# Patient Record
Sex: MALE | Race: WHITE | Employment: OTHER | URBAN - NONMETROPOLITAN AREA
[De-identification: names, ages, dates, MRNs, and addresses within clinical notes are randomized per-mention and may not be internally consistent; named-entity substitution may affect disease eponyms.]

---

## 2017-01-05 ENCOUNTER — OFFICE VISIT (OUTPATIENT)
Dept: FAMILY MEDICINE | Facility: OTHER | Age: 82
End: 2017-01-05
Attending: PHYSICIAN ASSISTANT
Payer: COMMERCIAL

## 2017-01-05 VITALS
DIASTOLIC BLOOD PRESSURE: 74 MMHG | SYSTOLIC BLOOD PRESSURE: 122 MMHG | OXYGEN SATURATION: 94 % | HEART RATE: 72 BPM | HEIGHT: 68 IN | BODY MASS INDEX: 26.98 KG/M2 | TEMPERATURE: 96.5 F | WEIGHT: 178 LBS

## 2017-01-05 DIAGNOSIS — E11.9 TYPE 2 DIABETES MELLITUS WITHOUT COMPLICATION, WITHOUT LONG-TERM CURRENT USE OF INSULIN (H): ICD-10-CM

## 2017-01-05 DIAGNOSIS — M17.12 PRIMARY OSTEOARTHRITIS OF LEFT KNEE: ICD-10-CM

## 2017-01-05 DIAGNOSIS — I25.810 CORONARY ARTERY DISEASE INVOLVING CORONARY BYPASS GRAFT OF NATIVE HEART WITHOUT ANGINA PECTORIS: ICD-10-CM

## 2017-01-05 DIAGNOSIS — E11.9 CONTROLLED TYPE 2 DIABETES MELLITUS WITHOUT COMPLICATION, WITHOUT LONG-TERM CURRENT USE OF INSULIN (H): ICD-10-CM

## 2017-01-05 DIAGNOSIS — E03.8 OTHER SPECIFIED HYPOTHYROIDISM: Primary | ICD-10-CM

## 2017-01-05 DIAGNOSIS — E11.9 ENCOUNTER FOR DIABETIC FOOT EXAM (H): ICD-10-CM

## 2017-01-05 DIAGNOSIS — E11.69 TYPE 2 DIABETES MELLITUS WITH OTHER SPECIFIED COMPLICATION (H): ICD-10-CM

## 2017-01-05 LAB
ALBUMIN SERPL-MCNC: 3.6 G/DL (ref 3.4–5)
ALBUMIN UR-MCNC: 10 MG/DL
ALP SERPL-CCNC: 102 U/L (ref 40–150)
ALT SERPL W P-5'-P-CCNC: 24 U/L (ref 0–70)
ANION GAP SERPL CALCULATED.3IONS-SCNC: 9 MMOL/L (ref 3–14)
APPEARANCE UR: CLEAR
AST SERPL W P-5'-P-CCNC: 14 U/L (ref 0–45)
BASOPHILS # BLD AUTO: 0.1 10E9/L (ref 0–0.2)
BASOPHILS NFR BLD AUTO: 1.1 %
BILIRUB SERPL-MCNC: 0.9 MG/DL (ref 0.2–1.3)
BILIRUB UR QL STRIP: NEGATIVE
BUN SERPL-MCNC: 12 MG/DL (ref 7–30)
CALCIUM SERPL-MCNC: 8.6 MG/DL (ref 8.5–10.1)
CHLORIDE SERPL-SCNC: 109 MMOL/L (ref 94–109)
CHOLEST SERPL-MCNC: 132 MG/DL
CO2 SERPL-SCNC: 24 MMOL/L (ref 20–32)
COLOR UR AUTO: YELLOW
CREAT SERPL-MCNC: 1.08 MG/DL (ref 0.66–1.25)
CREAT UR-MCNC: 226 MG/DL
DIFFERENTIAL METHOD BLD: NORMAL
EOSINOPHIL # BLD AUTO: 0.2 10E9/L (ref 0–0.7)
EOSINOPHIL NFR BLD AUTO: 2.6 %
ERYTHROCYTE [DISTWIDTH] IN BLOOD BY AUTOMATED COUNT: 13.4 % (ref 10–15)
EST. AVERAGE GLUCOSE BLD GHB EST-MCNC: 157 MG/DL
GFR SERPL CREATININE-BSD FRML MDRD: 65 ML/MIN/1.7M2
GLUCOSE SERPL-MCNC: 148 MG/DL (ref 70–99)
GLUCOSE UR STRIP-MCNC: NEGATIVE MG/DL
HBA1C MFR BLD: 7.1 % (ref 4.3–6)
HCT VFR BLD AUTO: 46.1 % (ref 40–53)
HDLC SERPL-MCNC: 38 MG/DL
HGB BLD-MCNC: 15.7 G/DL (ref 13.3–17.7)
HGB UR QL STRIP: NEGATIVE
HYALINE CASTS #/AREA URNS LPF: 2 /LPF (ref 0–2)
IMM GRANULOCYTES # BLD: 0 10E9/L (ref 0–0.4)
IMM GRANULOCYTES NFR BLD: 0.5 %
KETONES UR STRIP-MCNC: NEGATIVE MG/DL
LDLC SERPL CALC-MCNC: 64 MG/DL
LEUKOCYTE ESTERASE UR QL STRIP: ABNORMAL
LYMPHOCYTES # BLD AUTO: 1.3 10E9/L (ref 0.8–5.3)
LYMPHOCYTES NFR BLD AUTO: 20.8 %
MCH RBC QN AUTO: 29.1 PG (ref 26.5–33)
MCHC RBC AUTO-ENTMCNC: 34.1 G/DL (ref 31.5–36.5)
MCV RBC AUTO: 86 FL (ref 78–100)
MICROALBUMIN UR-MCNC: 10 MG/L
MICROALBUMIN/CREAT UR: 4.42 MG/G CR (ref 0–17)
MONOCYTES # BLD AUTO: 0.6 10E9/L (ref 0–1.3)
MONOCYTES NFR BLD AUTO: 8.8 %
MUCOUS THREADS #/AREA URNS LPF: PRESENT /LPF
NEUTROPHILS # BLD AUTO: 4.1 10E9/L (ref 1.6–8.3)
NEUTROPHILS NFR BLD AUTO: 66.2 %
NITRATE UR QL: NEGATIVE
NONHDLC SERPL-MCNC: 94 MG/DL
NRBC # BLD AUTO: 0 10*3/UL
NRBC BLD AUTO-RTO: 0 /100
PH UR STRIP: 5.5 PH (ref 4.7–8)
PLATELET # BLD AUTO: 173 10E9/L (ref 150–450)
POTASSIUM SERPL-SCNC: 4 MMOL/L (ref 3.4–5.3)
PROT SERPL-MCNC: 6.9 G/DL (ref 6.8–8.8)
RBC # BLD AUTO: 5.39 10E12/L (ref 4.4–5.9)
RBC #/AREA URNS AUTO: 0 /HPF (ref 0–2)
SODIUM SERPL-SCNC: 142 MMOL/L (ref 133–144)
SP GR UR STRIP: 1.02 (ref 1–1.03)
T4 FREE SERPL-MCNC: 0.86 NG/DL (ref 0.76–1.46)
TRIGL SERPL-MCNC: 152 MG/DL
TSH SERPL DL<=0.005 MIU/L-ACNC: 15.86 MU/L (ref 0.4–4)
URN SPEC COLLECT METH UR: ABNORMAL
UROBILINOGEN UR STRIP-MCNC: 2 MG/DL (ref 0–2)
WBC # BLD AUTO: 6.3 10E9/L (ref 4–11)
WBC #/AREA URNS AUTO: 2 /HPF (ref 0–2)

## 2017-01-05 PROCEDURE — 84443 ASSAY THYROID STIM HORMONE: CPT | Performed by: FAMILY MEDICINE

## 2017-01-05 PROCEDURE — 84439 ASSAY OF FREE THYROXINE: CPT | Performed by: FAMILY MEDICINE

## 2017-01-05 PROCEDURE — 82043 UR ALBUMIN QUANTITATIVE: CPT | Performed by: FAMILY MEDICINE

## 2017-01-05 PROCEDURE — 99212 OFFICE O/P EST SF 10 MIN: CPT

## 2017-01-05 PROCEDURE — 36415 COLL VENOUS BLD VENIPUNCTURE: CPT | Performed by: FAMILY MEDICINE

## 2017-01-05 PROCEDURE — 83036 HEMOGLOBIN GLYCOSYLATED A1C: CPT | Performed by: FAMILY MEDICINE

## 2017-01-05 PROCEDURE — 99214 OFFICE O/P EST MOD 30 MIN: CPT | Performed by: PHYSICIAN ASSISTANT

## 2017-01-05 PROCEDURE — 80061 LIPID PANEL: CPT | Performed by: FAMILY MEDICINE

## 2017-01-05 PROCEDURE — 81001 URINALYSIS AUTO W/SCOPE: CPT | Performed by: FAMILY MEDICINE

## 2017-01-05 PROCEDURE — 40000788 ZZHCL STATISTIC ESTIMATED AVERAGE GLUCOSE: Performed by: FAMILY MEDICINE

## 2017-01-05 PROCEDURE — 85025 COMPLETE CBC W/AUTO DIFF WBC: CPT | Performed by: FAMILY MEDICINE

## 2017-01-05 PROCEDURE — 80053 COMPREHEN METABOLIC PANEL: CPT | Performed by: FAMILY MEDICINE

## 2017-01-05 RX ORDER — ATORVASTATIN CALCIUM 40 MG/1
40 TABLET, FILM COATED ORAL DAILY
Qty: 90 TABLET | Refills: 1 | Status: SHIPPED | OUTPATIENT
Start: 2017-01-05 | End: 2017-07-28 | Stop reason: DRUGHIGH

## 2017-01-05 RX ORDER — LEVOTHYROXINE SODIUM 100 UG/1
100 TABLET ORAL DAILY
Qty: 60 TABLET | Refills: 0 | Status: SHIPPED | OUTPATIENT
Start: 2017-01-05 | End: 2017-01-27 | Stop reason: DRUGHIGH

## 2017-01-05 RX ORDER — METOPROLOL SUCCINATE 50 MG/1
50 TABLET, EXTENDED RELEASE ORAL DAILY
Qty: 90 TABLET | Refills: 1 | Status: SHIPPED | OUTPATIENT
Start: 2017-01-05 | End: 2018-02-16

## 2017-01-05 RX ORDER — METFORMIN HCL 500 MG
TABLET, EXTENDED RELEASE 24 HR ORAL
Qty: 180 TABLET | Refills: 0 | Status: SHIPPED | OUTPATIENT
Start: 2017-01-05 | End: 2017-03-28

## 2017-01-05 RX ORDER — GLIPIZIDE 5 MG/1
TABLET ORAL
Qty: 90 TABLET | Refills: 1 | Status: SHIPPED | OUTPATIENT
Start: 2017-01-05 | End: 2017-04-28 | Stop reason: DRUGHIGH

## 2017-01-05 ASSESSMENT — PATIENT HEALTH QUESTIONNAIRE - PHQ9: 5. POOR APPETITE OR OVEREATING: NOT AT ALL

## 2017-01-05 ASSESSMENT — ANXIETY QUESTIONNAIRES
6. BECOMING EASILY ANNOYED OR IRRITABLE: NOT AT ALL
2. NOT BEING ABLE TO STOP OR CONTROL WORRYING: NOT AT ALL
GAD7 TOTAL SCORE: 0
5. BEING SO RESTLESS THAT IT IS HARD TO SIT STILL: NOT AT ALL
7. FEELING AFRAID AS IF SOMETHING AWFUL MIGHT HAPPEN: NOT AT ALL
1. FEELING NERVOUS, ANXIOUS, OR ON EDGE: NOT AT ALL
3. WORRYING TOO MUCH ABOUT DIFFERENT THINGS: NOT AT ALL

## 2017-01-05 NOTE — NURSING NOTE
"Chief Complaint   Patient presents with     Recheck Medication       Initial /74 mmHg  Pulse 72  Temp(Src) 96.5  F (35.8  C) (Tympanic)  Ht 5' 7.5\" (1.715 m)  Wt 178 lb (80.74 kg)  BMI 27.45 kg/m2  SpO2 94% Estimated body mass index is 27.45 kg/(m^2) as calculated from the following:    Height as of this encounter: 5' 7.5\" (1.715 m).    Weight as of this encounter: 178 lb (80.74 kg).  BP completed using cuff size: charissa Bird LPN      "

## 2017-01-05 NOTE — PROGRESS NOTES
SUBJECTIVE:                                                    Nori Looney is a 82 year old male who presents to clinic today for the following health issues:      Diabetes Follow-up      Patient is checking blood sugars: once a week- averaging highest 165 lowest 120    Diabetic concerns: None     Symptoms of hypoglycemia (low blood sugar): dizzy     Paresthesias (numbness or burning in feet) or sores: No     Date of last diabetic eye exam: 6 months ago     Hypothyroidism Follow-up      Since last visit, patient describes the following symptoms: Weight stable, no hair loss, no skin changes, no constipation, no loose stools       Amount of exercise or physical activity: 6-7 days/week for an average of 45-60 minutes    Problems taking medications regularly: No    Medication side effects: dark spots when he bumps something, pains in hands, fingers and knee    Diet: regular (no restrictions)    Musculoskeletal problem/pain      Duration: left knee     Description  Location: pain in medial knee     Intensity:  moderate    Accompanying signs and symptoms: none    History  Previous similar problem: no   Previous evaluation:  none    Precipitating or alleviating factors:  Trauma or overuse: YES  Aggravating factors include: none    Therapies tried and outcome: nothing       Problem list and histories reviewed & adjusted, as indicated.  Additional history: as documented    Patient Active Problem List   Diagnosis     ACP (advance care planning)     Other specified hypothyroidism     Chronic systolic congestive heart failure (H)     Type 2 diabetes mellitus with other specified complication (H)     Benign essential hypertension     Hyperlipidemia LDL goal <70     Bilateral carotid artery stenosis     Coronary artery disease involving coronary bypass graft of native heart without angina pectoris     Past Surgical History   Procedure Laterality Date     As cabg, artery-vein, five  11/02/2011     off pump       Social History    Substance Use Topics     Smoking status: Never Smoker      Smokeless tobacco: Never Used      Comment: second hand smoke in the house 40 yrs     Alcohol Use: No     Family History   Problem Relation Age of Onset     Coronary Artery Disease Sister      Lung Cancer Sister      CEREBROVASCULAR DISEASE Father      Dementia Mother      Coronary Artery Disease Father      Thyroid Disease Daughter          Current Outpatient Prescriptions   Medication Sig Dispense Refill     levothyroxine (SYNTHROID/LEVOTHROID) 100 MCG tablet Take 1 tablet (100 mcg) by mouth daily 60 tablet 0     glipiZIDE (GLUCOTROL) 5 MG tablet TAKE 1 TABLET BY MOUTH EVERY DAY BEFORE A MEAL 30 tablet 0     metFORMIN (GLUCOPHAGE-XR) 500 MG 24 hr tablet TAKE 1 TABLET BY MOUTH TWICE DAILY WITH MEALS. SWALLOW WHOLE. DO NOT CRUSH OR CHEW 180 tablet 0     ASPIRIN PO Take 325 mg by mouth daily       Atorvastatin Calcium (LIPITOR PO) Take 40 mg by mouth daily       blood glucose monitoring (ACCU-CHEK KEYANNA) test strip by In Vitro route 2 times daily .       METFORMIN HCL PO Take 500 mg by mouth 2 times daily (with meals)       METOPROLOL SUCCINATE ER PO Take 50 mg by mouth daily        NITROGLYCERIN SL Place 0.4 mg under the tongue every 5 minutes as needed for chest pain       meclizine 25 MG CHEW Take 25 mg by mouth 3 times daily as needed for dizziness       [DISCONTINUED] levothyroxine (SYNTHROID/LEVOTHROID) 88 MCG tablet TAKE 1 TABLET(88 MCG) BY MOUTH DAILY 90 tablet 1     No Known Allergies  BP Readings from Last 3 Encounters:   01/05/17 122/74   06/29/16 121/68   06/09/16 161/91    Wt Readings from Last 3 Encounters:   01/05/17 178 lb (80.74 kg)   06/29/16 175 lb (79.379 kg)                  Problem list, Medication list, Allergies, and Medical/Social/Surgical histories reviewed in [facility] and updated as appropriate.    ROS:  Constitutional, neuro, ENT, endocrine, pulmonary, cardiac, gastrointestinal, genitourinary, musculoskeletal, integument and  "psychiatric systems are negative, except as otherwise noted.    OBJECTIVE:                                                    /74 mmHg  Pulse 72  Temp(Src) 96.5  F (35.8  C) (Tympanic)  Ht 5' 7.5\" (1.715 m)  Wt 178 lb (80.74 kg)  BMI 27.45 kg/m2  SpO2 94%  Body mass index is 27.45 kg/(m^2).  GENERAL APPEARANCE: healthy, alert and no distress  EYES: Eyes grossly normal to inspection, PERRL and conjunctivae and sclerae normal  HENT: ear canals and TM's normal and nose and mouth without ulcers or lesions  NECK: no adenopathy, no asymmetry, masses, or scars and thyroid normal to palpation  RESP: lungs clear to auscultation - no rales, rhonchi or wheezes  CV: regular rates and rhythm, normal S1 S2, no S3 or S4 and no murmur, click or rub  LYMPHATICS: normal ant/post cervical and supraclavicular nodes  ABDOMEN: soft, nontender, without hepatosplenomegaly or masses and bowel sounds normal  MS: extremities normal- no gross deformities noted  SKIN: no suspicious lesions or rashes  NEURO: Normal strength and tone, mentation intact and speech normal  DIABETIC FOOT EXAM: normal DP and PT pulses, no trophic changes or ulcerative lesions, normal sensory exam, bunions and loss of great toe nail on left.   PSYCH: mentation appears normal and affect normal/bright  MENTAL STATUS EXAM:  Appearance/Behavior: No apparent distress, Casually groomed and Appears stated age  Speech: Normal  Mood/Affect: normal affect  Insight: Adequate    Diagnostic test results:  Diagnostic Test Results:  Results for orders placed or performed in visit on 01/05/17   TSH with free T4 reflex   Result Value Ref Range    TSH 15.86 (H) 0.40 - 4.00 mU/L   Hemoglobin A1c   Result Value Ref Range    Hemoglobin A1C 7.1 (H) 4.3 - 6.0 %   Lipid Profile (Chol, Trig, HDL, LDL calc)   Result Value Ref Range    Cholesterol 132 <200 mg/dL    Triglycerides 152 (H) <150 mg/dL    HDL Cholesterol 38 (L) >39 mg/dL    LDL Cholesterol Calculated 64 <100 mg/dL    Non " HDL Cholesterol 94 <130 mg/dL   Comprehensive metabolic panel   Result Value Ref Range    Sodium 142 133 - 144 mmol/L    Potassium 4.0 3.4 - 5.3 mmol/L    Chloride 109 94 - 109 mmol/L    Carbon Dioxide 24 20 - 32 mmol/L    Anion Gap 9 3 - 14 mmol/L    Glucose 148 (H) 70 - 99 mg/dL    Urea Nitrogen 12 7 - 30 mg/dL    Creatinine 1.08 0.66 - 1.25 mg/dL    GFR Estimate 65 >60 mL/min/1.7m2    GFR Estimate If Black 79 >60 mL/min/1.7m2    Calcium 8.6 8.5 - 10.1 mg/dL    Bilirubin Total 0.9 0.2 - 1.3 mg/dL    Albumin 3.6 3.4 - 5.0 g/dL    Protein Total 6.9 6.8 - 8.8 g/dL    Alkaline Phosphatase 102 40 - 150 U/L    ALT 24 0 - 70 U/L    AST 14 0 - 45 U/L   CBC with platelets differential   Result Value Ref Range    WBC 6.3 4.0 - 11.0 10e9/L    RBC Count 5.39 4.4 - 5.9 10e12/L    Hemoglobin 15.7 13.3 - 17.7 g/dL    Hematocrit 46.1 40.0 - 53.0 %    MCV 86 78 - 100 fl    MCH 29.1 26.5 - 33.0 pg    MCHC 34.1 31.5 - 36.5 g/dL    RDW 13.4 10.0 - 15.0 %    Platelet Count 173 150 - 450 10e9/L    Diff Method Automated Method     % Neutrophils 66.2 %    % Lymphocytes 20.8 %    % Monocytes 8.8 %    % Eosinophils 2.6 %    % Basophils 1.1 %    % Immature Granulocytes 0.5 %    Nucleated RBCs 0 0 /100    Absolute Neutrophil 4.1 1.6 - 8.3 10e9/L    Absolute Lymphocytes 1.3 0.8 - 5.3 10e9/L    Absolute Monocytes 0.6 0.0 - 1.3 10e9/L    Absolute Eosinophils 0.2 0.0 - 0.7 10e9/L    Absolute Basophils 0.1 0.0 - 0.2 10e9/L    Abs Immature Granulocytes 0.0 0 - 0.4 10e9/L    Absolute Nucleated RBC 0.0    T4 free   Result Value Ref Range    T4 Free 0.86 0.76 - 1.46 ng/dL        ASSESSMENT/PLAN:                                                    1. Other specified hypothyroidism  He comes in today as he needed his thyroid medications refilled.  He was to see his primary Dr. Stuart but was placed on my service.  He has been taking this medications.  Has been active in heart failure clinic with Dr Elizondo and saw her yesterday and no labs were  done.  His TSH has improved from the 60's range to now 15 range.  We will bump up his medication and to be again rechecked in 2 months.  We did make an appointment for him prior to his leaving.   He does admit to trouble with memory and he is going to be giving all information to his grand daughter who now lives with him and she is a nurse.   We did write all his changes out for him on his AVS and when to return.     - TSH with free T4 reflex  - T4 free  - Estimated Average Glucose  - Lipid Profile; Future    2. Type 2 diabetes mellitus with other specified complication (H)  No low sugars and runs in the 180's at highest and 80 at lowest.  He doesn't like how he feels at the 80's as it feels shaky.  We will refill his meds and make no changes with an A1C of 7.1.   He is checking his sugars about 3 times a week.   His foot exam today was good.  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     - TSH with free T4 reflex  - Hemoglobin A1c  - Microalbumin quantitative random urine  - Lipid Profile (Chol, Trig, HDL, LDL calc)  - Comprehensive metabolic panel  - CBC with platelets differential  - UA with Microscopic reflex to Culture  - Albumin Random Urine Quantitative  - Lipid Profile; Future    3. Coronary artery disease involving coronary bypass graft of native heart without angina pectoris  Refill his medications.  Wasn't fasting but denies any sx of muscle aching at all.  At the heart clinic in [place] with Dr. Elizondo.  Not sure if she is filling this or us.  He is needing this done.  We will do this today for him.     - Lipid Profile; Future  - atorvastatin (LIPITOR) 40 MG tablet; Take 1 tablet (40 mg) by mouth daily  Dispense: 90 tablet; Refill: 1  - metoprolol (TOPROL-XL) 50 MG 24 hr tablet; Take 1 tablet (50 mg) by mouth daily  Dispense: 90 tablet; Refill: 1        4. Primary osteoarthritis of left knee  Tylenol and Fransico Boyce for his knee.  Not better we can image.         5.  Type 2 diabetes mellitus without complication, without long-term current use of insulin (H)  Doing well.  As above.    - metFORMIN (GLUCOPHAGE-XR) 500 MG 24 hr tablet; TAKE 1 TABLET BY MOUTH TWICE DAILY WITH MEALS. SWALLOW WHOLE. DO NOT CRUSH OR CHEW  Dispense: 180 tablet; Refill: 0    7. Controlled type 2 diabetes mellitus without complication, without long-term current use of insulin (H)  Doing well as above.   Foot exam documented and good.     - glipiZIDE (GLUCOTROL) 5 MG tablet; TAKE 1 TABLET BY MOUTH EVERY DAY BEFORE A MEAL  Dispense: 90 tablet; Refill: 1      Follow up with Provider - 2 months. For hypothyroidism.      SONAM Eric  [facility]

## 2017-01-05 NOTE — MR AVS SNAPSHOT
After Visit Summary   1/5/2017    Nori Looney    MRN: [id number]           Patient Information     Date Of Birth          2/4/1934        Visit Information        Provider Department      1/5/2017 9:30 AM Thao Bashir PA [facility] [place]        Today's Diagnoses     Coronary artery disease involving coronary bypass graft of native heart without angina pectoris    -  1     Other specified hypothyroidism         Type 2 diabetes mellitus with other specified complication (H)         Controlled type 2 diabetes mellitus without complication, with long-term current use of insulin (H)         Primary osteoarthritis of left knee         Type 2 diabetes mellitus without complication, without long-term current use of insulin (H)         Controlled type 2 diabetes mellitus without complication, without long-term current use of insulin (H)           Care Instructions    Results for orders placed or performed in visit on 01/05/17 (from the past 24 hour(s))   TSH with free T4 reflex   Result Value Ref Range    TSH 15.86 (H) 0.40 - 4.00 mU/L   Hemoglobin A1c   Result Value Ref Range    Hemoglobin A1C 7.1 (H) 4.3 - 6.0 %   Lipid Profile (Chol, Trig, HDL, LDL calc)   Result Value Ref Range    Cholesterol 132 <200 mg/dL    Triglycerides 152 (H) <150 mg/dL    HDL Cholesterol 38 (L) >39 mg/dL    LDL Cholesterol Calculated 64 <100 mg/dL    Non HDL Cholesterol 94 <130 mg/dL   Comprehensive metabolic panel   Result Value Ref Range    Sodium 142 133 - 144 mmol/L    Potassium 4.0 3.4 - 5.3 mmol/L    Chloride 109 94 - 109 mmol/L    Carbon Dioxide 24 20 - 32 mmol/L    Anion Gap 9 3 - 14 mmol/L    Glucose 148 (H) 70 - 99 mg/dL    Urea Nitrogen 12 7 - 30 mg/dL    Creatinine 1.08 0.66 - 1.25 mg/dL    GFR Estimate 65 >60 mL/min/1.7m2    GFR Estimate If Black 79 >60 mL/min/1.7m2    Calcium 8.6 8.5 - 10.1 mg/dL    Bilirubin Total 0.9 0.2 - 1.3 mg/dL    Albumin 3.6 3.4 - 5.0 g/dL    Protein Total 6.9 6.8 - 8.8 g/dL     Alkaline Phosphatase 102 40 - 150 U/L    ALT 24 0 - 70 U/L    AST 14 0 - 45 U/L   CBC with platelets differential   Result Value Ref Range    WBC 6.3 4.0 - 11.0 10e9/L    RBC Count 5.39 4.4 - 5.9 10e12/L    Hemoglobin 15.7 13.3 - 17.7 g/dL    Hematocrit 46.1 40.0 - 53.0 %    MCV 86 78 - 100 fl    MCH 29.1 26.5 - 33.0 pg    MCHC 34.1 31.5 - 36.5 g/dL    RDW 13.4 10.0 - 15.0 %    Platelet Count 173 150 - 450 10e9/L    Diff Method Automated Method     % Neutrophils 66.2 %    % Lymphocytes 20.8 %    % Monocytes 8.8 %    % Eosinophils 2.6 %    % Basophils 1.1 %    % Immature Granulocytes 0.5 %    Nucleated RBCs 0 0 /100    Absolute Neutrophil 4.1 1.6 - 8.3 10e9/L    Absolute Lymphocytes 1.3 0.8 - 5.3 10e9/L    Absolute Monocytes 0.6 0.0 - 1.3 10e9/L    Absolute Eosinophils 0.2 0.0 - 0.7 10e9/L    Absolute Basophils 0.1 0.0 - 0.2 10e9/L    Abs Immature Granulocytes 0.0 0 - 0.4 10e9/L    Absolute Nucleated RBC 0.0    T4 free   Result Value Ref Range    T4 Free 0.86 0.76 - 1.46 ng/dL     We sent in new dose of his medication for his thyroid.  He needs to see Dr. Stuart in 6 to 8 weeks on his new dose of medications.   He should continue with Dr. Elizondo as he has been instructed from Before and watch his weight and salt intake.   We did clean his ears today.  He has wax in them.  May benefit his hearing.           Follow-ups after your visit        Future tests that were ordered for you today     Open Future Orders        Priority Expected Expires Ordered    Lipid Profile Routine  1/5/2018 1/5/2017            Who to contact     If you have questions or need follow up information about today's clinic visit or your schedule please contact [facility] directly at [phone].  Normal or non-critical lab and imaging results will be communicated to you by MyChart, letter or phone within 4 business days after the clinic has received the results. If you do not hear from us within 7 days, please contact the clinic  "through [employer] or phone. If you have a critical or abnormal lab result, we will notify you by phone as soon as possible.  Submit refill requests through [employer] or call your pharmacy and they will forward the refill request to us. Please allow 3 business days for your refill to be completed.          Additional Information About Your Visit        Care EveryWhere ID     This is your Care EveryWhere ID. This could be used by other organizations to access your [facility] medical records  [id number]        Your Vitals Were     Pulse Temperature Height BMI (Body Mass Index) Pulse Oximetry       72 96.5  F (35.8  C) (Tympanic) 5' 7.5\" (1.715 m) 27.45 kg/m2 94%        Blood Pressure from Last 3 Encounters:   01/05/17 122/74   06/29/16 121/68   06/09/16 161/91    Weight from Last 3 Encounters:   01/05/17 178 lb (80.74 kg)   06/29/16 175 lb (79.379 kg)              We Performed the Following     Albumin Random Urine Quantitative     CBC with platelets differential     Comprehensive metabolic panel     Estimated Average Glucose     Hemoglobin A1c     Lipid Profile (Chol, Trig, HDL, LDL calc)     Microalbumin quantitative random urine     T4 free     TSH with free T4 reflex     UA with Microscopic reflex to Culture          Today's Medication Changes          These changes are accurate as of: 1/5/17 10:43 AM.  If you have any questions, ask your nurse or doctor.               These medicines have changed or have updated prescriptions.        Dose/Directions    atorvastatin 40 MG tablet   Commonly known as:  LIPITOR   This may have changed:  medication strength   Used for:  Coronary artery disease involving coronary bypass graft of native heart without angina pectoris   Changed by:  Thao Bashir PA        Dose:  40 mg   Take 1 tablet (40 mg) by mouth daily   Quantity:  90 tablet   Refills:  1       glipiZIDE 5 MG tablet   Commonly known as:  GLUCOTROL   This may have changed:  See the new instructions.   Used for:  " Controlled type 2 diabetes mellitus without complication, without long-term current use of insulin (H)   Changed by:  Thao Bashir PA        TAKE 1 TABLET BY MOUTH EVERY DAY BEFORE A MEAL   Quantity:  90 tablet   Refills:  1       levothyroxine 100 MCG tablet   Commonly known as:  SYNTHROID/LEVOTHROID   This may have changed:  See the new instructions.   Used for:  Controlled type 2 diabetes mellitus without complication, with long-term current use of insulin (H)   Changed by:  Thao Bashir PA        Dose:  100 mcg   Take 1 tablet (100 mcg) by mouth daily   Quantity:  60 tablet   Refills:  0       metFORMIN 500 MG 24 hr tablet   Commonly known as:  GLUCOPHAGE-XR   This may have changed:  See the new instructions.   Used for:  Type 2 diabetes mellitus without complication, without long-term current use of insulin (H)   Changed by:  Thao Bashir PA        TAKE 1 TABLET BY MOUTH TWICE DAILY WITH MEALS. SWALLOW WHOLE. DO NOT CRUSH OR CHEW   Quantity:  180 tablet   Refills:  0       metoprolol 50 MG 24 hr tablet   Commonly known as:  TOPROL-XL   This may have changed:  medication strength   Used for:  Coronary artery disease involving coronary bypass graft of native heart without angina pectoris   Changed by:  Thao Bashir PA        Dose:  50 mg   Take 1 tablet (50 mg) by mouth daily   Quantity:  90 tablet   Refills:  1            Where to get your medicines      These medications [id number] [address] [facility] [address] [facility] [address]  [address] [place] [address] [address]     Phone:  [phone]    - atorvastatin 40 MG tablet  - glipiZIDE 5 MG tablet  - levothyroxine 100 MCG tablet  - metFORMIN 500 MG 24 hr tablet  - metoprolol 50 MG 24 hr tablet             Primary Care Provider Office Phone # Fax #    Bisi Stuart [phone]       [facility] [place] [address]  [place] [address]        Thank you!     Thank  you for choosing [facility]  for your care. Our goal is always to provide you with excellent care. Hearing back from our patients is one way we can continue to improve our services. Please take a few minutes to complete the written survey that you may receive in the mail after your visit with us. Thank you!             Your Updated Medication List - Protect others around you: Learn how to safely use, store and throw away your medicines at www.disposemymeds.org.          This list is accurate as of: 1/5/17 10:43 AM.  Always use your most recent med list.                   Brand Name Dispense Instructions for use    ACCU-CHEK KEYANNA test strip   Generic drug:  blood glucose monitoring      by In Vitro route 2 times daily .       ASPIRIN PO      Take 325 mg by mouth daily       atorvastatin 40 MG tablet    LIPITOR    90 tablet    Take 1 tablet (40 mg) by mouth daily       glipiZIDE 5 MG tablet    GLUCOTROL    90 tablet    TAKE 1 TABLET BY MOUTH EVERY DAY BEFORE A MEAL       levothyroxine 100 MCG tablet    SYNTHROID/LEVOTHROID    60 tablet    Take 1 tablet (100 mcg) by mouth daily       meclizine 25 MG Chew      Take 25 mg by mouth 3 times daily as needed for dizziness       metFORMIN 500 MG 24 hr tablet    GLUCOPHAGE-XR    180 tablet    TAKE 1 TABLET BY MOUTH TWICE DAILY WITH MEALS. SWALLOW WHOLE. DO NOT CRUSH OR CHEW       metoprolol 50 MG 24 hr tablet    TOPROL-XL    90 tablet    Take 1 tablet (50 mg) by mouth daily       NITROGLYCERIN SL      Place 0.4 mg under the tongue every 5 minutes as needed for chest pain

## 2017-01-05 NOTE — PATIENT INSTRUCTIONS
Results for orders placed or performed in visit on 01/05/17 (from the past 24 hour(s))   TSH with free T4 reflex   Result Value Ref Range    TSH 15.86 (H) 0.40 - 4.00 mU/L   Hemoglobin A1c   Result Value Ref Range    Hemoglobin A1C 7.1 (H) 4.3 - 6.0 %   Lipid Profile (Chol, Trig, HDL, LDL calc)   Result Value Ref Range    Cholesterol 132 <200 mg/dL    Triglycerides 152 (H) <150 mg/dL    HDL Cholesterol 38 (L) >39 mg/dL    LDL Cholesterol Calculated 64 <100 mg/dL    Non HDL Cholesterol 94 <130 mg/dL   Comprehensive metabolic panel   Result Value Ref Range    Sodium 142 133 - 144 mmol/L    Potassium 4.0 3.4 - 5.3 mmol/L    Chloride 109 94 - 109 mmol/L    Carbon Dioxide 24 20 - 32 mmol/L    Anion Gap 9 3 - 14 mmol/L    Glucose 148 (H) 70 - 99 mg/dL    Urea Nitrogen 12 7 - 30 mg/dL    Creatinine 1.08 0.66 - 1.25 mg/dL    GFR Estimate 65 >60 mL/min/1.7m2    GFR Estimate If Black 79 >60 mL/min/1.7m2    Calcium 8.6 8.5 - 10.1 mg/dL    Bilirubin Total 0.9 0.2 - 1.3 mg/dL    Albumin 3.6 3.4 - 5.0 g/dL    Protein Total 6.9 6.8 - 8.8 g/dL    Alkaline Phosphatase 102 40 - 150 U/L    ALT 24 0 - 70 U/L    AST 14 0 - 45 U/L   CBC with platelets differential   Result Value Ref Range    WBC 6.3 4.0 - 11.0 10e9/L    RBC Count 5.39 4.4 - 5.9 10e12/L    Hemoglobin 15.7 13.3 - 17.7 g/dL    Hematocrit 46.1 40.0 - 53.0 %    MCV 86 78 - 100 fl    MCH 29.1 26.5 - 33.0 pg    MCHC 34.1 31.5 - 36.5 g/dL    RDW 13.4 10.0 - 15.0 %    Platelet Count 173 150 - 450 10e9/L    Diff Method Automated Method     % Neutrophils 66.2 %    % Lymphocytes 20.8 %    % Monocytes 8.8 %    % Eosinophils 2.6 %    % Basophils 1.1 %    % Immature Granulocytes 0.5 %    Nucleated RBCs 0 0 /100    Absolute Neutrophil 4.1 1.6 - 8.3 10e9/L    Absolute Lymphocytes 1.3 0.8 - 5.3 10e9/L    Absolute Monocytes 0.6 0.0 - 1.3 10e9/L    Absolute Eosinophils 0.2 0.0 - 0.7 10e9/L    Absolute Basophils 0.1 0.0 - 0.2 10e9/L    Abs Immature Granulocytes 0.0 0 - 0.4 10e9/L    Absolute  Nucleated RBC 0.0    T4 free   Result Value Ref Range    T4 Free 0.86 0.76 - 1.46 ng/dL     We sent in new dose of his medication for his thyroid.  He needs to see Dr. Stuart in 6 to 8 weeks on his new dose of medications.   He should continue with Dr. Elizondo as he has been instructed from Before and watch his weight and salt intake.   We did clean his ears today.  He has wax in them.  May benefit his hearing.

## 2017-01-06 ASSESSMENT — PATIENT HEALTH QUESTIONNAIRE - PHQ9: SUM OF ALL RESPONSES TO PHQ QUESTIONS 1-9: 1

## 2017-01-06 ASSESSMENT — ANXIETY QUESTIONNAIRES: GAD7 TOTAL SCORE: 0

## 2017-01-06 NOTE — PROGRESS NOTES
both ear wash done per Thao Bashir. Ear was irrigated with warm water, hydrogen peroxide and rubbing alcohol.  Ear was irrigated until cerumen was removed. Ear was clear, with no redness noted. Patient tolerated procedure well.  Nicole Bird LPN

## 2017-01-22 ENCOUNTER — HOSPITAL ENCOUNTER (EMERGENCY)
Facility: HOSPITAL | Age: 82
Discharge: HOME | End: 2017-01-22
Attending: NURSE PRACTITIONER | Admitting: NURSE PRACTITIONER
Payer: COMMERCIAL

## 2017-01-22 VITALS
DIASTOLIC BLOOD PRESSURE: 79 MMHG | OXYGEN SATURATION: 96 % | SYSTOLIC BLOOD PRESSURE: 151 MMHG | RESPIRATION RATE: 16 BRPM | TEMPERATURE: 96.5 F

## 2017-01-22 DIAGNOSIS — Z79.4 CONTROLLED TYPE 2 DIABETES MELLITUS WITHOUT COMPLICATION, WITH LONG-TERM CURRENT USE OF INSULIN (H): Primary | ICD-10-CM

## 2017-01-22 DIAGNOSIS — M16.11 ARTHRITIS OF RIGHT HIP: ICD-10-CM

## 2017-01-22 DIAGNOSIS — E11.9 CONTROLLED TYPE 2 DIABETES MELLITUS WITHOUT COMPLICATION, WITH LONG-TERM CURRENT USE OF INSULIN (H): Primary | ICD-10-CM

## 2017-01-22 PROCEDURE — 99213 OFFICE O/P EST LOW 20 MIN: CPT | Performed by: NURSE PRACTITIONER

## 2017-01-22 PROCEDURE — 99213 OFFICE O/P EST LOW 20 MIN: CPT

## 2017-01-22 PROCEDURE — 73502 X-RAY EXAM HIP UNI 2-3 VIEWS: CPT | Mod: TC,RT

## 2017-01-22 NOTE — DISCHARGE INSTRUCTIONS
Capsaicin cream or patches  Icy hot patches  Osteoarthritis  Osteoarthritis (also called degenerative joint disease) happens when the cartilage in a joint becomes damaged and worn. This may be due to age, wear and tear, overuse of the joint, or other problems. Osteoarthritis can affect any joint. But it is most common in hands, knees, spine, hips, and feet. Symptoms include joint stiffness, pain, and swelling.  Home care    When a joint is more sore than usual, rest it for a day or two.    Heat can help relieve stiffness. Take a hot bath or apply a heating pad for up to 30 minutes at a time. If symptoms are worse in the morning, using heat just after awakening can help relax the muscle and soothe the joints.     Ice helps relieve pain and swelling. It is often used after activity. Use a cold pack wrapped in a thin cloth on the joint for 10-15 minutes at a time.     Alternating hot and cold can also help relieve pain. Try this for 20 minutes at a time, several times per day.    Exercise helps prevent the muscles and ligaments around the joint from becoming weak. It also helps maintain function in the joint.  Be as active as you can. Talk to your doctor about what activity program is best for you.    Excess weight puts a lot of extra strain on weight-bearing joints of the lower back, hips, knees, feet and ankles. If you are overweight, talk to your doctor about a safe and effective weight loss program.    Use anti-inflammatory medications as prescribed for pain. This incudes acetaminophen or NSAIDs such as ibuprofen or naproxen. If needed, topical or injected medications may be recommended. Talk to your doctor if these options are not enough to manage your pain.    Talk with your doctor about devices that might help improve your function and reduce pain.  Follow-up care  Follow up with your doctor as advised by our staff.  When to seek medical attention  Call your doctor right away if any of the following  occur:    Redness or swelling of a painful joint    Discharge or pus from a painful joint    Fever of 100.4 F (38 C) or higher, or as directed by your healthcare provider    Worsening joint pain    Decreased ability to move the joint or bear weight on the joint    2538-4607 [employer]. [address] [place] [address]24. All rights reserved. This information is not intended as a substitute for professional medical care. Always follow your healthcare professional's instructions.

## 2017-01-22 NOTE — ED AVS SNAPSHOT
[facility]    [address] [address]    Phone:  [phone]                                       Nori Looney   MRN: [id number]    Department:  [facility]   Date of Visit:  1/22/2017           Patient Information     Date Of Birth          2/4/1934        Your diagnoses for this visit were:     Arthritis of right hip        You were seen by Nancy Miranda NP.      Follow-up Information     Follow up with [facility].    Specialty:  EMERGENCY MEDICINE    Why:  As needed, If symptoms worsen, or concerns develop    Contact information:    [address] [place] [address] [phone]    Additional information:    From [place]: Take [address]. Turn left at [address]. Turn left at the first stoplight on [address]. At the first stop sign, take a right onto [address]. Take a left into the parking lot and continue through until you reach the North enterance of the building.       From [place]: Take [address]. Take the [address] ramp towards [place]. Turn left onto [address]. Take a slight right onto [address]. Turn left at the first stoplight on [address]. At the first stop sign, take a right onto [address]. Take a left into the parking lot and continue through until you reach the North enterance of the building.       From [place]: Take [address]. Take a right at [address]. At the first stop sign, take a right onto [address]. Take a left into the parking lot and continue through until you reach the North enterance of the building.         Follow up with Bisi Stuart MD. Call on 1/23/2017.    Specialty:  Family Practice    Why:  to schedule an appointment for follow up    Contact information:    [facility]  [address]  [place] [address]  [phone]          Discharge Instructions         Capsaicin cream or  patches  Icy hot patches  Osteoarthritis  Osteoarthritis (also called degenerative joint disease) happens when the cartilage in a joint becomes damaged and worn. This may be due to age, wear and tear, overuse of the joint, or other problems. Osteoarthritis can affect any joint. But it is most common in hands, knees, spine, hips, and feet. Symptoms include joint stiffness, pain, and swelling.  Home care    When a joint is more sore than usual, rest it for a day or two.    Heat can help relieve stiffness. Take a hot bath or apply a heating pad for up to 30 minutes at a time. If symptoms are worse in the morning, using heat just after awakening can help relax the muscle and soothe the joints.     Ice helps relieve pain and swelling. It is often used after activity. Use a cold pack wrapped in a thin cloth on the joint for 10-15 minutes at a time.     Alternating hot and cold can also help relieve pain. Try this for 20 minutes at a time, several times per day.    Exercise helps prevent the muscles and ligaments around the joint from becoming weak. It also helps maintain function in the joint.  Be as active as you can. Talk to your doctor about what activity program is best for you.    Excess weight puts a lot of extra strain on weight-bearing joints of the lower back, hips, knees, feet and ankles. If you are overweight, talk to your doctor about a safe and effective weight loss program.    Use anti-inflammatory medications as prescribed for pain. This incudes acetaminophen or NSAIDs such as ibuprofen or naproxen. If needed, topical or injected medications may be recommended. Talk to your doctor if these options are not enough to manage your pain.    Talk with your doctor about devices that might help improve your function and reduce pain.  Follow-up care  Follow up with your doctor as advised by our staff.  When to seek medical attention  Call your doctor right away if any of the following occur:    Redness or swelling  of a painful joint    Discharge or pus from a painful joint    Fever of 100.4 F (38 C) or higher, or as directed by your healthcare provider    Worsening joint pain    Decreased ability to move the joint or bear weight on the joint    5016-7072 [employer]. [address] [place] [address]. All rights reserved. This information is not intended as a substitute for professional medical care. Always follow your healthcare professional's instructions.             Review of your medicines      Our records show that you are taking the medicines listed below. If these are incorrect, please call your family doctor or clinic.        Dose / Directions Last dose taken    ACCU-CHEK KEYANNA test strip   Generic drug:  blood glucose monitoring        by In Vitro route 2 times daily .   Refills:  0        ASPIRIN PO   Dose:  325 mg        Take 325 mg by mouth daily   Refills:  0        atorvastatin 40 MG tablet   Commonly known as:  LIPITOR   Dose:  40 mg   Quantity:  90 tablet        Take 1 tablet (40 mg) by mouth daily   Refills:  1        glipiZIDE 5 MG tablet   Commonly known as:  GLUCOTROL   Quantity:  90 tablet        TAKE 1 TABLET BY MOUTH EVERY DAY BEFORE A MEAL   Refills:  1        levothyroxine 100 MCG tablet   Commonly known as:  SYNTHROID/LEVOTHROID   Dose:  100 mcg   Quantity:  60 tablet        Take 1 tablet (100 mcg) by mouth daily   Refills:  0        meclizine 25 MG Chew   Dose:  25 mg        Take 25 mg by mouth 3 times daily as needed for dizziness   Refills:  0        metFORMIN 500 MG 24 hr tablet   Commonly known as:  GLUCOPHAGE-XR   Quantity:  180 tablet        TAKE 1 TABLET BY MOUTH TWICE DAILY WITH MEALS. SWALLOW WHOLE. DO NOT CRUSH OR CHEW   Refills:  0        metoprolol 50 MG 24 hr tablet   Commonly known as:  TOPROL-XL   Dose:  50 mg   Quantity:  90 tablet        Take 1 tablet (50 mg) by mouth daily   Refills:  1        NITROGLYCERIN SL   Dose:  0.4 mg        Place 0.4 mg under the  tongue every 5 minutes as needed for chest pain   Refills:  0                Procedures and tests performed during your visit     XR Pelvis including Hip Right 2-3 Views      Orders Needing Specimen Collection     None      Pending Results     Date and Time Order Name Status Description    1/22/2017 1447 XR Pelvis including Hip Right 2-3 Views In process             Pending Culture Results     No orders found from 1/21/2017 to 1/23/2017.            Thank you for choosing [facility]       Thank you for choosing [facility] for your care. Our goal is always to provide you with excellent care. Hearing back from our patients is one way we can continue to improve our services. Please take a few minutes to complete the written survey that you may receive in the mail after you visit with us. Thank you!        Care EveryWhere ID     This is your Care EveryWhere ID. This could be used by other organizations to access your [facility] medical records  [id number]        After Visit Summary       This is your record. Keep this with you and show to your community pharmacist(s) and doctor(s) at your next visit.

## 2017-01-22 NOTE — ED PROVIDER NOTES
History     Chief Complaint   Patient presents with     Hip Pain     c/o rt hip pain for a few weeks, denies injury     The history is provided by the patient. No  was used.     Nori Looney is a 82 year old male who presents today with a CC of right hip pain x several weeks.  No injury.  He is a farmer, very active, has a history of osteoarthritis.  Pain and stiffness is worse in the morning, improves as gets up and moves during the day.  He takes a daily ASA, no other pain medications.  Today he noted sharp stabbing pain in his right hip and buttocks.  No inciting injury or movements.  The pain was intense for a short time and has since been resolving.  His family was visiting and encouraged him to come in for evaluation.  He notes his left knee has been causing him pain off and on with certain twisting movements - no injury.  He denies numbness or tingling, no saddle paresthesias, no loss of continence.      I have reviewed the Medications, Allergies, Past Medical and Surgical History, and Social History in the Epic system.    Review of Systems   Constitutional: Negative for fever, chills, appetite change and fatigue.   Musculoskeletal: Positive for arthralgias (right hip, left knee).       Physical Exam   BP: 151/79 mmHg  Heart Rate: 75  Temp: 96.5  F (35.8  C)  Resp: 16  SpO2: 96 %    Physical Exam   Constitutional: He is oriented to person, place, and time. He appears well-developed and well-nourished.   HENT:   Head: Normocephalic and atraumatic.   Eyes: Conjunctivae are normal.   Cardiovascular: Normal rate.    Pulmonary/Chest: Effort normal.   Musculoskeletal:        Right hip: He exhibits tenderness and bony tenderness (femoral head). He exhibits normal range of motion, normal strength, no swelling, no deformity and no laceration.        Left knee: Tenderness found. Medial joint line tenderness noted.        Lumbar back: He exhibits no tenderness, no bony tenderness, no swelling, no  laceration and no spasm.   Right hip: No obvious ecchymosis, edema or erythema.  Mildly TTP right SI joint  Bilateral pedal pulses intact  Sensation intact, capillary refill < 3 seconds bilateral LE's  Left knee: No obvious ecchymosis, edema or erythema.  Able to reproduce mild pain with flexion and internal rotation.  No varus or valgus laxity or pain noted   Neurological: He is alert and oriented to person, place, and time.   Psychiatric: He has a normal mood and affect. His behavior is normal.   Nursing note and vitals reviewed.      ED Course   Procedures    Results for orders placed or performed during the hospital encounter of 01/22/17   XR Pelvis including Hip Right 2-3 Views    Narrative    PELVIS AND RIGHT HIP    HISTORY:  An 82-year-old with right hip pain.    FINDINGS:  A total of three views were obtained.  There is moderate to  severe joint space narrowing of the right hip.  There is marginal  osteophytic change at the head/neck junction, with some  chondrocalcinosis.  There is osteoarthritic change of similar to  slightly less severity of the left hip.    IMPRESSION:  MODERATE TO ADVANCED LEFT HIP OSTEARTHRITIS, WITHOUT  EVIDENCE OF FRACTURE OR DISLOCATION.  Exam Date: Jan 22, 2017 02:57:00 PM  Author: RICHARD SAMPSON  This report is final and signed         Assessments & Plan (with Medical Decision Making)     I have reviewed the nursing notes.    I have reviewed the findings, diagnosis, plan and need for follow up with the patient.  ASSESSMENT / PLAN:  (M19.90) Arthritis of right hip  Comment: no acute changes  Plan:  Offered patient ortho consultation as he is quite active and spry for his age, he declines at this time, would like to follow up with PCP first.  States he has a f/u appt for thyroid medication.  Will discuss at that time   Discussed OTC medications - NSAIDS, and topicals such as capsaicin cream.     Keep active, but do not push activity past point of discomfort   Patient and family  verbally educated and given appropriate education sheets for their diagnoses and has no questions.   Take medications as directed.    Return to ED/UC if symptoms increase or concerns develop: red flag symptoms as discussed and per discharge instructions   Call Monday to schedule a follow up appointment with PCP      Discharge Medication List as of 1/22/2017  3:16 PM          Final diagnoses:   Arthritis of right hip       1/22/2017   [facility]      Nancy Miranda NP  01/25/17 1052

## 2017-01-22 NOTE — ED AVS SNAPSHOT
[facility]    [address]    [place] [address]    Phone:  [phone]                                       Nori Looney   MRN: [id number]    Department:  [facility]   Date of Visit:  1/22/2017           After Visit Summary Signature Page     I have received my discharge instructions, and my questions have been answered. I have discussed any challenges I see with this plan with the nurse or doctor.    ..........................................................................................................................................  Patient/Patient Representative Signature      ..........................................................................................................................................  Patient Representative Print Name and Relationship to Patient    ..................................................               ................................................  Date                                            Time    ..........................................................................................................................................  Reviewed by Signature/Title    ...................................................              ..............................................  Date                                                            Time

## 2017-01-23 NOTE — PROGRESS NOTES
Quick Note:    Pelvis and Right Hip XR report routed to PCP, Dr. YONI Neville. Incidental finding - moderate to advanced left hip osteoarthritis.  ______

## 2017-01-24 RX ORDER — GLIPIZIDE 5 MG/1
TABLET ORAL
Qty: 30 TABLET | Refills: 5 | Status: SHIPPED | OUTPATIENT
Start: 2017-01-24 | End: 2017-07-28

## 2017-01-24 NOTE — TELEPHONE ENCOUNTER
Glipizide 5mg tab         Last Written Prescription Date: 12-  Last Fill Quantity: 30, # refills: 0  Last Office Visit with G, P or [facility] prescribing provider:  1-5-2017   Next 5 appointments (look out 90 days)     Jan 27, 2017  9:45 AM   (Arrive by 9:30 AM)   SHORT with Bisi Stuart MD   [facility]    [address]  [facility] [address]   [phone]                   BP Readings from Last 3 Encounters:   01/22/17 151/79   01/05/17 122/74   06/29/16 121/68     MICROL       10   1/5/2017  No results found for this basename: microalbumin  CREATININE   Date Value Ref Range Status   01/05/2017 1.08 0.66 - 1.25 mg/dL Final   ]  GFR ESTIMATE   Date Value Ref Range Status   01/05/2017 65 >60 mL/min/1.7m2 Final     Comment:     Non  GFR Calc   06/09/2016 76 >60 mL/min/1.7m2 Final     Comment:     Non  GFR Calc     GFR ESTIMATE IF BLACK   Date Value Ref Range Status   01/05/2017 79 >60 mL/min/1.7m2 Final     Comment:      GFR Calc   06/09/2016 >90   GFR Calc   >60 mL/min/1.7m2 Final     CHOL      132   1/5/2017  HDL       38   1/5/2017  LDL       64   1/5/2017  TRIG      152   1/5/2017  No results found for this basename: cholhdlratio  AST       14   1/5/2017  ALT       24   1/5/2017  A1C      7.1   1/5/2017  A1C      7.3   6/15/2016  POTASSIUM   Date Value Ref Range Status   01/05/2017 4.0 3.4 - 5.3 mmol/L Final

## 2017-01-25 ASSESSMENT — ENCOUNTER SYMPTOMS
FATIGUE: 0
FEVER: 0
APPETITE CHANGE: 0
ARTHRALGIAS: 1
CHILLS: 0

## 2017-01-27 ENCOUNTER — OFFICE VISIT (OUTPATIENT)
Dept: FAMILY MEDICINE | Facility: OTHER | Age: 82
End: 2017-01-27
Attending: FAMILY MEDICINE
Payer: COMMERCIAL

## 2017-01-27 VITALS
HEART RATE: 74 BPM | HEIGHT: 68 IN | DIASTOLIC BLOOD PRESSURE: 60 MMHG | SYSTOLIC BLOOD PRESSURE: 120 MMHG | BODY MASS INDEX: 25.31 KG/M2 | TEMPERATURE: 97 F | OXYGEN SATURATION: 95 % | WEIGHT: 167 LBS

## 2017-01-27 DIAGNOSIS — Z23 NEED FOR VACCINATION: ICD-10-CM

## 2017-01-27 DIAGNOSIS — E11.9 TYPE 2 DIABETES MELLITUS WITHOUT COMPLICATION, WITHOUT LONG-TERM CURRENT USE OF INSULIN (H): Primary | ICD-10-CM

## 2017-01-27 DIAGNOSIS — E78.5 HYPERLIPIDEMIA LDL GOAL <70: ICD-10-CM

## 2017-01-27 DIAGNOSIS — M16.7 OTHER SECONDARY OSTEOARTHRITIS OF RIGHT HIP: ICD-10-CM

## 2017-01-27 DIAGNOSIS — I10 BENIGN ESSENTIAL HYPERTENSION: ICD-10-CM

## 2017-01-27 DIAGNOSIS — E03.8 OTHER SPECIFIED HYPOTHYROIDISM: ICD-10-CM

## 2017-01-27 DIAGNOSIS — I50.22 CHRONIC SYSTOLIC CONGESTIVE HEART FAILURE (H): ICD-10-CM

## 2017-01-27 PROCEDURE — 99212 OFFICE O/P EST SF 10 MIN: CPT | Mod: 25

## 2017-01-27 PROCEDURE — G0009 ADMIN PNEUMOCOCCAL VACCINE: HCPCS | Performed by: FAMILY MEDICINE

## 2017-01-27 PROCEDURE — 99214 OFFICE O/P EST MOD 30 MIN: CPT | Performed by: FAMILY MEDICINE

## 2017-01-27 PROCEDURE — 90732 PPSV23 VACC 2 YRS+ SUBQ/IM: CPT | Performed by: FAMILY MEDICINE

## 2017-01-27 RX ORDER — LEVOTHYROXINE SODIUM 112 UG/1
112 TABLET ORAL DAILY
Qty: 90 TABLET | Refills: 1 | Status: SHIPPED | OUTPATIENT
Start: 2017-01-27 | End: 2017-06-26

## 2017-01-27 ASSESSMENT — PAIN SCALES - GENERAL: PAINLEVEL: MODERATE PAIN (4)

## 2017-01-27 NOTE — MR AVS SNAPSHOT
After Visit Summary   1/27/2017    Nori Looney    MRN: [id number]           Patient Information     Date Of Birth          2/4/1934        Visit Information        Provider Department      1/27/2017 9:45 AM Bisi Stuart MD [facility]        Today's Diagnoses     Type 2 diabetes mellitus without complication, without long-term current use of insulin (H)    -  1     Other specified hypothyroidism         Chronic systolic congestive heart failure (H)         Benign essential hypertension         Hyperlipidemia LDL goal <70         Other secondary osteoarthritis of right hip           Care Instructions    1.  Follow up in 3 mos  2.  Increase synthroid to 112 mcg and take on an empty stomach first thing in the morning  3.  Orthopedic consult          Follow-ups after your visit        Additional Services     ORTHOPEDICS ADULT REFERRAL       Your provider has referred you to: [facility]  Would like to consider injection    Please be aware that coverage of these services is subject to the terms and limitations of your health insurance plan.  Call member services at your health plan with any benefit or coverage questions.      Please bring the following to your appointment:    >>   Any x-rays, CTs or MRIs which have been performed.  Contact the facility where they were done to arrange for  prior to your scheduled appointment.  Any new CT, MRI or other procedures ordered by your specialist must be performed at a [facility] facility or coordinated by your clinic's referral office.    >>   List of current medications   >>   This referral request   >>   Any documents/labs given to you for this referral                  Follow-up notes from your care team     Return in about 3 months (around 4/27/2017) for DM2 follow up/thyroid.      Who to contact     If you have questions or need follow up information about today's clinic visit or your schedule please contact [facility]  "directly at [phone].  Normal or non-critical lab and imaging results will be communicated to you by MyChart, letter or phone within 4 business days after the clinic has received the results. If you do not hear from us within 7 days, please contact the clinic through MyChart or phone. If you have a critical or abnormal lab result, we will notify you by phone as soon as possible.  Submit refill requests through [employer] or call your pharmacy and they will forward the refill request to us. Please allow 3 business days for your refill to be completed.          Additional Information About Your Visit        Care EveryWhere ID     This is your Care EveryWhere ID. This could be used by other organizations to access your [facility] medical records  [id number]        Your Vitals Were     Pulse Temperature Height BMI (Body Mass Index) Pulse Oximetry       74 97  F (36.1  C) 5' 7.75\" (1.721 m) 25.58 kg/m2 95%        Blood Pressure from Last 3 Encounters:   01/27/17 120/60   01/22/17 151/79   01/05/17 122/74    Weight from Last 3 Encounters:   01/27/17 167 lb (75.751 kg)   01/05/17 178 lb (80.74 kg)   06/29/16 175 lb (79.379 kg)              We Performed the Following     ORTHOPEDICS ADULT REFERRAL          Today's Medication Changes          These changes are accurate as of: 1/27/17 10:12 AM.  If you have any questions, ask your nurse or doctor.               These medicines have changed or have updated prescriptions.        Dose/Directions    levothyroxine 112 MCG tablet   Commonly known as:  SYNTHROID/LEVOTHROID   This may have changed:    - medication strength  - how much to take   Used for:  Other specified hypothyroidism   Changed by:  Bisi Stuart MD        Dose:  112 mcg   Take 1 tablet (112 mcg) by mouth daily   Quantity:  90 tablet   Refills:  1            Where to get your medicines      These medications were sent to [facility] [address] [place] [address] [facility] [address] [place] [address]     Phone:  [phone]    - levothyroxine 112 MCG tablet             Primary Care Provider Office Phone # Fax #    Bisi Stuart [phone]       [facility] [place] [address]  [place] [address]        Thank you!     [facility] you [facility]  [facility] your [facility]. Our goal is always to provide you with excellent care. Hearing back from our patients is one way we can continue to improve our services. Please take a few minutes to complete the written survey that you may receive in the mail after your visit with us. Thank you!             Your Updated Medication List - Protect others around you: Learn how to safely use, store and throw away your medicines at www.disposemymeds.org.          This list is accurate as of: 1/27/17 10:12 AM.  Always use your most recent med list.                   Brand Name Dispense Instructions for use    ACCU-CHEK KEYANNA test strip   Generic drug:  blood glucose monitoring      by In Vitro route 2 times daily .       ASPIRIN PO      Take 325 mg by mouth daily       atorvastatin 40 MG tablet    LIPITOR    90 tablet    Take 1 tablet (40 mg) by mouth daily       * glipiZIDE 5 MG tablet    GLUCOTROL    90 tablet    TAKE 1 TABLET BY MOUTH EVERY DAY BEFORE A MEAL       * glipiZIDE 5 MG tablet    GLUCOTROL    30 tablet    TAKE 1 TABLET BY MOUTH EVERY DAY BEFORE A MEAL       levothyroxine 112 MCG tablet    SYNTHROID/LEVOTHROID    90 tablet    Take 1 tablet (112 mcg) by mouth daily       meclizine 25 MG Chew      Take 25 mg by mouth 3 times daily as needed for dizziness       metFORMIN 500 MG 24 hr tablet    GLUCOPHAGE-XR    180 tablet    TAKE 1 TABLET BY MOUTH TWICE DAILY WITH MEALS. SWALLOW WHOLE. DO NOT CRUSH OR CHEW       metoprolol 50 MG 24 hr tablet    TOPROL-XL    90 tablet    Take 1 tablet (50 mg) by mouth daily       NITROGLYCERIN SL      Place 0.4 mg under the tongue every 5 minutes as  needed for chest pain       * Notice:  This list has 2 medication(s) that are the same as other medications prescribed for you. Read the directions carefully, and ask your doctor or other care provider to review them with you.

## 2017-01-27 NOTE — PROGRESS NOTES
SUBJECTIVE:                                                    Nori Looney is a 82 year old male who presents to clinic today for the following health issues:      Musculoskeletal problem/pain      Duration: 1 week    Description  Location: right hip    Intensity:  moderate    Accompanying signs and symptoms: numbness of right foot    History  Previous similar problem: no   Previous evaluation:  x-ray    Precipitating or alleviating factors:  Trauma or overuse: YES- had twisted leg and pain started after that  Aggravating factors include: sitting    Therapies tried and outcome: acetaminophen and icy hot patches seem to help         Diabetes Follow-up      Patient is checking blood sugars: 1 times a week    Diabetic concerns: None     Symptoms of hypoglycemia (low blood sugar): none     Paresthesias (numbness or burning in feet) or sores: Yes numbness     Date of last diabetic eye exam: 8/2016     Hyperlipidemia Follow-Up      Rate your low fat/cholesterol diet?: fair    Taking statin?  Yes, no muscle aches from statin    Other lipid medications/supplements?:  none     Hypertension Follow-up      Outpatient blood pressures are not being checked.    Low Salt Diet: low salt     Hypothyroidism Follow-up      Since last visit, patient describes the following symptoms: Weight stable, no hair loss, no skin changes, no constipation, no loose stools       Amount of exercise or physical activity: 2-3 days/week for an average of 15-30 minutes    Problems taking medications regularly: No    Medication side effects: none  Diet: regular (no restrictions)     Problem list and histories reviewed & adjusted, as indicated.  Additional history: as documented    Current Outpatient Prescriptions   Medication Sig Dispense Refill     levothyroxine (SYNTHROID/LEVOTHROID) 112 MCG tablet Take 1 tablet (112 mcg) by mouth daily 90 tablet 1     glipiZIDE (GLUCOTROL) 5 MG tablet TAKE 1 TABLET BY MOUTH EVERY DAY BEFORE A MEAL 30 tablet 5      "metFORMIN (GLUCOPHAGE-XR) 500 MG 24 hr tablet TAKE 1 TABLET BY MOUTH TWICE DAILY WITH MEALS. SWALLOW WHOLE. DO NOT CRUSH OR CHEW 180 tablet 0     glipiZIDE (GLUCOTROL) 5 MG tablet TAKE 1 TABLET BY MOUTH EVERY DAY BEFORE A MEAL 90 tablet 1     atorvastatin (LIPITOR) 40 MG tablet Take 1 tablet (40 mg) by mouth daily 90 tablet 1     metoprolol (TOPROL-XL) 50 MG 24 hr tablet Take 1 tablet (50 mg) by mouth daily 90 tablet 1     ASPIRIN PO Take 325 mg by mouth daily       blood glucose monitoring (ACCU-CHEK KEYANNA) test strip by In Vitro route 2 times daily .       NITROGLYCERIN SL Place 0.4 mg under the tongue every 5 minutes as needed for chest pain       meclizine 25 MG CHEW Take 25 mg by mouth 3 times daily as needed for dizziness       [DISCONTINUED] levothyroxine (SYNTHROID/LEVOTHROID) 100 MCG tablet Take 1 tablet (100 mcg) by mouth daily 60 tablet 0     Problem list, Medication list, Allergies, and Medical/Social/Surgical histories reviewed in [facility] and updated as appropriate.    ROS:  Constitutional, HEENT, cardiovascular, pulmonary, gi and gu systems are negative, except as otherwise noted.    OBJECTIVE:                                                    /60 mmHg  Pulse 74  Temp(Src) 97  F (36.1  C)  Ht 5' 7.75\" (1.721 m)  Wt 167 lb (75.751 kg)  BMI 25.58 kg/m2  SpO2 95%  Body mass index is 25.58 kg/(m^2).  GENERAL APPEARANCE: healthy, alert and no distress  RESP: lungs clear to auscultation - no rales, rhonchi or wheezes  CV: regular rates and rhythm, normal S1 S2, no S3 or S4 and no murmur, click or rub  ABDOMEN: soft, nontender, without hepatosplenomegaly or masses and bowel sounds normal  SKIN: no suspicious lesions or rashes  NEURO: Normal strength and tone, mentation intact and speech normal  DIABETIC FOOT EXAM: normal DP and PT pulses, no trophic changes or ulcerative lesions, normal sensory exam and normal monofilament exam  PSYCH: mentation appears normal and affect normal/bright   "     ASSESSMENT/PLAN:                                                    1. Type 2 diabetes mellitus without complication, without long-term current use of insulin (H)  F/u 3 mos  - FOOT EXAM    2. Other specified hypothyroidism  F/u 3 mos, increased dose from   - levothyroxine (SYNTHROID/LEVOTHROID) 112 MCG tablet; Take 1 tablet (112 mcg) by mouth daily  Dispense: 90 tablet; Refill: 1    3. Chronic systolic congestive heart failure (H)  stable    4. Benign essential hypertension  stable    5. Hyperlipidemia LDL goal <70  stable    6. Other secondary osteoarthritis of right hip  Discussed options, he would like to try injection first  - ORTHOPEDICS ADULT REFERRAL    7. Need for vaccination    - Pneumococcal vaccine 23 valent (Pneumovax) [01026]  - 1st  Administration  [11110]    Patient was agreeable to this plan and had no further questions.  See Patient Instructions    Bisi Stuart MD  [facility]

## 2017-01-27 NOTE — PATIENT INSTRUCTIONS
1.  Follow up in 3 mos  2.  Increase synthroid to 112 mcg and take on an empty stomach first thing in the morning  3.  Orthopedic consult

## 2017-01-31 ENCOUNTER — OFFICE VISIT (OUTPATIENT)
Dept: ORTHOPEDICS | Facility: OTHER | Age: 82
End: 2017-01-31
Attending: FAMILY MEDICINE
Payer: COMMERCIAL

## 2017-01-31 VITALS
DIASTOLIC BLOOD PRESSURE: 78 MMHG | HEART RATE: 67 BPM | OXYGEN SATURATION: 97 % | HEIGHT: 67 IN | SYSTOLIC BLOOD PRESSURE: 136 MMHG | WEIGHT: 173 LBS | TEMPERATURE: 97 F | BODY MASS INDEX: 27.15 KG/M2

## 2017-01-31 DIAGNOSIS — M16.11 PRIMARY OSTEOARTHRITIS OF RIGHT HIP: ICD-10-CM

## 2017-01-31 PROCEDURE — 99203 OFFICE O/P NEW LOW 30 MIN: CPT | Performed by: ORTHOPAEDIC SURGERY

## 2017-01-31 PROCEDURE — 99214 OFFICE O/P EST MOD 30 MIN: CPT

## 2017-01-31 RX ORDER — SULINDAC 200 MG/1
200 TABLET ORAL 2 TIMES DAILY WITH MEALS
Qty: 60 TABLET | Refills: 2 | Status: SHIPPED | OUTPATIENT
Start: 2017-01-31 | End: 2017-07-28

## 2017-01-31 ASSESSMENT — PAIN SCALES - GENERAL: PAINLEVEL: SEVERE PAIN (6)

## 2017-01-31 NOTE — PATIENT INSTRUCTIONS
Take Sulindac (Clinoril) as prescribed, with meals.  Discontinue any other NSAIDs that you may be taking such as Advil, Aleve, Ibuprofen, Naproxen, etc..  It is ok to take over the counter Tylenol in an alternating pattern with this medication if needed.    Potential side effects to taking Sulindac include upset stomach, stomach pain, and heartburn (reflux).  Discontinue this medication should you experience these side effects and contact Dr. Stevenson.  Also, if you should notice any blood in your stool or black, tarry stools or decreased urination, please discontinue medication and contact our office.

## 2017-01-31 NOTE — NURSING NOTE
"Chief Complaint   Patient presents with     Musculoskeletal Problem     Complaints of osteoarthritis of right hip. Bisi sahu reffering.       Initial /78 mmHg  Pulse 67  Temp(Src) 97  F (36.1  C) (Tympanic)  Ht 5' 7\" (1.702 m)  Wt 173 lb (78.472 kg)  BMI 27.09 kg/m2  SpO2 97% Estimated body mass index is 27.09 kg/(m^2) as calculated from the following:    Height as of this encounter: 5' 7\" (1.702 m).    Weight as of this encounter: 173 lb (78.472 kg).  BP completed using cuff size: regular  Romina Urrutia LPN      "

## 2017-01-31 NOTE — MR AVS SNAPSHOT
After Visit Summary   1/31/2017    Nori Looney    MRN: [id number]           Patient Information     Date Of Birth          2/4/1934        Visit Information        Provider Department      1/31/2017 2:20 PM David Stevenson MD  ORTHOPEDICS        Today's Diagnoses     Primary osteoarthritis of right hip           Care Instructions    Take Sulindac (Clinoril) as prescribed, with meals.  Discontinue any other NSAIDs that you may be taking such as Advil, Aleve, Ibuprofen, Naproxen, etc..  It is ok to take over the counter Tylenol in an alternating pattern with this medication if needed.    Potential side effects to taking Sulindac include upset stomach, stomach pain, and heartburn (reflux).  Discontinue this medication should you experience these side effects and contact Dr. Stevenson.  Also, if you should notice any blood in your stool or black, tarry stools or decreased urination, please discontinue medication and contact our office.              Follow-ups after your visit        Follow-up notes from your care team     Return in about 4 weeks (around 2/28/2017).      Your next 10 appointments already scheduled     Apr 28, 2017  9:45 AM   (Arrive by 9:30 AM)   SHORT with Bisi Stuart MD   [facility] ([facility])    [address] [place] [address]   [phone]              Who to contact     If you have questions or need follow up information about today's clinic visit or your schedule please contact  ORTHOPEDICS directly at [phone].  Normal or non-critical lab and imaging results will be communicated to you by MyChart, letter or phone within 4 business days after the clinic has received the results. If you do not hear from us within 7 days, please contact the clinic through MyChart or phone. If you have a critical or abnormal lab result, we will notify you by phone as soon as possible.  Submit refill requests through [employer] or call your pharmacy and  "they will forward the refill request to us. Please allow 3 business days for your refill to be completed.          Additional Information About Your Visit        Care EveryWhere ID     This is your Care EveryWhere ID. This could be used by other organizations to access your [facility] medical records  [id number]        Your Vitals Were     Pulse Temperature Height BMI (Body Mass Index) Pulse Oximetry       67 97  F (36.1  C) (Tympanic) 5' 7\" (1.702 m) 27.09 kg/m2 97%        Blood Pressure from Last 3 Encounters:   01/31/17 136/78   01/27/17 120/60   01/22/17 151/79    Weight from Last 3 Encounters:   01/31/17 173 lb (78.472 kg)   01/27/17 167 lb (75.751 kg)   01/05/17 178 lb (80.74 kg)              Today, you had the following     No orders found for display         Today's Medication Changes          These changes are accurate as of: 1/31/17  2:22 PM.  If you have any questions, ask your nurse or doctor.               Start taking these medicines.        Dose/Directions    sulindac 200 MG tablet   Commonly known as:  CLINORIL   Used for:  Primary osteoarthritis of right hip   Started by:  David Stevenson MD        Dose:  200 mg   Take 1 tablet (200 mg) by mouth 2 times daily (with meals)   Quantity:  60 tablet   Refills:  2            Where to get your medicines      These medications were sent to [id number] - [place] [address] [facility] [address]  [address] [facility] [address]     Phone:  [phone]    - sulindac 200 MG tablet             Primary Care Provider Office Phone # Fax #    Bisi Stuart [phone]       [facility] [place] [address]  [place] [address]        Thank you!     Thank you for choosing  ORTHOPEDICS  for your care. Our goal is always to provide you with excellent care. Hearing back from our patients is one way we can continue to improve our services. Please take a few minutes to complete the written " survey that you may receive in the mail after your visit with us. Thank you!             Your Updated Medication List - Protect others around you: Learn how to safely use, store and throw away your medicines at www.disposemymeds.org.          This list is accurate as of: 1/31/17  2:22 PM.  Always use your most recent med list.                   Brand Name Dispense Instructions for use    ACCU-CHEK KEYANNA test strip   Generic drug:  blood glucose monitoring      by In Vitro route 2 times daily .       ASPIRIN PO      Take 325 mg by mouth daily       atorvastatin 40 MG tablet    LIPITOR    90 tablet    Take 1 tablet (40 mg) by mouth daily       * glipiZIDE 5 MG tablet    GLUCOTROL    90 tablet    TAKE 1 TABLET BY MOUTH EVERY DAY BEFORE A MEAL       * glipiZIDE 5 MG tablet    GLUCOTROL    30 tablet    TAKE 1 TABLET BY MOUTH EVERY DAY BEFORE A MEAL       levothyroxine 112 MCG tablet    SYNTHROID/LEVOTHROID    90 tablet    Take 1 tablet (112 mcg) by mouth daily       meclizine 25 MG Chew      Take 25 mg by mouth 3 times daily as needed for dizziness       metFORMIN 500 MG 24 hr tablet    GLUCOPHAGE-XR    180 tablet    TAKE 1 TABLET BY MOUTH TWICE DAILY WITH MEALS. SWALLOW WHOLE. DO NOT CRUSH OR CHEW       metoprolol 50 MG 24 hr tablet    TOPROL-XL    90 tablet    Take 1 tablet (50 mg) by mouth daily       NITROGLYCERIN SL      Place 0.4 mg under the tongue every 5 minutes as needed for chest pain       sulindac 200 MG tablet    CLINORIL    60 tablet    Take 1 tablet (200 mg) by mouth 2 times daily (with meals)       * Notice:  This list has 2 medication(s) that are the same as other medications prescribed for you. Read the directions carefully, and ask your doctor or other care provider to review them with you.

## 2017-01-31 NOTE — PROGRESS NOTES
New Patient Visit  Referral Source: Dr. Stuart  Chief Complaint: Right hip pain    History of Present Illness/Injury: This 82-year-old right-handed diabetic  had no complaints with his right hip until 2 weeks ago when he got down off a tractor and felt the sudden onset of pain in his lateral aspect of the right hip.  The pain has persisted intermittently, grade 6 in severity, dull and throbbing in quality.  It is worse with getting up from a seated position and is less by walking.  Since the injury he has been using a cane for ambulation.  The hip is not helped by taking Tylenol or applying heat.  Since this episode he has also noted intermittent numbness and tingling in the right foot.  He denies a history of neuropathy.    A review of the patient's complete medical/family/social  history, medications, allergies and a two (neurological and musculoskeletal) system review of systems are noncontributory for the presenting problem.  He is status post quintuple bypass in 2011. He is on no anticoagulants.  He has no allergies.    Examination: Elderly-appearing taciturn man in no obvious distress, with appropriate mood and affect.  He brought a cane with him.  Vital signs stable and afebrile.  He was easily able to get up from a seated position and get onto the examination table.The right hip is mildly tender to palpation anteriorly but not laterally or posteriorly.  He can do a straight leg raise against gravity.  The Stinchfield test is mildly positive.  Leg lengths are equal.  Passive range of motion of the right hip reveals 100  of flexion, no internal rotation in 90  of flexion, 25  of external rotation in flexion, and full abduction.  His right ankle pulses are palpable.  Light touch sensation in the right foot is present but he says it is diminished compared to his other foot.    X-ray; plain films of the right hip taken on 1/22/17 show severe DJD manifested as moderate joint space narrowing, femoral  head and acetabular osteophytes, a surceal sign and flattening of the superior femoral head.  The acetabular osteophyte might have a fracture through it.    Impression:   #1.  Primary osteoarthritis right hip  #2.  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  I showed them his x-ray.  We discussed treatment options in the form of NSAIDs, steroid injections, and DANILO.  I recommended we try conservative treatment 1st, in the form of an oral NSAID.  They agreed.  I prescribed sulindac 200 mg twice a day with food after giving him the full NSAID talk.  He knows to stop the medication and notify us if he has  problems with it.  He'll return in 4 weeks for recheck.

## 2017-03-01 ENCOUNTER — OFFICE VISIT (OUTPATIENT)
Dept: ORTHOPEDICS | Facility: OTHER | Age: 82
End: 2017-03-01
Attending: ORTHOPAEDIC SURGERY
Payer: COMMERCIAL

## 2017-03-01 VITALS
TEMPERATURE: 97.7 F | OXYGEN SATURATION: 95 % | WEIGHT: 168 LBS | SYSTOLIC BLOOD PRESSURE: 142 MMHG | HEART RATE: 64 BPM | DIASTOLIC BLOOD PRESSURE: 72 MMHG | HEIGHT: 68 IN | BODY MASS INDEX: 25.46 KG/M2

## 2017-03-01 DIAGNOSIS — M16.11 PRIMARY OSTEOARTHRITIS OF RIGHT HIP: Primary | ICD-10-CM

## 2017-03-01 PROCEDURE — 99212 OFFICE O/P EST SF 10 MIN: CPT | Performed by: ORTHOPAEDIC SURGERY

## 2017-03-01 PROCEDURE — 99212 OFFICE O/P EST SF 10 MIN: CPT

## 2017-03-01 ASSESSMENT — PAIN SCALES - GENERAL: PAINLEVEL: MODERATE PAIN (4)

## 2017-03-01 NOTE — NURSING NOTE
Rechecked B/P patient advised that any B/P over 140 we like to recheck and daughters were present , that patients are always welcome in our clinic without a appointment. Second B/P recorded.   Jia Urbina LPN

## 2017-03-01 NOTE — MR AVS SNAPSHOT
After Visit Summary   3/1/2017    Nori Looney    MRN: [id number]           Patient Information     Date Of Birth          2/4/1934        Visit Information        Provider Department      3/1/2017 1:40 PM David Stevenson MD  ORTHOPEDICS        Today's Diagnoses     Primary osteoarthritis of right hip    -  1      Care Instructions    You must get blood work done before your next prescription renewal in April.  Dr. Stuart can check the blood work results when she sees you in April for your next medical checkup.  Either she or we can give you refills on your sulindac, as long as the blood work is okay.        Follow-ups after your visit        Follow-up notes from your care team     Return if symptoms worsen or fail to improve.      Your next 10 appointments already scheduled     Apr 28, 2017  9:45 AM CDT   (Arrive by 9:30 AM)   SHORT with Bisi Stuart MD   [facility] ([facility])    [address] [place] [address]   [phone]              Who to contact     If you have questions or need follow up information about today's clinic visit or your schedule please contact  ORTHOPEDICS directly at [phone].  Normal or non-critical lab and imaging results will be communicated to you by MyChart, letter or phone within 4 business days after the clinic has received the results. If you do not hear from us within 7 days, please contact the clinic through MyChart or phone. If you have a critical or abnormal lab result, we will notify you by phone as soon as possible.  Submit refill requests through [employer] or call your pharmacy and they will forward the refill request to us. Please allow 3 business days for your refill to be completed.          Additional Information About Your Visit        Care EveryWhere ID     This is your Care EveryWhere ID. This could be used by other organizations to access your [facility] medical records  [id number]        Your Vitals Were   "   Pulse Temperature Height Pulse Oximetry BMI (Body Mass Index)       64 97.7  F (36.5  C) (Tympanic) 5' 8\" (1.727 m) 95% 25.54 kg/m2        Blood Pressure from Last 3 Encounters:   03/01/17 152/82   01/31/17 136/78   01/27/17 120/60    Weight from Last 3 Encounters:   03/01/17 168 lb (76.2 kg)   01/31/17 173 lb (78.5 kg)   01/27/17 167 lb (75.8 kg)              Today, you had the following     No orders found for display       Primary Care Provider Office Phone # Fax #    Bisi Stuart [phone]       [facility] [place] [address]  [place] [address]        Thank you!     Thank you for choosing  ORTHOPEDICS  for your care. Our goal is always to provide you with excellent care. Hearing back from our patients is one way we can continue to improve our services. Please take a few minutes to complete the written survey that you may receive in the mail after your visit with us. Thank you!             Your Updated Medication List - Protect others around you: Learn how to safely use, store and throw away your medicines at www.disposemymeds.org.          This list is accurate as of: 3/1/17  1:57 PM.  Always use your most recent med list.                   Brand Name Dispense Instructions for use    ACCU-CHEK KEYANNA test strip   Generic drug:  blood glucose monitoring      by In Vitro route 2 times daily .       ASPIRIN PO      Take 325 mg by mouth daily       atorvastatin 40 MG tablet    LIPITOR    90 tablet    Take 1 tablet (40 mg) by mouth daily       * glipiZIDE 5 MG tablet    GLUCOTROL    90 tablet    TAKE 1 TABLET BY MOUTH EVERY DAY BEFORE A MEAL       * glipiZIDE 5 MG tablet    GLUCOTROL    30 tablet    TAKE 1 TABLET BY MOUTH EVERY DAY BEFORE A MEAL       levothyroxine 112 MCG tablet    SYNTHROID/LEVOTHROID    90 tablet    Take 1 tablet (112 mcg) by mouth daily       meclizine 25 MG Chew      Take 25 mg by mouth 3 times daily as needed for dizziness       metFORMIN 500 MG 24 hr tablet "    GLUCOPHAGE-XR    180 tablet    TAKE 1 TABLET BY MOUTH TWICE DAILY WITH MEALS. SWALLOW WHOLE. DO NOT CRUSH OR CHEW       metoprolol 50 MG 24 hr tablet    TOPROL-XL    90 tablet    Take 1 tablet (50 mg) by mouth daily       NITROGLYCERIN SL      Place 0.4 mg under the tongue every 5 minutes as needed for chest pain       sulindac 200 MG tablet    CLINORIL    60 tablet    Take 1 tablet (200 mg) by mouth 2 times daily (with meals)       * Notice:  This list has 2 medication(s) that are the same as other medications prescribed for you. Read the directions carefully, and ask your doctor or other care provider to review them with you.

## 2017-03-01 NOTE — PROGRESS NOTES
Chief complaint: Primary osteoarthritis right hip    Subjective: This 83-year-old diabetic  was seen for the 1st time on 1/31/17 upon referral from Dr. Stuart for right hip pain that came on rather suddenly 2 weeks before.  X-rays reveal severe DJD in that joint.  He was not interested in surgical intervention at that time.  He agreed to try oral sulindac.    Today he reports that his stomach has tolerated the sulindac well, and at the sulindac has significantly lessened his right hip pain so now he can do his activities of daily living comfortably.  He is pleased with the results and wishes to stay on the medication.    Examination: Healthy-appearing elderly male in no obvious distress.  He was using no ambulatory appliance.    Impression: Primary osteoarthritis right hip    Plan: He'll continue on sulindac for now.  He has refills to last through April.  Before the prescription can be renewed he must have his BUN, creatinine, AST, and ALT checked.  He sees his PCP in April.  He'll get these labs drawn just before that visit.  Either Dr. Stuart or I can renew his sulindac prescription if the labs are okay.

## 2017-03-01 NOTE — NURSING NOTE
"Chief Complaint   Patient presents with     RECHECK     Follow up right hip.       Initial /82 (BP Location: Right arm, Patient Position: Chair, Cuff Size: Adult Regular)  Pulse 64  Temp 97.7  F (36.5  C) (Tympanic)  Ht 5' 8\" (1.727 m)  Wt 168 lb (76.2 kg)  SpO2 95%  BMI 25.54 kg/m2 Estimated body mass index is 25.54 kg/(m^2) as calculated from the following:    Height as of this encounter: 5' 8\" (1.727 m).    Weight as of this encounter: 168 lb (76.2 kg).  Medication Reconciliation: complete   Romina Urrutia LPN      "

## 2017-03-01 NOTE — PATIENT INSTRUCTIONS
You must get blood work done before your next prescription renewal in April.  Dr. Stuart can check the blood work results when she sees you in April for your next medical checkup.  Either she or we can give you refills on your sulindac, as long as the blood work is okay.

## 2017-03-28 DIAGNOSIS — E11.9 TYPE 2 DIABETES MELLITUS WITHOUT COMPLICATION, WITHOUT LONG-TERM CURRENT USE OF INSULIN (H): ICD-10-CM

## 2017-03-29 RX ORDER — METFORMIN HCL 500 MG
TABLET, EXTENDED RELEASE 24 HR ORAL
Qty: 180 TABLET | Refills: 1 | Status: SHIPPED | OUTPATIENT
Start: 2017-03-29 | End: 2017-10-25

## 2017-03-29 NOTE — TELEPHONE ENCOUNTER
Metformin         Last Written Prescription Date: 12/03/2016  Last Fill Quantity: 180, # refills: 0  Last Office Visit with [facility], [facility] or [facility] prescribing provider:  01/27/2017   Next 5 appointments (look out 90 days)     Apr 28, 2017  9:45 AM CDT   (Arrive by 9:30 AM)   SHORT with Bisi Stuart MD   [facility] ([facility])    [address]  [facility] [address]   [phone]                   BP Readings from Last 3 Encounters:   03/01/17 142/72   01/31/17 136/78   01/27/17 120/60     Lab Results   Component Value Date    MICROL 10 01/05/2017     No results found for: MICROALBUMIN  Creatinine   Date Value Ref Range Status   01/05/2017 1.08 0.66 - 1.25 mg/dL Final   ]  GFR Estimate   Date Value Ref Range Status   01/05/2017 65 >60 mL/min/1.7m2 Final     Comment:     Non  GFR Calc   06/09/2016 76 >60 mL/min/1.7m2 Final     Comment:     Non  GFR Calc     GFR Estimate If Black   Date Value Ref Range Status   01/05/2017 79 >60 mL/min/1.7m2 Final     Comment:      GFR Calc   06/09/2016 >90   GFR Calc   >60 mL/min/1.7m2 Final     Lab Results   Component Value Date    CHOL 132 01/05/2017     Lab Results   Component Value Date    HDL 38 01/05/2017     Lab Results   Component Value Date    LDL 64 01/05/2017     Lab Results   Component Value Date    TRIG 152 01/05/2017     No results found for: CHOLHDLRATIO  Lab Results   Component Value Date    AST 14 01/05/2017     Lab Results   Component Value Date    ALT 24 01/05/2017     Lab Results   Component Value Date    A1C 7.1 01/05/2017    A1C 7.3 06/15/2016     Potassium   Date Value Ref Range Status   01/05/2017 4.0 3.4 - 5.3 mmol/L Final

## 2017-04-28 ENCOUNTER — OFFICE VISIT (OUTPATIENT)
Dept: FAMILY MEDICINE | Facility: OTHER | Age: 82
End: 2017-04-28
Attending: FAMILY MEDICINE
Payer: COMMERCIAL

## 2017-04-28 VITALS
DIASTOLIC BLOOD PRESSURE: 74 MMHG | RESPIRATION RATE: 20 BRPM | SYSTOLIC BLOOD PRESSURE: 126 MMHG | OXYGEN SATURATION: 96 % | WEIGHT: 172 LBS | BODY MASS INDEX: 26.15 KG/M2 | HEART RATE: 72 BPM

## 2017-04-28 DIAGNOSIS — E03.8 OTHER SPECIFIED HYPOTHYROIDISM: ICD-10-CM

## 2017-04-28 DIAGNOSIS — E78.5 HYPERLIPIDEMIA LDL GOAL <70: ICD-10-CM

## 2017-04-28 DIAGNOSIS — E11.9 TYPE 2 DIABETES MELLITUS WITHOUT COMPLICATION, WITHOUT LONG-TERM CURRENT USE OF INSULIN (H): Primary | ICD-10-CM

## 2017-04-28 DIAGNOSIS — I10 BENIGN ESSENTIAL HYPERTENSION: ICD-10-CM

## 2017-04-28 DIAGNOSIS — M17.10 ARTHRITIS OF KNEE: ICD-10-CM

## 2017-04-28 LAB
EST. AVERAGE GLUCOSE BLD GHB EST-MCNC: 171 MG/DL
HBA1C MFR BLD: 7.6 % (ref 4.3–6)
T4 FREE SERPL-MCNC: 1.17 NG/DL (ref 0.76–1.46)
TSH SERPL DL<=0.005 MIU/L-ACNC: 4.88 MU/L (ref 0.4–4)

## 2017-04-28 PROCEDURE — 83036 HEMOGLOBIN GLYCOSYLATED A1C: CPT | Performed by: FAMILY MEDICINE

## 2017-04-28 PROCEDURE — 36415 COLL VENOUS BLD VENIPUNCTURE: CPT | Performed by: FAMILY MEDICINE

## 2017-04-28 PROCEDURE — 84443 ASSAY THYROID STIM HORMONE: CPT | Performed by: FAMILY MEDICINE

## 2017-04-28 PROCEDURE — 99212 OFFICE O/P EST SF 10 MIN: CPT

## 2017-04-28 PROCEDURE — 99214 OFFICE O/P EST MOD 30 MIN: CPT | Performed by: FAMILY MEDICINE

## 2017-04-28 PROCEDURE — 40000788 ZZHCL STATISTIC ESTIMATED AVERAGE GLUCOSE: Performed by: FAMILY MEDICINE

## 2017-04-28 PROCEDURE — 84439 ASSAY OF FREE THYROXINE: CPT | Performed by: FAMILY MEDICINE

## 2017-04-28 ASSESSMENT — PAIN SCALES - GENERAL: PAINLEVEL: MILD PAIN (2)

## 2017-04-28 NOTE — MR AVS SNAPSHOT
After Visit Summary   4/28/2017    Nori Looney    MRN: [id number]           Patient Information     Date Of Birth          2/4/1934        Visit Information        Provider Department      4/28/2017 9:45 AM Bisi Stuart MD [facility] [place]        Today's Diagnoses     Type 2 diabetes mellitus without complication, without long-term current use of insulin (H)    -  1    Other specified hypothyroidism        Hyperlipidemia LDL goal <70        Benign essential hypertension          Care Instructions    1.  Fish oil 2000mg daily  2.  Use tylenol 625-1000 3x/day as needed  3.  Follow up if not improving with knee pain        Follow-ups after your visit        Your next 10 appointments already scheduled     Apr 28, 2017  9:45 AM CDT   (Arrive by 9:30 AM)   SHORT with Bisi Stuart MD   [facility] ([facility])    [address]  [facility] [address]   [phone]            Jul 28, 2017  9:45 AM CDT   (Arrive by 9:30 AM)   SHORT with Bisi Stuart MD   [facility] ([facility])    [address]  [facility] [address]   [phone]              Who to contact     If you have questions or need follow up information about today's clinic visit or your schedule please contact [facility] directly at [phone].  Normal or non-critical lab and imaging results will be communicated to you by MyChart, letter or phone within 4 business days after the clinic has received the results. If you do not hear from us within 7 days, please contact the clinic through MyChart or phone. If you have a critical or abnormal lab result, we will notify you by phone as soon as possible.  Submit refill requests through [employer] or call your pharmacy and they will forward the refill request to us. Please allow 3 business days for your refill to be completed.          Additional Information About Your Visit        MyChart Information     [facility] lets  "you send messages to your doctor, view your test results, renew your prescriptions, schedule appointments and more. To sign up, go to [email or web address] . Click on \"Log in\" on the left side of the screen, which will take you to the Welcome page. Then click on \"Sign up Now\" on the right side of the page.     You will be asked to enter the access code listed below, as well as some personal information. Please follow the directions to create your username and password.     Your access code is: [email or web address]  Expires: 2017  9:44 AM     Your access code will  in 90 days. If you need help or a new code, please call your [facility] clinic or [phone].        Care EveryWhere ID     This is your Care EveryWhere ID. This could be used by other organizations to access your [facility] medical records  [id number]        Your Vitals Were     Pulse Respirations Pulse Oximetry BMI (Body Mass Index)          72 20 96% 26.15 kg/m2         Blood Pressure from Last 3 Encounters:   17 126/74   17 142/72   17 136/78    Weight from Last 3 Encounters:   17 172 lb (78 kg)   17 168 lb (76.2 kg)   17 173 lb (78.5 kg)              We Performed the Following     Hemoglobin A1c     TSH with free T4 reflex          Today's Medication Changes          These changes are accurate as of: 17  9:44 AM.  If you have any questions, ask your nurse or doctor.               These medicines have changed or have updated prescriptions.        Dose/Directions    glipiZIDE 5 MG tablet   Commonly known as:  GLUCOTROL   This may have changed:  Another medication with the same name was removed. Continue taking this medication, and follow the directions you see here.   Used for:  Controlled type 2 diabetes mellitus without complication, with long-term current use of insulin (H)   Changed by:  Bisi Stuart MD        TAKE 1 TABLET BY MOUTH EVERY DAY BEFORE A MEAL   Quantity:  30 tablet   Refills:  5    "             Primary Care Provider Office Phone # Fax #    Bisi Stuart [phone]       [facility] [place] [address]  [place] [address]        Thank you!     Thank you for choosing [facility] [place]  for your care. Our goal is always to provide you with excellent care. Hearing back from our patients is one way we can continue to improve our services. Please take a few minutes to complete the written survey that you may receive in the mail after your visit with us. Thank you!             Your Updated Medication List - Protect others around you: Learn how to safely use, store and throw away your medicines at www.disposemymeds.org.          This list is accurate as of: 4/28/17  9:44 AM.  Always use your most recent med list.                   Brand Name Dispense Instructions for use    ACCU-CHEK KEYANNA test strip   Generic drug:  blood glucose monitoring      by In Vitro route 2 times daily .       ASPIRIN PO      Take 325 mg by mouth daily       atorvastatin 40 MG tablet    LIPITOR    90 tablet    Take 1 tablet (40 mg) by mouth daily       glipiZIDE 5 MG tablet    GLUCOTROL    30 tablet    TAKE 1 TABLET BY MOUTH EVERY DAY BEFORE A MEAL       levothyroxine 112 MCG tablet    SYNTHROID/LEVOTHROID    90 tablet    Take 1 tablet (112 mcg) by mouth daily       metFORMIN 500 MG 24 hr tablet    GLUCOPHAGE-XR    180 tablet    TAKE 1 TABLET BY MOUTH TWICE DAILY WITH MEALS. SWALLOW WHOLE. DO NOT CRUSH OR CHEW       metoprolol 50 MG 24 hr tablet    TOPROL-XL    90 tablet    Take 1 tablet (50 mg) by mouth daily       NITROGLYCERIN SL      Place 0.4 mg under the tongue every 5 minutes as needed for chest pain       sulindac 200 MG tablet    CLINORIL    60 tablet    Take 1 tablet (200 mg) by mouth 2 times daily (with meals)

## 2017-04-28 NOTE — PATIENT INSTRUCTIONS
1.  Fish oil 2000mg daily  2.  Use tylenol 625-1000 3x/day as needed  3.  Follow up if not improving with knee pain

## 2017-04-28 NOTE — PROGRESS NOTES
SUBJECTIVE:                                                    Nori Looney is a 83 year old male who presents to clinic today for the following health issues:      Diabetes Follow-up      Patient is checking blood sugars: 1-2 times a week    Diabetic concerns: None     Symptoms of hypoglycemia (low blood sugar): none     Paresthesias (numbness or burning in feet) or sores: No     Date of last diabetic eye exam: 2016     Hypothyroidism Follow-up      Since last visit, patient describes the following symptoms: Weight stable, no hair loss, no skin changes, no constipation, no loose stools       Amount of exercise or physical activity: walking    Problems taking medications regularly: No    Medication side effects: none    Diet: regular (no restrictions)      Joint Pain     Onset: the last few months    Description:   Location: left knee  Character: Gnawing    Intensity: mild, 4/10    Progression of Symptoms: same    Accompanying Signs & Symptoms:  Other symptoms: none   History:   Previous similar pain: no       Precipitating factors:   Trauma or overuse: no     Alleviating factors:  Improved by: rest/inactivity       Therapies Tried and outcome:       Problem list and histories reviewed & adjusted, as indicated.  Additional history: as documented    Current Outpatient Prescriptions   Medication Sig Dispense Refill     metFORMIN (GLUCOPHAGE-XR) 500 MG 24 hr tablet TAKE 1 TABLET BY MOUTH TWICE DAILY WITH MEALS. SWALLOW WHOLE. DO NOT CRUSH OR CHEW 180 tablet 1     sulindac (CLINORIL) 200 MG tablet Take 1 tablet (200 mg) by mouth 2 times daily (with meals) 60 tablet 2     levothyroxine (SYNTHROID/LEVOTHROID) 112 MCG tablet Take 1 tablet (112 mcg) by mouth daily 90 tablet 1     glipiZIDE (GLUCOTROL) 5 MG tablet TAKE 1 TABLET BY MOUTH EVERY DAY BEFORE A MEAL 30 tablet 5     atorvastatin (LIPITOR) 40 MG tablet Take 1 tablet (40 mg) by mouth daily 90 tablet 1     metoprolol (TOPROL-XL) 50 MG 24 hr tablet Take 1 tablet (50  mg) by mouth daily 90 tablet 1     ASPIRIN PO Take 325 mg by mouth daily       blood glucose monitoring (ACCU-CHEK KEYANNA) test strip by In Vitro route 2 times daily .       NITROGLYCERIN SL Place 0.4 mg under the tongue every 5 minutes as needed for chest pain       [DISCONTINUED] glipiZIDE (GLUCOTROL) 5 MG tablet TAKE 1 TABLET BY MOUTH EVERY DAY BEFORE A MEAL 90 tablet 1     Labs reviewed in EPIC    Reviewed and updated as needed this visit by clinical staff  Tobacco  Allergies  Meds  Med Hx  Surg Hx  Fam Hx  Soc Hx      Reviewed and updated as needed this visit by Provider         ROS:  Constitutional, HEENT, cardiovascular, pulmonary, gi and gu systems are negative, except as otherwise noted.    OBJECTIVE:                                                    /74  Pulse 72  Resp 20  Wt 172 lb (78 kg)  SpO2 96%  BMI 26.15 kg/m2  Body mass index is 26.15 kg/(m^2).  GENERAL APPEARANCE: healthy, alert and no distress  NECK: no adenopathy, no asymmetry, masses, or scars and thyroid normal to palpation  RESP: lungs clear to auscultation - no rales, rhonchi or wheezes  CV: regular rates and rhythm, normal S1 S2, no S3 or S4 and no murmur, click or rub  ABDOMEN: soft, nontender, without hepatosplenomegaly or masses and bowel sounds normal  MS: arthritic changes of the knees bilaterally  PSYCH: mentation appears normal and affect normal/bright       ASSESSMENT/PLAN:                                                    1. Type 2 diabetes mellitus without complication, without long-term current use of insulin (H)  F/u 3 mos  - Hemoglobin A1c    2. Other specified hypothyroidism  Will likely need increased dose and f/u in 3 mos again  - TSH with free T4 reflex    3. Hyperlipidemia LDL goal <70  Add fish oil    4. Benign essential hypertension  stable    5. Arthritis of knee  Correct thyroid, add fish oil, and tylenol prn    Patient was agreeable to this plan and had no further questions.  See Patient  Instructions    Bisi Stuart MD  [facility]

## 2017-04-28 NOTE — LETTER
[facility] [place]  [address]  [place] [address]  [phone]      May 1, 2017      Nori Looney  [address]  [place] [address]        Dear Nori,    Here are the lab results from your recent visit.  Your thyroid level is getting better, continue medication and recheck in 3 months.  Your sugar is higher, watch your sugars and carbohydrates otherwise we may have to increase your medication.    Results for orders placed or performed in visit on 04/28/17   Hemoglobin A1c   Result Value Ref Range    Hemoglobin A1C 7.6 (H) 4.3 - 6.0 %   TSH with free T4 reflex   Result Value Ref Range    TSH 4.88 (H) 0.40 - 4.00 mU/L   Estimated Average Glucose   Result Value Ref Range    Estimated Average Glucose 171 mg/dL   T4 free   Result Value Ref Range    T4 Free 1.17 0.76 - 1.46 ng/dL     If you have any questions please call my office [phone].      Sincerely,    Dr. Bisi Stuart/MATILDE

## 2017-05-08 DIAGNOSIS — E11.9 TYPE 2 DIABETES MELLITUS WITHOUT COMPLICATION, WITHOUT LONG-TERM CURRENT USE OF INSULIN (H): ICD-10-CM

## 2017-05-10 NOTE — TELEPHONE ENCOUNTER
metformin         Last Written Prescription Date: 5/8/17  Last Fill Quantity: 180, # refills: 0  Last Office Visit with [facility], [facility] or [facility] prescribing provider:  4/28/17   Next 5 appointments (look out 90 days)     Jul 28, 2017  9:45 AM CDT   (Arrive by 9:30 AM)   SHORT with Bisi Stuart MD   [facility] ([facility])    [address]  [facility] [address]   [phone]                   BP Readings from Last 3 Encounters:   04/28/17 126/74   03/01/17 142/72   01/31/17 136/78     Lab Results   Component Value Date    MICROL 10 01/05/2017     Lab Results   Component Value Date    UMALCR 4.42 01/05/2017     Creatinine   Date Value Ref Range Status   01/05/2017 1.08 0.66 - 1.25 mg/dL Final   ]  GFR Estimate   Date Value Ref Range Status   01/05/2017 65 >60 mL/min/1.7m2 Final     Comment:     Non  GFR Calc   06/09/2016 76 >60 mL/min/1.7m2 Final     Comment:     Non  GFR Calc     GFR Estimate If Black   Date Value Ref Range Status   01/05/2017 79 >60 mL/min/1.7m2 Final     Comment:      GFR Calc   06/09/2016 >90   GFR Calc   >60 mL/min/1.7m2 Final     Lab Results   Component Value Date    CHOL 132 01/05/2017     Lab Results   Component Value Date    HDL 38 01/05/2017     Lab Results   Component Value Date    LDL 64 01/05/2017     Lab Results   Component Value Date    TRIG 152 01/05/2017     No results found for: CHOLHDLRATIO  Lab Results   Component Value Date    AST 14 01/05/2017     Lab Results   Component Value Date    ALT 24 01/05/2017     Lab Results   Component Value Date    A1C 7.6 04/28/2017    A1C 7.1 01/05/2017    A1C 7.3 06/15/2016     Potassium   Date Value Ref Range Status   01/05/2017 4.0 3.4 - 5.3 mmol/L Final

## 2017-05-11 RX ORDER — METFORMIN HCL 500 MG
TABLET, EXTENDED RELEASE 24 HR ORAL
Qty: 180 TABLET | Refills: 0 | Status: SHIPPED | OUTPATIENT
Start: 2017-05-11 | End: 2017-06-15

## 2017-06-06 DIAGNOSIS — I25.10 CORONARY ARTERY DISEASE INVOLVING NATIVE CORONARY ARTERY OF NATIVE HEART WITHOUT ANGINA PECTORIS: Primary | ICD-10-CM

## 2017-06-06 RX ORDER — NITROGLYCERIN 0.4 MG/1
TABLET SUBLINGUAL
Qty: 25 TABLET | Refills: 0 | Status: SHIPPED | OUTPATIENT
Start: 2017-06-06 | End: 2017-06-26

## 2017-06-06 NOTE — TELEPHONE ENCOUNTER
PCP Dr. Stuart. Last office visit 04/28/17.  Nitro historically entered on patient's current medication list. Please associate dx and sign if appropriate.

## 2017-06-15 ENCOUNTER — OFFICE VISIT (OUTPATIENT)
Dept: FAMILY MEDICINE | Facility: OTHER | Age: 82
End: 2017-06-15
Attending: FAMILY MEDICINE
Payer: COMMERCIAL

## 2017-06-15 VITALS
DIASTOLIC BLOOD PRESSURE: 68 MMHG | WEIGHT: 175 LBS | BODY MASS INDEX: 26.52 KG/M2 | HEIGHT: 68 IN | SYSTOLIC BLOOD PRESSURE: 124 MMHG | TEMPERATURE: 97.5 F | HEART RATE: 80 BPM

## 2017-06-15 DIAGNOSIS — G89.29 CHRONIC PAIN OF BOTH KNEES: Primary | ICD-10-CM

## 2017-06-15 DIAGNOSIS — M25.562 CHRONIC PAIN OF BOTH KNEES: Primary | ICD-10-CM

## 2017-06-15 DIAGNOSIS — M17.0 PRIMARY OSTEOARTHRITIS OF BOTH KNEES: ICD-10-CM

## 2017-06-15 DIAGNOSIS — M25.561 CHRONIC PAIN OF BOTH KNEES: Primary | ICD-10-CM

## 2017-06-15 DIAGNOSIS — M17.10 PRIMARY LOCALIZED OSTEOARTHROSIS, LOWER LEG, UNSPECIFIED LATERALITY: ICD-10-CM

## 2017-06-15 PROCEDURE — 99214 OFFICE O/P EST MOD 30 MIN: CPT

## 2017-06-15 PROCEDURE — 73565 X-RAY EXAM OF KNEES: CPT | Mod: TC

## 2017-06-15 PROCEDURE — 99213 OFFICE O/P EST LOW 20 MIN: CPT | Mod: 25 | Performed by: FAMILY MEDICINE

## 2017-06-15 PROCEDURE — 20610 DRAIN/INJ JOINT/BURSA W/O US: CPT | Performed by: FAMILY MEDICINE

## 2017-06-15 RX ORDER — TRIAMCINOLONE ACETONIDE 40 MG/ML
80 INJECTION, SUSPENSION INTRA-ARTICULAR; INTRAMUSCULAR ONCE
Qty: 2 ML | Refills: 0 | OUTPATIENT
Start: 2017-06-15 | End: 2017-06-15

## 2017-06-15 RX ORDER — LIDOCAINE HYDROCHLORIDE 10 MG/ML
12 INJECTION, SOLUTION INFILTRATION; PERINEURAL ONCE
Qty: 12 ML | Refills: 0 | OUTPATIENT
Start: 2017-06-15 | End: 2017-06-15

## 2017-06-15 ASSESSMENT — PAIN SCALES - GENERAL: PAINLEVEL: MODERATE PAIN (5)

## 2017-06-15 NOTE — PROGRESS NOTES
SUBJECTIVE:                                                    Nori Looney is a 83 year old male who presents to clinic today for the following health issues:      Musculoskeletal problem/pain      Duration: couple    Description  Location: bilateral knees    Intensity:  moderate    Accompanying signs and symptoms: swelling    History  Previous similar problem: YES  Previous evaluation:  none    Precipitating or alleviating factors:  Trauma or overuse: no   Aggravating factors include: walking and overuse    Therapies tried and outcome: nothing     He has been kneeling on them lately and has gotten some swelling    Problem list and histories reviewed & adjusted, as indicated.  Additional history: as documented    Current Outpatient Prescriptions   Medication Sig Dispense Refill     lidocaine 1 % injection 12 mLs by INTRA-ARTICULAR route once for 1 dose 12 mL 0     triamcinolone acetonide (KENALOG) 40 MG/ML injection 2 mLs (80 mg) by INTRA-ARTICULAR route once for 1 dose 2 mL 0     nitroglycerin (NITROSTAT) 0.4 MG sublingual tablet For chest pain place 1 tablet under the tongue every 5 minutes for 3 doses. If symptoms persist 5 minutes after 1st dose call 911. 25 tablet 0     metFORMIN (GLUCOPHAGE-XR) 500 MG 24 hr tablet TAKE 1 TABLET BY MOUTH TWICE DAILY WITH MEALS. SWALLOW WHOLE. DO NOT CRUSH OR CHEW 180 tablet 1     sulindac (CLINORIL) 200 MG tablet Take 1 tablet (200 mg) by mouth 2 times daily (with meals) 60 tablet 2     levothyroxine (SYNTHROID/LEVOTHROID) 112 MCG tablet Take 1 tablet (112 mcg) by mouth daily 90 tablet 1     glipiZIDE (GLUCOTROL) 5 MG tablet TAKE 1 TABLET BY MOUTH EVERY DAY BEFORE A MEAL 30 tablet 5     atorvastatin (LIPITOR) 40 MG tablet Take 1 tablet (40 mg) by mouth daily 90 tablet 1     metoprolol (TOPROL-XL) 50 MG 24 hr tablet Take 1 tablet (50 mg) by mouth daily 90 tablet 1     ASPIRIN PO Take 325 mg by mouth daily       blood glucose monitoring (ACCU-CHEK KEYANNA) test strip by In  "Vitro route 2 times daily .       [DISCONTINUED] metFORMIN (GLUCOPHAGE-XR) 500 MG 24 hr tablet TAKE 1 TABLET BY MOUTH 2 TIMES DAILY WITH MEALS. SWALLOW WHOLE, DO NOT CRUSH OR CHEW 180 tablet 0     Labs reviewed in EPIC    Reviewed and updated as needed this visit by clinical staff  Tobacco  Allergies  Meds  Med Hx  Surg Hx  Fam Hx  Soc Hx      Reviewed and updated as needed this visit by Provider         ROS:  Constitutional, HEENT, cardiovascular, pulmonary, gi and gu systems are negative, except as otherwise noted.    OBJECTIVE:                                                    /68  Pulse 80  Temp 97.5  F (36.4  C)  Ht 5' 8\" (1.727 m)  Wt 175 lb (79.4 kg)  BMI 26.61 kg/m2  Body mass index is 26.61 kg/(m^2).  GENERAL APPEARANCE: healthy, alert and no distress  MS: arthritic changes of the knees bilaterally, with mild effusion  PSYCH: mentation appears normal and affect normal/bright       ASSESSMENT/PLAN:                                                    1. Chronic pain of both knees  Moderate-severe arthritis noted on xray today  - XR Knee AP Standing Bilateral (Clinic Performed)    2. Primary osteoarthritis of both knees    - Large Joint/Bursa injection and/or drainage (Shoulder, Knee)  - Kenalog  80 MG         []  - Lidocaine 1% or 2%     []  - lidocaine 1 % injection; 12 mLs by INTRA-ARTICULAR route once for 1 dose  Dispense: 12 mL; Refill: 0  - triamcinolone acetonide (KENALOG) 40 MG/ML injection; 2 mLs (80 mg) by INTRA-ARTICULAR route once for 1 dose  Dispense: 2 mL; Refill: 0    3. Primary localized osteoarthrosis, lower leg, unspecified laterality  F/u 3-12 mos  - Large Joint/Bursa injection and/or drainage (Shoulder, Knee)  - Kenalog  80 MG         []  - Lidocaine 1% or 2%     []  - lidocaine 1 % injection; 12 mLs by INTRA-ARTICULAR route once for 1 dose  Dispense: 12 mL; Refill: 0  - triamcinolone acetonide (KENALOG) 40 MG/ML injection; 2 mLs (80 mg) by " INTRA-ARTICULAR route once for 1 dose  Dispense: 2 mL; Refill: 0    Patient was agreeable to this plan and had no further questions.  See Patient Instructions    Bisi Stuart MD  [facility]

## 2017-06-15 NOTE — MR AVS SNAPSHOT
"              After Visit Summary   6/15/2017    Nori Looney    MRN: [id number]           Patient Information     Date Of Birth          2/4/1934        Visit Information        Provider Department      6/15/2017 10:30 AM Bisi Stuart MD [facility] [place]        Today's Diagnoses     Chronic pain of both knees    -  1    Primary osteoarthritis of both knees        Primary localized osteoarthrosis, lower leg, unspecified laterality           Follow-ups after your visit        Your next 10 appointments already scheduled     Jul 28, 2017  9:45 AM CDT   (Arrive by 9:30 AM)   SHORT with Bisi Stuart MD   [facility] ([facility] )    [address]  [facility] [address]   [phone]              Who to contact     If you have questions or need follow up information about today's clinic visit or your schedule please contact [facility] directly at [phone].  Normal or non-critical lab and imaging results will be communicated to you by MyChart, letter or phone within 4 business days after the clinic has received the results. If you do not hear from us within 7 days, please contact the clinic through [employer] or phone. If you have a critical or abnormal lab result, we will notify you by phone as soon as possible.  Submit refill requests through [employer] or call your pharmacy and they will forward the refill request to us. Please allow 3 business days for your refill to be completed.          Additional Information About Your Visit        [employer] Information     [employer] lets you send messages to your doctor, view your test results, renew your prescriptions, schedule appointments and more. To sign up, go to [email or web address] . Click on \"Log in\" on the left side of the screen, which will take you to the Welcome page. Then click on \"Sign up Now\" on the right side of the page.     You will be asked to enter the access code listed below, as well as " "some personal information. Please follow the directions to create your username and password.     Your access code is: [email or web address]  Expires: 2017  9:44 AM     Your access code will  in 90 days. If you need help or a new code, please call your [facility] clinic or [phone].        Care EveryWhere ID     This is your Care EveryWhere ID. This could be used by other organizations to access your [facility] medical records  [id number]        Your Vitals Were     Pulse Temperature Height BMI (Body Mass Index)          80 97.5  F (36.4  C) 5' 8\" (1.727 m) 26.61 kg/m2         Blood Pressure from Last 3 Encounters:   06/15/17 124/68   17 126/74   17 142/72    Weight from Last 3 Encounters:   06/15/17 175 lb (79.4 kg)   17 172 lb (78 kg)   17 168 lb (76.2 kg)              We Performed the Following     Kenalog  80 MG         []     Large Joint/Bursa injection and/or drainage (Shoulder, Knee)     Lidocaine 1% or 2%     []     XR Knee AP Standing Bilateral (Clinic Performed)          Today's Medication Changes          These changes are accurate as of: 6/15/17 12:38 PM.  If you have any questions, ask your nurse or doctor.               Start taking these medicines.        Dose/Directions    lidocaine 1 % injection   Used for:  Primary osteoarthritis of both knees, Primary localized osteoarthrosis, lower leg, unspecified laterality   Started by:  Bisi Stuart MD        Dose:  12 mL   12 mLs by INTRA-ARTICULAR route once for 1 dose   Quantity:  12 mL   Refills:  0       triamcinolone acetonide 40 MG/ML injection   Commonly known as:  KENALOG   Used for:  Primary osteoarthritis of both knees, Primary localized osteoarthrosis, lower leg, unspecified laterality   Started by:  Bisi Stuart MD        Dose:  80 mg   2 mLs (80 mg) by INTRA-ARTICULAR route once for 1 dose   Quantity:  2 mL   Refills:  0         These medicines have changed or have updated prescriptions.     "    Dose/Directions    metFORMIN 500 MG 24 hr tablet   Commonly known as:  GLUCOPHAGE-XR   This may have changed:  Another medication with the same name was removed. Continue taking this medication, and follow the directions you see here.   Used for:  Type 2 diabetes mellitus without complication, without long-term current use of insulin (H)   Changed by:  Bisi Stuart MD        TAKE 1 TABLET BY MOUTH TWICE DAILY WITH MEALS. SWALLOW WHOLE. DO NOT CRUSH OR CHEW   Quantity:  180 tablet   Refills:  1            Where to get your medicines      Some of these will need a paper prescription and others can be bought over the counter.  Ask your nurse if you have questions.     You don't need a prescription for these medications     lidocaine 1 % injection    triamcinolone acetonide 40 MG/ML injection                Primary Care Provider Office Phone # Fax #    Bisi Stuart [phone]       [facility] [place] [address] [address]        Thank you!     Thank you for choosing [facility]  for your care. Our goal is always to provide you with excellent care. Hearing back from our patients is one way we can continue to improve our services. Please take a few minutes to complete the written survey that you may receive in the mail after your visit with us. Thank you!             Your Updated Medication List - Protect others around you: Learn how to safely use, store and throw away your medicines at www.disposemymeds.org.          This list is accurate as of: 6/15/17 12:38 PM.  Always use your most recent med list.                   Brand Name Dispense Instructions for use    ACCU-CHEK KEYANNA test strip   Generic drug:  blood glucose monitoring      by In Vitro route 2 times daily .       ASPIRIN PO      Take 325 mg by mouth daily       atorvastatin 40 MG tablet    LIPITOR    90 tablet    Take 1 tablet (40 mg) by mouth daily       glipiZIDE 5 MG tablet    GLUCOTROL    30  tablet    TAKE 1 TABLET BY MOUTH EVERY DAY BEFORE A MEAL       levothyroxine 112 MCG tablet    SYNTHROID/LEVOTHROID    90 tablet    Take 1 tablet (112 mcg) by mouth daily       lidocaine 1 % injection     12 mL    12 mLs by INTRA-ARTICULAR route once for 1 dose       metFORMIN 500 MG 24 hr tablet    GLUCOPHAGE-XR    180 tablet    TAKE 1 TABLET BY MOUTH TWICE DAILY WITH MEALS. SWALLOW WHOLE. DO NOT CRUSH OR CHEW       metoprolol 50 MG 24 hr tablet    TOPROL-XL    90 tablet    Take 1 tablet (50 mg) by mouth daily       nitroglycerin 0.4 MG sublingual tablet    NITROSTAT    25 tablet    For chest pain place 1 tablet under the tongue every 5 minutes for 3 doses. If symptoms persist 5 minutes after 1st dose call 911.       sulindac 200 MG tablet    CLINORIL    60 tablet    Take 1 tablet (200 mg) by mouth 2 times daily (with meals)       triamcinolone acetonide 40 MG/ML injection    KENALOG    2 mL    2 mLs (80 mg) by INTRA-ARTICULAR route once for 1 dose

## 2017-06-15 NOTE — NURSING NOTE
"Chief Complaint   Patient presents with     Knee Pain       Initial /68  Pulse 80  Temp 97.5  F (36.4  C)  Ht 5' 8\" (1.727 m)  Wt 175 lb (79.4 kg)  BMI 26.61 kg/m2 Estimated body mass index is 26.61 kg/(m^2) as calculated from the following:    Height as of this encounter: 5' 8\" (1.727 m).    Weight as of this encounter: 175 lb (79.4 kg).  Medication Reconciliation: complete     Fernando Brown      "

## 2017-06-26 DIAGNOSIS — I25.10 CORONARY ARTERY DISEASE INVOLVING NATIVE CORONARY ARTERY OF NATIVE HEART WITHOUT ANGINA PECTORIS: ICD-10-CM

## 2017-06-26 DIAGNOSIS — E03.8 OTHER SPECIFIED HYPOTHYROIDISM: ICD-10-CM

## 2017-06-26 RX ORDER — LEVOTHYROXINE SODIUM 112 UG/1
112 TABLET ORAL DAILY
Qty: 90 TABLET | Refills: 1 | Status: SHIPPED | OUTPATIENT
Start: 2017-06-26 | End: 2017-09-21

## 2017-06-26 RX ORDER — NITROGLYCERIN 0.4 MG/1
TABLET SUBLINGUAL
Qty: 25 TABLET | Refills: 0 | Status: SHIPPED | OUTPATIENT
Start: 2017-06-26 | End: 2022-01-13

## 2017-07-18 DIAGNOSIS — E11.9 CONTROLLED TYPE 2 DIABETES MELLITUS WITHOUT COMPLICATION, WITHOUT LONG-TERM CURRENT USE OF INSULIN (H): ICD-10-CM

## 2017-07-19 RX ORDER — GLIPIZIDE 5 MG/1
TABLET ORAL
Qty: 90 TABLET | Refills: 1 | Status: SHIPPED | OUTPATIENT
Start: 2017-07-19 | End: 2018-01-29

## 2017-07-19 NOTE — TELEPHONE ENCOUNTER
Glipizide          Last Written Prescription Date: 1/24/2017  Last Fill Quantity: 90, # refills: 0  Last Office Visit with FMG, UMP or  Health prescribing provider:  6/15/2017   Next 5 appointments (look out 90 days)     Jul 28, 2017  9:45 AM CDT   (Arrive by 9:30 AM)   SHORT with Bisi Stuart MD   [facility] [place] ([facility] [place] )    [address]  [place] [address]   [phone]                   BP Readings from Last 3 Encounters:   06/15/17 124/68   04/28/17 126/74   03/01/17 142/72     Lab Results   Component Value Date    MICROL 10 01/05/2017     Lab Results   Component Value Date    UMALCR 4.42 01/05/2017     Creatinine   Date Value Ref Range Status   01/05/2017 1.08 0.66 - 1.25 mg/dL Final   ]  GFR Estimate   Date Value Ref Range Status   01/05/2017 65 >60 mL/min/1.7m2 Final     Comment:     Non  GFR Calc   06/09/2016 76 >60 mL/min/1.7m2 Final     Comment:     Non  GFR Calc     GFR Estimate If Black   Date Value Ref Range Status   01/05/2017 79 >60 mL/min/1.7m2 Final     Comment:      GFR Calc   06/09/2016 >90   GFR Calc   >60 mL/min/1.7m2 Final     Lab Results   Component Value Date    CHOL 132 01/05/2017     Lab Results   Component Value Date    HDL 38 01/05/2017     Lab Results   Component Value Date    LDL 64 01/05/2017     Lab Results   Component Value Date    TRIG 152 01/05/2017     No results found for: CHOLHDLRATIO  Lab Results   Component Value Date    AST 14 01/05/2017     Lab Results   Component Value Date    ALT 24 01/05/2017     Lab Results   Component Value Date    A1C 7.6 04/28/2017    A1C 7.1 01/05/2017    A1C 7.3 06/15/2016     Potassium   Date Value Ref Range Status   01/05/2017 4.0 3.4 - 5.3 mmol/L Final

## 2017-07-28 ENCOUNTER — OFFICE VISIT (OUTPATIENT)
Dept: FAMILY MEDICINE | Facility: OTHER | Age: 82
End: 2017-07-28
Attending: FAMILY MEDICINE
Payer: COMMERCIAL

## 2017-07-28 VITALS
OXYGEN SATURATION: 94 % | SYSTOLIC BLOOD PRESSURE: 136 MMHG | DIASTOLIC BLOOD PRESSURE: 64 MMHG | BODY MASS INDEX: 25.01 KG/M2 | WEIGHT: 165 LBS | TEMPERATURE: 97.9 F | HEART RATE: 62 BPM | HEIGHT: 68 IN

## 2017-07-28 DIAGNOSIS — E03.8 OTHER SPECIFIED HYPOTHYROIDISM: ICD-10-CM

## 2017-07-28 DIAGNOSIS — M17.10 ARTHRITIS OF KNEE: ICD-10-CM

## 2017-07-28 DIAGNOSIS — M16.11 PRIMARY OSTEOARTHRITIS OF RIGHT HIP: ICD-10-CM

## 2017-07-28 DIAGNOSIS — E78.5 HYPERLIPIDEMIA LDL GOAL <70: ICD-10-CM

## 2017-07-28 DIAGNOSIS — I10 BENIGN ESSENTIAL HYPERTENSION: ICD-10-CM

## 2017-07-28 DIAGNOSIS — E11.9 CONTROLLED TYPE 2 DIABETES MELLITUS WITHOUT COMPLICATION, WITHOUT LONG-TERM CURRENT USE OF INSULIN (H): Primary | ICD-10-CM

## 2017-07-28 LAB
EST. AVERAGE GLUCOSE BLD GHB EST-MCNC: 177 MG/DL
HBA1C MFR BLD: 7.8 % (ref 4.3–6)

## 2017-07-28 PROCEDURE — 40000788 ZZHCL STATISTIC ESTIMATED AVERAGE GLUCOSE: Mod: ZL | Performed by: FAMILY MEDICINE

## 2017-07-28 PROCEDURE — 99214 OFFICE O/P EST MOD 30 MIN: CPT | Performed by: FAMILY MEDICINE

## 2017-07-28 PROCEDURE — 83036 HEMOGLOBIN GLYCOSYLATED A1C: CPT | Mod: ZL | Performed by: FAMILY MEDICINE

## 2017-07-28 PROCEDURE — 99212 OFFICE O/P EST SF 10 MIN: CPT

## 2017-07-28 PROCEDURE — 36415 COLL VENOUS BLD VENIPUNCTURE: CPT | Mod: ZL | Performed by: FAMILY MEDICINE

## 2017-07-28 RX ORDER — SULINDAC 200 MG/1
200 TABLET ORAL 2 TIMES DAILY WITH MEALS
Qty: 60 TABLET | Refills: 2 | Status: CANCELLED | OUTPATIENT
Start: 2017-07-28

## 2017-07-28 RX ORDER — ERGOCALCIFEROL 1.25 MG/1
CAPSULE, LIQUID FILLED ORAL
COMMUNITY
Start: 2017-06-22 | End: 2021-02-01

## 2017-07-28 RX ORDER — ACETAMINOPHEN 500 MG
1000 TABLET ORAL 2 TIMES DAILY PRN
Qty: 120 TABLET | Refills: 1 | Status: SHIPPED | OUTPATIENT
Start: 2017-07-28 | End: 2017-12-09

## 2017-07-28 RX ORDER — ATORVASTATIN CALCIUM 20 MG/1
20 TABLET, FILM COATED ORAL DAILY
Qty: 30 TABLET | Refills: 1 | Status: SHIPPED | OUTPATIENT
Start: 2017-07-28 | End: 2017-10-08

## 2017-07-28 ASSESSMENT — ANXIETY QUESTIONNAIRES
6. BECOMING EASILY ANNOYED OR IRRITABLE: SEVERAL DAYS
7. FEELING AFRAID AS IF SOMETHING AWFUL MIGHT HAPPEN: NOT AT ALL
2. NOT BEING ABLE TO STOP OR CONTROL WORRYING: NOT AT ALL
5. BEING SO RESTLESS THAT IT IS HARD TO SIT STILL: NOT AT ALL
4. TROUBLE RELAXING: NOT AT ALL
3. WORRYING TOO MUCH ABOUT DIFFERENT THINGS: NOT AT ALL
1. FEELING NERVOUS, ANXIOUS, OR ON EDGE: NOT AT ALL
GAD7 TOTAL SCORE: 1
IF YOU CHECKED OFF ANY PROBLEMS ON THIS QUESTIONNAIRE, HOW DIFFICULT HAVE THESE PROBLEMS MADE IT FOR YOU TO DO YOUR WORK, TAKE CARE OF THINGS AT HOME, OR GET ALONG WITH OTHER PEOPLE: NOT DIFFICULT AT ALL

## 2017-07-28 ASSESSMENT — PAIN SCALES - GENERAL: PAINLEVEL: NO PAIN (0)

## 2017-07-28 NOTE — PROGRESS NOTES
SUBJECTIVE:                                                    Nori Looney is a 83 year old male who presents to clinic today for the following health issues:      Diabetes Follow-up      Patient is checking blood sugars: not at all    Diabetic concerns: None     Symptoms of hypoglycemia (low blood sugar): none     Paresthesias (numbness or burning in feet) or sores: No     Date of last diabetic eye exam: Due for one. Been a few years    Hyperlipidemia Follow-Up      Rate your low fat/cholesterol diet?: not monitoring fat    Taking statin?  Yes, no muscle aches from statin    Other lipid medications/supplements?:  none    Hypertension Follow-up      Outpatient blood pressures are not being checked.    Low Salt Diet: not monitoring salt        Amount of exercise or physical activity: 6-7 days/week for an average of greater than 60 minutes    Problems taking medications regularly: No    Medication side effects: none  Diet: regular (no restrictions)      Musculoskeletal problem/pain      Duration: Since winter    Description  Location: hip, keee pain, shoulder    Intensity:  mild, moderate    Accompanying signs and symptoms: none    History  Previous similar problem: YES  Previous evaluation:  x-ray, MRI and CT    Precipitating or alleviating factors:  Trauma or overuse: no   Aggravating factors include: sitting and walking    Therapies tried and outcome: injections - helped. L knee has more pain in it then the right one.     Hypothyroidism Follow-up      Since last visit, patient describes the following symptoms: Weight stable, no hair loss, no skin changes, no constipation, no loose stools and weight loss of 10 lbs        Problem list and histories reviewed & adjusted, as indicated.  Additional history: as documented    Current Outpatient Prescriptions   Medication Sig Dispense Refill     vitamin D (ERGOCALCIFEROL) 10590 UNIT capsule        atorvastatin (LIPITOR) 20 MG tablet Take 1 tablet (20 mg) by mouth daily  "30 tablet 1     acetaminophen (TYLENOL) 500 MG tablet Take 2 tablets (1,000 mg) by mouth 2 times daily as needed for mild pain 120 tablet 1     glipiZIDE (GLUCOTROL) 5 MG tablet TAKE 1 TABLET BY MOUTH EVERY DAY BEFORE A MEAL 90 tablet 1     nitroglycerin (NITROSTAT) 0.4 MG sublingual tablet For chest pain place 1 tablet under the tongue every 5 minutes for 3 doses. If symptoms persist 5 minutes after 1st dose call 911. 25 tablet 0     levothyroxine (SYNTHROID/LEVOTHROID) 112 MCG tablet Take 1 tablet (112 mcg) by mouth daily 90 tablet 1     metFORMIN (GLUCOPHAGE-XR) 500 MG 24 hr tablet TAKE 1 TABLET BY MOUTH TWICE DAILY WITH MEALS. SWALLOW WHOLE. DO NOT CRUSH OR CHEW 180 tablet 1     [DISCONTINUED] glipiZIDE (GLUCOTROL) 5 MG tablet TAKE 1 TABLET BY MOUTH EVERY DAY BEFORE A MEAL 30 tablet 5     metoprolol (TOPROL-XL) 50 MG 24 hr tablet Take 1 tablet (50 mg) by mouth daily 90 tablet 1     [DISCONTINUED] atorvastatin (LIPITOR) 40 MG tablet Take 1 tablet (40 mg) by mouth daily 90 tablet 1     ASPIRIN PO Take 325 mg by mouth daily       blood glucose monitoring (ACCU-CHEK KEYANNA) test strip by In Vitro route 2 times daily .       Labs reviewed in EPIC    Reviewed and updated as needed this visit by clinical staffTobacco  Allergies  Meds  Med Hx  Surg Hx  Fam Hx  Soc Hx      Reviewed and updated as needed this visit by Provider         ROS:  Constitutional, HEENT, cardiovascular, pulmonary, gi and gu systems are negative, except as otherwise noted.      OBJECTIVE:                                                    /64 (BP Location: Left arm, Patient Position: Chair, Cuff Size: Adult Regular)  Pulse 62  Temp 97.9  F (36.6  C) (Tympanic)  Ht 5' 8\" (1.727 m)  Wt 165 lb (74.8 kg)  SpO2 94%  BMI 25.09 kg/m2  Body mass index is 25.09 kg/(m^2).  GENERAL APPEARANCE: healthy, alert and no distress  RESP: lungs clear to auscultation - no rales, rhonchi or wheezes  CV: regular rates and rhythm, normal S1 S2, no S3 or " S4 and grade 2/6 systolic murmur heard best over the RUSB  ABDOMEN: soft, nontender, without hepatosplenomegaly or masses and bowel sounds normal  MS: arthritic changes of the knees bilaterally  PSYCH: mentation appears normal and affect normal/bright       ASSESSMENT/PLAN:                                                    1. Controlled type 2 diabetes mellitus without complication, without long-term current use of insulin (H)  F/u 3 mos come fasting  - Hemoglobin A1c; Standing  - Hemoglobin A1c    2. Hyperlipidemia LDL goal <70  Family would like to decrease dose  - atorvastatin (LIPITOR) 20 MG tablet; Take 1 tablet (20 mg) by mouth daily  Dispense: 30 tablet; Refill: 1    3. Benign essential hypertension  stable    4. Other specified hypothyroidism  UTD    5. Primary osteoarthritis of right hip  Try regular tyelnol and PT  - PHYSICAL THERAPY REFERRAL  - acetaminophen (TYLENOL) 500 MG tablet; Take 2 tablets (1,000 mg) by mouth 2 times daily as needed for mild pain  Dispense: 120 tablet; Refill: 1    6. Arthritis of knee  Regular tylenol and PT and tumeric OTC if needed  - PHYSICAL THERAPY REFERRAL  - acetaminophen (TYLENOL) 500 MG tablet; Take 2 tablets (1,000 mg) by mouth 2 times daily as needed for mild pain  Dispense: 120 tablet; Refill: 1    Patient was agreeable to this plan and had no further questions.  See Patient Instructions    Bisi Stuart MD  [facility]

## 2017-07-28 NOTE — MR AVS SNAPSHOT
"              After Visit Summary   7/28/2017    Nori Looney    MRN: [id number]           Patient Information     Date Of Birth          2/4/1934        Visit Information        Provider Department      7/28/2017 9:45 AM Bisi Stuart MD [facility] [place]        Today's Diagnoses     Controlled type 2 diabetes mellitus without complication, without long-term current use of insulin (H)    -  1    Hyperlipidemia LDL goal <70        Benign essential hypertension        Other specified hypothyroidism        Primary osteoarthritis of right hip        Arthritis of knee           Follow-ups after your visit        Additional Services     PHYSICAL THERAPY REFERRAL       *This therapy referral will be filtered to a centralized scheduling office at [facility] and the patient will receive a call to schedule an appointment at a [facility] location most convenient for them. *     [facility] provides Physical Therapy evaluation and treatment and many specialty services across the [facility] system.  If requesting a specialty program, please choose from the list below.    If you have not heard from the scheduling office within 2 business days, please call [phone] for all locations, with the exception of [place], please call [phone].  Treatment: Evaluation & Treatment  Special Instructions/Modalities:   Special Programs:     Please be aware that coverage of these services is subject to the terms and limitations of your health insurance plan.  Call member services at your health plan with any benefit or coverage questions.      **Note to Provider:  If you are referring outside of [facility] for the therapy appointment, please list the name of the location in the \"special instructions\" above, print the referral and give to the patient to schedule the appointment.                  Your next 10 appointments already scheduled     Oct 30, 2017  9:15 AM CDT   (Arrive by 9:00 " "AM)   SHORT with Bisi Stuart MD   [facility] [place] ([facility] [place] )    [place] [address]   [phone]              Future tests that were ordered for you today     Open Standing Orders        Priority Remaining Interval Expires Ordered    Hemoglobin A1c Routine 3/4  2018 2017            Who to contact     If you have questions or need follow up information about today's clinic visit or your schedule please contact [facility] directly at [phone].  Normal or non-critical lab and imaging results will be communicated to you by [employer], letter or phone within 4 business days after the clinic has received the results. If you do not hear from us within 7 days, please contact the clinic through [employer] or phone. If you have a critical or abnormal lab result, we will notify you by phone as soon as possible.  Submit refill requests through [employer] or call your pharmacy and they will forward the refill request to us. Please allow 3 business days for your refill to be completed.          Additional Information About Your Visit        [employer] Information     [employer] lets you send messages to your doctor, view your test results, renew your prescriptions, schedule appointments and more. To sign up, go to [email or web address] . Click on \"Log in\" on the left side of the screen, which will take you to the Welcome page. Then click on \"Sign up Now\" on the right side of the page.     You will be asked to enter the access code listed below, as well as some personal information. Please follow the directions to create your username and password.     Your access code is: [email or web address]  Expires: 10/26/2017 10:19 AM     Your access code will  in 90 days. If you need help or a new code, please call your [facility] or [phone].        Care EveryWhere ID     This is your Care EveryWhere ID. This could be used by other organizations to access your " "[facility] medical records  [id number]        Your Vitals Were     Pulse Temperature Height Pulse Oximetry BMI (Body Mass Index)       62 97.9  F (36.6  C) (Tympanic) 5' 8\" (1.727 m) 94% 25.09 kg/m2        Blood Pressure from Last 3 Encounters:   07/28/17 136/64   06/15/17 124/68   04/28/17 126/74    Weight from Last 3 Encounters:   07/28/17 165 lb (74.8 kg)   06/15/17 175 lb (79.4 kg)   04/28/17 172 lb (78 kg)              We Performed the Following     Hemoglobin A1c     PHYSICAL THERAPY REFERRAL          Today's Medication Changes          These changes are accurate as of: 7/28/17 10:19 AM.  If you have any questions, ask your nurse or doctor.               Start taking these medicines.        Dose/Directions    acetaminophen 500 MG tablet   Commonly known as:  TYLENOL   Used for:  Primary osteoarthritis of right hip, Arthritis of knee   Started by:  Bisi Stuart MD        Dose:  1000 mg   Take 2 tablets (1,000 mg) by mouth 2 times daily as needed for mild pain   Quantity:  120 tablet   Refills:  1         These medicines have changed or have updated prescriptions.        Dose/Directions    atorvastatin 20 MG tablet   Commonly known as:  LIPITOR   This may have changed:    - medication strength  - how much to take   Used for:  Hyperlipidemia LDL goal <70   Changed by:  Bisi Stuart MD        Dose:  20 mg   Take 1 tablet (20 mg) by mouth daily   Quantity:  30 tablet   Refills:  1            Where to get your medicines      These medications were sent to [facility] - [address] AT [address] [address]     Phone:  [phone]     acetaminophen 500 MG tablet    atorvastatin 20 MG tablet                Primary Care Provider Office Phone # Fax #    Bisi Stuart [phone]       [facility] [place] [address]  [place] [address]        Equal Access to Services     ARIE RESENDIZ AH: " Hadii aad santos hadcedrico Somarcyali, waaxda luqadaha, qaybta kaalmada boris, esteban grant. So [facility] [phone].    ATENCIÓN: Si robert cheney, tiene a crabtree disposición servicios gratuitos de asistencia lingüística. Llame al [phone].    We comply with applicable federal civil rights laws and [place] laws. We do not discriminate on the basis of race, color, national origin, age, disability sex, sexual orientation or gender identity.            Thank you!     Thank you for choosing [facility]  for your care. Our goal is always to provide you with excellent care. Hearing back from our patients is one way we can continue to improve our services. Please take a few minutes to complete the written survey that you may receive in the mail after your visit with us. Thank you!             Your Updated Medication List - Protect others around you: Learn how to safely use, store and throw away your medicines at www.disposemymeds.org.          This list is accurate as of: 7/28/17 10:19 AM.  Always use your most recent med list.                   Brand Name Dispense Instructions for use Diagnosis    ACCU-CHEK KEYANNA test strip   Generic drug:  blood glucose monitoring      by In Vitro route 2 times daily .        acetaminophen 500 MG tablet    TYLENOL    120 tablet    Take 2 tablets (1,000 mg) by mouth 2 times daily as needed for mild pain    Primary osteoarthritis of right hip, Arthritis of knee       ASPIRIN PO      Take 325 mg by mouth daily        atorvastatin 20 MG tablet    LIPITOR    30 tablet    Take 1 tablet (20 mg) by mouth daily    Hyperlipidemia LDL goal <70       glipiZIDE 5 MG tablet    GLUCOTROL    90 tablet    TAKE 1 TABLET BY MOUTH EVERY DAY BEFORE A MEAL    Controlled type 2 diabetes mellitus without complication, without long-term current use of insulin (H)       levothyroxine 112 MCG tablet    SYNTHROID/LEVOTHROID    90 tablet    Take 1 tablet (112 mcg) by mouth daily     Other specified hypothyroidism       metFORMIN 500 MG 24 hr tablet    GLUCOPHAGE-XR    180 tablet    TAKE 1 TABLET BY MOUTH TWICE DAILY WITH MEALS. SWALLOW WHOLE. DO NOT CRUSH OR CHEW    Type 2 diabetes mellitus without complication, without long-term current use of insulin (H)       metoprolol 50 MG 24 hr tablet    TOPROL-XL    90 tablet    Take 1 tablet (50 mg) by mouth daily    Coronary artery disease involving coronary bypass graft of native heart without angina pectoris       nitroGLYcerin 0.4 MG sublingual tablet    NITROSTAT    25 tablet    For chest pain place 1 tablet under the tongue every 5 minutes for 3 doses. If symptoms persist 5 minutes after 1st dose call 911.    Coronary artery disease involving native coronary artery of native heart without angina pectoris       vitamin D 43567 UNIT capsule    ERGOCALCIFEROL

## 2017-07-29 ASSESSMENT — ANXIETY QUESTIONNAIRES: GAD7 TOTAL SCORE: 1

## 2017-07-29 ASSESSMENT — PATIENT HEALTH QUESTIONNAIRE - PHQ9: SUM OF ALL RESPONSES TO PHQ QUESTIONS 1-9: 1

## 2017-08-02 ENCOUNTER — HOSPITAL ENCOUNTER (OUTPATIENT)
Dept: PHYSICAL THERAPY | Facility: HOSPITAL | Age: 82
Setting detail: THERAPIES SERIES
End: 2017-08-02
Attending: FAMILY MEDICINE
Payer: COMMERCIAL

## 2017-08-02 PROCEDURE — G8980 MOBILITY D/C STATUS: HCPCS | Mod: GP,CI

## 2017-08-02 PROCEDURE — 97162 PT EVAL MOD COMPLEX 30 MIN: CPT | Mod: GP

## 2017-08-02 PROCEDURE — 40000718 ZZHC STATISTIC PT DEPARTMENT ORTHO VISIT

## 2017-08-02 PROCEDURE — G8979 MOBILITY GOAL STATUS: HCPCS | Mod: GP,CI

## 2017-08-02 PROCEDURE — G8978 MOBILITY CURRENT STATUS: HCPCS | Mod: GP,CI

## 2017-08-03 ENCOUNTER — TELEPHONE (OUTPATIENT)
Dept: FAMILY MEDICINE | Facility: OTHER | Age: 82
End: 2017-08-03

## 2017-08-04 ENCOUNTER — TELEPHONE (OUTPATIENT)
Dept: FAMILY MEDICINE | Facility: OTHER | Age: 82
End: 2017-08-04

## 2017-08-04 DIAGNOSIS — M16.10 ARTHRITIS, HIP: Primary | ICD-10-CM

## 2017-08-04 ASSESSMENT — ACTIVITIES OF DAILY LIVING (ADL)
HOW_WOULD_YOU_RATE_THE_OVERALL_FUNCTION_OF_YOUR_KNEE_DURING_YOUR_USUAL_DAILY_ACTIVITIES?: ABNORMAL
WALK: ACTIVITY IS FAIRLY DIFFICULT
WEAKNESS: THE SYMPTOM AFFECTS MY ACTIVITY MODERATELY
GO DOWN STAIRS: ACTIVITY IS FAIRLY DIFFICULT
RISE FROM A CHAIR: ACTIVITY IS FAIRLY DIFFICULT
RAW_SCORE: 36
SQUAT: ACTIVITY IS SOMEWHAT DIFFICULT
STIFFNESS: THE SYMPTOM AFFECTS MY ACTIVITY MODERATELY
SIT WITH YOUR KNEE BENT: ACTIVITY IS SOMEWHAT DIFFICULT
KNEE_ACTIVITY_OF_DAILY_LIVING_SCORE: 51.43
PAIN: THE SYMPTOM AFFECTS MY ACTIVITY MODERATELY
GO UP STAIRS: ACTIVITY IS FAIRLY DIFFICULT
LIMPING: THE SYMPTOM AFFECTS MY ACTIVITY MODERATELY
SWELLING: I DO NOT HAVE THE SYMPTOM
HOW_WOULD_YOU_RATE_THE_CURRENT_FUNCTION_OF_YOUR_KNEE_DURING_YOUR_USUAL_DAILY_ACTIVITIES_ON_A_SCALE_FROM_0_TO_100_WITH_100_BEING_YOUR_LEVEL_OF_KNEE_FUNCTION_PRIOR_TO_YOUR_INJURY_AND_0_BEING_THE_INABILITY_TO_PERFORM_ANY_OF_YOUR_USUAL_DAILY_ACTIVITIES?: 50
AS_A_RESULT_OF_YOUR_KNEE_INJURY,_HOW_WOULD_YOU_RATE_YOUR_CURRENT_LEVEL_OF_DAILY_ACTIVITY?: ABNORMAL
KNEEL ON THE FRONT OF YOUR KNEE: ACTIVITY IS FAIRLY DIFFICULT
GIVING WAY, BUCKLING OR SHIFTING OF KNEE: I DO NOT HAVE THE SYMPTOM
STAND: ACTIVITY IS FAIRLY DIFFICULT
KNEE_ACTIVITY_OF_DAILY_LIVING_SUM: 36

## 2017-08-04 NOTE — PROGRESS NOTES
08/02/17 1000   General Information   Type of Visit Initial OP Ortho PT Evaluation   Start of Care Date 08/02/17   Referring Physician Sade Rojas Evaluate and Treat   Date of Order 07/28/17   Insurance Type Medicare   Medical Diagnosis R hip strain/pain, Bilat knee OA   Surgical/Medical history reviewed Yes   Precautions/Limitations no known precautions/limitations   Body Part(s)   Body Part(s) Knee;Hip   Presentation and Etiology   Pertinent history of current problem (include personal factors and/or comorbidities that impact the POC) This 84 y/o male has had 6 month history of R hip pain when twisting hip getting onto tractor.  Bilat knee pain started a few months after d/t walking differently. Cortizone shots one month ago with signif pain reduction. 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 Avoiding too far distance walkingStopped using cane ( Was using in R hand) R hip is worse than bilat knees.    Impairments A. Pain;B. Decreased WB tolerance;E. Decreased flexibility;F. Decreased strength and endurance;G. Impaired balance;H. Impaired gait   Functional Limitations perform activities of daily living;perform desired leisure / sports activities   Symptom Location R hip, bilat knees   How/Where did it occur Other   Onset date of current episode/exacerbation (Feb 2017)   Pain rating (0-10 point scale) Best (/10);Worst (/10)   Best (/10) 0   Worst (/10) 4   Pain quality B. Dull;C. Aching   Frequency of pain/symptoms B. Intermittent   Pain/symptoms exacerbated by B. Walking  (sit to stand, climbing)   Pain/symptoms eased by C. Rest;I. OTC medication(s)  (icy hot patches)   Progression of symptoms since onset: Worsened   Prior Level of Function   Prior Level of Function-Mobility indep with no asst device   Prior Level of Function-ADLs indep, very active   Functional Level Prior Comment still farms, takes care of cattle   Current Level of Function   Current  Community Support Family/friend caregiver   Patient role/employment history A. Employed  ( 8 hours per day)   Living environment [facility]   Home/community accessibility 5 steps to enter house. one levelhouse   Current equipment-Gait/Locomotion None   Fall Risk Screen   Per patient - Fall 2 or more times in past year? No   Per patient - Fall with injury in past year? No   Is patient a fall risk? No   Knee Objective Findings   Side (if bilateral, select both right and left) Right;Left   Integumentary  intact, no edema or incisions   Gait/Locomotion ambul with antalgic gait pattern when not using cane d/t R hip pain. No knee pain presently with ambulation  (always wears cowboy boots )   Balance/Proprioception (Single Leg Stance) equal, wfl for age   Knee ROM Comment pain free   Anterior Drawer Test neg   Posterior Drawer Test neg   Varus Stress Test neg   Valgus Stress Test neg   Palpation no significant    Right Knee Extension AROM full   Right Knee Flexion AROM full   Right Knee Flexion Strength 5   Right Knee Extension Strength 5   Right Quad Set Strength 5   Right Hamstring Flexibility wfl   Right Hip Flexor Flexibility wfl   Right Quadricep Flexibility wfl   Left Knee Extension AROM full   Left Knee Flexion AROM full   Left Knee Flexion Strength 5   Left Knee Extension Strength 5   Left Quad Set Strength 5   Left Gastrocnemius Flexibility wfl for age   Left Hamstring Flexibility wfl   Left Hip Flexor Flexibility wfl   Left ITB Flexibility wfl   Hip Objective Findings   Side (if bilateral, select both right and left) Right   Integumentary  intact   Gait/Locomotion pain with R heeltouch/foot flat d/t pain when not using cane. When using cane in L hand appropriately, pt ambul with min antalgia and has no pain   Balance/Proprioception (Single Leg Stance) wnl bilat for age   Lumbar ROM wfl for age   Femoral Nerve Stretch Test neg   Straight Leg Raise Test neg   Scour Test neg   Palpation possible  tenderness over greater trochanter- inconsistent. No ITB or other palpable tightness   Right Hip Flexion PROM wnl   Right Hip Abduction PROM wnl   Right Hip Adduction PROM wnl   Right Hip ER PROM wnl   Right Hip IR PROM wnl   Right Hip Flexion Strength 4+   Right Hip Abduction Strength 4+   Right Hip Extension Strength 5   Right Hip IR Strength 4+   Right Hip ER Strength 4+   Right Knee Flexion Strength 5   Right Knee Extension Strength 5   Haile Flexibility Test modified test- slight tightness to be expected   Right Hamstring Flexibility wfl   Right Piriformis Flexibility wfl   Clinical Impression   Clinical Impression Comments No significant dysfunction or impairment to treat at this time. Pt is functional, has normal strength, ROM and unable to treat arthritic pain which was flared by injury in February. Pt was taught proper cane usage and will use cane consistently more at this time until hip pain subsides. Will refer back to MD for now to consider possible further injections or treatments.   1x Eval Only-Outpatient/Observation Medicare G-Code   G-code Mobility: Walking & Moving Around   Mobility: Walking & Moving Around   Mobility: Current Status , Goal , Discharge -Bgch Only- Modifier the same for all G-codes CI: 1-19% impairment   Mobility: Current & Discharge Modifier Rationale-Eval Only ambul with cane   Therapy Certification   Certification date from 08/02/17   Certification date to 08/02/17   Medical Diagnosis L hip strain aggravated by signif OA in hip and bilat knees.

## 2017-08-04 NOTE — TELEPHONE ENCOUNTER
PT eval completed on 8/02/17 for bilat knee and R hip pain. Pt's knee pain is not significant at this time since injections.  All hip and knee strength and  ROM tests are wnl and PT orthop testing negative. No treatment necessary at this time. Pt was taught how to properly use cane in L UE.  It is possible that R hip OA pathology was aggravated by tractor incident.  Would you consider possible injection or further imaging?

## 2017-09-12 ENCOUNTER — TRANSFERRED RECORDS (OUTPATIENT)
Dept: HEALTH INFORMATION MANAGEMENT | Facility: HOSPITAL | Age: 82
End: 2017-09-12

## 2017-09-21 DIAGNOSIS — E03.8 OTHER SPECIFIED HYPOTHYROIDISM: ICD-10-CM

## 2017-09-21 RX ORDER — LEVOTHYROXINE SODIUM 112 UG/1
112 TABLET ORAL DAILY
Qty: 90 TABLET | Refills: 1 | Status: SHIPPED | OUTPATIENT
Start: 2017-09-21 | End: 2018-01-31 | Stop reason: DRUGHIGH

## 2017-09-21 NOTE — TELEPHONE ENCOUNTER
"Chief Complaint   Patient presents with     Refill Request     Levothyroxine        Initial There were no vitals taken for this visit. Estimated body mass index is 25.09 kg/(m^2) as calculated from the following:    Height as of 7/28/17: 5' 8\" (1.727 m).    Weight as of 7/28/17: 165 lb (74.8 kg).  Medication Reconciliation: artuor Gardiner      "

## 2017-10-08 DIAGNOSIS — E78.5 HYPERLIPIDEMIA LDL GOAL <70: ICD-10-CM

## 2017-10-09 NOTE — TELEPHONE ENCOUNTER
atorvastatin (LIPITOR) 20 MG tablet    Last Written Prescription Date: 7/28/17  Last Fill Quantity: 30, # refills: 1  Last Office Visit with FMG, UMP or [facility] prescribing provider: 7/28/17  Next 5 appointments (look out 90 days)     Oct 30, 2017  9:15 AM CDT   (Arrive by 9:00 AM)   SHORT with Bisi Stuart MD   [facility] [place] ([facility] [place] )    [address] [place] [address] [place]  [place] [address]   [phone]                   Lab Results   Component Value Date    CHOL 132 01/05/2017     Lab Results   Component Value Date    HDL 38 01/05/2017     Lab Results   Component Value Date    LDL 64 01/05/2017     Lab Results   Component Value Date    TRIG 152 01/05/2017     No results found for: CHOLSHAYYATIO

## 2017-10-10 RX ORDER — ATORVASTATIN CALCIUM 20 MG/1
TABLET, FILM COATED ORAL
Qty: 30 TABLET | Refills: 2 | Status: SHIPPED | OUTPATIENT
Start: 2017-10-10 | End: 2018-01-14

## 2017-10-25 DIAGNOSIS — E11.9 TYPE 2 DIABETES MELLITUS WITHOUT COMPLICATION, WITHOUT LONG-TERM CURRENT USE OF INSULIN (H): ICD-10-CM

## 2017-10-25 NOTE — TELEPHONE ENCOUNTER
Metformin       Last Written Prescription Date: 3/29/17  Last Fill Quantity: 180,  # refills: 1   Last Office Visit with FMG, UMP or [facility] prescribing provider: 7/28/17                                         Next 5 appointments (look out 90 days)     Oct 30, 2017  9:15 AM CDT   (Arrive by 9:00 AM)   SHORT with Bisi Stuart MD   [facility] [place] ([facility] [place] )    [address] [place] [address]   [phone]

## 2017-10-30 ENCOUNTER — OFFICE VISIT (OUTPATIENT)
Dept: FAMILY MEDICINE | Facility: OTHER | Age: 82
End: 2017-10-30
Attending: FAMILY MEDICINE
Payer: COMMERCIAL

## 2017-10-30 VITALS
HEIGHT: 68 IN | OXYGEN SATURATION: 97 % | WEIGHT: 170.8 LBS | DIASTOLIC BLOOD PRESSURE: 58 MMHG | BODY MASS INDEX: 25.88 KG/M2 | SYSTOLIC BLOOD PRESSURE: 132 MMHG | TEMPERATURE: 95.4 F | HEART RATE: 60 BPM

## 2017-10-30 DIAGNOSIS — E11.9 TYPE 2 DIABETES MELLITUS WITHOUT COMPLICATION, WITHOUT LONG-TERM CURRENT USE OF INSULIN (H): Primary | ICD-10-CM

## 2017-10-30 DIAGNOSIS — I10 BENIGN ESSENTIAL HYPERTENSION: ICD-10-CM

## 2017-10-30 DIAGNOSIS — M54.2 CERVICALGIA: ICD-10-CM

## 2017-10-30 DIAGNOSIS — E03.9 HYPOTHYROIDISM, UNSPECIFIED TYPE: ICD-10-CM

## 2017-10-30 DIAGNOSIS — E03.8 OTHER SPECIFIED HYPOTHYROIDISM: ICD-10-CM

## 2017-10-30 DIAGNOSIS — E78.5 HYPERLIPIDEMIA LDL GOAL <70: ICD-10-CM

## 2017-10-30 DIAGNOSIS — I50.22 CHRONIC SYSTOLIC CONGESTIVE HEART FAILURE (H): ICD-10-CM

## 2017-10-30 DIAGNOSIS — M16.11 PRIMARY OSTEOARTHRITIS OF RIGHT HIP: ICD-10-CM

## 2017-10-30 DIAGNOSIS — M17.12 PRIMARY LOCALIZED OSTEOARTHROSIS OF LEFT LOWER LEG: ICD-10-CM

## 2017-10-30 DIAGNOSIS — E11.9 CONTROLLED TYPE 2 DIABETES MELLITUS WITHOUT COMPLICATION, WITHOUT LONG-TERM CURRENT USE OF INSULIN (H): ICD-10-CM

## 2017-10-30 DIAGNOSIS — Z23 NEED FOR PROPHYLACTIC VACCINATION AND INOCULATION AGAINST INFLUENZA: ICD-10-CM

## 2017-10-30 LAB
ALBUMIN SERPL-MCNC: 3.8 G/DL (ref 3.4–5)
ALP SERPL-CCNC: 107 U/L (ref 40–150)
ALT SERPL W P-5'-P-CCNC: 36 U/L (ref 0–70)
ANION GAP SERPL CALCULATED.3IONS-SCNC: 10 MMOL/L (ref 3–14)
AST SERPL W P-5'-P-CCNC: 14 U/L (ref 0–45)
BILIRUB SERPL-MCNC: 0.7 MG/DL (ref 0.2–1.3)
BUN SERPL-MCNC: 14 MG/DL (ref 7–30)
CALCIUM SERPL-MCNC: 8.9 MG/DL (ref 8.5–10.1)
CHLORIDE SERPL-SCNC: 108 MMOL/L (ref 94–109)
CHOLEST SERPL-MCNC: 151 MG/DL
CK SERPL-CCNC: 11 U/L (ref 30–300)
CO2 SERPL-SCNC: 25 MMOL/L (ref 20–32)
CREAT SERPL-MCNC: 0.81 MG/DL (ref 0.66–1.25)
CREAT UR-MCNC: 142 MG/DL
EST. AVERAGE GLUCOSE BLD GHB EST-MCNC: 166 MG/DL
GFR SERPL CREATININE-BSD FRML MDRD: >90 ML/MIN/1.7M2
GLUCOSE SERPL-MCNC: 179 MG/DL (ref 70–99)
HBA1C MFR BLD: 7.4 % (ref 4.3–6)
HDLC SERPL-MCNC: 39 MG/DL
LDLC SERPL CALC-MCNC: 94 MG/DL
MICROALBUMIN UR-MCNC: 16 MG/L
MICROALBUMIN/CREAT UR: 11.34 MG/G CR (ref 0–17)
NONHDLC SERPL-MCNC: 112 MG/DL
POTASSIUM SERPL-SCNC: 4.7 MMOL/L (ref 3.4–5.3)
PROT SERPL-MCNC: 7.1 G/DL (ref 6.8–8.8)
SODIUM SERPL-SCNC: 143 MMOL/L (ref 133–144)
T4 FREE SERPL-MCNC: 1.16 NG/DL (ref 0.76–1.46)
TRIGL SERPL-MCNC: 89 MG/DL
TSH SERPL DL<=0.005 MIU/L-ACNC: 6.03 MU/L (ref 0.4–4)

## 2017-10-30 PROCEDURE — 36415 COLL VENOUS BLD VENIPUNCTURE: CPT | Mod: ZL | Performed by: FAMILY MEDICINE

## 2017-10-30 PROCEDURE — 82043 UR ALBUMIN QUANTITATIVE: CPT | Mod: ZL | Performed by: FAMILY MEDICINE

## 2017-10-30 PROCEDURE — 82550 ASSAY OF CK (CPK): CPT | Mod: ZL | Performed by: FAMILY MEDICINE

## 2017-10-30 PROCEDURE — 99214 OFFICE O/P EST MOD 30 MIN: CPT | Mod: 25 | Performed by: FAMILY MEDICINE

## 2017-10-30 PROCEDURE — 40000788 ZZHCL STATISTIC ESTIMATED AVERAGE GLUCOSE: Mod: ZL | Performed by: FAMILY MEDICINE

## 2017-10-30 PROCEDURE — 84439 ASSAY OF FREE THYROXINE: CPT | Mod: ZL | Performed by: FAMILY MEDICINE

## 2017-10-30 PROCEDURE — 90471 IMMUNIZATION ADMIN: CPT | Performed by: FAMILY MEDICINE

## 2017-10-30 PROCEDURE — 80061 LIPID PANEL: CPT | Mod: ZL | Performed by: FAMILY MEDICINE

## 2017-10-30 PROCEDURE — 80053 COMPREHEN METABOLIC PANEL: CPT | Mod: ZL | Performed by: FAMILY MEDICINE

## 2017-10-30 PROCEDURE — 20610 DRAIN/INJ JOINT/BURSA W/O US: CPT | Mod: LT | Performed by: FAMILY MEDICINE

## 2017-10-30 PROCEDURE — 84443 ASSAY THYROID STIM HORMONE: CPT | Mod: ZL | Performed by: FAMILY MEDICINE

## 2017-10-30 PROCEDURE — 83036 HEMOGLOBIN GLYCOSYLATED A1C: CPT | Mod: ZL | Performed by: FAMILY MEDICINE

## 2017-10-30 PROCEDURE — 90662 IIV NO PRSV INCREASED AG IM: CPT | Performed by: FAMILY MEDICINE

## 2017-10-30 PROCEDURE — 99212 OFFICE O/P EST SF 10 MIN: CPT

## 2017-10-30 RX ORDER — LIDOCAINE HYDROCHLORIDE 10 MG/ML
7 INJECTION, SOLUTION INFILTRATION; PERINEURAL ONCE
Qty: 7 ML | Refills: 0 | OUTPATIENT
Start: 2017-10-30 | End: 2017-10-30

## 2017-10-30 RX ORDER — LEVOTHYROXINE SODIUM 125 UG/1
125 TABLET ORAL DAILY
Qty: 90 TABLET | Refills: 0 | Status: SHIPPED | OUTPATIENT
Start: 2017-10-30 | End: 2018-02-03

## 2017-10-30 RX ORDER — METFORMIN HCL 500 MG
TABLET, EXTENDED RELEASE 24 HR ORAL
Qty: 180 TABLET | Refills: 0 | Status: SHIPPED | OUTPATIENT
Start: 2017-10-30 | End: 2018-02-03

## 2017-10-30 RX ORDER — TRIAMCINOLONE ACETONIDE 40 MG/ML
40 INJECTION, SUSPENSION INTRA-ARTICULAR; INTRAMUSCULAR ONCE
Qty: 1 ML | Refills: 0 | OUTPATIENT
Start: 2017-10-30 | End: 2017-10-30

## 2017-10-30 ASSESSMENT — ANXIETY QUESTIONNAIRES
7. FEELING AFRAID AS IF SOMETHING AWFUL MIGHT HAPPEN: NOT AT ALL
4. TROUBLE RELAXING: NOT AT ALL
2. NOT BEING ABLE TO STOP OR CONTROL WORRYING: NOT AT ALL
5. BEING SO RESTLESS THAT IT IS HARD TO SIT STILL: NOT AT ALL
1. FEELING NERVOUS, ANXIOUS, OR ON EDGE: NOT AT ALL
6. BECOMING EASILY ANNOYED OR IRRITABLE: NOT AT ALL
GAD7 TOTAL SCORE: 0
3. WORRYING TOO MUCH ABOUT DIFFERENT THINGS: NOT AT ALL

## 2017-10-30 ASSESSMENT — PAIN SCALES - GENERAL: PAINLEVEL: MODERATE PAIN (4)

## 2017-10-30 ASSESSMENT — PATIENT HEALTH QUESTIONNAIRE - PHQ9: SUM OF ALL RESPONSES TO PHQ QUESTIONS 1-9: 2

## 2017-10-30 NOTE — PROGRESS NOTES
SUBJECTIVE:                                                    Nori Looney is a 83 year old male who presents to clinic today for the following health issues:      Diabetes Follow-up      Patient is checking blood sugars: rarely.  Results range from 160-170     Diabetic concerns: None     Symptoms of hypoglycemia (low blood sugar): dizzy, sometimes but gets better as soon as he eats     Paresthesias (numbness or burning in feet) or sores: No     Date of last diabetic eye exam: Sept 2017    Hyperlipidemia Follow-Up      Rate your low fat/cholesterol diet?: not monitoring fat    Taking statin?  Yes, no muscle aches from statin    Other lipid medications/supplements?:  Fish oil/Omega 3, dose unknown without side effects    Hypertension Follow-up      Outpatient blood pressures are not being checked.    Low Salt Diet: not monitoring salt        Amount of exercise or physical activity: 6-7 days/week for an average of 45-60 minutes    Problems taking medications regularly: No    Medication side effects: none    Diet: regular (no restrictions)    Also having knee pain and neck pain    Problem list and histories reviewed & adjusted, as indicated.  Additional history: as documented    Current Outpatient Prescriptions   Medication Sig Dispense Refill     triamcinolone acetonide (KENALOG) 40 MG/ML injection 1 mL (40 mg) by INTRA-ARTICULAR route once for 1 dose 1 mL 0     lidocaine 1 % injection 7 mLs by INTRA-ARTICULAR route once for 1 dose 7 mL 0     atorvastatin (LIPITOR) 20 MG tablet Take 1 Tab by mouth one time a day. 30 tablet 2     levothyroxine (SYNTHROID/LEVOTHROID) 112 MCG tablet Take 1 tablet (112 mcg) by mouth daily 90 tablet 1     vitamin D (ERGOCALCIFEROL) 22203 UNIT capsule        acetaminophen (TYLENOL) 500 MG tablet Take 2 tablets (1,000 mg) by mouth 2 times daily as needed for mild pain 120 tablet 1     glipiZIDE (GLUCOTROL) 5 MG tablet TAKE 1 TABLET BY MOUTH EVERY DAY BEFORE A MEAL 90 tablet 1      "nitroglycerin (NITROSTAT) 0.4 MG sublingual tablet For chest pain place 1 tablet under the tongue every 5 minutes for 3 doses. If symptoms persist 5 minutes after 1st dose call 911. 25 tablet 0     metFORMIN (GLUCOPHAGE-XR) 500 MG 24 hr tablet TAKE 1 TABLET BY MOUTH TWICE DAILY WITH MEALS. SWALLOW WHOLE. DO NOT CRUSH OR CHEW 180 tablet 1     metoprolol (TOPROL-XL) 50 MG 24 hr tablet Take 1 tablet (50 mg) by mouth daily 90 tablet 1     ASPIRIN PO Take 325 mg by mouth daily       blood glucose monitoring (ACCU-CHEK KEYANNA) test strip by In Vitro route 2 times daily .       Labs reviewed in EPIC    ROS:  Constitutional, HEENT, cardiovascular, pulmonary, gi and gu systems are negative, except as otherwise noted.      OBJECTIVE:                                                    /58 (BP Location: Left arm, Patient Position: Sitting, Cuff Size: Adult Regular)  Pulse 60  Temp 95.4  F (35.2  C)  Ht 5' 8\" (1.727 m)  Wt 170 lb 12.8 oz (77.5 kg)  SpO2 97%  BMI 25.97 kg/m2  Body mass index is 25.97 kg/(m^2).  GENERAL APPEARANCE: healthy, alert and no distress  RESP: lungs clear to auscultation - no rales, rhonchi or wheezes  CV: regular rates and rhythm, normal S1 S2, no S3 or S4 and no murmur, click or rub  ABDOMEN: soft, nontender, without hepatosplenomegaly or masses and bowel sounds normal  MS: arthritic changes of the knees  ORTHO: Cervical Spine Exam: Inspection: normal cervical lordosis  Tender:  left paracervical muscles, right paracervical muscles, left trapezius muscles, right trapezius muscles  Non-tender:  medial border of scapula, superior angle of scapula  Range of Motion:  flexion:  decreased, painful, extension: decreased, painful, left lateral bending: decreased, painful, right lateral bending: decreased, painful, left lateral rotation:  decreased, painful, right lateral rotation:  decreased, painful  Strength:  Not done  Special tests:  Not done  PSYCH: mentation appears normal and affect " normal/bright         ASSESSMENT/PLAN:                                                    1. Type 2 diabetes mellitus without complication, without long-term current use of insulin (H)  F/u 3 mos  - Albumin Random Urine Quantitative with Creat Ratio; Future  - Estimated Average Glucose  - Albumin Random Urine Quantitative with Creat Ratio  - Comprehensive metabolic panel  - TSH with free T4 reflex  - Lipid Profile (Chol, Trig, HDL, LDL calc)  - T4 free    2. Hyperlipidemia LDL goal <70    - Lipid Profile (Chol, Trig, HDL, LDL calc)    3. Benign essential hypertension    - Comprehensive metabolic panel    4. Other specified hypothyroidism      5. Chronic systolic congestive heart failure (H)      6. Controlled type 2 diabetes mellitus without complication, without long-term current use of insulin (H)    - Hemoglobin A1c    7. Need for prophylactic vaccination and inoculation against influenza    - HC FLU VACCINE, INCREASED ANTIGEN, PRESV FREE  - Vaccine Administration, Initial [30336]    8. Primary osteoarthritis of right hip    - CK total    9. Hypothyroidism, unspecified type    - TSH with free T4 reflex    10. Cervicalgia    - CHIROPRACTIC REFERRAL    11. Primary localized osteoarthrosis of left lower leg    - Large Joint/Bursa injection and/or drainage (Shoulder, Knee)  - Kenalog 40 MG  []  - Lidocaine 1% or 2%     []  - triamcinolone acetonide (KENALOG) 40 MG/ML injection; 1 mL (40 mg) by INTRA-ARTICULAR route once for 1 dose  Dispense: 1 mL; Refill: 0  - lidocaine 1 % injection; 7 mLs by INTRA-ARTICULAR route once for 1 dose  Dispense: 7 mL; Refill: 0    Please notify patient of the following results:  See Patient Instructions    Bisi Stuart MD  [facility] [place]    Injectable Influenza Immunization Documentation    1.  Is the person to be vaccinated sick today?   No    2. Does the person to be vaccinated have an allergy to a component   of the vaccine?   No  Egg Allergy Algorithm  Link    3. Has the person to be vaccinated ever had a serious reaction   to influenza vaccine in the past?   No    4. Has the person to be vaccinated ever had Guillain-Barré syndrome?   No    Form completed by [place]

## 2017-10-30 NOTE — MR AVS SNAPSHOT
After Visit Summary   10/30/2017    Nori Looney    MRN: [id number]           Patient Information     Date Of Birth          2/4/1934        Visit Information        Provider Department      10/30/2017 9:15 AM Bisi Stuart MD [place] [facility] [place]        Today's Diagnoses     Type 2 diabetes mellitus without complication, without long-term current use of insulin (H)    -  1    Hyperlipidemia LDL goal <70        Benign essential hypertension        Other specified hypothyroidism        Chronic systolic congestive heart failure (H)        Controlled type 2 diabetes mellitus without complication, without long-term current use of insulin (H)        Need for prophylactic vaccination and inoculation against influenza        Primary osteoarthritis of right hip        Hypothyroidism, unspecified type        Cervicalgia           Follow-ups after your visit        Additional Services     CHIROPRACTIC REFERRAL       Your provider has referred you to: [facility] [place] [phone]   [email or web address]    Please be aware that coverage of these services is subject to the terms and limitations of your health insurance plan.  Call member services at your health plan with any benefit or coverage questions.      Please bring the following to your appointment:    >>   Any x-rays, CTs or MRIs which have been performed.  Contact the facility where they were done to arrange for  prior to your scheduled appointment.    >>   List of current medications   >>   This referral request   >>   Any documents/labs given to you for this referral                  Your next 10 appointments already scheduled     Jan 31, 2018 10:30 AM CST   (Arrive by 10:15 AM)   [facility]    [address] [place] [address]   [phone]              Who to  "contact     If you have questions or need follow up information about today's clinic visit or your schedule please contact [facility] directly at [phone].  Normal or non-critical lab and imaging results will be communicated to you by MyChart, letter or phone within 4 business days after the clinic has received the results. If you do not hear from us within 7 days, please contact the clinic through MyChart or phone. If you have a critical or abnormal lab result, we will notify you by phone as soon as possible.  Submit refill requests through [employer] or call your pharmacy and they will forward the refill request to us. Please allow 3 business days for your refill to be completed.          Additional Information About Your Visit        VAYAVYA [employer]SharFlynn Information     IORevolution lets you send messages to your doctor, view your test results, renew your prescriptions, schedule appointments and more. To sign up, go to [email or web address] . Click on \"Log in\" on the left side of the screen, which will take you to the Welcome page. Then click on \"Sign up Now\" on the right side of the page.     You will be asked to enter the access code listed below, as well as some personal information. Please follow the directions to create your username and password.     Your access code is: [email or web address]  Expires: 2018 10:26 AM     Your access code will  in 90 days. If you need help or a new code, please call your [place] clinic or [phone].        Care EveryWhere ID     This is your Care EveryWhere ID. This could be used by other organizations to access your [place] medical records  [id number]        Your Vitals Were     Pulse Temperature Height Pulse Oximetry BMI (Body Mass Index)       60 95.4  F (35.2  C) 5' 8\" (1.727 m) 97% 25.97 kg/m2        Blood Pressure from Last 3 Encounters:   10/30/17 132/58   17 136/64   06/15/17 124/68    Weight from Last 3 Encounters:   10/30/17 170 lb 12.8 oz (77.5 kg) "   07/28/17 165 lb (74.8 kg)   06/15/17 175 lb (79.4 kg)              We Performed the Following     Albumin Random Urine Quantitative with Creat Ratio     CHIROPRACTIC REFERRAL     CK total     Comprehensive metabolic panel     Estimated Average Glucose     HC FLU VACCINE, INCREASED ANTIGEN, PRESV FREE     Hemoglobin A1c     Lipid Profile (Chol, Trig, HDL, LDL calc)     TSH with free T4 reflex     Vaccine Administration, Initial [[id number]]        Primary Care Provider Office Phone # Fax #    Bisi Stuart [phone]       [facility] [place] [address]  [place] [address]        Equal Access to Services     [facility]: Hadii aad ku hadasho Soomaali, waaxda luqadaha, qaybta kaalmada adeegyada, esteban man haylayne cai . So [facility] [phone].    ATENCIÓN: Si habla español, tiene a crabtree disposición servicios gratuitos de asistencia lingüística. [facility] [phone].    We comply with applicable federal civil rights laws and [place] laws. We do not discriminate on the basis of race, color, national origin, age, disability, sex, sexual orientation, or gender identity.            Thank you!     Thank you for choosing [facility]  for your care. Our goal is always to provide you with excellent care. Hearing back from our patients is one way we can continue to improve our services. Please take a few minutes to complete the written survey that you may receive in the mail after your visit with us. Thank you!             Your Updated Medication List - Protect others around you: Learn how to safely use, store and throw away your medicines at www.disposemymeds.org.          This list is accurate as of: 10/30/17 10:26 AM.  Always use your most recent med list.                   Brand Name Dispense Instructions for use Diagnosis    ACCU-CHEK KEYANNA test strip   Generic drug:  blood glucose monitoring      by In Vitro route 2 times daily .        acetaminophen 500 MG tablet     TYLENOL    120 tablet    Take 2 tablets (1,000 mg) by mouth 2 times daily as needed for mild pain    Primary osteoarthritis of right hip, Arthritis of knee       ASPIRIN PO      Take 325 mg by mouth daily        atorvastatin 20 MG tablet    LIPITOR    30 tablet    Take 1 Tab by mouth one time a day.    Hyperlipidemia LDL goal <70       glipiZIDE 5 MG tablet    GLUCOTROL    90 tablet    TAKE 1 TABLET BY MOUTH EVERY DAY BEFORE A MEAL    Controlled type 2 diabetes mellitus without complication, without long-term current use of insulin (H)       levothyroxine 112 MCG tablet    SYNTHROID/LEVOTHROID    90 tablet    Take 1 tablet (112 mcg) by mouth daily    Other specified hypothyroidism       metFORMIN 500 MG 24 hr tablet    GLUCOPHAGE-XR    180 tablet    TAKE 1 TABLET BY MOUTH TWICE DAILY WITH MEALS. SWALLOW WHOLE. DO NOT CRUSH OR CHEW    Type 2 diabetes mellitus without complication, without long-term current use of insulin (H)       metoprolol 50 MG 24 hr tablet    TOPROL-XL    90 tablet    Take 1 tablet (50 mg) by mouth daily    Coronary artery disease involving coronary bypass graft of native heart without angina pectoris       nitroGLYcerin 0.4 MG sublingual tablet    NITROSTAT    25 tablet    For chest pain place 1 tablet under the tongue every 5 minutes for 3 doses. If symptoms persist 5 minutes after 1st dose call 911.    Coronary artery disease involving native coronary artery of native heart without angina pectoris       vitamin D 95690 UNIT capsule    ERGOCALCIFEROL

## 2017-10-30 NOTE — PROGRESS NOTES
PROCEDURE:  JOINT ASPIRATION/INJECTION.         After a discussion of risks, benefits and side effects of procedure, informed patient consent was obtained.       The left knee was prepped and draped in the usual clean fashion (sterile not required for this procedure).      INJECTION:  Using 7 cc of 1% lidocaine mixed                           with 60 mg of kenalog, the left knee was successfully injected                           without complication.  Patient did experience some pain                          relief following injection.

## 2017-10-30 NOTE — NURSING NOTE
"Chief Complaint   Patient presents with     Diabetes       Initial /58 (BP Location: Left arm, Patient Position: Sitting, Cuff Size: Adult Regular)  Pulse 60  Temp 95.4  F (35.2  C)  Ht 5' 8\" (1.727 m)  Wt 170 lb 12.8 oz (77.5 kg)  SpO2 97%  BMI 25.97 kg/m2 Estimated body mass index is 25.97 kg/(m^2) as calculated from the following:    Height as of this encounter: 5' 8\" (1.727 m).    Weight as of this encounter: 170 lb 12.8 oz (77.5 kg).  Medication Reconciliation: complete     [place]    "

## 2017-10-31 ASSESSMENT — ANXIETY QUESTIONNAIRES: GAD7 TOTAL SCORE: 0

## 2017-11-01 ENCOUNTER — OFFICE VISIT (OUTPATIENT)
Dept: CHIROPRACTIC MEDICINE | Facility: OTHER | Age: 82
End: 2017-11-01
Attending: CHIROPRACTOR
Payer: COMMERCIAL

## 2017-11-01 DIAGNOSIS — M54.2 CERVICALGIA: ICD-10-CM

## 2017-11-01 DIAGNOSIS — M99.02 SEGMENTAL AND SOMATIC DYSFUNCTION OF THORACIC REGION: ICD-10-CM

## 2017-11-01 DIAGNOSIS — M99.01 SEGMENTAL AND SOMATIC DYSFUNCTION OF CERVICAL REGION: Primary | ICD-10-CM

## 2017-11-01 PROCEDURE — 98940 CHIROPRACT MANJ 1-2 REGIONS: CPT | Mod: AT | Performed by: CHIROPRACTOR

## 2017-11-01 NOTE — MR AVS SNAPSHOT
"              After Visit Summary   11/1/2017    Nori Looney    MRN: [id number]           Patient Information     Date Of Birth          2/4/1934        Visit Information        Provider Department      11/1/2017 2:40 PM Vinh Ceballos DC [facility] [place]        Today's Diagnoses     Segmental and somatic dysfunction of cervical region    -  1    Cervicalgia        Segmental and somatic dysfunction of thoracic region           Follow-ups after your visit        Your next 10 appointments already scheduled     Nov 06, 2017 10:00 AM CST   Return Visit with Vinh Ceballos DC   [facility])    [address]  [facility] [address]   [phone]            Jan 31, 2018 10:30 AM CST   (Arrive by 10:15 AM)   SHORT with Bisi Stuart MD   [facility] ([facility] )    [address] [facility] [address]   [phone]              Who to contact     If you have questions or need follow up information about today's clinic visit or your schedule please contact  [facility] directly at [phone].  Normal or non-critical lab and imaging results will be communicated to you by Pro-[employer] Therapeuticshart, letter or phone within 4 business days after the clinic has received the results. If you do not hear from us within 7 days, please contact the clinic through [employer] or phone. If you have a critical or abnormal lab result, we will notify you by phone as soon as possible.  Submit refill requests through Taykey or call your pharmacy and they will forward the refill request to us. Please allow 3 business days for your refill to be completed.          Additional Information About Your Visit        MyChart Information     Taykey lets you send messages to your doctor, view your test results, renew your prescriptions, schedule appointments and more. To sign up, go to [email or web address] . Click on \"Log in\" on the left side of the screen, which will take " "you to the Welcome page. Then click on \"Sign up Now\" on the right side of the page.     You will be asked to enter the access code listed below, as well as some personal information. Please follow the directions to create your username and password.     Your access code is: [email or web address]  Expires: 2018 10:26 AM     Your access code will  in 90 days. If you need help or a new code, please call your [place] clinic or [phone].        Care EveryWhere ID     This is your Care EveryWhere ID. This could be used by other organizations to access your [place] medical records  [id number]         Blood Pressure from Last 3 Encounters:   10/30/17 132/58   17 136/64   06/15/17 124/68    Weight from Last 3 Encounters:   10/30/17 170 lb 12.8 oz (77.5 kg)   17 165 lb (74.8 kg)   06/15/17 175 lb (79.4 kg)              We Performed the Following     CHIROPRAC MANIP,SPINAL,1-2 REGIONS        Primary Care Provider Office Phone # Fax #    Bisi Stuart [phone]       [facility] [place] [address] [facility] [address]        Equal Access to Services     SHANNON RESENDIZ : Hadii aad ku hadasho Soomaali, waaxda luqadaha, qaybta kaalmada adeegyada, waxay idiin hayavan rosanna cai . So [facility] [phone].    ATENCIÓN: Si habla español, tiene a crabtree disposición servicios gratuitos de asistencia lingüística. Llame al [phone].    We comply with applicable federal civil rights laws and [place] laws. We do not discriminate on the basis of race, color, national origin, age, disability, sex, sexual orientation, or gender identity.            Thank you!     Thank you for choosing  [facility] [place] [facility]  for your care. Our goal is always to provide you with excellent care. Hearing back from our patients is one way we can continue to improve our services. Please take a few minutes to complete the written survey that you may receive in the mail after your visit with us. Thank you!   "           Your Updated Medication List - Protect others around you: Learn how to safely use, store and throw away your medicines at www.disposemymeds.org.          This list is accurate as of: 11/1/17 11:59 PM.  Always use your most recent med list.                   Brand Name Dispense Instructions for use Diagnosis    ACCU-CHEK KEYANNA test strip   Generic drug:  blood glucose monitoring      by In Vitro route 2 times daily .        acetaminophen 500 MG tablet    TYLENOL    120 tablet    Take 2 tablets (1,000 mg) by mouth 2 times daily as needed for mild pain    Primary osteoarthritis of right hip, Arthritis of knee       ASPIRIN PO      Take 325 mg by mouth daily        atorvastatin 20 MG tablet    LIPITOR    30 tablet    Take 1 Tab by mouth one time a day.    Hyperlipidemia LDL goal <70       glipiZIDE 5 MG tablet    GLUCOTROL    90 tablet    TAKE 1 TABLET BY MOUTH EVERY DAY BEFORE A MEAL    Controlled type 2 diabetes mellitus without complication, without long-term current use of insulin (H)       * levothyroxine 112 MCG tablet    SYNTHROID/LEVOTHROID    90 tablet    Take 1 tablet (112 mcg) by mouth daily    Other specified hypothyroidism       * levothyroxine 125 MCG tablet    SYNTHROID/LEVOTHROID    90 tablet    Take 1 tablet (125 mcg) by mouth daily    Other specified hypothyroidism       metFORMIN 500 MG 24 hr tablet    GLUCOPHAGE-XR    180 tablet    TAKE 1 TABLET BY MOUTH TWICE DAILY WITH MEALS. SWALLOW WHOLE. DO NOT CRUSH OR CHEW    Type 2 diabetes mellitus without complication, without long-term current use of insulin (H)       metoprolol 50 MG 24 hr tablet    TOPROL-XL    90 tablet    Take 1 tablet (50 mg) by mouth daily    Coronary artery disease involving coronary bypass graft of native heart without angina pectoris       nitroGLYcerin 0.4 MG sublingual tablet    NITROSTAT    25 tablet    For chest pain place 1 tablet under the tongue every 5 minutes for 3 doses. If symptoms persist 5 minutes after 1st  dose call 911.    Coronary artery disease involving native coronary artery of native heart without angina pectoris       vitamin D 03382 UNIT capsule    ERGOCALCIFEROL          * Notice:  This list has 2 medication(s) that are the same as other medications prescribed for you. Read the directions carefully, and ask your doctor or other care provider to review them with you.

## 2017-11-02 NOTE — PROGRESS NOTES
Subjective Finding:    Chief compalint: Patient presents with:  Neck Pain: right scapulae pain  , Pain Scale: 7/10, Intensity: sharp, Duration: 2 months, Radiating: down right leg.    Date of injury:     Activities that the pain restricts:   Home/household/hobbies/social activities: yes.  Work duties: no.  Sleep: no.  Makes symptoms better: rest.  Makes symptoms worse: activity.  Have you seen anyone else for the symptoms? MD.  Work related: no.  Automobile related injury: no.    Objective and Assessment:    Posture Analysis:   High shoulder: right.  Head tilt: right.  High iliac crest: .  Head carriage: forward.  Thoracic Kyphosis: neutral.  Lumbar Lordosis: neutral.    Lumbar Range of Motion: .  Cervical Range of Motion: extension decreased and right lateral flexion decreased.  Thoracic Range of Motion: .  Extremity Range of Motion: .    Palpation:   Lev scapulae: sharp pain, referred pain: no    Segmental dysfunction pre-treatment and treatment area: C5, C6, T2 and T10.    Assessment post-treatment:  Cervical: ROM increased.  Thoracic: ROM increased.  Lumbar: .    Comments: .      Complicating Factors: .    Procedure(s):  [facility]:  [address] Chiropractic manipulative treatment 1-2 regions performed   Cervical: Diversified, See above for level, Supine and Thoracic: Diversified, See above for level, Prone    Modalities:  None performed this visit    Therapeutic procedures:  None    Plan:  Treatment plan: 2 times per week for 2 weeks.  Instructed patient: stretch as instructed at visit.  Short term goals: reduce pain.  Long term goals: restore normal function.  Prognosis: excellent.

## 2017-11-06 ENCOUNTER — OFFICE VISIT (OUTPATIENT)
Dept: CHIROPRACTIC MEDICINE | Facility: OTHER | Age: 82
End: 2017-11-06
Attending: CHIROPRACTOR
Payer: COMMERCIAL

## 2017-11-06 DIAGNOSIS — M99.02 SEGMENTAL AND SOMATIC DYSFUNCTION OF THORACIC REGION: ICD-10-CM

## 2017-11-06 DIAGNOSIS — M99.01 SEGMENTAL AND SOMATIC DYSFUNCTION OF CERVICAL REGION: Primary | ICD-10-CM

## 2017-11-06 DIAGNOSIS — M54.2 CERVICALGIA: ICD-10-CM

## 2017-11-06 PROCEDURE — 98940 CHIROPRACT MANJ 1-2 REGIONS: CPT | Mod: AT | Performed by: CHIROPRACTOR

## 2017-11-06 NOTE — MR AVS SNAPSHOT
"              After Visit Summary   11/6/2017    Nori Looney    MRN: [id number]           Patient Information     Date Of Birth          2/4/1934        Visit Information        Provider Department      11/6/2017 10:00 AM [facility] C[facility]all[facility]        Today's Diagnoses     Segmental and somatic dysfunction of cervical region    -  1    Cervicalgia        Segmental and somatic dysfunction of thoracic region           Follow-ups after your visit        Your next 10 appointments already scheduled     Jan 31, 2018 10:30 AM CST   (Arrive by 10:15 AM)   SHORT with Bisi Stuart MD   [facility] ([facility] )    [address]  [place] [address]   [phone]              Who to contact     If you have questions or need follow up information about today's clinic visit or your schedule please contact  [facility] [place] [facility] directly at [phone].  Normal or non-critical lab and imaging results will be communicated to you by MyChart, letter or phone within 4 business days after the clinic has received the results. If you do not hear from us within 7 days, please contact the clinic through MyChart or phone. If you have a critical or abnormal lab result, we will notify you by phone as soon as possible.  Submit refill requests through [employer] or call your pharmacy and they will forward the refill request to us. Please allow 3 business days for your refill to be completed.          Additional Information About Your Visit        MyChart Information     [employer] lets you send messages to your doctor, view your test results, renew your prescriptions, schedule appointments and more. To sign up, go to [email or web address] . Click on \"Log in\" on the left side of the screen, which will take you to the Welcome page. Then click on \"Sign up Now\" on the right side of the page.     You will be asked to enter the access code listed below, as well as some personal " information. Please follow the directions to create your username and password.     Your access code is: [email or web address]  Expires: 2018  9:26 AM     Your access code will  in 90 days. If you need help or a new code, please call your [place] [facility] clinic or [phone].        Care EveryWhere ID     This is your Care EveryWhere ID. This could be used by other organizations to access your [facility] medical records  [id number]         Blood Pressure from Last 3 Encounters:   10/30/17 132/58   17 136/64   06/15/17 124/68    Weight from Last 3 Encounters:   10/30/17 170 lb 12.8 oz (77.5 kg)   17 165 lb (74.8 kg)   06/15/17 175 lb (79.4 kg)              We Performed the Following     CHIROPRAC MANIP,SPINAL,1-2 REGIONS        Primary Care Provider Office Phone # Fax #    Bisi Stuart [phone]       [facility] [place] [address] [address]        Equal Access to Services     [facility]: Hadii aad ku hadasho Soomaali, waaxda luqadaha, qaybta kaalmada adeegyada, esteban cai . So [facility] [phone].    ATENCIÓN: Si habla español, tiene a crabtree disposición servicios gratuitos de asistencia lingüística. Ale al [phone].    We comply with applicable federal civil rights laws and [place] laws. We do not discriminate on the basis of race, color, national origin, age, disability, sex, sexual orientation, or gender identity.            Thank you!     Thank you for choosing  [facility] [place] [facility]  for your care. Our goal is always to provide you with excellent care. Hearing back from our patients is one way we can continue to improve our services. Please take a few minutes to complete the written survey that you may receive in the mail after your visit with us. Thank you!             Your Updated Medication List - Protect others around you: Learn how to safely use, store and throw away your medicines at www.disposemymeds.org.          This  list is accurate as of: 11/6/17 11:59 PM.  Always use your most recent med list.                   Brand Name Dispense Instructions for use Diagnosis    ACCU-CHEK KEYANNA test strip   Generic drug:  blood glucose monitoring      by In Vitro route 2 times daily .        acetaminophen 500 MG tablet    TYLENOL    120 tablet    Take 2 tablets (1,000 mg) by mouth 2 times daily as needed for mild pain    Primary osteoarthritis of right hip, Arthritis of knee       ASPIRIN PO      Take 325 mg by mouth daily        atorvastatin 20 MG tablet    LIPITOR    30 tablet    Take 1 Tab by mouth one time a day.    Hyperlipidemia LDL goal <70       glipiZIDE 5 MG tablet    GLUCOTROL    90 tablet    TAKE 1 TABLET BY MOUTH EVERY DAY BEFORE A MEAL    Controlled type 2 diabetes mellitus without complication, without long-term current use of insulin (H)       * levothyroxine 112 MCG tablet    SYNTHROID/LEVOTHROID    90 tablet    Take 1 tablet (112 mcg) by mouth daily    Other specified hypothyroidism       * levothyroxine 125 MCG tablet    SYNTHROID/LEVOTHROID    90 tablet    Take 1 tablet (125 mcg) by mouth daily    Other specified hypothyroidism       metFORMIN 500 MG 24 hr tablet    GLUCOPHAGE-XR    180 tablet    TAKE 1 TABLET BY MOUTH TWICE DAILY WITH MEALS. SWALLOW WHOLE. DO NOT CRUSH OR CHEW    Type 2 diabetes mellitus without complication, without long-term current use of insulin (H)       metoprolol 50 MG 24 hr tablet    TOPROL-XL    90 tablet    Take 1 tablet (50 mg) by mouth daily    Coronary artery disease involving coronary bypass graft of native heart without angina pectoris       nitroGLYcerin 0.4 MG sublingual tablet    NITROSTAT    25 tablet    For chest pain place 1 tablet under the tongue every 5 minutes for 3 doses. If symptoms persist 5 minutes after 1st dose call 911.    Coronary artery disease involving native coronary artery of native heart without angina pectoris       vitamin D 46156 UNIT capsule    ERGOCALCIFEROL           * Notice:  This list has 2 medication(s) that are the same as other medications prescribed for you. Read the directions carefully, and ask your doctor or other care provider to review them with you.

## 2017-11-07 NOTE — PROGRESS NOTES
Subjective Finding:    Chief compalint: Patient presents with:  Neck Pain  Back Pain: right upper back  , Pain Scale: 2/10, Intensity: sharp, Duration: 2 months, Radiating: right arm.    Date of injury:     Activities that the pain restricts:   Home/household/hobbies/social activities: yes.  Work duties: no.  Sleep: no.  Makes symptoms better: rest.  Makes symptoms worse: activity.  Have you seen anyone else for the symptoms? MD.  Work related: no.  Automobile related injury: no.    Objective and Assessment:    Posture Analysis:   High shoulder: right.  Head tilt: right.  High iliac crest: .  Head carriage: forward.  Thoracic Kyphosis: neutral.  Lumbar Lordosis: neutral.    Lumbar Range of Motion: .  Cervical Range of Motion: extension decreased and right lateral flexion decreased.  Thoracic Range of Motion: .  Extremity Range of Motion: .    Palpation:   Lev scapulae: sharp pain, referred pain: no    Segmental dysfunction pre-treatment and treatment area: C5, C6, T2 and T10.    Assessment post-treatment:  Cervical: ROM increased.  Thoracic: ROM increased.  Lumbar: .    Comments: .      Complicating Factors: .    Procedure(s):  [facility]:  [address] Chiropractic manipulative treatment 1-2 regions performed   Cervical: Diversified, See above for level, Supine and Thoracic: Diversified, See above for level, Prone    Modalities:  None performed this visit    Therapeutic procedures:  None    Plan:  Treatment plan: 2 times per week for 2 weeks.  Instructed patient: stretch as instructed at visit.  Short term goals: reduce pain.  Long term goals: restore normal function.  Prognosis: excellent.

## 2017-12-09 DIAGNOSIS — M17.10 ARTHRITIS OF KNEE: ICD-10-CM

## 2017-12-09 DIAGNOSIS — M16.11 PRIMARY OSTEOARTHRITIS OF RIGHT HIP: ICD-10-CM

## 2017-12-12 RX ORDER — ACETAMINOPHEN 500 MG/1
TABLET, FILM COATED ORAL
Qty: 120 TABLET | Refills: 0 | Status: SHIPPED | OUTPATIENT
Start: 2017-12-12 | End: 2018-03-12

## 2017-12-12 NOTE — TELEPHONE ENCOUNTER
acetaminophen 500 MG OR tablet  Last Written Prescription Date: 07/28/17  Last Fill Quantity: 120,  # refills: 1   Last Office Visit with [facility], [facility] or [facility] prescribing provider: 10/30/17                                         Next 5 appointments (look out 90 days)     Jan 31, 2018 10:30 AM CST   (Arrive by 10:15 AM)   SHORT with Bisi Stuart MD   [facility] [place] ([facility] )    [address] [place] [address]   [phone]

## 2018-01-16 ENCOUNTER — TRANSFERRED RECORDS (OUTPATIENT)
Dept: HEALTH INFORMATION MANAGEMENT | Facility: HOSPITAL | Age: 83
End: 2018-01-16

## 2018-01-23 ENCOUNTER — TRANSFERRED RECORDS (OUTPATIENT)
Dept: HEALTH INFORMATION MANAGEMENT | Facility: HOSPITAL | Age: 83
End: 2018-01-23

## 2018-01-29 DIAGNOSIS — E11.9 CONTROLLED TYPE 2 DIABETES MELLITUS WITHOUT COMPLICATION, WITHOUT LONG-TERM CURRENT USE OF INSULIN (H): ICD-10-CM

## 2018-01-29 RX ORDER — GLIPIZIDE 5 MG/1
TABLET ORAL
Qty: 90 TABLET | Refills: 0 | Status: SHIPPED | OUTPATIENT
Start: 2018-01-29 | End: 2018-04-22

## 2018-01-29 NOTE — TELEPHONE ENCOUNTER
glipiZIDE (GLUCOTROL) 5 MG tablet   Last Written Prescription Date:  7/19/17  Last Fill Quantity: 90,   # refills: 1  Last Office Visit: 10/30/17  Future Office visit:    Next 5 appointments (look out 90 days)     Jan 31, 2018 10:30 AM CST   (Arrive by 10:15 AM)   SHORT with Bisi Stuart MD   [facility] [place] ([facility] )    [address] [place] [address]   [phone]

## 2018-01-31 ENCOUNTER — OFFICE VISIT (OUTPATIENT)
Dept: FAMILY MEDICINE | Facility: OTHER | Age: 83
End: 2018-01-31
Attending: FAMILY MEDICINE
Payer: COMMERCIAL

## 2018-01-31 VITALS
WEIGHT: 170 LBS | HEART RATE: 72 BPM | HEIGHT: 68 IN | DIASTOLIC BLOOD PRESSURE: 74 MMHG | SYSTOLIC BLOOD PRESSURE: 122 MMHG | BODY MASS INDEX: 25.76 KG/M2 | TEMPERATURE: 96.7 F | OXYGEN SATURATION: 93 %

## 2018-01-31 DIAGNOSIS — G62.9 NEUROPATHY: ICD-10-CM

## 2018-01-31 DIAGNOSIS — E03.8 OTHER SPECIFIED HYPOTHYROIDISM: ICD-10-CM

## 2018-01-31 DIAGNOSIS — E78.5 HYPERLIPIDEMIA LDL GOAL <70: ICD-10-CM

## 2018-01-31 DIAGNOSIS — I10 BENIGN ESSENTIAL HYPERTENSION: ICD-10-CM

## 2018-01-31 DIAGNOSIS — I50.22 CHRONIC SYSTOLIC CONGESTIVE HEART FAILURE (H): ICD-10-CM

## 2018-01-31 DIAGNOSIS — M70.61 TROCHANTERIC BURSITIS OF RIGHT HIP: ICD-10-CM

## 2018-01-31 DIAGNOSIS — E11.9 TYPE 2 DIABETES MELLITUS WITHOUT COMPLICATION, WITHOUT LONG-TERM CURRENT USE OF INSULIN (H): Primary | ICD-10-CM

## 2018-01-31 DIAGNOSIS — E11.9 CONTROLLED TYPE 2 DIABETES MELLITUS WITHOUT COMPLICATION, WITHOUT LONG-TERM CURRENT USE OF INSULIN (H): ICD-10-CM

## 2018-01-31 LAB
EST. AVERAGE GLUCOSE BLD GHB EST-MCNC: 171 MG/DL
HBA1C MFR BLD: 7.6 % (ref 4.3–6)
TSH SERPL DL<=0.005 MIU/L-ACNC: 2.58 MU/L (ref 0.4–4)

## 2018-01-31 PROCEDURE — 83036 HEMOGLOBIN GLYCOSYLATED A1C: CPT | Mod: ZL | Performed by: FAMILY MEDICINE

## 2018-01-31 PROCEDURE — 99214 OFFICE O/P EST MOD 30 MIN: CPT | Performed by: FAMILY MEDICINE

## 2018-01-31 PROCEDURE — 36415 COLL VENOUS BLD VENIPUNCTURE: CPT | Mod: ZL | Performed by: FAMILY MEDICINE

## 2018-01-31 PROCEDURE — G0463 HOSPITAL OUTPT CLINIC VISIT: HCPCS

## 2018-01-31 PROCEDURE — 40000788 ZZHCL STATISTIC ESTIMATED AVERAGE GLUCOSE: Mod: ZL | Performed by: FAMILY MEDICINE

## 2018-01-31 PROCEDURE — 84443 ASSAY THYROID STIM HORMONE: CPT | Mod: ZL | Performed by: FAMILY MEDICINE

## 2018-01-31 ASSESSMENT — ANXIETY QUESTIONNAIRES
GAD7 TOTAL SCORE: 1
1. FEELING NERVOUS, ANXIOUS, OR ON EDGE: NOT AT ALL
IF YOU CHECKED OFF ANY PROBLEMS ON THIS QUESTIONNAIRE, HOW DIFFICULT HAVE THESE PROBLEMS MADE IT FOR YOU TO DO YOUR WORK, TAKE CARE OF THINGS AT HOME, OR GET ALONG WITH OTHER PEOPLE: NOT DIFFICULT AT ALL
2. NOT BEING ABLE TO STOP OR CONTROL WORRYING: NOT AT ALL
3. WORRYING TOO MUCH ABOUT DIFFERENT THINGS: NOT AT ALL
5. BEING SO RESTLESS THAT IT IS HARD TO SIT STILL: NOT AT ALL
6. BECOMING EASILY ANNOYED OR IRRITABLE: SEVERAL DAYS
7. FEELING AFRAID AS IF SOMETHING AWFUL MIGHT HAPPEN: NOT AT ALL

## 2018-01-31 ASSESSMENT — PATIENT HEALTH QUESTIONNAIRE - PHQ9: 5. POOR APPETITE OR OVEREATING: NOT AT ALL

## 2018-01-31 ASSESSMENT — PAIN SCALES - GENERAL: PAINLEVEL: MILD PAIN (3)

## 2018-01-31 NOTE — PROGRESS NOTES
SUBJECTIVE:   Nori Looney is a 83 year old male who presents to clinic today for the following health issues:    Diabetes Follow-up      Patient is checking blood sugars: rarely.  Results range from 160 to 175    Diabetic concerns: None     Symptoms of hypoglycemia (low blood sugar): none     Paresthesias (numbness or burning in feet) or sores: No     Date of last diabetic eye exam: 3 months ago    Hyperlipidemia Follow-Up      Rate your low fat/cholesterol diet?: not monitoring fat    Taking statin?  Yes, muscle aches, possibly from other cause    Other lipid medications/supplements?:  none    Hypertension Follow-up      Outpatient blood pressures are not being checked.    Low Salt Diet: not monitoring salt    BP Readings from Last 2 Encounters:   01/31/18 122/74   10/30/17 132/58     Hemoglobin A1C (%)   Date Value   10/30/2017 7.4 (H)   07/28/2017 7.8 (H)     LDL Cholesterol Calculated (mg/dL)   Date Value   10/30/2017 94   01/05/2017 64       Amount of exercise or physical activity: 6-7 days/week for an average of greater than 60 minutes    Problems taking medications regularly: No    Medication side effects: none    Diet: regular (no restrictions)        Problem list and histories reviewed & adjusted, as indicated.  Additional history: as documented    Current Outpatient Prescriptions   Medication Sig Dispense Refill     glipiZIDE (GLUCOTROL) 5 MG tablet TAKE 1 TABLET BY MOUTH EVERY DAY BEFORE A MEAL 90 tablet 0     atorvastatin (LIPITOR) 20 MG tablet Take 1 Tab by mouth one time a day. 30 tablet 5     PAIN RELIEF EXTRA STRENGTH 500 MG tablet Take 2 Tabs by mouth two times a day as needed for mild pain 120 tablet 0     metFORMIN (GLUCOPHAGE-XR) 500 MG 24 hr tablet TAKE 1 TABLET BY MOUTH TWICE DAILY WITH MEALS. SWALLOW WHOLE. DO NOT CRUSH OR CHEW 180 tablet 0     levothyroxine (SYNTHROID/LEVOTHROID) 125 MCG tablet Take 1 tablet (125 mcg) by mouth daily 90 tablet 0     vitamin D (ERGOCALCIFEROL) 00847 UNIT  "capsule        nitroglycerin (NITROSTAT) 0.4 MG sublingual tablet For chest pain place 1 tablet under the tongue every 5 minutes for 3 doses. If symptoms persist 5 minutes after 1st dose call 911. 25 tablet 0     metoprolol (TOPROL-XL) 50 MG 24 hr tablet Take 1 tablet (50 mg) by mouth daily 90 tablet 1     ASPIRIN PO Take 325 mg by mouth daily       blood glucose monitoring (ACCU-CHEK KEYANNA) test strip by In Vitro route 2 times daily .       [DISCONTINUED] levothyroxine (SYNTHROID/LEVOTHROID) 112 MCG tablet Take 1 tablet (112 mcg) by mouth daily 90 tablet 1     No Known Allergies  Recent Labs   Lab Test  01/31/18   1033  10/30/17   0856  07/28/17   0953   01/05/17   0921   06/09/16   1035   A1C  7.6*  7.4*  7.8*   < >  7.1*   < >   --    LDL   --   94   --    --   64   --    --    HDL   --   39*   --    --   38*   --    --    TRIG   --   89   --    --   152*   --    --    ALT   --   36   --    --   24   --   42   CR   --   0.81   --    --   1.08   < >  0.95   GFRESTIMATED   --   >90   --    --   65   --   76   GFRESTBLACK   --   >90   --    --   79   --   >90   GFR Calc     POTASSIUM   --   4.7   --    --   4.0   < >  4.1   TSH  2.58  6.03*   --    < >  15.86*   --   68.28*    < > = values in this interval not displayed.      Labs reviewed in EPIC    Reviewed and updated as needed this visit by clinical staff  Tobacco  Allergies  Meds       Reviewed and updated as needed this visit by Provider         ROS:  Constitutional, HEENT, cardiovascular, pulmonary, gi and gu systems are negative, except as otherwise noted.    OBJECTIVE:                                                    /74  Pulse 72  Temp 96.7  F (35.9  C) (Tympanic)  Ht 5' 8\" (1.727 m)  Wt 170 lb (77.1 kg)  SpO2 93%  BMI 25.85 kg/m2  Body mass index is 25.85 kg/(m^2).  GENERAL APPEARANCE: healthy, alert and no distress  RESP: lungs clear to auscultation - no rales, rhonchi or wheezes  CV: regular rates and rhythm, normal S1 " S2, no S3 or S4 and no murmur, click or rub  ABDOMEN: soft, nontender, without hepatosplenomegaly or masses and bowel sounds normal  MS: decreased range of motion  and arthritic changes of the right trocanteric bursa  SKIN: no suspicious lesions or rashes  DIABETIC FOOT EXAM: normal DP and PT pulses, no trophic changes or ulcerative lesions, normal sensory exam, normal monofilament exam except bilateral toes and onychomycosis  PSYCH: mentation appears normal and affect normal/bright       ASSESSMENT/PLAN:                                                    1. Type 2 diabetes mellitus without complication, without long-term current use of insulin (H)  F/u 3 mos  - Estimated Average Glucose  - Hemoglobin A1c; Standing    2. Hyperlipidemia LDL goal <70      3. Other specified hypothyroidism    - TSH with free T4 reflex; Future  - TSH with free T4 reflex    4. Benign essential hypertension    - CBC with platelets; Future    5. Chronic systolic congestive heart failure (H)  Note from Dr epperson reviewed today    6. Controlled type 2 diabetes mellitus without complication, without long-term current use of insulin (H)  Discussed better control of sugars, eating less bread and sweets  - Hemoglobin A1c    7. Neuropathy  New onset, wear good shoes    8.  (M70.61) Trochanteric bursitis of right hip  Comment:   Plan: discussed ice and injection      Patient was agreeable to this plan and had no further questions.  See Patient Instructions    Bisi Stuart MD  [facility]

## 2018-01-31 NOTE — MR AVS SNAPSHOT
After Visit Summary   1/31/2018    Nori Looney    MRN: [id number]           Patient Information     Date Of Birth          2/4/1934        Visit Information        Provider Department      1/31/2018 10:30 AM [facility] [place]        Today's Diagnoses     Type 2 diabetes mellitus without complication, without long-term current use of insulin (H)    -  1    Hyperlipidemia LDL goal <70        Other specified hypothyroidism        Benign essential hypertension        Chronic systolic congestive heart failure (H)        Controlled type 2 diabetes mellitus without complication, without long-term current use of insulin (H)        Neuropathy        Trochanteric bursitis of right hip           Follow-ups after your visit        Your next 10 appointments already scheduled     May 04, 2018 11:00 AM CDT   (Arrive by 10:45 AM)   SHORT with Bisi Stuart MD   [facility] [place] ([facility] )    [address]  [place] [address]   [phone]              Future tests that were ordered for you today     Open Standing Orders        Priority Remaining Interval Expires Ordered    Hemoglobin A1c Routine 4/4 1/31/2019 1/31/2018          Open Future Orders        Priority Expected Expires Ordered    CBC with platelets Routine 4/16/2018 1/2/2019 1/31/2018            Who to contact     If you have questions or need follow up information about today's clinic visit or your schedule please contact [facility] [place] directly at [phone].  Normal or non-critical lab and imaging results will be communicated to you by MyChart, letter or phone within 4 business days after the clinic has received the results. If you do not hear from us within 7 days, please contact the clinic through MyChart or phone. If you have a critical or abnormal lab result, we will notify you by phone as soon as possible.  Submit refill requests through [employer] or call your  "pharmacy and they will forward the refill request to us. Please allow 3 business days for your refill to be completed.          Additional Information About Your Visit        MySupportAssistanthart Information     [employer] lets you send messages to your doctor, view your test results, renew your prescriptions, schedule appointments and more. To sign up, go to [email or web address] . Click on \"Log in\" on the left side of the screen, which will take you to the Welcome page. Then click on \"Sign up Now\" on the right side of the page.     You will be asked to enter the access code listed below, as well as some personal information. Please follow the directions to create your username and password.     Your access code is: [email or web address]  Expires: 2018 12:02 PM     Your access code will  in 90 days. If you need help or a new code, please call your [place] clinic or [phone].        Care EveryWhere ID     This is your Care EveryWhere ID. This could be used by other organizations to access your [place] medical records  [id number]        Your Vitals Were     Pulse Temperature Height Pulse Oximetry BMI (Body Mass Index)       72 96.7  F (35.9  C) (Tympanic) 5' 8\" (1.727 m) 93% 25.85 kg/m2        Blood Pressure from Last 3 Encounters:   18 122/74   10/30/17 132/58   17 136/64    Weight from Last 3 Encounters:   18 170 lb (77.1 kg)   10/30/17 170 lb 12.8 oz (77.5 kg)   17 165 lb (74.8 kg)              We Performed the Following     Estimated Average Glucose     Hemoglobin A1c     TSH with free T4 reflex          Today's Medication Changes          These changes are accurate as of 18 12:02 PM.  If you have any questions, ask your nurse or doctor.               These medicines have changed or have updated prescriptions.        Dose/Directions    levothyroxine 125 MCG tablet   Commonly known as:  SYNTHROID/LEVOTHROID   This may have changed:  Another medication with the same name was removed. " Continue taking this medication, and follow the directions you see here.   Used for:  Other specified hypothyroidism   Changed by:  Bisi Stuart MD        Dose:  125 mcg   Take 1 tablet (125 mcg) by mouth daily   Quantity:  90 tablet   Refills:  0                Primary Care Provider Office Phone # Fax #    Bisi Stuart [phone]       [facility] [place] [address]  [place] [address]        Equal Access to Services     [facility]: Hadii aad ku hadasho Soomaali, waaxda luqadaha, qaybta kaalmada adeegyada, waxay idiin hayaan adeeg kharash la'aan . So [facility] [phone].    ATENCIÓN: Si habla español, tiene a crabtree disposición servicios gratuitos de asistencia lingüística. [facility] [phone].    We comply with applicable federal civil rights laws and [place] laws. We do not discriminate on the basis of race, color, national origin, age, disability, sex, sexual orientation, or gender identity.            Thank you!     [facility] you [facility]  [facility] your [facility]. Our goal is always to provide you with excellent care. Hearing back from our patients is one way we can continue to improve our services. Please take a few minutes to complete the written survey that you may receive in the mail after your visit with us. Thank you!             Your Updated Medication List - Protect others around you: Learn how to safely use, store and throw away your medicines at www.disposemymeds.org.          This list is accurate as of 1/31/18 12:02 PM.  Always use your most recent med list.                   Brand Name Dispense Instructions for use Diagnosis    ACCU-CHEK KEYANNA test strip   Generic drug:  blood glucose monitoring      by In Vitro route 2 times daily .        ASPIRIN PO      Take 325 mg by mouth daily        atorvastatin 20 MG tablet    LIPITOR    30 tablet    Take 1 Tab by mouth one time a day.    Hyperlipidemia LDL goal <70       glipiZIDE 5 MG tablet     GLUCOTROL    90 tablet    TAKE 1 TABLET BY MOUTH EVERY DAY BEFORE A MEAL    Controlled type 2 diabetes mellitus without complication, without long-term current use of insulin (H)       levothyroxine 125 MCG tablet    SYNTHROID/LEVOTHROID    90 tablet    Take 1 tablet (125 mcg) by mouth daily    Other specified hypothyroidism       metFORMIN 500 MG 24 hr tablet    GLUCOPHAGE-XR    180 tablet    TAKE 1 TABLET BY MOUTH TWICE DAILY WITH MEALS. SWALLOW WHOLE. DO NOT CRUSH OR CHEW    Type 2 diabetes mellitus without complication, without long-term current use of insulin (H)       metoprolol succinate 50 MG 24 hr tablet    TOPROL-XL    90 tablet    Take 1 tablet (50 mg) by mouth daily    Coronary artery disease involving coronary bypass graft of native heart without angina pectoris       nitroGLYcerin 0.4 MG sublingual tablet    NITROSTAT    25 tablet    For chest pain place 1 tablet under the tongue every 5 minutes for 3 doses. If symptoms persist 5 minutes after 1st dose call 911.    Coronary artery disease involving native coronary artery of native heart without angina pectoris       PAIN RELIEF EXTRA STRENGTH 500 MG tablet   Generic drug:  acetaminophen     120 tablet    Take 2 Tabs by mouth two times a day as needed for mild pain    Primary osteoarthritis of right hip, Arthritis of knee       vitamin D 63614 UNIT capsule    ERGOCALCIFEROL

## 2018-01-31 NOTE — NURSING NOTE
"No chief complaint on file.      Initial /74  Pulse 72  Temp 96.7  F (35.9  C) (Tympanic)  Ht 5' 8\" (1.727 m)  Wt 170 lb (77.1 kg)  SpO2 93%  BMI 25.85 kg/m2 Estimated body mass index is 25.85 kg/(m^2) as calculated from the following:    Height as of this encounter: 5' 8\" (1.727 m).    Weight as of this encounter: 170 lb (77.1 kg).  Medication Reconciliation: complete     Aretha Barger      "

## 2018-02-01 ASSESSMENT — PATIENT HEALTH QUESTIONNAIRE - PHQ9: SUM OF ALL RESPONSES TO PHQ QUESTIONS 1-9: 1

## 2018-02-01 ASSESSMENT — ANXIETY QUESTIONNAIRES: GAD7 TOTAL SCORE: 1

## 2018-02-03 DIAGNOSIS — E03.8 OTHER SPECIFIED HYPOTHYROIDISM: ICD-10-CM

## 2018-02-03 DIAGNOSIS — E11.9 TYPE 2 DIABETES MELLITUS WITHOUT COMPLICATION, WITHOUT LONG-TERM CURRENT USE OF INSULIN (H): ICD-10-CM

## 2018-02-05 RX ORDER — LEVOTHYROXINE SODIUM 125 UG/1
TABLET ORAL
Qty: 90 TABLET | Refills: 2 | Status: SHIPPED | OUTPATIENT
Start: 2018-02-05 | End: 2018-11-12

## 2018-02-05 RX ORDER — METFORMIN HCL 500 MG
TABLET, EXTENDED RELEASE 24 HR ORAL
Qty: 180 TABLET | Refills: 0 | Status: SHIPPED | OUTPATIENT
Start: 2018-02-05 | End: 2018-05-10

## 2018-02-16 DIAGNOSIS — E78.5 HYPERLIPIDEMIA LDL GOAL <70: ICD-10-CM

## 2018-02-16 DIAGNOSIS — I25.810 CORONARY ARTERY DISEASE INVOLVING CORONARY BYPASS GRAFT OF NATIVE HEART WITHOUT ANGINA PECTORIS: ICD-10-CM

## 2018-02-16 NOTE — TELEPHONE ENCOUNTER
metoprolol (TOPROL-XL) 50 MG 24 hr tablet     Last Written Prescription Date:  1/5/17  Last Fill Quantity: 90,   # refills: 1  Last Office Visit: 1/31/18  Future Office visit:    Next 5 appointments (look out 90 days)     May 04, 2018 11:00 AM CDT   (Arrive by 10:45 AM)   SHORT with Bisi Stuart MD   [facility]    [address] [facility] [address]   [phone]                 lipitor      Last Written Prescription Date:  1/15/18  Last Fill Quantity: 30,   # refills: 5  Last Office Visit: 1/31/18  Future Office visit:    Next 5 appointments (look out 90 days)     May 04, 2018 11:00 AM CDT   (Arrive by 10:45 AM)   SHORT [facility] Bisi Stuart MD   [facility]    [address] [place] [address]   [phone]

## 2018-02-19 RX ORDER — METOPROLOL SUCCINATE 50 MG/1
50 TABLET, EXTENDED RELEASE ORAL DAILY
Qty: 90 TABLET | Refills: 1 | Status: SHIPPED | OUTPATIENT
Start: 2018-02-19 | End: 2018-09-15

## 2018-02-19 RX ORDER — ATORVASTATIN CALCIUM 20 MG/1
TABLET, FILM COATED ORAL
Qty: 30 TABLET | Refills: 5 | Status: SHIPPED | OUTPATIENT
Start: 2018-02-19 | End: 2018-09-23

## 2018-03-12 DIAGNOSIS — M16.11 PRIMARY OSTEOARTHRITIS OF RIGHT HIP: ICD-10-CM

## 2018-03-12 DIAGNOSIS — M17.10 ARTHRITIS OF KNEE: ICD-10-CM

## 2018-04-22 DIAGNOSIS — E11.9 CONTROLLED TYPE 2 DIABETES MELLITUS WITHOUT COMPLICATION, WITHOUT LONG-TERM CURRENT USE OF INSULIN (H): ICD-10-CM

## 2018-04-23 NOTE — TELEPHONE ENCOUNTER
glipizide      Last Written Prescription Date:  1/29/18  Last Fill Quantity: 90,   # refills: 0  Last Office Visit: 1/31/18  Future Office visit:    Next 5 appointments (look out 90 days)     May 10, 2018 11:15 AM CDT   (Arrive by 11:00 AM)   SHORT with Bisi Stuart MD   [facility] ([facility] )    [address] [place] [address]   [phone]

## 2018-04-24 RX ORDER — GLIPIZIDE 5 MG/1
TABLET ORAL
Qty: 90 TABLET | Refills: 0 | Status: SHIPPED | OUTPATIENT
Start: 2018-04-24 | End: 2018-08-19

## 2018-05-10 ENCOUNTER — OFFICE VISIT (OUTPATIENT)
Dept: FAMILY MEDICINE | Facility: OTHER | Age: 83
End: 2018-05-10
Attending: FAMILY MEDICINE
Payer: COMMERCIAL

## 2018-05-10 VITALS
BODY MASS INDEX: 26.95 KG/M2 | HEART RATE: 62 BPM | OXYGEN SATURATION: 95 % | WEIGHT: 177.8 LBS | SYSTOLIC BLOOD PRESSURE: 132 MMHG | DIASTOLIC BLOOD PRESSURE: 72 MMHG | TEMPERATURE: 96.5 F | RESPIRATION RATE: 16 BRPM | HEIGHT: 68 IN

## 2018-05-10 DIAGNOSIS — E11.9 TYPE 2 DIABETES MELLITUS WITHOUT COMPLICATION, WITHOUT LONG-TERM CURRENT USE OF INSULIN (H): Primary | ICD-10-CM

## 2018-05-10 DIAGNOSIS — M17.10 PRIMARY LOCALIZED OSTEOARTHROSIS OF LOWER LEG, UNSPECIFIED LATERALITY: ICD-10-CM

## 2018-05-10 DIAGNOSIS — I10 BENIGN ESSENTIAL HYPERTENSION: ICD-10-CM

## 2018-05-10 DIAGNOSIS — E78.5 HYPERLIPIDEMIA LDL GOAL <70: ICD-10-CM

## 2018-05-10 DIAGNOSIS — E03.8 OTHER SPECIFIED HYPOTHYROIDISM: ICD-10-CM

## 2018-05-10 LAB
ERYTHROCYTE [DISTWIDTH] IN BLOOD BY AUTOMATED COUNT: 13.6 % (ref 10–15)
EST. AVERAGE GLUCOSE BLD GHB EST-MCNC: 189 MG/DL
HBA1C MFR BLD: 8.2 % (ref 0–5.6)
HCT VFR BLD AUTO: 46.8 % (ref 40–53)
HGB BLD-MCNC: 15.9 G/DL (ref 13.3–17.7)
MCH RBC QN AUTO: 29.3 PG (ref 26.5–33)
MCHC RBC AUTO-ENTMCNC: 34 G/DL (ref 31.5–36.5)
MCV RBC AUTO: 86 FL (ref 78–100)
PLATELET # BLD AUTO: 170 10E9/L (ref 150–450)
RBC # BLD AUTO: 5.42 10E12/L (ref 4.4–5.9)
WBC # BLD AUTO: 7.6 10E9/L (ref 4–11)

## 2018-05-10 PROCEDURE — 36415 COLL VENOUS BLD VENIPUNCTURE: CPT | Mod: ZL | Performed by: FAMILY MEDICINE

## 2018-05-10 PROCEDURE — 83036 HEMOGLOBIN GLYCOSYLATED A1C: CPT | Mod: ZL | Performed by: FAMILY MEDICINE

## 2018-05-10 PROCEDURE — 85027 COMPLETE CBC AUTOMATED: CPT | Mod: ZL | Performed by: FAMILY MEDICINE

## 2018-05-10 PROCEDURE — G0463 HOSPITAL OUTPT CLINIC VISIT: HCPCS

## 2018-05-10 PROCEDURE — 99214 OFFICE O/P EST MOD 30 MIN: CPT | Mod: 25 | Performed by: FAMILY MEDICINE

## 2018-05-10 PROCEDURE — 40000788 ZZHCL STATISTIC ESTIMATED AVERAGE GLUCOSE: Mod: ZL | Performed by: FAMILY MEDICINE

## 2018-05-10 PROCEDURE — 20610 DRAIN/INJ JOINT/BURSA W/O US: CPT | Mod: 50 | Performed by: FAMILY MEDICINE

## 2018-05-10 PROCEDURE — G0463 HOSPITAL OUTPT CLINIC VISIT: HCPCS | Mod: 25

## 2018-05-10 RX ORDER — TRIAMCINOLONE ACETONIDE 40 MG/ML
80 INJECTION, SUSPENSION INTRA-ARTICULAR; INTRAMUSCULAR ONCE
Qty: 2 ML | Refills: 0 | OUTPATIENT
Start: 2018-05-10 | End: 2019-02-22

## 2018-05-10 RX ORDER — METFORMIN HCL 500 MG
1000 TABLET, EXTENDED RELEASE 24 HR ORAL 2 TIMES DAILY WITH MEALS
Qty: 180 TABLET | Refills: 1 | Status: SHIPPED | OUTPATIENT
Start: 2018-05-10 | End: 2019-03-02

## 2018-05-10 RX ORDER — LIDOCAINE HYDROCHLORIDE 10 MG/ML
12 INJECTION, SOLUTION INFILTRATION; PERINEURAL ONCE
Qty: 12 ML | Refills: 0 | OUTPATIENT
Start: 2018-05-10 | End: 2019-02-22

## 2018-05-10 ASSESSMENT — ANXIETY QUESTIONNAIRES
7. FEELING AFRAID AS IF SOMETHING AWFUL MIGHT HAPPEN: NOT AT ALL
3. WORRYING TOO MUCH ABOUT DIFFERENT THINGS: NOT AT ALL
6. BECOMING EASILY ANNOYED OR IRRITABLE: NOT AT ALL
IF YOU CHECKED OFF ANY PROBLEMS ON THIS QUESTIONNAIRE, HOW DIFFICULT HAVE THESE PROBLEMS MADE IT FOR YOU TO DO YOUR WORK, TAKE CARE OF THINGS AT HOME, OR GET ALONG WITH OTHER PEOPLE: NOT DIFFICULT AT ALL
5. BEING SO RESTLESS THAT IT IS HARD TO SIT STILL: NOT AT ALL
1. FEELING NERVOUS, ANXIOUS, OR ON EDGE: NOT AT ALL
2. NOT BEING ABLE TO STOP OR CONTROL WORRYING: NOT AT ALL
GAD7 TOTAL SCORE: 0

## 2018-05-10 ASSESSMENT — PATIENT HEALTH QUESTIONNAIRE - PHQ9: 5. POOR APPETITE OR OVEREATING: NOT AT ALL

## 2018-05-10 NOTE — PROGRESS NOTES
SUBJECTIVE:                                                    Nori Looney is a 84 year old male who presents to clinic today for the following health issues:      Diabetes Follow-up      Patient is checking blood sugars: rarely.  Results range from 160 to 175    Diabetic concerns: None     Symptoms of hypoglycemia (low blood sugar): none     Paresthesias (numbness or burning in feet) or sores: No     Date of last diabetic eye exam: 12/17    BP Readings from Last 2 Encounters:   05/10/18 132/72   01/31/18 122/74     Hemoglobin A1C (%)   Date Value   01/31/2018 7.6 (H)   10/30/2017 7.4 (H)     LDL Cholesterol Calculated (mg/dL)   Date Value   10/30/2017 94   01/05/2017 64       Amount of exercise or physical activity: 4-5 days/week for an average of 30-45 minutes    Problems taking medications regularly: No    Medication side effects: none    Diet: regular (no restrictions)        Hyperlipidemia Follow-Up      Rate your low fat/cholesterol diet?: not monitoring fat    Taking statin?  Yes, no muscle aches from statin    Other lipid medications/supplements?:  none    Hypertension Follow-up      Outpatient blood pressures are not being checked.    Low Salt Diet: no added salt      Problem list and histories reviewed & adjusted, as indicated.  Additional history: as documented    Patient Active Problem List   Diagnosis     ACP (advance care planning)     Other specified hypothyroidism     Chronic systolic congestive heart failure (H)     Benign essential hypertension     Hyperlipidemia LDL goal <70     Bilateral carotid artery stenosis     Coronary artery disease involving coronary bypass graft of native heart without angina pectoris     Controlled type 2 diabetes mellitus without complication, without long-term current use of insulin (H)     Type 2 diabetes mellitus without complication, without long-term current use of insulin (H)     Primary osteoarthritis of left knee     Encounter for diabetic foot exam (H)      Neuropathy     Trochanteric bursitis of right hip     Past Surgical History:   Procedure Laterality Date     AS CABG, ARTERY-VEIN, FIVE  11/02/2011    off pump       Social History   Substance Use Topics     Smoking status: Never Smoker     Smokeless tobacco: Never Used      Comment: second hand smoke in the house 40 yrs     Alcohol use No     Family History   Problem Relation Age of Onset     CEREBROVASCULAR DISEASE Father      Coronary Artery Disease Father      Dementia Mother      Coronary Artery Disease Sister      Lung Cancer Sister      Thyroid Disease Daughter          Current Outpatient Prescriptions   Medication Sig Dispense Refill     ASPIRIN PO Take 325 mg by mouth daily       atorvastatin (LIPITOR) 20 MG tablet Take 1 Tab by mouth one time a day. 30 tablet 5     blood glucose monitoring (ACCU-CHEK KEYANNA) test strip by In Vitro route 2 times daily .       glipiZIDE (GLUCOTROL) 5 MG tablet Take 1 Tab by mouth one time a day before a meal. 90 tablet 0     levothyroxine (SYNTHROID/LEVOTHROID) 125 MCG tablet Take 1 Tab by mouth one time a day. 90 tablet 2     lidocaine 1 % injection 12 mLs by INTRA-ARTICULAR route once for 1 dose 12 mL 0     MAPAP 500 MG tablet Take 2 Tabs by mouth two times a day as needed for mild pain. 120 tablet 3     metFORMIN (GLUCOPHAGE-XR) 500 MG 24 hr tablet Take 2 tablets (1,000 mg) by mouth 2 times daily (with meals) 180 tablet 1     metoprolol succinate (TOPROL-XL) 50 MG 24 hr tablet Take 1 tablet (50 mg) by mouth daily 90 tablet 1     nitroglycerin (NITROSTAT) 0.4 MG sublingual tablet For chest pain place 1 tablet under the tongue every 5 minutes for 3 doses. If symptoms persist 5 minutes after 1st dose call 911. 25 tablet 0     triamcinolone acetonide (KENALOG) 10 MG/ML injection 2 mLs (20 mg) by INTRA-ARTICULAR route once for 1 dose 2 mL 0     triamcinolone acetonide (KENALOG) 40 MG/ML injection 2 mLs (80 mg) by INTRA-ARTICULAR route once for 1 dose 2 mL 0     vitamin D  "(ERGOCALCIFEROL) 36740 UNIT capsule        [DISCONTINUED] metFORMIN (GLUCOPHAGE-XR) 500 MG 24 hr tablet Take 1 Tab by mouth two times a day with meals. Swallow whole. Do not crush or chew. 180 tablet 0     No Known Allergies  Recent Labs   Lab Test  05/10/18   1124  01/31/18   1033  10/30/17   0856   01/05/17   0921   06/09/16   1035   A1C  8.2*  7.6*  7.4*   < >  7.1*   < >   --    LDL   --    --   94   --   64   --    --    HDL   --    --   39*   --   38*   --    --    TRIG   --    --   89   --   152*   --    --    ALT   --    --   36   --   24   --   42   CR   --    --   0.81   --   1.08   < >  0.95   GFRESTIMATED   --    --   >90   --   65   --   76   GFRESTBLACK   --    --   >90   --   79   --   >90   GFR Calc     POTASSIUM   --    --   4.7   --   4.0   < >  4.1   TSH   --   2.58  6.03*   < >  15.86*   --   68.28*    < > = values in this interval not displayed.      BP Readings from Last 3 Encounters:   05/10/18 132/72   01/31/18 122/74   10/30/17 132/58    Wt Readings from Last 3 Encounters:   05/10/18 177 lb 12.8 oz (80.6 kg)   01/31/18 170 lb (77.1 kg)   10/30/17 170 lb 12.8 oz (77.5 kg)            Labs reviewed in EPIC    ROS:  Constitutional, HEENT, cardiovascular, pulmonary, gi and gu systems are negative, except as otherwise noted.    OBJECTIVE:                                                    /72 (BP Location: Left arm, Patient Position: Sitting, Cuff Size: Adult Regular)  Pulse 62  Temp 96.5  F (35.8  C) (Tympanic)  Resp 16  Ht 5' 8\" (1.727 m)  Wt 177 lb 12.8 oz (80.6 kg)  SpO2 95%  BMI 27.03 kg/m2  Body mass index is 27.03 kg/(m^2).  GENERAL APPEARANCE: healthy, alert and no distress  RESP: lungs clear to auscultation - no rales, rhonchi or wheezes  CV: regular rates and rhythm, normal S1 S2, no S3 or S4 and no murmur, click or rub  ABDOMEN: soft, nontender, without hepatosplenomegaly or masses and bowel sounds normal  MS: arthritic changes of the knees " bilaterally  PSYCH: mentation appears normal and affect normal/bright       ASSESSMENT/PLAN:                                                    1. Type 2 diabetes mellitus without complication, without long-term current use of insulin (H)  F/u 3 mos, increase metformin  - Hemoglobin A1c  - Basic metabolic panel; Future  - Estimated Average Glucose  - metFORMIN (GLUCOPHAGE-XR) 500 MG 24 hr tablet; Take 2 tablets (1,000 mg) by mouth 2 times daily (with meals)  Dispense: 180 tablet; Refill: 1    2. Hyperlipidemia LDL goal <70      3. Benign essential hypertension    - CBC with platelets    4. Other specified hypothyroidism      5. Primary localized osteoarthrosis of lower leg, unspecified laterality    - Large Joint/Bursa injection and/or drainage (Shoulder, Knee)  - Kenalog  80 MG         []  - Kenalog 20 MG  []  - Lidocaine 1% or 2%     []  - triamcinolone acetonide (KENALOG) 40 MG/ML injection; 2 mLs (80 mg) by INTRA-ARTICULAR route once for 1 dose  Dispense: 2 mL; Refill: 0  - triamcinolone acetonide (KENALOG) 10 MG/ML injection; 2 mLs (20 mg) by INTRA-ARTICULAR route once for 1 dose  Dispense: 2 mL; Refill: 0  - lidocaine 1 % injection; 12 mLs by INTRA-ARTICULAR route once for 1 dose  Dispense: 12 mL; Refill: 0    Patient was agreeable to this plan and had no further questions.  See Patient Instructions    Bisi Stuart MD  [facility]    PROCEDURE:  JOINT ASPIRATION/INJECTION.         After a discussion of risks, benefits and side effects of procedure, informed patient consent was obtained.       The right knee was prepped and draped in the usual clean fashion (sterile not required for this procedure).     INJECTION:  Using 5 cc of 1% lidocaine mixed                           with 40 mg of kenalog, the right knee was successfully injected                           without complication.  Patient did experience some pain                          relief following  injection.    PROCEDURE:  JOINT ASPIRATION/INJECTION.         After a discussion of risks, benefits and side effects of procedure, informed patient consent was obtained.       The left knee was prepped and draped in the usual clean fashion (sterile not required for this procedure).     INJECTION:  Using 7 cc of 1% lidocaine mixed                           with 60 mg of kenalog, the left knee was successfully injected                           without complication.  Patient did experience some pain                          relief following injection.

## 2018-05-10 NOTE — MR AVS SNAPSHOT
After Visit Summary   5/10/2018    Nori Looney    MRN: [id number]           Patient Information     Date Of Birth          2/4/1934        Visit Information        Provider Department      5/10/2018 11:15 AM Bisi Stuart MD [facility] [place]        Today's Diagnoses     Type 2 diabetes mellitus without complication, without long-term current use of insulin (H)    -  1    Hyperlipidemia LDL goal <70        Benign essential hypertension        Other specified hypothyroidism        Primary localized osteoarthrosis of lower leg, unspecified laterality           Follow-ups after your visit        Your next 10 appointments already scheduled     Aug 10, 2018  2:15 PM CDT   (Arrive by 2:00 PM)   SHORT with Bisi Stuart MD   [facility] ([facility]    [address]  [place] [address]   [phone]              Future tests that were ordered for you today     Open Future Orders        Priority Expected Expires Ordered    Basic metabolic panel Routine 8/1/2018 5/10/2019 5/10/2018            Who to contact     If you have questions or need follow up information about today's clinic visit or your schedule please contact [facility] directly at [phone].  Normal or non-critical lab and imaging results will be communicated to you by MyChart, letter or phone within 4 business days after the clinic has received the results. If you do not hear from us within 7 days, please contact the clinic through MyChart or phone. If you have a critical or abnormal lab result, we will notify you by phone as soon as possible.  Submit refill requests through [employer] or call your pharmacy and they will forward the refill request to us. Please allow 3 business days for your refill to be completed.          Additional Information About Your Visit        MyChart Information     [employer] lets you send messages to your doctor, view your test results, renew your  "prescriptions, schedule appointments and more. To sign up, go to [email or web address] . Click on \"Log in\" on the left side of the screen, which will take you to the Welcome page. Then click on \"Sign up Now\" on the right side of the page.     You will be asked to enter the access code listed below, as well as some personal information. Please follow the directions to create your username and password.     Your access code is: [email or web address]  Expires: 2018 12:19 PM     Your access code will  in 90 days. If you need help or a new code, please call your [place] clinic or [phone].        Care EveryWhere ID     This is your Care EveryWhere ID. This could be used by other organizations to access your [facility] medical records  [id number]        Your Vitals Were     Pulse Temperature Respirations Height Pulse Oximetry BMI (Body Mass Index)    62 96.5  F (35.8  C) (Tympanic) 16 5' 8\" (1.727 m) 95% 27.03 kg/m2       Blood Pressure from Last 3 Encounters:   05/10/18 132/72   18 122/74   10/30/17 132/58    Weight from Last 3 Encounters:   05/10/18 177 lb 12.8 oz (80.6 kg)   18 170 lb (77.1 kg)   10/30/17 170 lb 12.8 oz (77.5 kg)              We Performed the Following     CBC with platelets     Estimated Average Glucose     Hemoglobin A1c     Kenalog  80 MG         []     Kenalog 20 MG  []     Large Joint/Bursa injection and/or drainage (Shoulder, Knee)     Lidocaine 1% or 2%     []          Today's Medication Changes          These changes are accurate as of 5/10/18 12:19 PM.  If you have any questions, ask your nurse or doctor.               Start taking these medicines.        Dose/Directions    lidocaine 1 % injection   Used for:  Primary localized osteoarthrosis of lower leg, unspecified laterality   Started by:  Bisi Stuart MD        Dose:  12 mL   12 mLs by INTRA-ARTICULAR route once for 1 dose   Quantity:  12 mL   Refills:  0       * triamcinolone acetonide 40 MG/ML " injection   Commonly known as:  KENALOG   Used for:  Primary localized osteoarthrosis of lower leg, unspecified laterality   Started by:  Bisi Stuart MD        Dose:  80 mg   2 mLs (80 mg) by INTRA-ARTICULAR route once for 1 dose   Quantity:  2 mL   Refills:  0       * triamcinolone acetonide 10 MG/ML injection   Commonly known as:  KENALOG   Used for:  Primary localized osteoarthrosis of lower leg, unspecified laterality   Started by:  Bisi Stuart MD        Dose:  20 mg   2 mLs (20 mg) by INTRA-ARTICULAR route once for 1 dose   Quantity:  2 mL   Refills:  0       * Notice:  This list has 2 medication(s) that are the same as other medications prescribed for you. Read the directions carefully, and ask your doctor or other care provider to review them with you.      These medicines have changed or have updated prescriptions.        Dose/Directions    metFORMIN 500 MG 24 hr tablet   Commonly known as:  GLUCOPHAGE-XR   This may have changed:  See the new instructions.   Used for:  Type 2 diabetes mellitus without complication, without long-term current use of insulin (H)   Changed by:  Bisi Stuart MD        Dose:  1000 mg   Take 2 tablets (1,000 mg) by mouth 2 times daily (with meals)   Quantity:  180 tablet   Refills:  1            Where to get your medicines      These medications were sent to [address] AT [address] [address]     Phone:  [phone]     metFORMIN 500 MG 24 hr tablet         Some of these will need a paper prescription and others can be bought over the counter.  Ask your nurse if you have questions.     You don't need a prescription for these medications     lidocaine 1 % injection    triamcinolone acetonide 10 MG/ML injection    triamcinolone acetonide 40 MG/ML injection                Primary Care Provider Office Phone # Fax #    Bisi Stuart [phone]  [phone]       [facility] [place] [address]  [place] [address]        Equal Access to Services     ARIE RESENDIZ : Hadii deedee graham hadcedrico Somarcyali, waaxda luqadaha, qaybta kaalmada boris, esteban imeldain hayaaumesh lyonsmelany roman trell grant. So [facility] [phone].    ATENCIÓN: Si habla español, tiene a crabtree disposición servicios gratuitos de asistencia lingüística. Llame al [phone].    We comply with applicable federal civil rights laws and [place] laws. We do not discriminate on the basis of race, color, national origin, age, disability, sex, sexual orientation, or gender identity.            Thank you!     Thank you for choosing [facility]  for your care. Our goal is always to provide you with excellent care. Hearing back from our patients is one way we can continue to improve our services. Please take a few minutes to complete the written survey that you may receive in the mail after your visit with us. Thank you!             Your Updated Medication List - Protect others around you: Learn how to safely use, store and throw away your medicines at www.disposemymeds.org.          This list is accurate as of 5/10/18 12:19 PM.  Always use your most recent med list.                   Brand Name Dispense Instructions for use Diagnosis    ACCU-CHEK KEYANNA test strip   Generic drug:  blood glucose monitoring      by In Vitro route 2 times daily .        ASPIRIN PO      Take 325 mg by mouth daily        atorvastatin 20 MG tablet    LIPITOR    30 tablet    Take 1 Tab by mouth one time a day.    Hyperlipidemia LDL goal <70       glipiZIDE 5 MG tablet    GLUCOTROL    90 tablet    Take 1 Tab by mouth one time a day before a meal.    Controlled type 2 diabetes mellitus without complication, without long-term current use of insulin (H)       levothyroxine 125 MCG tablet    SYNTHROID/LEVOTHROID    90 tablet    Take 1 Tab by mouth one time a day.    Other specified hypothyroidism       lidocaine 1 % injection      12 mL    12 mLs by INTRA-ARTICULAR route once for 1 dose    Primary localized osteoarthrosis of lower leg, unspecified laterality       MAPAP 500 MG tablet   Generic drug:  acetaminophen     120 tablet    Take 2 Tabs by mouth two times a day as needed for mild pain.    Primary osteoarthritis of right hip, Arthritis of knee       metFORMIN 500 MG 24 hr tablet    GLUCOPHAGE-XR    180 tablet    Take 2 tablets (1,000 mg) by mouth 2 times daily (with meals)    Type 2 diabetes mellitus without complication, without long-term current use of insulin (H)       metoprolol succinate 50 MG 24 hr tablet    TOPROL-XL    90 tablet    Take 1 tablet (50 mg) by mouth daily    Coronary artery disease involving coronary bypass graft of native heart without angina pectoris       nitroGLYcerin 0.4 MG sublingual tablet    NITROSTAT    25 tablet    For chest pain place 1 tablet under the tongue every 5 minutes for 3 doses. If symptoms persist 5 minutes after 1st dose call 911.    Coronary artery disease involving native coronary artery of native heart without angina pectoris       * triamcinolone acetonide 40 MG/ML injection    KENALOG    2 mL    2 mLs (80 mg) by INTRA-ARTICULAR route once for 1 dose    Primary localized osteoarthrosis of lower leg, unspecified laterality       * triamcinolone acetonide 10 MG/ML injection    KENALOG    2 mL    2 mLs (20 mg) by INTRA-ARTICULAR route once for 1 dose    Primary localized osteoarthrosis of lower leg, unspecified laterality       vitamin D 99066 UNIT capsule    ERGOCALCIFEROL          * Notice:  This list has 2 medication(s) that are the same as other medications prescribed for you. Read the directions carefully, and ask your doctor or other care provider to review them with you.

## 2018-05-10 NOTE — NURSING NOTE
"Chief Complaint   Patient presents with     Diabetes       Initial /72 (BP Location: Left arm, Patient Position: Sitting, Cuff Size: Adult Regular)  Pulse 62  Temp 96.5  F (35.8  C) (Tympanic)  Resp 16  Ht 5' 8\" (1.727 m)  Wt 177 lb 12.8 oz (80.6 kg)  SpO2 95%  BMI 27.03 kg/m2 Estimated body mass index is 27.03 kg/(m^2) as calculated from the following:    Height as of this encounter: 5' 8\" (1.727 m).    Weight as of this encounter: 177 lb 12.8 oz (80.6 kg).  Medication Reconciliation: complete    Ana Maria Vergara MA  "

## 2018-05-11 ASSESSMENT — ANXIETY QUESTIONNAIRES: GAD7 TOTAL SCORE: 0

## 2018-05-11 ASSESSMENT — PATIENT HEALTH QUESTIONNAIRE - PHQ9: SUM OF ALL RESPONSES TO PHQ QUESTIONS 1-9: 1

## 2018-08-10 ENCOUNTER — OFFICE VISIT (OUTPATIENT)
Dept: FAMILY MEDICINE | Facility: OTHER | Age: 83
End: 2018-08-10
Attending: FAMILY MEDICINE
Payer: COMMERCIAL

## 2018-08-10 VITALS
DIASTOLIC BLOOD PRESSURE: 66 MMHG | WEIGHT: 168 LBS | SYSTOLIC BLOOD PRESSURE: 134 MMHG | HEIGHT: 68 IN | BODY MASS INDEX: 25.46 KG/M2 | HEART RATE: 61 BPM | TEMPERATURE: 96.9 F | OXYGEN SATURATION: 93 %

## 2018-08-10 DIAGNOSIS — I10 BENIGN ESSENTIAL HYPERTENSION: ICD-10-CM

## 2018-08-10 DIAGNOSIS — E11.9 TYPE 2 DIABETES MELLITUS WITHOUT COMPLICATION, WITHOUT LONG-TERM CURRENT USE OF INSULIN (H): ICD-10-CM

## 2018-08-10 DIAGNOSIS — E03.8 OTHER SPECIFIED HYPOTHYROIDISM: Primary | ICD-10-CM

## 2018-08-10 DIAGNOSIS — E78.5 HYPERLIPIDEMIA LDL GOAL <70: ICD-10-CM

## 2018-08-10 LAB
ANION GAP SERPL CALCULATED.3IONS-SCNC: 10 MMOL/L (ref 3–14)
BUN SERPL-MCNC: 16 MG/DL (ref 7–30)
CALCIUM SERPL-MCNC: 8.4 MG/DL (ref 8.5–10.1)
CHLORIDE SERPL-SCNC: 110 MMOL/L (ref 94–109)
CO2 SERPL-SCNC: 22 MMOL/L (ref 20–32)
CREAT SERPL-MCNC: 1.02 MG/DL (ref 0.66–1.25)
EST. AVERAGE GLUCOSE BLD GHB EST-MCNC: 177 MG/DL
GFR SERPL CREATININE-BSD FRML MDRD: 69 ML/MIN/1.7M2
GLUCOSE SERPL-MCNC: 117 MG/DL (ref 70–99)
HBA1C MFR BLD: 7.8 % (ref 0–5.6)
POTASSIUM SERPL-SCNC: 4.1 MMOL/L (ref 3.4–5.3)
SODIUM SERPL-SCNC: 142 MMOL/L (ref 133–144)
TSH SERPL DL<=0.005 MIU/L-ACNC: 3.09 MU/L (ref 0.4–4)

## 2018-08-10 PROCEDURE — 36415 COLL VENOUS BLD VENIPUNCTURE: CPT | Mod: ZL | Performed by: FAMILY MEDICINE

## 2018-08-10 PROCEDURE — 80048 BASIC METABOLIC PNL TOTAL CA: CPT | Mod: ZL | Performed by: FAMILY MEDICINE

## 2018-08-10 PROCEDURE — G0463 HOSPITAL OUTPT CLINIC VISIT: HCPCS

## 2018-08-10 PROCEDURE — 83036 HEMOGLOBIN GLYCOSYLATED A1C: CPT | Mod: ZL | Performed by: FAMILY MEDICINE

## 2018-08-10 PROCEDURE — 99214 OFFICE O/P EST MOD 30 MIN: CPT | Performed by: FAMILY MEDICINE

## 2018-08-10 PROCEDURE — 84443 ASSAY THYROID STIM HORMONE: CPT | Mod: ZL | Performed by: FAMILY MEDICINE

## 2018-08-10 PROCEDURE — 40000788 ZZHCL STATISTIC ESTIMATED AVERAGE GLUCOSE: Mod: ZL | Performed by: FAMILY MEDICINE

## 2018-08-10 ASSESSMENT — ANXIETY QUESTIONNAIRES
6. BECOMING EASILY ANNOYED OR IRRITABLE: NOT AT ALL
5. BEING SO RESTLESS THAT IT IS HARD TO SIT STILL: NOT AT ALL
GAD7 TOTAL SCORE: 0
7. FEELING AFRAID AS IF SOMETHING AWFUL MIGHT HAPPEN: NOT AT ALL
3. WORRYING TOO MUCH ABOUT DIFFERENT THINGS: NOT AT ALL
4. TROUBLE RELAXING: NOT AT ALL
2. NOT BEING ABLE TO STOP OR CONTROL WORRYING: NOT AT ALL
1. FEELING NERVOUS, ANXIOUS, OR ON EDGE: NOT AT ALL

## 2018-08-10 ASSESSMENT — PAIN SCALES - GENERAL: PAINLEVEL: NO PAIN (0)

## 2018-08-10 NOTE — MR AVS SNAPSHOT
"              After Visit Summary   8/10/2018    Nori Looney    MRN: [id number]           Patient Information     Date Of Birth          2/4/1934        Visit Information        Provider Department      8/10/2018 2:15 PM Bisi Stuart MD [facility] [place]        Today's Diagnoses     Other specified hypothyroidism    -  1    Benign essential hypertension        Hyperlipidemia LDL goal <70        Type 2 diabetes mellitus without complication, without long-term current use of insulin (H)           Follow-ups after your visit        Your next 10 appointments already scheduled     Nov 08, 2018  9:45 AM CST   (Arrive by 9:30 AM)   SHORT with Bisi Stuart MD   [facility] [place] ([facility] [place] )    [address]  [place] [address]   [phone]              Who to contact     If you have questions or need follow up information about today's clinic visit or your schedule please contact [facility] directly at [phone].  Normal or non-critical lab and imaging results will be communicated to you by MyChart, letter or phone within 4 business days after the clinic has received the results. If you do not hear from us within 7 days, please contact the clinic through MyChart or phone. If you have a critical or abnormal lab result, we will notify you by phone as soon as possible.  Submit refill requests through [employer] or call your pharmacy and they will forward the refill request to us. Please allow 3 business days for your refill to be completed.          Additional Information About Your Visit        Care EveryWhere ID     This is your Care EveryWhere ID. This could be used by other organizations to access your [facility] medical records  [id number]        Your Vitals Were     Pulse Temperature Height Pulse Oximetry BMI (Body Mass Index)       61 96.9  F (36.1  C) 5' 8\" (1.727 m) 93% 25.54 kg/m2        Blood Pressure from Last 3 Encounters:   08/10/18 134/66 "   05/10/18 132/72   01/31/18 122/74    Weight from Last 3 Encounters:   08/10/18 168 lb (76.2 kg)   05/10/18 177 lb 12.8 oz (80.6 kg)   01/31/18 170 lb (77.1 kg)              We Performed the Following     Basic metabolic panel     Hemoglobin A1c     TSH with free T4 reflex        Primary Care Provider Office Phone # Fax #    Bisitanvir Stuart [phone]       [facility] [place] [address]  [place] [address]        Equal Access to Services     [facility]: Hadii aad ku hadasho Soomaali, waaxda luqadaha, qaybta kaalmada adeegyada, waxamalia segurain hayaan rosanna cai . So [facility] [phone].    ATENCIÓN: Si habla español, tiene a crabtree disposición servicios gratuitos de asistencia lingüística. [facility] [phone].    We comply with applicable federal civil rights laws and [place] laws. We do not discriminate on the basis of race, color, national origin, age, disability, sex, sexual orientation, or gender identity.            Thank you!     Thank you for choosing [facility]  for your care. Our goal is always to provide you with excellent care. Hearing back from our patients is one way we can continue to improve our services. Please take a few minutes to complete the written survey that you may receive in the mail after your visit with us. Thank you!             Your Updated Medication List - Protect others around you: Learn how to safely use, store and throw away your medicines at www.disposemymeds.org.          This list is accurate as of 8/10/18  2:49 PM.  Always use your most recent med list.                   Brand Name Dispense Instructions for use Diagnosis    ACCU-CHEK KEYANNA test strip   Generic drug:  blood glucose monitoring      by In Vitro route 2 times daily .        ASPIRIN PO      Take 325 mg by mouth daily        atorvastatin 20 MG tablet    LIPITOR    30 tablet    Take 1 Tab by mouth one time a day.    Hyperlipidemia LDL goal <70       glipiZIDE 5 MG  tablet    GLUCOTROL    90 tablet    Take 1 Tab by mouth one time a day before a meal.    Controlled type 2 diabetes mellitus without complication, without long-term current use of insulin (H)       levothyroxine 125 MCG tablet    SYNTHROID/LEVOTHROID    90 tablet    Take 1 Tab by mouth one time a day.    Other specified hypothyroidism       MAPAP 500 MG tablet   Generic drug:  acetaminophen     120 tablet    Take 2 Tabs by mouth two times a day as needed for mild pain.    Primary osteoarthritis of right hip, Arthritis of knee       metFORMIN 500 MG 24 hr tablet    GLUCOPHAGE-XR    180 tablet    Take 2 tablets (1,000 mg) by mouth 2 times daily (with meals)    Type 2 diabetes mellitus without complication, without long-term current use of insulin (H)       metoprolol succinate 50 MG 24 hr tablet    TOPROL-XL    90 tablet    Take 1 tablet (50 mg) by mouth daily    Coronary artery disease involving coronary bypass graft of native heart without angina pectoris       nitroGLYcerin 0.4 MG sublingual tablet    NITROSTAT    25 tablet    For chest pain place 1 tablet under the tongue every 5 minutes for 3 doses. If symptoms persist 5 minutes after 1st dose call 911.    Coronary artery disease involving native coronary artery of native heart without angina pectoris       vitamin D 25897 UNIT capsule    ERGOCALCIFEROL

## 2018-08-10 NOTE — PROGRESS NOTES
SUBJECTIVE:   Nori Looney is a 84 year old male who presents to clinic today for the following health issues:      Diabetes Follow-up      Patient is checking blood sugars: rarely.  Results range from 122     Diabetic concerns: None     Symptoms of hypoglycemia (low blood sugar): none     Paresthesias (numbness or burning in feet) or sores: No     Date of last diabetic eye exam: 9/17    Diabetes Management Resources    Hyperlipidemia Follow-Up      Rate your low fat/cholesterol diet?: not monitoring fat    Taking statin?  Yes, no muscle aches from statin    Other lipid medications/supplements?:  none    Hypertension Follow-up      Outpatient blood pressures are not being checked.    Low Salt Diet: low salt    BP Readings from Last 2 Encounters:   05/10/18 132/72   01/31/18 122/74     Hemoglobin A1C (%)   Date Value   05/10/2018 8.2 (H)   01/31/2018 7.6 (H)     LDL Cholesterol Calculated (mg/dL)   Date Value   10/30/2017 94   01/05/2017 64       Amount of exercise or physical activity: 6-7 days/week for an average of 30-45 minutes    Problems taking medications regularly: No    Medication side effects: none    Diet: regular (no restrictions)      Problem list and histories reviewed & adjusted, as indicated.  Additional history: as documented    Patient Active Problem List   Diagnosis     ACP (advance care planning)     Other specified hypothyroidism     Chronic systolic congestive heart failure (H)     Benign essential hypertension     Hyperlipidemia LDL goal <70     Bilateral carotid artery stenosis     Coronary artery disease involving coronary bypass graft of native heart without angina pectoris     Controlled type 2 diabetes mellitus without complication, without long-term current use of insulin (H)     Type 2 diabetes mellitus without complication, without long-term current use of insulin (H)     Primary osteoarthritis of left knee     Encounter for diabetic foot exam (H)     Neuropathy     Trochanteric  bursitis of right hip     Past Surgical History:   Procedure Laterality Date     AS CABG, ARTERY-VEIN, FIVE  11/02/2011    off pump       Social History   Substance Use Topics     Smoking status: Never Smoker     Smokeless tobacco: Never Used      Comment: second hand smoke in the house 40 yrs     Alcohol use No     Family History   Problem Relation Age of Onset     Cerebrovascular Disease Father      Coronary Artery Disease Father      Dementia Mother      Coronary Artery Disease Sister      Lung Cancer Sister      Thyroid Disease Daughter          Current Outpatient Prescriptions   Medication Sig Dispense Refill     ASPIRIN PO Take 325 mg by mouth daily       atorvastatin (LIPITOR) 20 MG tablet Take 1 Tab by mouth one time a day. 30 tablet 5     blood glucose monitoring (ACCU-CHEK KEYANNA) test strip by In Vitro route 2 times daily .       glipiZIDE (GLUCOTROL) 5 MG tablet Take 1 Tab by mouth one time a day before a meal. 90 tablet 0     levothyroxine (SYNTHROID/LEVOTHROID) 125 MCG tablet Take 1 Tab by mouth one time a day. 90 tablet 2     MAPAP 500 MG tablet Take 2 Tabs by mouth two times a day as needed for mild pain. 120 tablet 3     metFORMIN (GLUCOPHAGE-XR) 500 MG 24 hr tablet Take 2 tablets (1,000 mg) by mouth 2 times daily (with meals) 180 tablet 1     metoprolol succinate (TOPROL-XL) 50 MG 24 hr tablet Take 1 tablet (50 mg) by mouth daily 90 tablet 1     nitroglycerin (NITROSTAT) 0.4 MG sublingual tablet For chest pain place 1 tablet under the tongue every 5 minutes for 3 doses. If symptoms persist 5 minutes after 1st dose call 911. 25 tablet 0     vitamin D (ERGOCALCIFEROL) 69285 UNIT capsule        No Known Allergies  Labs reviewed in EPIC    Reviewed and updated as needed this visit by clinical staff       Reviewed and updated as needed this visit by Provider         ROS:  Constitutional, HEENT, cardiovascular, pulmonary, gi and gu systems are negative, except as otherwise noted.    OBJECTIVE:              "                                       /66  Pulse 61  Temp 96.9  F (36.1  C)  Ht 5' 8\" (1.727 m)  Wt 168 lb (76.2 kg)  SpO2 93%  BMI 25.54 kg/m2  Body mass index is 25.54 kg/(m^2).  GENERAL APPEARANCE: healthy, alert and no distress  RESP: lungs clear to auscultation - no rales, rhonchi or wheezes  CV: regular rates and rhythm, normal S1 S2, no S3 or S4 and no murmur, click or rub  ABDOMEN: soft, nontender, without hepatosplenomegaly or masses and bowel sounds normal  PSYCH: mentation appears normal and affect normal/bright       ASSESSMENT/PLAN:                                                    1. Other specified hypothyroidism  stable  - TSH with free T4 reflex; Future  - TSH with free T4 reflex  - Estimated Average Glucose    2. Benign essential hypertension  Doing well    3. Hyperlipidemia LDL goal <70      4. Type 2 diabetes mellitus without complication, without long-term current use of insulin (H)  F/u 3 mos  Congratulated on his efforts to change diet and exercise  - Hemoglobin A1c  - Basic metabolic panel    Patient was agreeable to this plan and had no further questions.  See Patient Instructions    Bisi Stuart MD  [facility] [place]  "

## 2018-08-10 NOTE — NURSING NOTE
"Chief Complaint   Patient presents with     Diabetes       Initial /66  Pulse 61  Temp 96.9  F (36.1  C)  Ht 5' 8\" (1.727 m)  Wt 168 lb (76.2 kg)  SpO2 93%  BMI 25.54 kg/m2 Estimated body mass index is 25.54 kg/(m^2) as calculated from the following:    Height as of this encounter: 5' 8\" (1.727 m).    Weight as of this encounter: 168 lb (76.2 kg).  Medication Reconciliation: complete    Fernando Brown LPN  "

## 2018-08-11 ASSESSMENT — PATIENT HEALTH QUESTIONNAIRE - PHQ9: SUM OF ALL RESPONSES TO PHQ QUESTIONS 1-9: 1

## 2018-08-11 ASSESSMENT — ANXIETY QUESTIONNAIRES: GAD7 TOTAL SCORE: 0

## 2018-09-13 ENCOUNTER — TRANSFERRED RECORDS (OUTPATIENT)
Dept: HEALTH INFORMATION MANAGEMENT | Facility: CLINIC | Age: 83
End: 2018-09-13

## 2018-09-15 DIAGNOSIS — I25.810 CORONARY ARTERY DISEASE INVOLVING CORONARY BYPASS GRAFT OF NATIVE HEART WITHOUT ANGINA PECTORIS: ICD-10-CM

## 2018-09-17 RX ORDER — METOPROLOL SUCCINATE 50 MG/1
TABLET, EXTENDED RELEASE ORAL
Qty: 90 TABLET | Refills: 1 | Status: SHIPPED | OUTPATIENT
Start: 2018-09-17 | End: 2019-04-13

## 2018-09-17 NOTE — TELEPHONE ENCOUNTER
metoprolol      Last Written Prescription Date:  2/19/18  Last Fill Quantity: 90,   # refills: 1  Last Office Visit: 8/10/18  Future Office visit:    Next 5 appointments (look out 90 days)     Nov 08, 2018  9:45 AM CST   (Arrive by 9:30 AM)   SHORT with Bisi Stuart MD   [facility] [place] ([facility] [place] )    [address] [place] [address]   [phone]

## 2018-11-07 NOTE — PROGRESS NOTES
SUBJECTIVE:   Nori Looney is a 84 year old male who presents to clinic today for the following health issues:    Diabetes Follow-up      Patient is checking blood sugars: 3 times a month - last checked 184    Diabetic concerns: None     Symptoms of hypoglycemia (low blood sugar): none     Paresthesias (numbness or burning in feet) or sores: No     Date of last diabetic eye exam: 9/13/18    Has been experiencing dizziness in the morning when waking up. Ongoing for one month.   Only happens on waking not during the day at all.    BP Readings from Last 2 Encounters:   08/10/18 134/66   05/10/18 132/72     Hemoglobin A1C (%)   Date Value   08/10/2018 7.8 (H)   05/10/2018 8.2 (H)     LDL Cholesterol Calculated (mg/dL)   Date Value   10/30/2017 94   01/05/2017 64       Diabetes Management Resources  Hyperlipidemia Follow-Up      Rate your low fat/cholesterol diet?: fair    Taking statin?  Yes, no muscle aches from statin    Other lipid medications/supplements?:  none    Hypothyroidism Follow-up      Since last visit, patient describes the following symptoms: Weight stable, no hair loss, no skin changes, no constipation, no loose stools      Amount of exercise or physical activity: 4-5 days/week - 30 minutes average     Problems taking medications regularly: No    Medication side effects: none    Diet: regular (no restrictions)    Problem list and histories reviewed & adjusted, as indicated.  Additional history: as documented    Patient Active Problem List   Diagnosis     ACP (advance care planning)     Other specified hypothyroidism     Chronic systolic congestive heart failure (H)     Benign essential hypertension     Hyperlipidemia LDL goal <70     Bilateral carotid artery stenosis     Coronary artery disease involving coronary bypass graft of native heart without angina pectoris     Controlled type 2 diabetes mellitus without complication, without long-term current use of insulin (H)     Type 2 diabetes mellitus  without complication, without long-term current use of insulin (H)     Primary osteoarthritis of left knee     Encounter for diabetic foot exam (H)     Neuropathy     Trochanteric bursitis of right hip     Past Surgical History:   Procedure Laterality Date     AS CABG, ARTERY-VEIN, FIVE  11/02/2011    off pump       Social History   Substance Use Topics     Smoking status: Never Smoker     Smokeless tobacco: Never Used      Comment: second hand smoke in the house 40 yrs     Alcohol use No     Family History   Problem Relation Age of Onset     Cerebrovascular Disease Father      Coronary Artery Disease Father      Dementia Mother      Coronary Artery Disease Sister      Lung Cancer Sister      Thyroid Disease Daughter          Current Outpatient Prescriptions   Medication Sig Dispense Refill     ASPIRIN PO Take 325 mg by mouth daily       atorvastatin (LIPITOR) 20 MG tablet Take 1 Tab by mouth one time a day. 30 tablet 1     blood glucose monitoring (ACCU-CHEK KEYANNA) test strip by In Vitro route 2 times daily .       glipiZIDE (GLUCOTROL) 5 MG tablet Take 1 Tab by mouth one time a day before a meal. 90 tablet 0     levothyroxine (SYNTHROID/LEVOTHROID) 125 MCG tablet Take 1 Tab by mouth one time a day. 90 tablet 2     MAPAP 500 MG tablet Take 2 Tabs by mouth two times a day as needed for mild pain. 120 tablet 3     metFORMIN (GLUCOPHAGE-XR) 500 MG 24 hr tablet Take 2 tablets (1,000 mg) by mouth 2 times daily (with meals) 180 tablet 1     metoprolol succinate (TOPROL-XL) 50 MG 24 hr tablet Take 1 Tab by mouth one time a day. 90 tablet 1     nitroglycerin (NITROSTAT) 0.4 MG sublingual tablet For chest pain place 1 tablet under the tongue every 5 minutes for 3 doses. If symptoms persist 5 minutes after 1st dose call 911. 25 tablet 0     vitamin D (ERGOCALCIFEROL) 45431 UNIT capsule        No Known Allergies  BP Readings from Last 3 Encounters:   11/08/18 139/77   08/10/18 134/66   05/10/18 132/72    Wt Readings from Last  3 Encounters:   11/08/18 172 lb (78 kg)   08/10/18 168 lb (76.2 kg)   05/10/18 177 lb 12.8 oz (80.6 kg)                  Labs reviewed in EPIC    Reviewed and updated as needed this visit by clinical staff       Reviewed and updated as needed this visit by Provider         ROS:  Constitutional, HEENT, cardiovascular, pulmonary, gi and gu systems are negative, except as otherwise noted.    OBJECTIVE:                                                    /77 (Patient Position: Standing)  Pulse 62  Temp 96.7  F (35.9  C) (Tympanic)  Wt 172 lb (78 kg)  SpO2 96%  BMI 26.15 kg/m2  Body mass index is 26.15 kg/(m^2).  GENERAL APPEARANCE: healthy, alert and no distress  RESP: lungs clear to auscultation - no rales, rhonchi or wheezes  CV: regular rates and rhythm, normal S1 S2, no S3 or S4 and no murmur, click or rub  ABDOMEN: soft, nontender, without hepatosplenomegaly or masses and bowel sounds normal  PSYCH: mentation appears normal and affect normal/bright       ASSESSMENT/PLAN:                                                    1. Type 2 diabetes mellitus without complication, without long-term current use of insulin (H)  Follow-up 3 mos  - Albumin Random Urine Quantitative with Creat Ratio; Future  - Comprehensive metabolic panel; Future  - Lipid Profile (Chol, Trig, HDL, LDL calc); Future  - Hemoglobin A1c  - Albumin Random Urine Quantitative with Creat Ratio  - Comprehensive metabolic panel  - Lipid Profile (Chol, Trig, HDL, LDL calc)  - Estimated Average Glucose  - Estimated Average Glucose    2. Other specified hypothyroidism  stable    3. Benign essential hypertension  stable    4. Hyperlipidemia LDL goal <70  stable  - Comprehensive metabolic panel; Future  - Lipid Profile (Chol, Trig, HDL, LDL calc); Future  - Comprehensive metabolic panel  - Lipid Profile (Chol, Trig, HDL, LDL calc)    5. Need for prophylactic vaccination and inoculation against influenza    - HC FLU VACCINE, INCREASED ANTIGEN, PRESV  FREE    6. Need for vaccination    - TD PRSERV FREE >=7 YRS ADS IM [24543]  - 1st  Administration  [[id number]]  - Each additional admin.  (Right click and add QUANTITY)  [[id number]]    Patient was agreeable to this plan and had no further questions.  See Patient Instructions    Bisi Stuart MD  [facility] [place]

## 2018-11-08 ENCOUNTER — OFFICE VISIT (OUTPATIENT)
Dept: FAMILY MEDICINE | Facility: OTHER | Age: 83
End: 2018-11-08
Attending: FAMILY MEDICINE
Payer: COMMERCIAL

## 2018-11-08 VITALS
WEIGHT: 172 LBS | TEMPERATURE: 96.7 F | DIASTOLIC BLOOD PRESSURE: 77 MMHG | HEART RATE: 62 BPM | OXYGEN SATURATION: 96 % | BODY MASS INDEX: 26.15 KG/M2 | SYSTOLIC BLOOD PRESSURE: 139 MMHG

## 2018-11-08 DIAGNOSIS — E11.9 TYPE 2 DIABETES MELLITUS WITHOUT COMPLICATION, WITHOUT LONG-TERM CURRENT USE OF INSULIN (H): Primary | ICD-10-CM

## 2018-11-08 DIAGNOSIS — Z23 NEED FOR VACCINATION: ICD-10-CM

## 2018-11-08 DIAGNOSIS — I10 BENIGN ESSENTIAL HYPERTENSION: ICD-10-CM

## 2018-11-08 DIAGNOSIS — Z23 NEED FOR PROPHYLACTIC VACCINATION AND INOCULATION AGAINST INFLUENZA: ICD-10-CM

## 2018-11-08 DIAGNOSIS — E03.8 OTHER SPECIFIED HYPOTHYROIDISM: ICD-10-CM

## 2018-11-08 DIAGNOSIS — E78.5 HYPERLIPIDEMIA LDL GOAL <70: ICD-10-CM

## 2018-11-08 LAB
ALBUMIN SERPL-MCNC: 4.2 G/DL (ref 3.4–5)
ALP SERPL-CCNC: 97 U/L (ref 40–150)
ALT SERPL W P-5'-P-CCNC: 30 U/L (ref 0–70)
ANION GAP SERPL CALCULATED.3IONS-SCNC: 6 MMOL/L (ref 3–14)
AST SERPL W P-5'-P-CCNC: 20 U/L (ref 0–45)
BILIRUB SERPL-MCNC: 1.1 MG/DL (ref 0.2–1.3)
BUN SERPL-MCNC: 15 MG/DL (ref 7–30)
CALCIUM SERPL-MCNC: 9 MG/DL (ref 8.5–10.1)
CHLORIDE SERPL-SCNC: 107 MMOL/L (ref 94–109)
CHOLEST SERPL-MCNC: 138 MG/DL
CO2 SERPL-SCNC: 27 MMOL/L (ref 20–32)
CREAT SERPL-MCNC: 1.05 MG/DL (ref 0.66–1.25)
CREAT UR-MCNC: 281 MG/DL
EST. AVERAGE GLUCOSE BLD GHB EST-MCNC: 171 MG/DL
GFR SERPL CREATININE-BSD FRML MDRD: 67 ML/MIN/1.7M2
GLUCOSE SERPL-MCNC: 98 MG/DL (ref 70–99)
HBA1C MFR BLD: 7.6 % (ref 0–5.6)
HDLC SERPL-MCNC: 35 MG/DL
LDLC SERPL CALC-MCNC: 79 MG/DL
MICROALBUMIN UR-MCNC: 21 MG/L
MICROALBUMIN/CREAT UR: 7.37 MG/G CR (ref 0–17)
NONHDLC SERPL-MCNC: 103 MG/DL
POTASSIUM SERPL-SCNC: 4.3 MMOL/L (ref 3.4–5.3)
PROT SERPL-MCNC: 7.4 G/DL (ref 6.8–8.8)
SODIUM SERPL-SCNC: 140 MMOL/L (ref 133–144)
TRIGL SERPL-MCNC: 120 MG/DL

## 2018-11-08 PROCEDURE — 90662 IIV NO PRSV INCREASED AG IM: CPT | Performed by: FAMILY MEDICINE

## 2018-11-08 PROCEDURE — 36415 COLL VENOUS BLD VENIPUNCTURE: CPT | Mod: ZL | Performed by: FAMILY MEDICINE

## 2018-11-08 PROCEDURE — G0463 HOSPITAL OUTPT CLINIC VISIT: HCPCS | Mod: 25

## 2018-11-08 PROCEDURE — G0463 HOSPITAL OUTPT CLINIC VISIT: HCPCS

## 2018-11-08 PROCEDURE — 80053 COMPREHEN METABOLIC PANEL: CPT | Mod: ZL | Performed by: FAMILY MEDICINE

## 2018-11-08 PROCEDURE — 90471 IMMUNIZATION ADMIN: CPT | Performed by: FAMILY MEDICINE

## 2018-11-08 PROCEDURE — 82043 UR ALBUMIN QUANTITATIVE: CPT | Mod: ZL | Performed by: FAMILY MEDICINE

## 2018-11-08 PROCEDURE — 90472 IMMUNIZATION ADMIN EACH ADD: CPT | Performed by: FAMILY MEDICINE

## 2018-11-08 PROCEDURE — 83036 HEMOGLOBIN GLYCOSYLATED A1C: CPT | Mod: ZL | Performed by: FAMILY MEDICINE

## 2018-11-08 PROCEDURE — 40000788 ZZHCL STATISTIC ESTIMATED AVERAGE GLUCOSE: Mod: ZL | Performed by: FAMILY MEDICINE

## 2018-11-08 PROCEDURE — 99214 OFFICE O/P EST MOD 30 MIN: CPT | Performed by: FAMILY MEDICINE

## 2018-11-08 PROCEDURE — 90714 TD VACC NO PRESV 7 YRS+ IM: CPT | Performed by: FAMILY MEDICINE

## 2018-11-08 PROCEDURE — 80061 LIPID PANEL: CPT | Mod: ZL | Performed by: FAMILY MEDICINE

## 2018-11-08 ASSESSMENT — ANXIETY QUESTIONNAIRES
7. FEELING AFRAID AS IF SOMETHING AWFUL MIGHT HAPPEN: NOT AT ALL
3. WORRYING TOO MUCH ABOUT DIFFERENT THINGS: NOT AT ALL
5. BEING SO RESTLESS THAT IT IS HARD TO SIT STILL: NOT AT ALL
2. NOT BEING ABLE TO STOP OR CONTROL WORRYING: NOT AT ALL
6. BECOMING EASILY ANNOYED OR IRRITABLE: NOT AT ALL
4. TROUBLE RELAXING: NOT AT ALL
IF YOU CHECKED OFF ANY PROBLEMS ON THIS QUESTIONNAIRE, HOW DIFFICULT HAVE THESE PROBLEMS MADE IT FOR YOU TO DO YOUR WORK, TAKE CARE OF THINGS AT HOME, OR GET ALONG WITH OTHER PEOPLE: NOT DIFFICULT AT ALL
GAD7 TOTAL SCORE: 0
1. FEELING NERVOUS, ANXIOUS, OR ON EDGE: NOT AT ALL

## 2018-11-08 ASSESSMENT — PATIENT HEALTH QUESTIONNAIRE - PHQ9: SUM OF ALL RESPONSES TO PHQ QUESTIONS 1-9: 1

## 2018-11-08 ASSESSMENT — PAIN SCALES - GENERAL: PAINLEVEL: MODERATE PAIN (4)

## 2018-11-08 NOTE — PROGRESS NOTES
Injectable Influenza Immunization Documentation    1.  Is the person to be vaccinated sick today?   No    2. Does the person to be vaccinated have an allergy to a component   of the vaccine?   No  Egg Allergy Algorithm Link    3. Has the person to be vaccinated ever had a serious reaction   to influenza vaccine in the past?   No    4. Has the person to be vaccinated ever had Guillain-Barré syndrome?   No    Form completed by Alysia Valero LPN    Prior to injection verified patient identity using patient's name and date of birth.    Alysia Valero LPN

## 2018-11-08 NOTE — LETTER
My Heart Failure Action Plan   Name: Nori Looney    YOB: 1934   Date: 11/7/2018    [facility] [place]     [address]  [place] [address]  [phone]  My Diagnosis:  My Ejection Fraction:     My Exercise Goal: 30 minutes daily  .     My Weight Goal:   Wt Readings from Last 2 Encounters:   08/10/18 168 lb (76.2 kg)   05/10/18 177 lb 12.8 oz (80.6 kg)     Weigh yourself daily using the same scale. If you gain more than 2 pounds in 24 hours or 5 pounds in a week     My Diet Goal:     Emergency Room Visits:    Our goal is to improve your quality of life and help you avoid a visit to the emergency room or hospital.  If we work together, we can achieve this goal. But, if you feel you need to call 911 or go to the emergency room, please do so.  If you go to the emergency room, please bring your list of medicines and your daily weight chart with you.       GREEN ZONE     Doing well today    Weight gained is no more than 2 pounds a day or 5 pounds a week.    No swelling in feet, ankles, legs or stomach.    No more swelling than usual.    No more trouble breathing than usual.    No change in my sleep.    No other problems. Actions:    I am doing fine.  I will take my medicine, follow my diet, see my doctor, exercise, and watch for symptoms.           YELLOW ZONE         Having a bad day or flare up    Weight gain of more than 2 pounds in one day or 5 pounds in one week.    New swelling in ankle, leg, knee or thigh.    Bloating in belly, pants feel tighter.    Swelling in hands or face.    Coughing or trouble breathing while walking or talking.    Harder to breathe last night.    Have trouble sleeping, wake up short of breath.    Much more tired than usual.    Not eating.    Pain in my chest or bad leg cramps.    Feel weak or dizzy. Actions:    I need to take action and call my doctor or nurse today.                 RED ZONE         Need medical care  now    Weight gain of 5 pounds overnight.    Chest pain or pressure that does not go away.    Feel less alert.    Wheezing or have trouble breathing when at rest.    Cannot sleep lying down.    Cannot take my water pill.    Pass out or faint. Actions:    I need to call my doctor or nurse now!    Call 911 if I have chest pain or cannot breathe.

## 2018-11-08 NOTE — MR AVS SNAPSHOT
After Visit Summary   11/8/2018    Nori Looney    MRN: [id number]           Patient Information     Date Of Birth          2/4/1934        Visit Information        Provider Department      11/8/2018 9:45 AM Bisi Stuart MD [facility] [place]        Today's Diagnoses     Type 2 diabetes mellitus without complication, without long-term current use of insulin (H)    -  1    Other specified hypothyroidism        Benign essential hypertension        Hyperlipidemia LDL goal <70        Need for prophylactic vaccination and inoculation against influenza        Need for vaccination           Follow-ups after your visit        Your next 10 appointments already scheduled     Feb 08, 2019 11:00 AM CST   (Arrive by 10:45 AM)   SHORT with Bisi Stuart MD   [facility] [place] ([facility] [place] )    [address]  [place] [address]   [phone]              Who to contact     If you have questions or need follow up information about today's clinic visit or your schedule please contact [facility] directly at [phone].  Normal or non-critical lab and imaging results will be communicated to you by MyChart, letter or phone within 4 business days after the clinic has received the results. If you do not hear from us within 7 days, please contact the clinic through MyChart or phone. If you have a critical or abnormal lab result, we will notify you by phone as soon as possible.  Submit refill requests through [employer] or call your pharmacy and they will forward the refill request to us. Please allow 3 business days for your refill to be completed.          Additional Information About Your Visit        Care EveryWhere ID     This is your Care EveryWhere ID. This could be used by other organizations to access your [facility] medical records  [id number]        Your Vitals Were     Pulse Temperature Pulse Oximetry BMI (Body Mass Index)           62 96.7  F (35.9  C) (Tympanic) 96% 26.15 kg/m2         Blood Pressure from Last 3 Encounters:   11/08/18 139/77   08/10/18 134/66   05/10/18 132/72    Weight from Last 3 Encounters:   11/08/18 172 lb (78 kg)   08/10/18 168 lb (76.2 kg)   05/10/18 177 lb 12.8 oz (80.6 kg)              We Performed the Following     1st  Administration  [[id number]]     Each additional admin.  (Right click and add QUANTITY)  [[id number]]     HC FLU VACCINE, INCREASED ANTIGEN, PRESV FREE     Hemoglobin A1c     TD PRSERV FREE >=7 YRS ADS IM [[id number]]        Primary Care Provider Office Phone # Fax #    Bisi Stuart [phone]       [facility] [place] [address]  [place] [address]        Equal Access to Services     [facility] Center[facility] : Hadii aad ku hadasho Soomaali, waaxda luqadaha, qaybta kaalmada adeegyada, waxay idiin hayaan adeeg abel cai . So [facility] [phone].    ATENCIÓN: Si habla español, tiene a crabtree disposición servicios gratuitos de asistencia lingüística. Llame al [phone].    We comply with applicable federal civil rights laws and [place] laws. We do not discriminate on the basis of race, color, national origin, age, disability, sex, sexual orientation, or gender identity.            Thank you!     Thank you for choosing [facility]  for your care. Our goal is always to provide you with excellent care. Hearing back from our patients is one way we can continue to improve our services. Please take a few minutes to complete the written survey that you may receive in the mail after your visit with us. Thank you!             Your Updated Medication List - Protect others around you: Learn how to safely use, store and throw away your medicines at www.disposemymeds.org.          This list is accurate as of 11/8/18 10:56 AM.  Always use your most recent med list.                   Brand Name Dispense Instructions for use Diagnosis    ACCU-CHEK KEYANNA test strip   Generic drug:   blood glucose monitoring      by In Vitro route 2 times daily .        ASPIRIN PO      Take 325 mg by mouth daily        atorvastatin 20 MG tablet    LIPITOR    30 tablet    Take 1 Tab by mouth one time a day.    Hyperlipidemia LDL goal <70       glipiZIDE 5 MG tablet    GLUCOTROL    90 tablet    Take 1 Tab by mouth one time a day before a meal.    Controlled type 2 diabetes mellitus without complication, without long-term current use of insulin (H)       levothyroxine 125 MCG tablet    SYNTHROID/LEVOTHROID    90 tablet    Take 1 Tab by mouth one time a day.    Other specified hypothyroidism       MAPAP 500 MG tablet   Generic drug:  acetaminophen     120 tablet    Take 2 Tabs by mouth two times a day as needed for mild pain.    Primary osteoarthritis of right hip, Arthritis of knee       metFORMIN 500 MG 24 hr tablet    GLUCOPHAGE-XR    180 tablet    Take 2 tablets (1,000 mg) by mouth 2 times daily (with meals)    Type 2 diabetes mellitus without complication, without long-term current use of insulin (H)       metoprolol succinate 50 MG 24 hr tablet    TOPROL-XL    90 tablet    Take 1 Tab by mouth one time a day.    Coronary artery disease involving coronary bypass graft of native heart without angina pectoris       nitroGLYcerin 0.4 MG sublingual tablet    NITROSTAT    25 tablet    For chest pain place 1 tablet under the tongue every 5 minutes for 3 doses. If symptoms persist 5 minutes after 1st dose call 911.    Coronary artery disease involving native coronary artery of native heart without angina pectoris       vitamin D2 16533 UNIT capsule    ERGOCALCIFEROL

## 2018-11-08 NOTE — NURSING NOTE
"Chief Complaint   Patient presents with     Diabetes     Lipids     Thyroid Problem       Initial /90 (BP Location: Right arm, Patient Position: Chair, Cuff Size: Adult Large)  Pulse 60  Temp 96.7  F (35.9  C) (Tympanic)  Wt 172 lb (78 kg)  SpO2 96%  BMI 26.15 kg/m2 Estimated body mass index is 26.15 kg/(m^2) as calculated from the following:    Height as of 8/10/18: 5' 8\" (1.727 m).    Weight as of this encounter: 172 lb (78 kg).  Medication Reconciliation: complete    Alysia Valero LPN    "

## 2018-11-09 ASSESSMENT — ANXIETY QUESTIONNAIRES: GAD7 TOTAL SCORE: 0

## 2018-11-12 DIAGNOSIS — E78.5 HYPERLIPIDEMIA LDL GOAL <70: ICD-10-CM

## 2018-11-12 DIAGNOSIS — E03.8 OTHER SPECIFIED HYPOTHYROIDISM: ICD-10-CM

## 2018-11-12 NOTE — TELEPHONE ENCOUNTER
lipitor      Last Written Prescription Date:  9/26/18  Last Fill Quantity: 30,   # refills: 1  Last Office Visit: 11/8/18  Future Office visit:    Next 5 appointments (look out 90 days)     Feb 08, 2019 11:00 AM CST   (Arrive by 10:45 AM)   SHORT with Bisi Stuart MD   [facility] [place] ([facility] [place] [facility]    [address] [place] [address]   [phone]                 levothyroxine      Last Written Prescription Date:  2/5/18  Last Fill Quantity: 90,   # refills: 2  Last Office Visit: 11/8/18  Future Office visit:    Next 5 appointments (look out 90 days)     Feb 08, 2019 11:00 AM CST   (Arrive by 10:45 AM)   SHORT with Bisi Stuart MD   [facility] [place] ([facility] [place] )    [address] [place] [address]   [phone]

## 2018-11-13 RX ORDER — ATORVASTATIN CALCIUM 20 MG/1
TABLET, FILM COATED ORAL
Qty: 90 TABLET | Refills: 1 | Status: SHIPPED | OUTPATIENT
Start: 2018-11-13 | End: 2019-06-30

## 2018-11-13 RX ORDER — LEVOTHYROXINE SODIUM 125 UG/1
TABLET ORAL
Qty: 90 TABLET | Refills: 1 | Status: SHIPPED | OUTPATIENT
Start: 2018-11-13 | End: 2019-06-24

## 2018-12-08 DIAGNOSIS — E11.9 CONTROLLED TYPE 2 DIABETES MELLITUS WITHOUT COMPLICATION, WITHOUT LONG-TERM CURRENT USE OF INSULIN (H): ICD-10-CM

## 2018-12-11 RX ORDER — GLIPIZIDE 5 MG/1
TABLET ORAL
Qty: 90 TABLET | Refills: 0 | Status: SHIPPED | OUTPATIENT
Start: 2018-12-11 | End: 2019-02-22

## 2018-12-11 NOTE — TELEPHONE ENCOUNTER
glipiZIDE (GLUCOTROL) 5 MG tablet  Last Written Prescription Date:  8/20/18  Last Fill Quantity: 90,   # refills: 0  Last Office Visit: 11/8/18  Future Office visit:    Next 5 appointments (look out 90 days)    Feb 08, 2019 11:00 AM CST  (Arrive by 10:45 AM)  SHORT with Bisi Stuart MD  [facility] [place] ([facility] [place] [facility] ) [address]  [place] [address]  [phone]

## 2019-02-19 NOTE — PROGRESS NOTES
"  SUBJECTIVE:   Nori Looney is a 85 year old male who presents to clinic today for the following health issues:      Diabetes Follow-up      Patient is checking blood sugars: rarely.  Results range from 213 today    Diabetic concerns: None     Symptoms of hypoglycemia (low blood sugar): none     Paresthesias (numbness or burning in feet) or sores: No     Date of last diabetic eye exam: sept 2018    Diabetes Management Resources    Hyperlipidemia Follow-Up      Rate your low fat/cholesterol diet?: not monitoring fat    Taking statin?  Yes, no muscle aches from statin    Other lipid medications/supplements?:  none    Hypertension Follow-up      Outpatient blood pressures are not being checked.    Low Salt Diet: not monitoring salt    BP Readings from Last 2 Encounters:   02/22/19 130/60   11/08/18 139/77     Hemoglobin A1C (%)   Date Value   11/08/2018 7.6 (H)   08/10/2018 7.8 (H)     LDL Cholesterol Calculated (mg/dL)   Date Value   11/08/2018 79   10/30/2017 94     Heart Failure Follow-up    Symptoms:    Shortness of breath: none    Lower extremity edema: none    Chest pain: Yes-  Pt reports he has had a \"heaviness or pain every day on his chest. Cardiology visit in January noted and her note states he is to try nitroglycerin and if this resides it is more cardiac and to update her office. He reports this happens every day and he only takes a nitroglycerin when it reaches a 5/10. Today he reports a 3-4/10    Using more pillows than normal: No    Cough at night: No    Weight:    Checking weight daily: Yes    Weight change: none    Cardiology visits, ER/UC, or hospital admissions since last visit:  Cardiology Visit - January 16th 2019    Medication side effects: reports at times he gets a little lightheaded and has to sit for a bit before he gets up.       Amount of exercise or physical activity: 6-7 days/week for an average of greater than 60 minutes    Problems taking medications regularly: Yes,  problems " remembering to take maybe once a week    Medication side effects: none    Diet: regular (no restrictions)      GERD/Heartburn      Duration: one month    Description (location/character/radiation): left sided chest pain    Intensity:  mild, moderate    Accompanying signs and symptoms:  food getting stuck: no   nausea/vomiting/blood: no   abdominal pain: YES- sometimes  black/tarry or bloody stools: no :    History (similar episodes/previous evaluation): has CHF and has been having chest pain for the past one month, see notes above    Precipitating or alleviating factors:  worse with no particular food or drink but just if he overeats  current NSAID/Aspirin use: YES- ASA 2x/day    Therapies tried and outcome: none      Chest pain isn't there when he wakes but comes throughout the day, nitroglycerin helps but he has only taken it 3-4 times in the past month    Problem list and histories reviewed & adjusted, as indicated.  Additional history: as documented    Patient Active Problem List   Diagnosis     ACP (advance care planning)     Other specified hypothyroidism     Chronic systolic congestive heart failure (H)     Benign essential hypertension     Hyperlipidemia LDL goal <70     Bilateral carotid artery stenosis     Coronary artery disease involving coronary bypass graft of native heart without angina pectoris     Controlled type 2 diabetes mellitus without complication, without long-term current use of insulin (H)     Type 2 diabetes mellitus without complication, without long-term current use of insulin (H)     Primary osteoarthritis of left knee     Encounter for diabetic foot exam (H)     Neuropathy     Trochanteric bursitis of right hip     Past Surgical History:   Procedure Laterality Date     AS CABG, ARTERY-VEIN, FIVE  11/02/2011    off pump       Social History     Tobacco Use     Smoking status: Never Smoker     Smokeless tobacco: Never Used     Tobacco comment: second hand smoke in the house 40 yrs    Substance Use Topics     Alcohol use: No     Alcohol/week: 0.0 oz     Family History   Problem Relation Age of Onset     Cerebrovascular Disease Father      Coronary Artery Disease Father      Dementia Mother      Coronary Artery Disease Sister      Lung Cancer Sister      Thyroid Disease Daughter          Current Outpatient Medications   Medication Sig Dispense Refill     ASPIRIN PO Take 325 mg by mouth daily       atorvastatin (LIPITOR) 20 MG tablet Take 1 Tab by mouth one time a day. 90 tablet 1     blood glucose monitoring (ACCU-CHEK KEYANNA) test strip by In Vitro route 2 times daily .       glipiZIDE (GLUCOTROL) 5 MG tablet Take 1 Tab by mouth one time a day before a meal. 90 tablet 0     levothyroxine (SYNTHROID/LEVOTHROID) 125 MCG tablet Take 1 Tab by mouth one time a day. 90 tablet 1     MAPAP 500 MG tablet Take 2 Tabs by mouth two times a day as needed for mild pain. 120 tablet 3     metFORMIN (GLUCOPHAGE-XR) 500 MG 24 hr tablet Take 2 tablets (1,000 mg) by mouth 2 times daily (with meals) 180 tablet 1     metoprolol succinate (TOPROL-XL) 50 MG 24 hr tablet Take 1 Tab by mouth one time a day. 90 tablet 1     nitroglycerin (NITROSTAT) 0.4 MG sublingual tablet For chest pain place 1 tablet under the tongue every 5 minutes for 3 doses. If symptoms persist 5 minutes after 1st dose call 911. 25 tablet 0     ranitidine (ZANTAC) 300 MG tablet Take 1 tablet (300 mg) by mouth At Bedtime 90 tablet 1     vitamin D (ERGOCALCIFEROL) 22027 UNIT capsule        No Known Allergies  Recent Labs   Lab Test 11/08/18  1100 08/10/18  1350 05/10/18  1124 01/31/18  1033 10/30/17  0856  01/05/17  0921   A1C 7.6* 7.8* 8.2* 7.6* 7.4*   < > 7.1*   LDL 79  --   --   --  94  --  64   HDL 35*  --   --   --  39*  --  38*   TRIG 120  --   --   --  89  --  152*   ALT 30  --   --   --  36  --  24   CR 1.05 1.02  --   --  0.81  --  1.08   GFRESTIMATED 67 69  --   --  >90  --  65   GFRESTBLACK 81 84  --   --  >90  --  79   POTASSIUM 4.3 4.1   --   --  4.7  --  4.0   TSH  --  3.09  --  2.58 6.03*   < > 15.86*    < > = values in this interval not displayed.      BP Readings from Last 3 Encounters:   02/22/19 130/60   11/08/18 139/77   08/10/18 134/66    Wt Readings from Last 3 Encounters:   02/22/19 77.2 kg (170 lb 3.2 oz)   11/08/18 78 kg (172 lb)   08/10/18 76.2 kg (168 lb)                  Labs reviewed in EPIC    Reviewed and updated as needed this visit by clinical staff  Tobacco  Allergies  Meds  Med Hx  Surg Hx  Fam Hx  Soc Hx      Reviewed and updated as needed this visit by Provider         ROS:  Constitutional, neuro, ENT, endocrine, pulmonary, cardiac, gastrointestinal, genitourinary, musculoskeletal, integument and psychiatric systems are negative, except as otherwise noted.    OBJECTIVE:                                                    /60 (BP Location: Left arm, Patient Position: Chair, Cuff Size: Adult Large)   Pulse 64   Temp 98.6  F (37  C) (Tympanic)   Resp 22   Wt 77.2 kg (170 lb 3.2 oz)   SpO2 97%   BMI 25.88 kg/m    Body mass index is 25.88 kg/m .  NECK: no adenopathy, no asymmetry, masses, or scars and thyroid normal to palpation  RESP: lungs clear to auscultation - no rales, rhonchi or wheezes  CV: regular rates and rhythm, normal S1 S2, no S3 or S4 and no murmur, click or rub  ABDOMEN: soft, nontender, without hepatosplenomegaly or masses and bowel sounds normal  MS: extremities normal- no gross deformities noted  PSYCH: mentation appears normal and affect normal/bright       ASSESSMENT/PLAN:                                                    1. Chronic systolic congestive heart failure (H)  Stable, just saw cardiology    2. Type 2 diabetes mellitus without complication, without long-term current use of insulin (H)  Follow-up 3 mos  Increase glipizide to BID  - Hemoglobin A1c; Standing  - Hemoglobin A1c    3. Other specified hypothyroidism  stable    4. Hyperlipidemia LDL goal <70  Fasting labs done    5. Benign  essential hypertension      6. Atypical chest pain  ekg looks ok as does normal troponin and CXR  Will get stress test  - EKG 12-lead complete w/read - (Clinic Performed)  - XR CHEST 2 VW (Clinic Performed); Future  - Troponin I    7. Gastroesophageal reflux disease, esophagitis presence not specified    - ranitidine (ZANTAC) 300 MG tablet; Take 1 tablet (300 mg) by mouth At Bedtime  Dispense: 90 tablet; Refill: 1    Patient was agreeable to this plan and had no further questions.  See Patient Instructions    Bisi Stuart MD  [facility] [place]

## 2019-02-22 ENCOUNTER — OFFICE VISIT (OUTPATIENT)
Dept: FAMILY MEDICINE | Facility: OTHER | Age: 84
End: 2019-02-22
Attending: FAMILY MEDICINE
Payer: COMMERCIAL

## 2019-02-22 ENCOUNTER — ANCILLARY PROCEDURE (OUTPATIENT)
Dept: GENERAL RADIOLOGY | Facility: OTHER | Age: 84
End: 2019-02-22
Attending: FAMILY MEDICINE
Payer: COMMERCIAL

## 2019-02-22 VITALS
SYSTOLIC BLOOD PRESSURE: 130 MMHG | BODY MASS INDEX: 25.88 KG/M2 | OXYGEN SATURATION: 97 % | WEIGHT: 170.2 LBS | TEMPERATURE: 98.6 F | HEART RATE: 64 BPM | DIASTOLIC BLOOD PRESSURE: 60 MMHG | RESPIRATION RATE: 22 BRPM

## 2019-02-22 DIAGNOSIS — I10 BENIGN ESSENTIAL HYPERTENSION: ICD-10-CM

## 2019-02-22 DIAGNOSIS — R07.89 ATYPICAL CHEST PAIN: ICD-10-CM

## 2019-02-22 DIAGNOSIS — K21.9 GASTROESOPHAGEAL REFLUX DISEASE, ESOPHAGITIS PRESENCE NOT SPECIFIED: ICD-10-CM

## 2019-02-22 DIAGNOSIS — E11.9 TYPE 2 DIABETES MELLITUS WITHOUT COMPLICATION, WITHOUT LONG-TERM CURRENT USE OF INSULIN (H): ICD-10-CM

## 2019-02-22 DIAGNOSIS — E03.8 OTHER SPECIFIED HYPOTHYROIDISM: Primary | ICD-10-CM

## 2019-02-22 DIAGNOSIS — E78.5 HYPERLIPIDEMIA LDL GOAL <70: ICD-10-CM

## 2019-02-22 DIAGNOSIS — F43.21 GRIEF REACTION: ICD-10-CM

## 2019-02-22 DIAGNOSIS — I50.22 CHRONIC SYSTOLIC CONGESTIVE HEART FAILURE (H): ICD-10-CM

## 2019-02-22 DIAGNOSIS — E11.9 CONTROLLED TYPE 2 DIABETES MELLITUS WITHOUT COMPLICATION, WITHOUT LONG-TERM CURRENT USE OF INSULIN (H): ICD-10-CM

## 2019-02-22 LAB
EST. AVERAGE GLUCOSE BLD GHB EST-MCNC: 174 MG/DL
HBA1C MFR BLD: 7.7 % (ref 0–5.6)
TROPONIN I SERPL-MCNC: <0.015 UG/L (ref 0–0.04)

## 2019-02-22 PROCEDURE — 40000788 ZZHCL STATISTIC ESTIMATED AVERAGE GLUCOSE: Mod: ZL | Performed by: FAMILY MEDICINE

## 2019-02-22 PROCEDURE — 93005 ELECTROCARDIOGRAM TRACING: CPT

## 2019-02-22 PROCEDURE — 36415 COLL VENOUS BLD VENIPUNCTURE: CPT | Mod: ZL | Performed by: FAMILY MEDICINE

## 2019-02-22 PROCEDURE — 83036 HEMOGLOBIN GLYCOSYLATED A1C: CPT | Mod: ZL | Performed by: FAMILY MEDICINE

## 2019-02-22 PROCEDURE — 71046 X-RAY EXAM CHEST 2 VIEWS: CPT | Mod: TC

## 2019-02-22 PROCEDURE — 84484 ASSAY OF TROPONIN QUANT: CPT | Mod: ZL | Performed by: FAMILY MEDICINE

## 2019-02-22 PROCEDURE — G0463 HOSPITAL OUTPT CLINIC VISIT: HCPCS | Mod: 25

## 2019-02-22 PROCEDURE — 99214 OFFICE O/P EST MOD 30 MIN: CPT | Mod: 25 | Performed by: FAMILY MEDICINE

## 2019-02-22 PROCEDURE — 93010 ELECTROCARDIOGRAM REPORT: CPT | Performed by: INTERNAL MEDICINE

## 2019-02-22 PROCEDURE — G0463 HOSPITAL OUTPT CLINIC VISIT: HCPCS

## 2019-02-22 RX ORDER — GLIPIZIDE 5 MG/1
5 TABLET ORAL
Qty: 180 TABLET | Refills: 1 | Status: SHIPPED | OUTPATIENT
Start: 2019-02-22 | End: 2019-09-14

## 2019-02-22 RX ORDER — ERGOCALCIFEROL 1.25 MG/1
50000 CAPSULE, LIQUID FILLED ORAL
Qty: 4 CAPSULE | Refills: 1 | Status: SHIPPED | OUTPATIENT
Start: 2019-02-22 | End: 2019-04-13

## 2019-02-22 ASSESSMENT — PAIN SCALES - GENERAL: PAINLEVEL: MODERATE PAIN (4)

## 2019-02-22 NOTE — NURSING NOTE
"Chief Complaint   Patient presents with     Diabetes       Initial /60 (BP Location: Left arm, Patient Position: Chair, Cuff Size: Adult Large)   Pulse 64   Temp 98.6  F (37  C) (Tympanic)   Resp 22   Wt 77.2 kg (170 lb 3.2 oz)   SpO2 97%   BMI 25.88 kg/m   Estimated body mass index is 25.88 kg/m  as calculated from the following:    Height as of 8/10/18: 1.727 m (5' 8\").    Weight as of this encounter: 77.2 kg (170 lb 3.2 oz).  Medication Reconciliation: complete    Jeanne Mckeon LPN    "

## 2019-03-02 DIAGNOSIS — E11.9 TYPE 2 DIABETES MELLITUS WITHOUT COMPLICATION, WITHOUT LONG-TERM CURRENT USE OF INSULIN (H): ICD-10-CM

## 2019-03-05 RX ORDER — METFORMIN HCL 500 MG
TABLET, EXTENDED RELEASE 24 HR ORAL
Qty: 360 TABLET | Refills: 1 | Status: SHIPPED | OUTPATIENT
Start: 2019-03-05 | End: 2019-12-14

## 2019-03-06 ENCOUNTER — HOSPITAL ENCOUNTER (OUTPATIENT)
Dept: CARDIOLOGY | Facility: HOSPITAL | Age: 84
Discharge: HOME | End: 2019-03-06
Attending: FAMILY MEDICINE | Admitting: FAMILY MEDICINE
Payer: COMMERCIAL

## 2019-03-06 DIAGNOSIS — R07.89 ATYPICAL CHEST PAIN: ICD-10-CM

## 2019-03-06 PROCEDURE — 93350 STRESS TTE ONLY: CPT | Mod: TC

## 2019-03-06 PROCEDURE — 93350 STRESS TTE ONLY: CPT | Mod: 26 | Performed by: INTERNAL MEDICINE

## 2019-03-06 PROCEDURE — 93321 DOPPLER ECHO F-UP/LMTD STD: CPT | Mod: 26 | Performed by: INTERNAL MEDICINE

## 2019-03-06 PROCEDURE — 93016 CV STRESS TEST SUPVJ ONLY: CPT | Performed by: INTERNAL MEDICINE

## 2019-03-06 PROCEDURE — 93018 CV STRESS TEST I&R ONLY: CPT | Performed by: INTERNAL MEDICINE

## 2019-03-06 PROCEDURE — 93325 DOPPLER ECHO COLOR FLOW MAPG: CPT | Mod: 26 | Performed by: INTERNAL MEDICINE

## 2019-04-13 DIAGNOSIS — I25.810 CORONARY ARTERY DISEASE INVOLVING CORONARY BYPASS GRAFT OF NATIVE HEART WITHOUT ANGINA PECTORIS: ICD-10-CM

## 2019-04-15 RX ORDER — METOPROLOL SUCCINATE 50 MG/1
TABLET, EXTENDED RELEASE ORAL
Qty: 90 TABLET | Refills: 1 | Status: SHIPPED | OUTPATIENT
Start: 2019-04-15 | End: 2019-10-20

## 2019-05-06 NOTE — PROGRESS NOTES
SUBJECTIVE:   Nori Looney is a 85 year old male who presents to clinic today for the following   health issues:      Diabetes Follow-up      Patient is checking blood sugars: not at all    Diabetic concerns: None     Symptoms of hypoglycemia (low blood sugar): shaky     Paresthesias (numbness or burning in feet) or sores: No     Date of last diabetic eye exam: last fall     Diabetes Management Resources    Hyperlipidemia Follow-Up      Rate your low fat/cholesterol diet?: fair    Taking statin?  Yes,Lipitor 40 mg daily     Other lipid medications/supplements?:  none    Hypertension Follow-up      Outpatient blood pressures are not being checked.    Low Salt Diet: low salt    BP Readings from Last 2 Encounters:   02/22/19 130/60   11/08/18 139/77     Hemoglobin A1C (%)   Date Value   02/22/2019 7.7 (H)   11/08/2018 7.6 (H)     LDL Cholesterol Calculated (mg/dL)   Date Value   11/08/2018 79   10/30/2017 94       Amount of exercise or physical activity: 6-7 days/week for an average of 30-45 minutes walks everyday     Problems taking medications regularly: No    Medication side effects: none    Diet: regular (no restrictions)  Would like knee injections today    Additional history: as documented    Reviewed  and updated as needed this visit by clinical staff         Reviewed and updated as needed this visit by Provider         Patient Active Problem List   Diagnosis     ACP (advance care planning)     Other specified hypothyroidism     Chronic systolic congestive heart failure (H)     Benign essential hypertension     Hyperlipidemia LDL goal <70     Bilateral carotid artery stenosis     Coronary artery disease involving coronary bypass graft of native heart without angina pectoris     Controlled type 2 diabetes mellitus without complication, without long-term current use of insulin (H)     Type 2 diabetes mellitus without complication, without long-term current use of insulin (H)     Primary osteoarthritis of left  knee     Encounter for diabetic foot exam (H)     Neuropathy     Trochanteric bursitis of right hip     Past Surgical History:   Procedure Laterality Date     AS CABG, ARTERY-VEIN, FIVE  11/02/2011    off pump       Social History     Tobacco Use     Smoking status: Never Smoker     Smokeless tobacco: Never Used     Tobacco comment: second hand smoke in the house 40 yrs   Substance Use Topics     Alcohol use: No     Alcohol/week: 0.0 oz     Family History   Problem Relation Age of Onset     Cerebrovascular Disease Father      Coronary Artery Disease Father      Dementia Mother      Coronary Artery Disease Sister      Lung Cancer Sister      Thyroid Disease Daughter          Current Outpatient Medications   Medication Sig Dispense Refill     ASPIRIN PO Take 325 mg by mouth daily       atorvastatin (LIPITOR) 20 MG tablet Take 1 Tab by mouth one time a day. 90 tablet 1     blood glucose monitoring (ACCU-CHEK KEYANNA) test strip by In Vitro route 2 times daily .       glipiZIDE (GLUCOTROL) 5 MG tablet Take 1 tablet (5 mg) by mouth 2 times daily (before meals) 180 tablet 1     levothyroxine (SYNTHROID/LEVOTHROID) 125 MCG tablet Take 1 Tab by mouth one time a day. 90 tablet 1     MAPAP 500 MG tablet Take 2 Tabs by mouth two times a day as needed for mild pain. 120 tablet 3     metFORMIN (GLUCOPHAGE-XR) 500 MG 24 hr tablet Take 2 Tabs by mouth two times a day with meals. 360 tablet 1     metoprolol succinate ER (TOPROL-XL) 50 MG 24 hr tablet Take 1 Tab by mouth one time a day. 90 tablet 1     nitroglycerin (NITROSTAT) 0.4 MG sublingual tablet For chest pain place 1 tablet under the tongue every 5 minutes for 3 doses. If symptoms persist 5 minutes after 1st dose call 911. 25 tablet 0     ranitidine (ZANTAC) 300 MG tablet Take 1 tablet (300 mg) by mouth At Bedtime 90 tablet 1     vitamin D (ERGOCALCIFEROL) 83197 UNIT capsule        No Known Allergies  BP Readings from Last 3 Encounters:   05/23/19 146/68   02/22/19 130/60    11/08/18 139/77    Wt Readings from Last 3 Encounters:   05/23/19 78.4 kg (172 lb 12.8 oz)   02/22/19 77.2 kg (170 lb 3.2 oz)   11/08/18 78 kg (172 lb)                    ROS:  Constitutional, HEENT, cardiovascular, pulmonary, gi and gu systems are negative, except as otherwise noted.    OBJECTIVE:                                                    /68 (BP Location: Left arm, Patient Position: Chair, Cuff Size: Adult Regular)   Pulse 68   Temp 97  F (36.1  C) (Tympanic)   Wt 78.4 kg (172 lb 12.8 oz)   SpO2 96%   BMI 26.27 kg/m    Body mass index is 26.27 kg/m .  GENERAL APPEARANCE: healthy, alert and no distress  RESP: lungs clear to auscultation - no rales, rhonchi or wheezes  CV: regular rates and rhythm, normal S1 S2, no S3 or S4 and no murmur, click or rub  ABDOMEN: soft, nontender, without hepatosplenomegaly or masses and bowel sounds normal  MS: arthritic changes of the bilateral knees  PSYCH: mentation appears normal and affect normal/bright       ASSESSMENT/PLAN:                                                    1. Type 2 diabetes mellitus without complication, without long-term current use of insulin (H)  Follow-up 3 mos  Continue with current meds, a1c is better, occasional low  - C FOOT EXAM  NO CHARGE    2. Hyperlipidemia LDL goal <70  Due for labs in november    3. Other specified hypothyroidism  stable    4. Benign essential hypertension  stable    5. Chronic systolic congestive heart failure (H)  Doing ok, occasional chest tightness, stress echo was normal  Zantac has helped most of his previous chest pain    6. Deformity of toenail    - PODIATRY/FOOT & ANKLE SURGERY REFERRAL    7. Gastroesophageal reflux disease, esophagitis presence not specified    - ranitidine (ZANTAC) 300 MG tablet; Take 1 tablet (300 mg) by mouth At Bedtime  Dispense: 90 tablet; Refill: 1    8. Bilateral post-traumatic osteoarthritis of knee    - Large Joint/Bursa injection and/or drainage (Shoulder, Knee)  -  triamcinolone (KENALOG-40) injection 80 mg  - triamcinolone acetonide (KENALOG-10) injection 20 mg  - lidocaine 1 % injection 12 mL    Patient was agreeable to this plan and had no further questions.  See Patient Instructions    Bisi Stuart MD  [facility] [place]

## 2019-05-23 ENCOUNTER — OFFICE VISIT (OUTPATIENT)
Dept: FAMILY MEDICINE | Facility: OTHER | Age: 84
End: 2019-05-23
Attending: FAMILY MEDICINE
Payer: COMMERCIAL

## 2019-05-23 VITALS
WEIGHT: 172.8 LBS | OXYGEN SATURATION: 96 % | SYSTOLIC BLOOD PRESSURE: 146 MMHG | BODY MASS INDEX: 26.27 KG/M2 | HEART RATE: 68 BPM | DIASTOLIC BLOOD PRESSURE: 68 MMHG | TEMPERATURE: 97 F

## 2019-05-23 DIAGNOSIS — M17.2 BILATERAL POST-TRAUMATIC OSTEOARTHRITIS OF KNEE: ICD-10-CM

## 2019-05-23 DIAGNOSIS — I10 BENIGN ESSENTIAL HYPERTENSION: ICD-10-CM

## 2019-05-23 DIAGNOSIS — I50.22 CHRONIC SYSTOLIC CONGESTIVE HEART FAILURE (H): ICD-10-CM

## 2019-05-23 DIAGNOSIS — E11.9 TYPE 2 DIABETES MELLITUS WITHOUT COMPLICATION, WITHOUT LONG-TERM CURRENT USE OF INSULIN (H): ICD-10-CM

## 2019-05-23 DIAGNOSIS — E03.8 OTHER SPECIFIED HYPOTHYROIDISM: ICD-10-CM

## 2019-05-23 DIAGNOSIS — E11.9 TYPE 2 DIABETES MELLITUS WITHOUT COMPLICATION, WITHOUT LONG-TERM CURRENT USE OF INSULIN (H): Primary | ICD-10-CM

## 2019-05-23 DIAGNOSIS — K21.9 GASTROESOPHAGEAL REFLUX DISEASE, ESOPHAGITIS PRESENCE NOT SPECIFIED: ICD-10-CM

## 2019-05-23 DIAGNOSIS — L60.8 DEFORMITY OF TOENAIL: ICD-10-CM

## 2019-05-23 DIAGNOSIS — E78.5 HYPERLIPIDEMIA LDL GOAL <70: ICD-10-CM

## 2019-05-23 LAB
EST. AVERAGE GLUCOSE BLD GHB EST-MCNC: 157 MG/DL
HBA1C MFR BLD: 7.1 % (ref 0–5.6)

## 2019-05-23 PROCEDURE — 36415 COLL VENOUS BLD VENIPUNCTURE: CPT | Mod: ZL | Performed by: FAMILY MEDICINE

## 2019-05-23 PROCEDURE — 83036 HEMOGLOBIN GLYCOSYLATED A1C: CPT | Mod: ZL | Performed by: FAMILY MEDICINE

## 2019-05-23 PROCEDURE — 99214 OFFICE O/P EST MOD 30 MIN: CPT | Mod: 25 | Performed by: FAMILY MEDICINE

## 2019-05-23 PROCEDURE — 40000788 ZZHCL STATISTIC ESTIMATED AVERAGE GLUCOSE: Mod: ZL | Performed by: FAMILY MEDICINE

## 2019-05-23 PROCEDURE — G0463 HOSPITAL OUTPT CLINIC VISIT: HCPCS | Mod: 25

## 2019-05-23 PROCEDURE — G0463 HOSPITAL OUTPT CLINIC VISIT: HCPCS

## 2019-05-23 PROCEDURE — 20610 DRAIN/INJ JOINT/BURSA W/O US: CPT | Mod: 50 | Performed by: FAMILY MEDICINE

## 2019-05-23 RX ORDER — TRIAMCINOLONE ACETONIDE 40 MG/ML
60 INJECTION, SUSPENSION INTRA-ARTICULAR; INTRAMUSCULAR ONCE
Status: COMPLETED | OUTPATIENT
Start: 2019-05-23 | End: 2019-05-23

## 2019-05-23 RX ORDER — TRIAMCINOLONE ACETONIDE 40 MG/ML
80 INJECTION, SUSPENSION INTRA-ARTICULAR; INTRAMUSCULAR ONCE
Status: DISCONTINUED | OUTPATIENT
Start: 2019-05-23 | End: 2019-05-23

## 2019-05-23 RX ORDER — LIDOCAINE HYDROCHLORIDE 10 MG/ML
12 INJECTION, SOLUTION INFILTRATION; PERINEURAL ONCE
Status: DISCONTINUED | OUTPATIENT
Start: 2019-05-23 | End: 2019-08-26

## 2019-05-23 RX ORDER — TRIAMCINOLONE ACETONIDE 40 MG/ML
40 INJECTION, SUSPENSION INTRA-ARTICULAR; INTRAMUSCULAR ONCE
Status: COMPLETED | OUTPATIENT
Start: 2019-05-23 | End: 2019-05-23

## 2019-05-23 RX ADMIN — TRIAMCINOLONE ACETONIDE 60 MG: 40 INJECTION, SUSPENSION INTRA-ARTICULAR; INTRAMUSCULAR at 16:00

## 2019-05-23 RX ADMIN — TRIAMCINOLONE ACETONIDE 40 MG: 40 INJECTION, SUSPENSION INTRA-ARTICULAR; INTRAMUSCULAR at 16:01

## 2019-05-23 ASSESSMENT — ANXIETY QUESTIONNAIRES
2. NOT BEING ABLE TO STOP OR CONTROL WORRYING: NOT AT ALL
5. BEING SO RESTLESS THAT IT IS HARD TO SIT STILL: NOT AT ALL
GAD7 TOTAL SCORE: 0
1. FEELING NERVOUS, ANXIOUS, OR ON EDGE: NOT AT ALL
6. BECOMING EASILY ANNOYED OR IRRITABLE: NOT AT ALL
IF YOU CHECKED OFF ANY PROBLEMS ON THIS QUESTIONNAIRE, HOW DIFFICULT HAVE THESE PROBLEMS MADE IT FOR YOU TO DO YOUR WORK, TAKE CARE OF THINGS AT HOME, OR GET ALONG WITH OTHER PEOPLE: NOT DIFFICULT AT ALL
3. WORRYING TOO MUCH ABOUT DIFFERENT THINGS: NOT AT ALL
7. FEELING AFRAID AS IF SOMETHING AWFUL MIGHT HAPPEN: NOT AT ALL

## 2019-05-23 ASSESSMENT — PATIENT HEALTH QUESTIONNAIRE - PHQ9
5. POOR APPETITE OR OVEREATING: NOT AT ALL
SUM OF ALL RESPONSES TO PHQ QUESTIONS 1-9: 1

## 2019-05-23 ASSESSMENT — PAIN SCALES - GENERAL: PAINLEVEL: MODERATE PAIN (5)

## 2019-05-23 NOTE — NURSING NOTE
"Chief Complaint   Patient presents with     Diabetes       Initial /68 (BP Location: Left arm, Patient Position: Chair, Cuff Size: Adult Regular)   Pulse 68   Temp 97  F (36.1  C) (Tympanic)   Wt 78.4 kg (172 lb 12.8 oz)   SpO2 96%   BMI 26.27 kg/m   Estimated body mass index is 26.27 kg/m  as calculated from the following:    Height as of 8/10/18: 1.727 m (5' 8\").    Weight as of this encounter: 78.4 kg (172 lb 12.8 oz).  Medication Reconciliation: complete    Lexus Butt LPN  "

## 2019-05-23 NOTE — LETTER
"My Heart Failure Action Plan   Name: Nori Looney    YOB: 1934   Date: 5/6/2019    [facility] [place]     [facility] [facility] [address]  [phone]  My Diagnosis: {HF TYPE:949612::\"Systolic Heart Failure\"}   My Ejection Fraction: {EF%:163595}    My Exercise Goal: 30 minutes daily  .     My Weight Goal: ***  Wt Readings from Last 2 Encounters:   02/22/19 77.2 kg (170 lb 3.2 oz)   11/08/18 78 kg (172 lb)     Weigh yourself daily using the same scale. If you gain more than 2 pounds in 24 hours or 5 pounds in a week {HF WEIGHT GOAL:460054}    My Diet Goal: {HF DIET GOAL:386074::\"No added salt\"}    Emergency Room Visits:    Our goal is to improve your quality of life and help you avoid a visit to the emergency room or hospital.  If we work together, we can achieve this goal. But, if you feel you need to call 911 or go to the emergency room, please do so.  If you go to the emergency room, please bring your list of medicines and your daily weight chart with you.       GREEN ZONE     Doing well today    Weight gained is no more than 2 pounds a day or 5 pounds a week.    No swelling in feet, ankles, legs or stomach.    No more swelling than usual.    No more trouble breathing than usual.    No change in my sleep.    No other problems. Actions:    I am doing fine.  I will take my medicine, follow my diet, see my doctor, exercise, and watch for symptoms.           YELLOW ZONE         Having a bad day or flare up    Weight gain of more than 2 pounds in one day or 5 pounds in one week.    New swelling in ankle, leg, knee or thigh.    Bloating in belly, pants feel tighter.    Swelling in hands or face.    Coughing or trouble breathing while walking or talking.    Harder to breathe last night.    Have trouble sleeping, wake up short of breath.    Much more tired than usual.    Not eating.    Pain in my chest or bad leg cramps.    Feel weak or dizzy. " Actions:    I need to take action and call my doctor or nurse today.                 RED ZONE         Need medical care now    Weight gain of 5 pounds overnight.    Chest pain or pressure that does not go away.    Feel less alert.    Wheezing or have trouble breathing when at rest.    Cannot sleep lying down.    Cannot take my water pill.    Pass out or faint. Actions:    I need to call my doctor or nurse now!    Call 911 if I have chest pain or cannot breathe.

## 2019-05-23 NOTE — PROGRESS NOTES
PROCEDURE:  JOINT ASPIRATION/INJECTION.        After a discussion of risks, benefits and side effects of procedure, informed patient consent was obtained.       The right knee was prepped and draped in the usual clean fashion (sterile not required for this procedure).      INJECTION:  Using 5 cc of 1% lidocaine mixed                           with 40 mg of kenalog, the right knee was successfully injected                           without complication.  Patient did experience some pain                          relief following injection.     PROCEDURE:  JOINT ASPIRATION/INJECTION.          After a discussion of risks, benefits and side effects of procedure, informed patient consent was obtained.       The left knee was prepped and draped in the usual clean fashion (sterile not required for this procedure).      INJECTION:  Using 7 cc of 1% lidocaine mixed                           with 60 mg of kenalog, the left knee was successfully injected                           without complication.  Patient did experience some pain                          relief following injection.

## 2019-05-23 NOTE — LETTER
My Depression Action Plan  Name: Nori Looney   Date of Birth 2/4/1934  Date: 5/23/2019    My doctor: Bisi Stuart   My clinic: [facility] [place]  [address]  [place] [address]  [phone]          GREEN    ZONE   Good Control    What it looks like:     Things are going generally well. You have normal up s and down s. You may even feel depressed from time to time, but bad moods usually last less than a day.   What you need to do:  1. Continue to care for yourself (see self care plan)  2. Check your depression survival kit and update it as needed  3. Follow your physician s recommendations including any medication.  4. Do not stop taking medication unless you consult with your physician first.           YELLOW         ZONE Getting Worse    What it looks like:     Depression is starting to interfere with your life.     It may be hard to get out of bed; you may be starting to isolate yourself from others.    Symptoms of depression are starting to last most all day and this has happened for several days.     You may have suicidal thoughts but they are not constant.   What you need to do:     1. Call your care team, your response to treatment will improve if you keep your care team informed of your progress. Yellow periods are signs an adjustment may need to be made.     2. Continue your self-care, even if you have to fake it!    3. Talk to someone in your support network    4. Open up your depression survival kit           RED    ZONE Medical Alert - Get Help    What it looks like:     Depression is seriously interfering with your life.     You may experience these or other symptoms: You can t get out of bed most days, can t work or engage in other necessary activities, you have trouble taking care of basic hygiene, or basic responsibilities, thoughts of suicide or death that will not go away, self-injurious behavior.     What you need to do:  1. Call your care team and request  a same-day appointment. If they are not available (weekends or after hours) call your local crisis line, emergency room or 911.            Depression Self Care Plan / Survival Kit    Self-Care for Depression  Here s the deal. Your body and mind are really not as separate as most people think.  What you do and think affects how you feel and how you feel influences what you do and think. This means if you do things that people who feel good do, it will help you feel better.  Sometimes this is all it takes.  There is also a place for medication and therapy depending on how severe your depression is, so be sure to consult with your medical provider and/ or Behavioral Health Consultant if your symptoms are worsening or not improving.     In order to better manage my stress, I will:    Exercise  Get some form of exercise, every day. This will help reduce pain and release endorphins, the  feel good  chemicals in your brain. This is almost as good as taking antidepressants!  This is not the same as joining a gym and then never going! (they count on that by the way ) It can be as simple as just going for a walk or doing some gardening, anything that will get you moving.      Hygiene   Maintain good hygiene (Get out of bed in the morning, Make your bed, Brush your teeth, Take a shower, and Get dressed like you were going to work, even if you are unemployed).  If your clothes don't fit try to get ones that do.    Diet  I will strive to eat foods that are good for me, drink plenty of water, and avoid excessive sugar, caffeine, alcohol, and other mood-altering substances.  Some foods that are helpful in depression are: complex carbohydrates, B vitamins, flaxseed, fish or fish oil, fresh fruits and vegetables.    Psychotherapy  I agree to participate in Individual Therapy (if recommended).    Medication  If prescribed medications, I agree to take them.  Missing doses can result in serious side effects.  I understand that drinking  alcohol, or other illicit drug use, may cause potential side effects.  I will not stop my medication abruptly without first discussing it with my provider.    Staying Connected With Others  I will stay in touch with my friends, family members, and my primary care provider/team.    Use your imagination  Be creative.  We all have a creative side; it doesn t matter if it s oil painting, sand castles, or mud pies! This will also kick up the endorphins.    Witness Beauty  (AKA stop and smell the roses) Take a look outside, even in mid-winter. Notice colors, textures. Watch the squirrels and birds.     Service to others  Be of service to others.  There is always someone else in need.  By helping others we can  get out of ourselves  and remember the really important things.  This also provides opportunities for practicing all the other parts of the program.    Humor  Laugh and be silly!  Adjust your TV habits for less news and crime-drama and more comedy.    Control your stress  Try breathing deep, massage therapy, biofeedback, and meditation. Find time to relax each day.     My support system    Clinic Contact:  Phone number:    Contact 1:  Phone number:    Contact 2:  Phone number:    Adventism/:  Phone number:    Therapist:  Phone number:    Local crisis center:    Phone number:    Other community support:  Phone number:

## 2019-05-23 NOTE — LETTER
My Depression Action Plan  Name: Nori Looney   Date of Birth 2/4/1934  Date: 5/6/2019    My doctor: Bisi Stuart   My clinic: [facility] [place]  [address]  [place] [address]  [phone]          GREEN    ZONE   Good Control    What it looks like:     Things are going generally well. You have normal up s and down s. You may even feel depressed from time to time, but bad moods usually last less than a day.   What you need to do:  1. Continue to care for yourself (see self care plan)  2. Check your depression survival kit and update it as needed  3. Follow your physician s recommendations including any medication.  4. Do not stop taking medication unless you consult with your physician first.           YELLOW         ZONE Getting Worse    What it looks like:     Depression is starting to interfere with your life.     It may be hard to get out of bed; you may be starting to isolate yourself from others.    Symptoms of depression are starting to last most all day and this has happened for several days.     You may have suicidal thoughts but they are not constant.   What you need to do:     1. Call your care team, your response to treatment will improve if you keep your care team informed of your progress. Yellow periods are signs an adjustment may need to be made.     2. Continue your self-care, even if you have to fake it!    3. Talk to someone in your support network    4. Open up your depression survival kit           RED    ZONE Medical Alert - Get Help    What it looks like:     Depression is seriously interfering with your life.     You may experience these or other symptoms: You can t get out of bed most days, can t work or engage in other necessary activities, you have trouble taking care of basic hygiene, or basic responsibilities, thoughts of suicide or death that will not go away, self-injurious behavior.     What you need to do:  1. Call your care team and request a  same-day appointment. If they are not available (weekends or after hours) call your local crisis line, emergency room or 911.            Depression Self Care Plan / Survival Kit    Self-Care for Depression  Here s the deal. Your body and mind are really not as separate as most people think.  What you do and think affects how you feel and how you feel influences what you do and think. This means if you do things that people who feel good do, it will help you feel better.  Sometimes this is all it takes.  There is also a place for medication and therapy depending on how severe your depression is, so be sure to consult with your medical provider and/ or Behavioral Health Consultant if your symptoms are worsening or not improving.     In order to better manage my stress, I will:    Exercise  Get some form of exercise, every day. This will help reduce pain and release endorphins, the  feel good  chemicals in your brain. This is almost as good as taking antidepressants!  This is not the same as joining a gym and then never going! (they count on that by the way ) It can be as simple as just going for a walk or doing some gardening, anything that will get you moving.      Hygiene   Maintain good hygiene (Get out of bed in the morning, Make your bed, Brush your teeth, Take a shower, and Get dressed like you were going to work, even if you are unemployed).  If your clothes don't fit try to get ones that do.    Diet  I will strive to eat foods that are good for me, drink plenty of water, and avoid excessive sugar, caffeine, alcohol, and other mood-altering substances.  Some foods that are helpful in depression are: complex carbohydrates, B vitamins, flaxseed, fish or fish oil, fresh fruits and vegetables.    Psychotherapy  I agree to participate in Individual Therapy (if recommended).    Medication  If prescribed medications, I agree to take them.  Missing doses can result in serious side effects.  I understand that drinking  alcohol, or other illicit drug use, may cause potential side effects.  I will not stop my medication abruptly without first discussing it with my provider.    Staying Connected With Others  I will stay in touch with my friends, family members, and my primary care provider/team.    Use your imagination  Be creative.  We all have a creative side; it doesn t matter if it s oil painting, sand castles, or mud pies! This will also kick up the endorphins.    Witness Beauty  (AKA stop and smell the roses) Take a look outside, even in mid-winter. Notice colors, textures. Watch the squirrels and birds.     Service to others  Be of service to others.  There is always someone else in need.  By helping others we can  get out of ourselves  and remember the really important things.  This also provides opportunities for practicing all the other parts of the program.    Humor  Laugh and be silly!  Adjust your TV habits for less news and crime-drama and more comedy.    Control your stress  Try breathing deep, massage therapy, biofeedback, and meditation. Find time to relax each day.     My support system    Clinic Contact:  Phone number:    Contact 1:  Phone number:    Contact 2:  Phone number:    Confucianist/:  Phone number:    Therapist:  Phone number:    Local crisis center:    Phone number:    Other community support:  Phone number:

## 2019-05-24 ASSESSMENT — ANXIETY QUESTIONNAIRES: GAD7 TOTAL SCORE: 0

## 2019-06-24 DIAGNOSIS — E03.8 OTHER SPECIFIED HYPOTHYROIDISM: ICD-10-CM

## 2019-06-25 RX ORDER — LEVOTHYROXINE SODIUM 125 UG/1
TABLET ORAL
Qty: 90 TABLET | Refills: 1 | Status: SHIPPED | OUTPATIENT
Start: 2019-06-25 | End: 2019-08-26 | Stop reason: DRUGHIGH

## 2019-06-30 DIAGNOSIS — E78.5 HYPERLIPIDEMIA LDL GOAL <70: ICD-10-CM

## 2019-07-01 RX ORDER — ATORVASTATIN CALCIUM 20 MG/1
TABLET, FILM COATED ORAL
Qty: 90 TABLET | Refills: 1 | Status: SHIPPED | OUTPATIENT
Start: 2019-07-01 | End: 2020-02-07

## 2019-08-14 ENCOUNTER — TRANSFERRED RECORDS (OUTPATIENT)
Dept: HEALTH INFORMATION MANAGEMENT | Facility: CLINIC | Age: 84
End: 2019-08-14

## 2019-08-15 NOTE — PROGRESS NOTES
Subjective     Nori Looney is a 85 year old male who presents to clinic today for the following health issues:    HPI   Diabetes Follow-up      How often are you checking your blood sugar? Not at all    What time of day are you checking your blood sugars (select all that apply)? NA    Have you had any blood sugars above 200?  No    Have you had any blood sugars below 70?  No    What symptoms do you notice when your blood sugar is low?  None    What concerns do you have today about your diabetes? None     Do you have any of these symptoms? (Select all that apply)  No numbness or tingling in feet.  No redness, sores or blisters on feet.  No complaints of excessive thirst.  No reports of blurry vision.  No significant changes to weight.     Have you had a diabetic eye exam in the last 12 months? Yes- Date of last eye exam: 6 months ago    Diabetes Management Resources    Hyperlipidemia Follow-Up      Are you having any of the following symptoms? (Select all that apply)  No complaints of shortness of breath, chest pain or pressure.  No increased sweating or nausea with activity.  No left-sided neck or arm pain.  No complaints of pain in calves when walking 1-2 blocks.    Are you regularly taking any medication or supplement to lower your cholesterol?   Yes- Lipitor    Are you having muscle aches or other side effects that you think could be caused by your cholesterol lowering medication?  No    Hypertension Follow-up      Do you check your blood pressure regularly outside of the clinic? No     Are you following a low salt diet? Yes    Are your blood pressures ever more than 140 on the top number (systolic) OR more   than 90 on the bottom number (diastolic), for example 140/90? No    BP Readings from Last 2 Encounters:   08/26/19 126/66   05/23/19 146/68     Hemoglobin A1C (%)   Date Value   05/23/2019 7.1 (H)   02/22/2019 7.7 (H)     LDL Cholesterol Calculated (mg/dL)   Date Value   11/08/2018 79   10/30/2017 94          How many servings of fruits and vegetables do you eat daily?  0-1    On average, how many sweetened beverages do you drink each day (soda, juice, sweet tea, etc)?   0  How many days per week do you miss taking your medication? 2    What makes it hard for you to take your medications?  remembering to take      Knee injections didn't help as much last time, he can't take tylenol or ibuprofen 2 tablets at a time as it hurts his stomach, does ok with 1 tablet.  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    Patient Active Problem List   Diagnosis     ACP (advance care planning)     Other specified hypothyroidism     Chronic systolic congestive heart failure (H)     Benign essential hypertension     Hyperlipidemia LDL goal <70     Bilateral carotid artery stenosis     Coronary artery disease involving coronary bypass graft of native heart without angina pectoris     Controlled type 2 diabetes mellitus without complication, without long-term current use of insulin (H)     Type 2 diabetes mellitus without complication, without long-term current use of insulin (H)     Primary osteoarthritis of left knee     Encounter for diabetic foot exam (H)     Neuropathy     Trochanteric bursitis of right hip     Gastroesophageal reflux disease, esophagitis presence not specified     Past Surgical History:   Procedure Laterality Date     AS CABG, ARTERY-VEIN, FIVE  11/02/2011    off pump       Social History     Tobacco Use     Smoking status: Never Smoker     Smokeless tobacco: Never Used     Tobacco comment: second hand smoke in the house 40 yrs   Substance Use Topics     Alcohol use: No     Alcohol/week: 0.0 oz     Family History   Problem Relation Age of Onset     Cerebrovascular Disease Father      Coronary Artery Disease Father      Dementia Mother      Coronary Artery Disease Sister      Lung Cancer Sister      Thyroid Disease Daughter          Current Outpatient Medications   Medication Sig Dispense Refill      ASPIRIN PO Take 325 mg by mouth daily       atorvastatin (LIPITOR) 20 MG tablet Take 1 Tab by mouth one time a day. Patient due for office visit and labs. 90 tablet 1     blood glucose monitoring (ACCU-CHEK KEYANNA) test strip by In Vitro route 2 times daily .       glipiZIDE (GLUCOTROL) 5 MG tablet Take 1 tablet (5 mg) by mouth 2 times daily (before meals) 180 tablet 1     levothyroxine (SYNTHROID/LEVOTHROID) 137 MCG tablet Take 1 tablet (137 mcg) by mouth daily 30 tablet 1     lisinopril (PRINIVIL/ZESTRIL) 2.5 MG tablet Take 1 tablet (2.5 mg) by mouth daily 90 tablet 1     MAPAP 500 MG tablet Take 2 Tabs by mouth two times a day as needed for mild pain. 120 tablet 3     metFORMIN (GLUCOPHAGE-XR) 500 MG 24 hr tablet Take 2 Tabs by mouth two times a day with meals. 360 tablet 1     metoprolol succinate ER (TOPROL-XL) 50 MG 24 hr tablet Take 1 Tab by mouth one time a day. 90 tablet 1     nitroglycerin (NITROSTAT) 0.4 MG sublingual tablet For chest pain place 1 tablet under the tongue every 5 minutes for 3 doses. If symptoms persist 5 minutes after 1st dose call 911. 25 tablet 0     ranitidine (ZANTAC) 300 MG tablet Take 1 tablet (300 mg) by mouth At Bedtime 90 tablet 1     vitamin D (ERGOCALCIFEROL) 47963 UNIT capsule        No Known Allergies  BP Readings from Last 3 Encounters:   08/26/19 126/66   05/23/19 146/68   02/22/19 130/60    Wt Readings from Last 3 Encounters:   08/26/19 72.8 kg (160 lb 6.4 oz)   05/23/19 78.4 kg (172 lb 12.8 oz)   02/22/19 77.2 kg (170 lb 3.2 oz)           Reviewed and updated as needed this visit by Provider         Review of Systems   ROS COMP: Constitutional, HEENT, cardiovascular, pulmonary, GI, , musculoskeletal, neuro, skin, endocrine and psych systems are negative, except as otherwise noted.      Objective    /66 (BP Location: Right arm, Patient Position: Chair, Cuff Size: Adult Regular)   Pulse 58   Temp 96.2  F (35.7  C) (Tympanic)   Resp 20   Wt 72.8 kg (160 lb 6.4  oz)   SpO2 96%   BMI 24.39 kg/m    Body mass index is 24.39 kg/m .  Physical Exam   GENERAL: healthy, alert and no distress  NECK: no adenopathy, no asymmetry, masses, or scars and thyroid normal to palpation  RESP: lungs clear to auscultation - no rales, rhonchi or wheezes  CV: regular rate and rhythm, normal S1 S2, no S3 or S4, no murmur, click or rub, no peripheral edema and peripheral pulses strong  ABDOMEN: soft, nontender, no hepatosplenomegaly, no masses and bowel sounds normal  MS: no gross musculoskeletal defects noted, no edema  MS: arthritic changes obvious  PSYCH: mentation appears normal, affect normal/bright    Diagnostic Test Results:  Labs reviewed in Epic  Results for orders placed or performed in visit on 08/26/19   TSH with free T4 reflex   Result Value Ref Range    TSH 3.97 0.40 - 4.00 mU/L           Assessment & Plan     (E11.9) Type 2 diabetes mellitus without complication, without long-term current use of insulin (H)  (primary encounter diagnosis)  Comment:   Plan: Albumin Random Urine Quantitative with Creat         Ratio, Albumin Random Urine Quantitative with         Creat Ratio, lisinopril (PRINIVIL/ZESTRIL) 2.5         MG tablet        Follow-up 4 mos  Keep meds the same    (I10) Benign essential hypertension  Comment:   Plan: lisinopril (PRINIVIL/ZESTRIL) 2.5 MG tablet        Add lisinopril more for his DM2 and CHF    (E03.8) Other specified hypothyroidism  Comment:   Plan: TSH with free T4 reflex, levothyroxine         (SYNTHROID/LEVOTHROID) 137 MCG tablet, TSH with        free T4 reflex        Increase dose and recheck 2 mos    (E78.5) Hyperlipidemia LDL goal <70  Comment:   Plan: fasting next visit    (I25.810) Coronary artery disease involving coronary bypass graft of native heart without angina pectoris  Comment:   Plan: stable    (K21.9) Gastroesophageal reflux disease, esophagitis presence not specified  Comment:   Plan: ranitidine (ZANTAC) 300 MG tablet        Has helped  symptoms    (L60.8) Toenail deformity  Comment:   Plan: PODIATRY/FOOT & ANKLE SURGERY REFERRAL        Would like to see podiatry     Patient was agreeable to this plan and had no further questions.    There are no Patient Instructions on file for this visit.    No follow-ups on file.    Bisi Stuart MD  [facility]

## 2019-08-26 ENCOUNTER — OFFICE VISIT (OUTPATIENT)
Dept: FAMILY MEDICINE | Facility: OTHER | Age: 84
End: 2019-08-26
Attending: FAMILY MEDICINE
Payer: COMMERCIAL

## 2019-08-26 VITALS
BODY MASS INDEX: 24.39 KG/M2 | OXYGEN SATURATION: 96 % | HEART RATE: 58 BPM | TEMPERATURE: 96.2 F | WEIGHT: 160.4 LBS | SYSTOLIC BLOOD PRESSURE: 126 MMHG | RESPIRATION RATE: 20 BRPM | DIASTOLIC BLOOD PRESSURE: 66 MMHG

## 2019-08-26 DIAGNOSIS — L60.8 TOENAIL DEFORMITY: ICD-10-CM

## 2019-08-26 DIAGNOSIS — I25.810 CORONARY ARTERY DISEASE INVOLVING CORONARY BYPASS GRAFT OF NATIVE HEART WITHOUT ANGINA PECTORIS: ICD-10-CM

## 2019-08-26 DIAGNOSIS — E78.5 HYPERLIPIDEMIA LDL GOAL <70: ICD-10-CM

## 2019-08-26 DIAGNOSIS — E03.8 OTHER SPECIFIED HYPOTHYROIDISM: ICD-10-CM

## 2019-08-26 DIAGNOSIS — I10 BENIGN ESSENTIAL HYPERTENSION: ICD-10-CM

## 2019-08-26 DIAGNOSIS — K21.9 GASTROESOPHAGEAL REFLUX DISEASE, ESOPHAGITIS PRESENCE NOT SPECIFIED: ICD-10-CM

## 2019-08-26 DIAGNOSIS — E11.9 TYPE 2 DIABETES MELLITUS WITHOUT COMPLICATION, WITHOUT LONG-TERM CURRENT USE OF INSULIN (H): ICD-10-CM

## 2019-08-26 DIAGNOSIS — E11.9 TYPE 2 DIABETES MELLITUS WITHOUT COMPLICATION, WITHOUT LONG-TERM CURRENT USE OF INSULIN (H): Primary | ICD-10-CM

## 2019-08-26 LAB
EST. AVERAGE GLUCOSE BLD GHB EST-MCNC: 157 MG/DL
HBA1C MFR BLD: 7.1 % (ref 0–5.6)
TSH SERPL DL<=0.005 MIU/L-ACNC: 3.97 MU/L (ref 0.4–4)

## 2019-08-26 PROCEDURE — 83036 HEMOGLOBIN GLYCOSYLATED A1C: CPT | Mod: ZL | Performed by: FAMILY MEDICINE

## 2019-08-26 PROCEDURE — 40000788 ZZHCL STATISTIC ESTIMATED AVERAGE GLUCOSE: Mod: ZL | Performed by: FAMILY MEDICINE

## 2019-08-26 PROCEDURE — 99214 OFFICE O/P EST MOD 30 MIN: CPT | Performed by: FAMILY MEDICINE

## 2019-08-26 PROCEDURE — 84443 ASSAY THYROID STIM HORMONE: CPT | Mod: ZL | Performed by: FAMILY MEDICINE

## 2019-08-26 PROCEDURE — 36415 COLL VENOUS BLD VENIPUNCTURE: CPT | Mod: ZL | Performed by: FAMILY MEDICINE

## 2019-08-26 PROCEDURE — G0463 HOSPITAL OUTPT CLINIC VISIT: HCPCS

## 2019-08-26 RX ORDER — LEVOTHYROXINE SODIUM 137 UG/1
137 TABLET ORAL DAILY
Qty: 30 TABLET | Refills: 1 | Status: SHIPPED | OUTPATIENT
Start: 2019-08-26 | End: 2020-01-08 | Stop reason: DRUGHIGH

## 2019-08-26 RX ORDER — LISINOPRIL 2.5 MG/1
2.5 TABLET ORAL DAILY
Qty: 90 TABLET | Refills: 1 | Status: SHIPPED | OUTPATIENT
Start: 2019-08-26 | End: 2020-03-02

## 2019-08-26 ASSESSMENT — PAIN SCALES - GENERAL: PAINLEVEL: MODERATE PAIN (4)

## 2019-08-26 NOTE — NURSING NOTE
"Chief Complaint   Patient presents with     Diabetes       Initial /66 (BP Location: Right arm, Patient Position: Chair, Cuff Size: Adult Regular)   Pulse 58   Temp 96.2  F (35.7  C) (Tympanic)   Resp 20   Wt 72.8 kg (160 lb 6.4 oz)   SpO2 96%   BMI 24.39 kg/m   Estimated body mass index is 24.39 kg/m  as calculated from the following:    Height as of 8/10/18: 1.727 m (5' 8\").    Weight as of this encounter: 72.8 kg (160 lb 6.4 oz).  Medication Reconciliation: complete   Jeanne Mckeon LPN      "

## 2019-08-27 DIAGNOSIS — E11.9 TYPE 2 DIABETES MELLITUS WITHOUT COMPLICATION, WITHOUT LONG-TERM CURRENT USE OF INSULIN (H): ICD-10-CM

## 2019-08-27 LAB
CREAT UR-MCNC: 126 MG/DL
MICROALBUMIN UR-MCNC: 7 MG/L
MICROALBUMIN/CREAT UR: 5.93 MG/G CR (ref 0–17)

## 2019-08-27 PROCEDURE — 82043 UR ALBUMIN QUANTITATIVE: CPT | Mod: ZL | Performed by: FAMILY MEDICINE

## 2019-09-10 ENCOUNTER — OFFICE VISIT (OUTPATIENT)
Dept: PODIATRY | Facility: OTHER | Age: 84
End: 2019-09-10
Attending: FAMILY MEDICINE
Payer: COMMERCIAL

## 2019-09-10 VITALS
RESPIRATION RATE: 12 BRPM | HEIGHT: 67 IN | OXYGEN SATURATION: 96 % | SYSTOLIC BLOOD PRESSURE: 122 MMHG | TEMPERATURE: 97.2 F | BODY MASS INDEX: 24.99 KG/M2 | DIASTOLIC BLOOD PRESSURE: 64 MMHG | HEART RATE: 60 BPM | WEIGHT: 159.2 LBS

## 2019-09-10 DIAGNOSIS — E11.42 DIABETIC POLYNEUROPATHY ASSOCIATED WITH TYPE 2 DIABETES MELLITUS (H): ICD-10-CM

## 2019-09-10 DIAGNOSIS — E11.9 DIABETES MELLITUS TYPE 2, NONINSULIN DEPENDENT (H): ICD-10-CM

## 2019-09-10 DIAGNOSIS — L60.3 ONYCHODYSTROPHY: Primary | ICD-10-CM

## 2019-09-10 DIAGNOSIS — L85.3 XEROSIS OF SKIN: ICD-10-CM

## 2019-09-10 PROCEDURE — G0463 HOSPITAL OUTPT CLINIC VISIT: HCPCS | Mod: 25

## 2019-09-10 PROCEDURE — G0463 HOSPITAL OUTPT CLINIC VISIT: HCPCS

## 2019-09-10 PROCEDURE — 99202 OFFICE O/P NEW SF 15 MIN: CPT | Mod: 25 | Performed by: PODIATRIST

## 2019-09-10 PROCEDURE — 11721 DEBRIDE NAIL 6 OR MORE: CPT | Mod: Q8 | Performed by: PODIATRIST

## 2019-09-10 ASSESSMENT — MIFFLIN-ST. JEOR: SCORE: 1365.76

## 2019-09-10 ASSESSMENT — PAIN SCALES - GENERAL: PAINLEVEL: NO PAIN (0)

## 2019-09-10 NOTE — LETTER
"    9/10/2019         RE: Nori Looney  [address]  [place] [address]        Dear Colleague,    Thank you for referring your patient, Nori Looney, to the [facility]. Please see a copy of my visit note below.    Chief complaint: Patient presents with:  Toenail: clipping      History of Present Illness: This 85 year old NIDDM male is seen with his daughter at the request of Sade for evaluation and suggestions of management of elongated toenails. He gets some tingling in his toes at night. It has been difficult for him to trim and reach his toenails. 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 No further pedal complaints today. Last HbA1C was 7.1% on 08/26/2019.      Wt Readings from Last 4 Encounters:   09/10/19 72.2 kg (159 lb 3.2 oz)   08/26/19 72.8 kg (160 lb 6.4 oz)   05/23/19 78.4 kg (172 lb 12.8 oz)   02/22/19 77.2 kg (170 lb 3.2 oz)         /64 (BP Location: Left arm, Patient Position: Sitting, Cuff Size: Adult Regular)   Pulse 60   Temp 97.2  F (36.2  C) (Tympanic)   Resp 12   Ht 1.702 m (5' 7\")   Wt 72.2 kg (159 lb 3.2 oz)   SpO2 96%   BMI 24.93 kg/m       Patient Active Problem List   Diagnosis     ACP (advance care planning)     Other specified hypothyroidism     Chronic systolic congestive heart failure (H)     Benign essential hypertension     Hyperlipidemia LDL goal <70     Bilateral carotid artery stenosis     Coronary artery disease involving coronary bypass graft of native heart without angina pectoris     Controlled type 2 diabetes mellitus without complication, without long-term current use of insulin (H)     Type 2 diabetes mellitus without complication, without long-term current use of insulin (H)     Primary osteoarthritis of left knee     Encounter for diabetic foot exam (H)     Neuropathy     " Trochanteric bursitis of right hip     Gastroesophageal reflux disease, esophagitis presence not specified       Past Surgical History:   Procedure Laterality Date     AS CABG, ARTERY-VEIN, FIVE  11/02/2011    off pump       Current Outpatient Medications   Medication     ASPIRIN PO     atorvastatin (LIPITOR) 20 MG tablet     blood glucose monitoring (ACCU-CHEK KEYANNA) test strip     glipiZIDE (GLUCOTROL) 5 MG tablet     levothyroxine (SYNTHROID/LEVOTHROID) 137 MCG tablet     lisinopril (PRINIVIL/ZESTRIL) 2.5 MG tablet     MAPAP 500 MG tablet     metFORMIN (GLUCOPHAGE-XR) 500 MG 24 hr tablet     metoprolol succinate ER (TOPROL-XL) 50 MG 24 hr tablet     nitroglycerin (NITROSTAT) 0.4 MG sublingual tablet     ranitidine (ZANTAC) 300 MG tablet     vitamin D (ERGOCALCIFEROL) 88094 UNIT capsule     No current facility-administered medications for this visit.         No Known Allergies    Family History   Problem Relation Age of Onset     Cerebrovascular Disease Father      Coronary Artery Disease Father      Dementia Mother      Coronary Artery Disease Sister      Lung Cancer Sister      Thyroid Disease Daughter        Social History     Socioeconomic History     Marital status:      Spouse name: Luis     Number of children: 5     Years of education: 12     Highest education level: Not on file   Occupational History     Occupation:      Employer: RETIRED   Social Needs     Financial resource strain: Not on file     Food insecurity:     Worry: Not on file     Inability: Not on file     Transportation needs:     Medical: Not on file     Non-medical: Not on file   Tobacco Use     Smoking status: Never Smoker     Smokeless tobacco: Never Used     Tobacco comment: second hand smoke in the house 40 yrs   Substance and Sexual Activity     Alcohol use: No     Alcohol/week: 0.0 oz     Drug use: No     Sexual activity: Never     Partners: Female   Lifestyle     Physical activity:     Days per week: Not on file      Minutes per session: Not on file     Stress: Not on file   Relationships     Social connections:     Talks on phone: Not on file     Gets together: Not on file     Attends Mosque service: Not on file     Active member of club or organization: Not on file     Attends meetings of clubs or organizations: Not on file     Relationship status: Not on file     Intimate partner violence:     Fear of current or ex partner: Not on file     Emotionally abused: Not on file     Physically abused: Not on file     Forced sexual activity: Not on file   Other Topics Concern      Service No     Blood Transfusions Yes     Comment: Ok to recieve      Caffeine Concern Yes     Comment: 3 cups daily      Occupational Exposure Not Asked     Hobby Hazards Not Asked     Sleep Concern Not Asked     Stress Concern Not Asked     Weight Concern Not Asked     Special Diet Not Asked     Back Care Not Asked     Exercise Yes     Comment: walking,       Bike Helmet Not Asked     Seat Belt Yes     Self-Exams Not Asked     Parent/sibling w/ CABG, MI or angioplasty before 65F 55M? Not Asked   Social History Narrative     Not on file       ROS: 10 point ROS neg other than the symptoms noted above in the HPI.  EXAM  Constitutional: healthy, alert and no distress    Psychiatric: mentation appears normal and affect normal/bright    VASCULAR:  -Dorsalis pedis pulse +1/4 b/l  -Posterior tibial pulse +1/4 b/l  -Capillary refill time < 3 seconds to b/l hallux  -Hair growth Absent to b/l anterior legs and ankles  NEURO:  -Protective sensation intact with SWM +7/10 RIGHT and +7/10 LEFT on 09/10/2019  -Light touch sensation intact to b/l plantar forefoot  DERM:  -Skin mildly dry, flaking and cool in temperature to bilateral feet  -Toenails elongated, thickened, dystrophic and discolored with subungual debris x 10  MSK:  -Muscle strength of ankles +5/5 for dorsiflexion, plantarflexion, ABDUction and ADDuction  b/l    ============================================================    ASSESSMENT:  (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 Treatment options discussed with no educational barriers noted.  -Nails debrided x 10 without incident  -DM foot exam for patient with sensation in feet  -Diabetic Foot Education provided. This included checking the feet daily looking for new new blisters or wounds, wearing shoes at all times when walking including around the house, and avoiding lotion application between the toes. Any sign of infection in the foot warrant's the patient presenting to the ED as soon as possible.  -Patient in agreement with the above treatment plan and all of patient's questions were answered.      RTC 63+ days for diabetic foot exam and high risk nail debridement        Deidre Mishra DPM    Again, thank you for allowing me to participate in the care of your patient.        Sincerely,        Deidre Mishra DPM

## 2019-09-10 NOTE — PROGRESS NOTES
"Chief complaint: Patient presents with:  Toenail: clipping      History of Present Illness: This 85 year old NIDDM male is seen with his daughter at the request of Sade for evaluation and suggestions of management of elongated toenails. He gets some tingling in his toes at night. It has been difficult for him to trim and reach his toenails. 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 No further pedal complaints today. Last HbA1C was 7.1% on 08/26/2019.      Wt Readings from Last 4 Encounters:   09/10/19 72.2 kg (159 lb 3.2 oz)   08/26/19 72.8 kg (160 lb 6.4 oz)   05/23/19 78.4 kg (172 lb 12.8 oz)   02/22/19 77.2 kg (170 lb 3.2 oz)         /64 (BP Location: Left arm, Patient Position: Sitting, Cuff Size: Adult Regular)   Pulse 60   Temp 97.2  F (36.2  C) (Tympanic)   Resp 12   Ht 1.702 m (5' 7\")   Wt 72.2 kg (159 lb 3.2 oz)   SpO2 96%   BMI 24.93 kg/m      Patient Active Problem List   Diagnosis     ACP (advance care planning)     Other specified hypothyroidism     Chronic systolic congestive heart failure (H)     Benign essential hypertension     Hyperlipidemia LDL goal <70     Bilateral carotid artery stenosis     Coronary artery disease involving coronary bypass graft of native heart without angina pectoris     Controlled type 2 diabetes mellitus without complication, without long-term current use of insulin (H)     Type 2 diabetes mellitus without complication, without long-term current use of insulin (H)     Primary osteoarthritis of left knee     Encounter for diabetic foot exam (H)     Neuropathy     Trochanteric bursitis of right hip     Gastroesophageal reflux disease, esophagitis presence not specified       Past Surgical History:   Procedure Laterality Date     AS CABG, ARTERY-VEIN, FIVE  11/02/2011    off pump       Current Outpatient " Medications   Medication     ASPIRIN PO     atorvastatin (LIPITOR) 20 MG tablet     blood glucose monitoring (ACCU-CHEK KEYANNA) test strip     glipiZIDE (GLUCOTROL) 5 MG tablet     levothyroxine (SYNTHROID/LEVOTHROID) 137 MCG tablet     lisinopril (PRINIVIL/ZESTRIL) 2.5 MG tablet     MAPAP 500 MG tablet     metFORMIN (GLUCOPHAGE-XR) 500 MG 24 hr tablet     metoprolol succinate ER (TOPROL-XL) 50 MG 24 hr tablet     nitroglycerin (NITROSTAT) 0.4 MG sublingual tablet     ranitidine (ZANTAC) 300 MG tablet     vitamin D (ERGOCALCIFEROL) 31296 UNIT capsule     No current facility-administered medications for this visit.         No Known Allergies    Family History   Problem Relation Age of Onset     Cerebrovascular Disease Father      Coronary Artery Disease Father      Dementia Mother      Coronary Artery Disease Sister      Lung Cancer Sister      Thyroid Disease Daughter        Social History     Socioeconomic History     Marital status:      Spouse name: Luis     Number of children: 5     Years of education: 12     Highest education level: Not on file   Occupational History     Occupation:      Employer: RETIRED   Social Needs     Financial resource strain: Not on file     Food insecurity:     Worry: Not on file     Inability: Not on file     Transportation needs:     Medical: Not on file     Non-medical: Not on file   Tobacco Use     Smoking status: Never Smoker     Smokeless tobacco: Never Used     Tobacco comment: second hand smoke in the house 40 yrs   Substance and Sexual Activity     Alcohol use: No     Alcohol/week: 0.0 oz     Drug use: No     Sexual activity: Never     Partners: Female   Lifestyle     Physical activity:     Days per week: Not on file     Minutes per session: Not on file     Stress: Not on file   Relationships     Social connections:     Talks on phone: Not on file     Gets together: Not on file     Attends Pentecostal service: Not on file     Active member of club or  organization: Not on file     Attends meetings of clubs or organizations: Not on file     Relationship status: Not on file     Intimate partner violence:     Fear of current or ex partner: Not on file     Emotionally abused: Not on file     Physically abused: Not on file     Forced sexual activity: Not on file   Other Topics Concern      Service No     Blood Transfusions Yes     Comment: Ok to recieve      Caffeine Concern Yes     Comment: 3 cups daily      Occupational Exposure Not Asked     Hobby Hazards Not Asked     Sleep Concern Not Asked     Stress Concern Not Asked     Weight Concern Not Asked     Special Diet Not Asked     Back Care Not Asked     Exercise Yes     Comment: walking,       Bike Helmet Not Asked     Seat Belt Yes     Self-Exams Not Asked     Parent/sibling w/ CABG, MI or angioplasty before 65F 55M? Not Asked   Social History Narrative     Not on file       ROS: 10 point ROS neg other than the symptoms noted above in the HPI.  EXAM  Constitutional: healthy, alert and no distress    Psychiatric: mentation appears normal and affect normal/bright    VASCULAR:  -Dorsalis pedis pulse +1/4 b/l  -Posterior tibial pulse +1/4 b/l  -Capillary refill time < 3 seconds to b/l hallux  -Hair growth Absent to b/l anterior legs and ankles  NEURO:  -Protective sensation intact with SWM +7/10 RIGHT and +7/10 LEFT on 09/10/2019  -Light touch sensation intact to b/l plantar forefoot  DERM:  -Skin mildly dry, flaking and cool in temperature to bilateral feet  -Toenails elongated, thickened, dystrophic and discolored with subungual debris x 10  MSK:  -Muscle strength of ankles +5/5 for dorsiflexion, plantarflexion, ABDUction and ADDuction b/l    ============================================================    ASSESSMENT:  (L60.3) Onychodystrophy  (primary encounter diagnosis)    (L85.3) Xerosis of skin    (E11.9) Diabetes mellitus type 2, noninsulin dependent (H)    (E11.42) Diabetic polyneuropathy  associated with type 2 diabetes mellitus (H)      PLAN:  -Patient evaluated and examined. Treatment options discussed with no educational barriers noted.  -Nails debrided x 10 without incident  -DM foot exam for patient with sensation in feet  -Diabetic Foot Education provided. This included checking the feet daily looking for new new blisters or wounds, wearing shoes at all times when walking including around the house, and avoiding lotion application between the toes. Any sign of infection in the foot warrant's the patient presenting to the ED as soon as possible.  -Patient in agreement with the above treatment plan and all of patient's questions were answered.      RTC 63+ days for diabetic foot exam and high risk nail debridement        Deidre Mishra DPM

## 2019-09-10 NOTE — NURSING NOTE
"Chief Complaint   Patient presents with     Toenail     clipping       Initial /64 (BP Location: Left arm, Patient Position: Sitting, Cuff Size: Adult Regular)   Pulse 60   Temp 97.2  F (36.2  C) (Tympanic)   Resp 12   Ht 1.702 m (5' 7\")   Wt 72.2 kg (159 lb 3.2 oz)   SpO2 96%   BMI 24.93 kg/m   Estimated body mass index is 24.93 kg/m  as calculated from the following:    Height as of this encounter: 1.702 m (5' 7\").    Weight as of this encounter: 72.2 kg (159 lb 3.2 oz).  Medication Reconciliation: complete  "

## 2019-09-10 NOTE — PATIENT INSTRUCTIONS
-Diabetic Foot Education:  ---Check the feet daily looking for new new blisters or wounds  ---Wear shoes at all times when walking including around the house  ---Avoid lotion application between the toes.   ---If you have any open wounds with signs of infection (redness, swelling, pain, purulence, fever, chills, nausea, vomiting), go to the Emergency Department as soon as possible.

## 2019-10-20 DIAGNOSIS — I25.810 CORONARY ARTERY DISEASE INVOLVING CORONARY BYPASS GRAFT OF NATIVE HEART WITHOUT ANGINA PECTORIS: ICD-10-CM

## 2019-10-23 RX ORDER — METOPROLOL SUCCINATE 50 MG/1
TABLET, EXTENDED RELEASE ORAL
Qty: 90 TABLET | Refills: 1 | Status: SHIPPED | OUTPATIENT
Start: 2019-10-23 | End: 2020-04-06

## 2019-11-21 ENCOUNTER — OFFICE VISIT (OUTPATIENT)
Dept: PODIATRY | Facility: OTHER | Age: 84
End: 2019-11-21
Attending: PODIATRIST
Payer: COMMERCIAL

## 2019-11-21 VITALS
HEART RATE: 68 BPM | TEMPERATURE: 96.3 F | DIASTOLIC BLOOD PRESSURE: 75 MMHG | OXYGEN SATURATION: 96 % | RESPIRATION RATE: 18 BRPM | HEIGHT: 68 IN | WEIGHT: 152 LBS | SYSTOLIC BLOOD PRESSURE: 121 MMHG | BODY MASS INDEX: 23.04 KG/M2

## 2019-11-21 DIAGNOSIS — E11.42 DIABETIC POLYNEUROPATHY ASSOCIATED WITH TYPE 2 DIABETES MELLITUS (H): ICD-10-CM

## 2019-11-21 DIAGNOSIS — L60.3 ONYCHODYSTROPHY: Primary | ICD-10-CM

## 2019-11-21 DIAGNOSIS — L85.3 XEROSIS OF SKIN: ICD-10-CM

## 2019-11-21 DIAGNOSIS — E11.9 DIABETES MELLITUS TYPE 2, NONINSULIN DEPENDENT (H): ICD-10-CM

## 2019-11-21 PROCEDURE — 99213 OFFICE O/P EST LOW 20 MIN: CPT | Mod: 25 | Performed by: PODIATRIST

## 2019-11-21 PROCEDURE — G0463 HOSPITAL OUTPT CLINIC VISIT: HCPCS | Mod: 25

## 2019-11-21 PROCEDURE — G0463 HOSPITAL OUTPT CLINIC VISIT: HCPCS

## 2019-11-21 PROCEDURE — 11721 DEBRIDE NAIL 6 OR MORE: CPT | Performed by: PODIATRIST

## 2019-11-21 ASSESSMENT — PAIN SCALES - GENERAL: PAINLEVEL: NO PAIN (0)

## 2019-11-21 ASSESSMENT — MIFFLIN-ST. JEOR: SCORE: 1348.97

## 2019-11-21 NOTE — PROGRESS NOTES
"Chief complaint: Patient presents with:  RECHECK: Toenail clipping      History of Present Illness: This 85 year old NIDDM male is seen for follow-up management of elongated toenails. He gets some tingling in his toes at night. It has been difficult for him to trim and reach his toenails. He says he has a bad knee and a bad hip but he has never had a knee or hip replacement. He is wondering why his nails grow so thick. No further pedal complaints today. Last HbA1C was 7.1% on 08/26/2019.      Wt Readings from Last 4 Encounters:   11/21/19 68.9 kg (152 lb)   09/10/19 72.2 kg (159 lb 3.2 oz)   08/26/19 72.8 kg (160 lb 6.4 oz)   05/23/19 78.4 kg (172 lb 12.8 oz)         BP (!) 144/76 (BP Location: Left arm, Patient Position: Sitting, Cuff Size: Adult Regular)   Pulse 68   Temp 96.3  F (35.7  C) (Tympanic)   Resp 18   Ht 1.727 m (5' 8\")   Wt 68.9 kg (152 lb)   SpO2 96%   BMI 23.11 kg/m      Patient Active Problem List   Diagnosis     ACP (advance care planning)     Other specified hypothyroidism     Chronic systolic congestive heart failure (H)     Benign essential hypertension     Hyperlipidemia LDL goal <70     Bilateral carotid artery stenosis     Coronary artery disease involving coronary bypass graft of native heart without angina pectoris     Controlled type 2 diabetes mellitus without complication, without long-term current use of insulin (H)     Type 2 diabetes mellitus without complication, without long-term current use of insulin (H)     Primary osteoarthritis of left knee     Encounter for diabetic foot exam (H)     Neuropathy     Trochanteric bursitis of right hip     Gastroesophageal reflux disease, esophagitis presence not specified       Past Surgical History:   Procedure Laterality Date     AS CABG, ARTERY-VEIN, FIVE  11/02/2011    off pump       Current Outpatient Medications   Medication     ASPIRIN PO     atorvastatin (LIPITOR) 20 MG tablet     blood glucose monitoring (ACCU-CHEK KEYANNA) test " strip     glipiZIDE (GLUCOTROL) 5 MG tablet     levothyroxine (SYNTHROID/LEVOTHROID) 137 MCG tablet     lisinopril (PRINIVIL/ZESTRIL) 2.5 MG tablet     MAPAP 500 MG tablet     metFORMIN (GLUCOPHAGE-XR) 500 MG 24 hr tablet     metoprolol succinate ER (TOPROL-XL) 50 MG 24 hr tablet     nitroglycerin (NITROSTAT) 0.4 MG sublingual tablet     ranitidine (ZANTAC) 300 MG tablet     vitamin D (ERGOCALCIFEROL) 81437 UNIT capsule     No current facility-administered medications for this visit.         No Known Allergies    Family History   Problem Relation Age of Onset     Cerebrovascular Disease Father      Coronary Artery Disease Father      Dementia Mother      Coronary Artery Disease Sister      Lung Cancer Sister      Thyroid Disease Daughter        Social History     Socioeconomic History     Marital status:      Spouse name: Luis     Number of children: 5     Years of education: 12     Highest education level: Not on file   Occupational History     Occupation:      Employer: RETIRED   Social Needs     Financial resource strain: Not on file     Food insecurity:     Worry: Not on file     Inability: Not on file     Transportation needs:     Medical: Not on file     Non-medical: Not on file   Tobacco Use     Smoking status: Never Smoker     Smokeless tobacco: Never Used     Tobacco comment: second hand smoke in the house 40 yrs   Substance and Sexual Activity     Alcohol use: No     Alcohol/week: 0.0 oz     Drug use: No     Sexual activity: Never     Partners: Female   Lifestyle     Physical activity:     Days per week: Not on file     Minutes per session: Not on file     Stress: Not on file   Relationships     Social connections:     Talks on phone: Not on file     Gets together: Not on file     Attends Rastafari service: Not on file     Active member of club or organization: Not on file     Attends meetings of clubs or organizations: Not on file     Relationship status: Not on file     Intimate  partner violence:     Fear of current or ex partner: Not on file     Emotionally abused: Not on file     Physically abused: Not on file     Forced sexual activity: Not on file   Other Topics Concern      Service No     Blood Transfusions Yes     Comment: Ok to recieve      Caffeine Concern Yes     Comment: 3 cups daily      Occupational Exposure Not Asked     Hobby Hazards Not Asked     Sleep Concern Not Asked     Stress Concern Not Asked     Weight Concern Not Asked     Special Diet Not Asked     Back Care Not Asked     Exercise Yes     Comment: walking,       Bike Helmet Not Asked     Seat Belt Yes     Self-Exams Not Asked     Parent/sibling w/ CABG, MI or angioplasty before 65F 55M? Not Asked   Social History Narrative     Not on file       ROS: 10 point ROS neg other than the symptoms noted above in the HPI.  EXAM  Constitutional: healthy, alert and no distress    Psychiatric: mentation appears normal and affect normal/bright    VASCULAR:  -Dorsalis pedis pulse +1/4 b/l  -Posterior tibial pulse +1/4 b/l  -Capillary refill time < 3 seconds to b/l hallux  -Hair growth Absent to b/l anterior legs and ankles  NEURO:  -Protective sensation intact with SWM +10/10 RIGHT and +10/10 LEFT on 11/21/2019  -Protective sensation intact with SWM +7/10 RIGHT and +7/10 LEFT on 09/10/2019  -Light touch sensation intact to b/l plantar forefoot  DERM:  -Skin mildly dry, flaking and cool in temperature to bilateral feet  -Toenails elongated, thickened, dystrophic and discolored with subungual debris x 10  MSK:  -Muscle strength of ankles +5/5 for dorsiflexion, plantarflexion, ABDUction and ADDuction b/l    ============================================================    ASSESSMENT:  (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 Treatment options discussed with no educational barriers noted.  -Nails debrided x 10 without incident  -DM foot exam for patient with sensation in feet  -Diabetic Foot Education provided. This included checking the feet daily looking for new new blisters or wounds, wearing shoes at all times when walking including around the house, and avoiding lotion application between the toes. Any sign of infection in the foot warrant's the patient presenting to the ED as soon as possible.  -Patient in agreement with the above treatment plan and all of patient's questions were answered.      RTC 10 weeks for diabetic foot exam and high risk nail debridement        Deidre Mishra DPM

## 2019-11-21 NOTE — NURSING NOTE
"Chief Complaint   Patient presents with     RECHECK     Toenail clipping       Initial BP (!) 144/76 (BP Location: Left arm, Patient Position: Sitting, Cuff Size: Adult Regular)   Pulse 68   Temp 96.3  F (35.7  C) (Tympanic)   Resp 18   Ht 1.727 m (5' 8\")   Wt 68.9 kg (152 lb)   SpO2 96%   BMI 23.11 kg/m   Estimated body mass index is 23.11 kg/m  as calculated from the following:    Height as of this encounter: 1.727 m (5' 8\").    Weight as of this encounter: 68.9 kg (152 lb).  Medication Reconciliation: complete  Jia Urbina LPN    "

## 2019-12-14 DIAGNOSIS — E11.9 TYPE 2 DIABETES MELLITUS WITHOUT COMPLICATION, WITHOUT LONG-TERM CURRENT USE OF INSULIN (H): ICD-10-CM

## 2019-12-18 RX ORDER — METFORMIN HCL 500 MG
TABLET, EXTENDED RELEASE 24 HR ORAL
Qty: 360 TABLET | Refills: 1 | Status: SHIPPED | OUTPATIENT
Start: 2019-12-18 | End: 2019-12-23

## 2019-12-22 DIAGNOSIS — E11.9 TYPE 2 DIABETES MELLITUS WITHOUT COMPLICATION, WITHOUT LONG-TERM CURRENT USE OF INSULIN (H): ICD-10-CM

## 2019-12-23 RX ORDER — METFORMIN HCL 500 MG
TABLET, EXTENDED RELEASE 24 HR ORAL
Qty: 360 TABLET | Refills: 0 | Status: SHIPPED | OUTPATIENT
Start: 2019-12-23 | End: 2020-01-08

## 2019-12-26 NOTE — PROGRESS NOTES
Subjective     Nori Looney is a 85 year old male who presents to clinic today for the following health issues:    HPI   Diabetes Follow-up      How often are you checking your blood sugar? Not at all    What concerns do you have today about your diabetes? None     Do you have any of these symptoms? (Select all that apply)  Weight loss, more diarrhea than normal     Have you had a diabetic eye exam in the last 12 months? No    BP Readings from Last 2 Encounters:   01/08/20 138/70   11/21/19 121/75     Hemoglobin A1C (%)   Date Value   08/26/2019 7.1 (H)   05/23/2019 7.1 (H)     LDL Cholesterol Calculated (mg/dL)   Date Value   11/08/2018 79   10/30/2017 94       Diabetes Management Resources    Hyperlipidemia Follow-Up      Are you regularly taking any medication or supplement to lower your cholesterol?   Yes- Lipitor 20mg    Are you having muscle aches or other side effects that you think could be caused by your cholesterol lowering medication?  No      How many servings of fruits and vegetables do you eat daily?  0-1    On average, how many sweetened beverages do you drink each day (Examples: soda, juice, sweet tea, etc.  Do NOT count diet or artificially sweetened beverages)?   0  How many days per week do you miss taking your medication? 2    What makes it hard for you to take your medications?  remembering to take    Musculoskeletal problem/pain      Duration: ongoing    Description  Location: right hip     Intensity:  4/10    Accompanying signs and symptoms: radiation of pain to leg    History  Previous similar problem: YES  Previous evaluation:  x-ray 2017    Precipitating or alleviating factors:  Trauma or overuse: no   Aggravating factors include: sitting and climbing stairs    Therapies tried and outcome: deep heating rub      Patient Active Problem List   Diagnosis     ACP (advance care planning)     Other specified hypothyroidism     Chronic systolic congestive heart failure (H)     Benign essential  hypertension     Hyperlipidemia LDL goal <70     Bilateral carotid artery stenosis     Coronary artery disease involving coronary bypass graft of native heart without angina pectoris     Controlled type 2 diabetes mellitus without complication, without long-term current use of insulin (H)     Type 2 diabetes mellitus without complication, without long-term current use of insulin (H)     Primary osteoarthritis of left knee     Encounter for diabetic foot exam (H)     Neuropathy     Trochanteric bursitis of right hip     Gastroesophageal reflux disease, esophagitis presence not specified     Primary osteoarthritis of right hip     Bilateral primary osteoarthritis of knee      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)     Past Surgical History:   Procedure Laterality Date     AS CABG, ARTERY-VEIN, FIVE  11/02/2011    off pump       Social History     Tobacco Use     Smoking status: Never Smoker     Smokeless tobacco: Never Used     Tobacco comment: second hand smoke in the house 40 yrs   Substance Use Topics     Alcohol use: No     Alcohol/week: 0.0 standard drinks     Family History   Problem Relation Age of Onset     Cerebrovascular Disease Father      Coronary Artery Disease Father      Dementia Mother      Coronary Artery Disease Sister      Lung Cancer Sister      Thyroid Disease Daughter          Current Outpatient Medications   Medication Sig Dispense Refill     ASPIRIN PO Take 325 mg by mouth daily       atorvastatin (LIPITOR) 20 MG tablet Take 1 Tab by mouth one time a day. Patient due for office visit and labs. 90 tablet 1     blood glucose monitoring (ACCU-CHEK KEYANNA) test strip by In Vitro route 2 times daily .       glipiZIDE (GLUCOTROL XL) 2.5 MG 24 hr tablet Take 1 tablet (2.5 mg) by mouth daily 90 tablet 1     levothyroxine (SYNTHROID/LEVOTHROID) 150 MCG tablet Take 1 tablet (150 mcg) by mouth daily 90 tablet 0     lisinopril (PRINIVIL/ZESTRIL) 2.5 MG tablet Take 1 tablet (2.5  mg) by mouth daily 90 tablet 1     MAPAP 500 MG tablet Take 2 Tabs by mouth two times a day as needed for mild pain. 120 tablet 3     metFORMIN (GLUCOPHAGE-XR) 500 MG 24 hr tablet Take 1 tablet (500 mg) by mouth 2 times daily (with meals) 180 tablet 2     metoprolol succinate ER (TOPROL-XL) 50 MG 24 hr tablet Take 1 Tab by mouth one time a day. 90 tablet 1     nitroglycerin (NITROSTAT) 0.4 MG sublingual tablet For chest pain place 1 tablet under the tongue every 5 minutes for 3 doses. If symptoms persist 5 minutes after 1st dose call 911. 25 tablet 0     ranitidine (ZANTAC) 300 MG tablet Take 1 tablet (300 mg) by mouth At Bedtime 90 tablet 1     vitamin D (ERGOCALCIFEROL) 04569 UNIT capsule        No Known Allergies  BP Readings from Last 3 Encounters:   01/08/20 138/70   11/21/19 121/75   09/10/19 122/64    Wt Readings from Last 3 Encounters:   01/08/20 70.3 kg (155 lb)   11/21/19 68.9 kg (152 lb)   09/10/19 72.2 kg (159 lb 3.2 oz)                    Hypertension Follow-up      Do you check your blood pressure regularly outside of the clinic? No     Are you following a low salt diet? No    Are your blood pressures ever more than 140 on the top number (systolic) OR more   than 90 on the bottom number (diastolic), for example 140/90? No    Reviewed and updated as needed this visit by Provider         Review of Systems   ROS COMP: Constitutional, HEENT, cardiovascular, pulmonary, gi and gu systems are negative, except as otherwise noted.      Objective    /70 (BP Location: Right arm, Patient Position: Chair, Cuff Size: Adult Regular)   Pulse 66   Temp 96.7  F (35.9  C) (Tympanic)   Resp 20   Wt 70.3 kg (155 lb)   SpO2 98%   BMI 23.57 kg/m    Body mass index is 23.57 kg/m .  Physical Exam   GENERAL: healthy, alert and no distress  NECK: no adenopathy, no asymmetry, masses, or scars and thyroid normal to palpation  RESP: lungs clear to auscultation - no rales, rhonchi or wheezes  CV: regular rate and rhythm,  normal S1 S2, no S3 or S4, no murmur, click or rub, no peripheral edema and peripheral pulses strong  MS: no gross musculoskeletal defects noted, no edema  PSYCH: mentation appears normal, affect normal/bright    Diagnostic Test Results:  Labs reviewed in Epic  Results for orders placed or performed in visit on 01/08/20   Lipid Profile (Chol, Trig, HDL, LDL calc)     Status: Abnormal   Result Value Ref Range    Cholesterol 128 <200 mg/dL    Triglycerides 90 <150 mg/dL    HDL Cholesterol 37 (L) >39 mg/dL    LDL Cholesterol Calculated 73 <100 mg/dL    Non HDL Cholesterol 91 <130 mg/dL   Comprehensive metabolic panel     Status: Abnormal   Result Value Ref Range    Sodium 141 133 - 144 mmol/L    Potassium 4.2 3.4 - 5.3 mmol/L    Chloride 110 (H) 94 - 109 mmol/L    Carbon Dioxide 25 20 - 32 mmol/L    Anion Gap 6 3 - 14 mmol/L    Glucose 137 (H) 70 - 99 mg/dL    Urea Nitrogen 16 7 - 30 mg/dL    Creatinine 1.02 0.66 - 1.25 mg/dL    GFR Estimate 66 >60 mL/min/[1.73_m2]    GFR Estimate If Black 77 >60 mL/min/[1.73_m2]    Calcium 9.1 8.5 - 10.1 mg/dL    Bilirubin Total 0.9 0.2 - 1.3 mg/dL    Albumin 4.3 3.4 - 5.0 g/dL    Protein Total 7.4 6.8 - 8.8 g/dL    Alkaline Phosphatase 87 40 - 150 U/L    ALT 19 0 - 70 U/L    AST 8 0 - 45 U/L   TSH with free T4 reflex     Status: Abnormal   Result Value Ref Range    TSH 4.42 (H) 0.40 - 4.00 mU/L   Hemoglobin A1c     Status: Abnormal   Result Value Ref Range    Hemoglobin A1C 6.3 (H) 0 - 5.6 %   Estimated Average Glucose     Status: None   Result Value Ref Range    Estimated Average Glucose 134 mg/dL   T4 free     Status: None   Result Value Ref Range    T4 Free 1.31 0.76 - 1.46 ng/dL           Assessment & Plan     (E11.9) Type 2 diabetes mellitus without complication, without long-term current use of insulin (H)  (primary encounter diagnosis)  Comment:   Plan: metFORMIN (GLUCOPHAGE-XR) 500 MG 24 hr tablet,         glipiZIDE (GLUCOTROL XL) 2.5 MG 24 hr tablet        Follow-up 3  mos  a1c too low, will decrease metformin to 1 tab BID and decrease glipizide to 2.5 mg daily    (E78.5) Hyperlipidemia LDL goal <70  Comment:   Plan: Comprehensive metabolic panel, Lipid Profile         (Chol, Trig, HDL, LDL calc)          (E03.8) Other specified hypothyroidism  Comment:   Plan: levothyroxine (SYNTHROID/LEVOTHROID) 150 MCG         tablet        Increase dose and recheck 3 mos    (I10) Benign essential hypertension  Comment:   Plan: stable    (M16.11) Primary osteoarthritis of right hip  Comment:   Plan: XR Joint Injection Major Right        Had moderate severe arthritis 2 years ago -- starting to bother him more    (M17.0) Bilateral primary osteoarthritis of knee   Comment:   Plan: XR Joint Injection Major Right          (I50.22) Chronic systolic congestive heart failure (H)  Comment:   Plan: stable    (E11.42) 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)  Comment:   Plan: doing well    Some loose stool 1x/wk for last 2 months -- will decrease metformin     Patient was agreeable to this plan and had no further questions.  There are no Patient Instructions on file for this visit.    No follow-ups on file.    Bisi Stuart MD  [facility]

## 2020-01-08 ENCOUNTER — OFFICE VISIT (OUTPATIENT)
Dept: FAMILY MEDICINE | Facility: OTHER | Age: 85
End: 2020-01-08
Attending: FAMILY MEDICINE
Payer: COMMERCIAL

## 2020-01-08 VITALS
SYSTOLIC BLOOD PRESSURE: 138 MMHG | OXYGEN SATURATION: 98 % | WEIGHT: 155 LBS | DIASTOLIC BLOOD PRESSURE: 70 MMHG | RESPIRATION RATE: 20 BRPM | HEART RATE: 66 BPM | TEMPERATURE: 96.7 F | BODY MASS INDEX: 23.57 KG/M2

## 2020-01-08 DIAGNOSIS — E11.9 TYPE 2 DIABETES MELLITUS WITHOUT COMPLICATION, WITHOUT LONG-TERM CURRENT USE OF INSULIN (H): Primary | ICD-10-CM

## 2020-01-08 DIAGNOSIS — E11.42 DIABETIC POLYNEUROPATHY ASSOCIATED WITH TYPE 2 DIABETES MELLITUS (H): ICD-10-CM

## 2020-01-08 DIAGNOSIS — I10 BENIGN ESSENTIAL HYPERTENSION: ICD-10-CM

## 2020-01-08 DIAGNOSIS — M16.11 PRIMARY OSTEOARTHRITIS OF RIGHT HIP: ICD-10-CM

## 2020-01-08 DIAGNOSIS — E78.5 HYPERLIPIDEMIA LDL GOAL <70: ICD-10-CM

## 2020-01-08 DIAGNOSIS — M17.0 BILATERAL PRIMARY OSTEOARTHRITIS OF KNEE: ICD-10-CM

## 2020-01-08 DIAGNOSIS — E03.8 OTHER SPECIFIED HYPOTHYROIDISM: ICD-10-CM

## 2020-01-08 DIAGNOSIS — E11.9 TYPE 2 DIABETES MELLITUS WITHOUT COMPLICATION, WITHOUT LONG-TERM CURRENT USE OF INSULIN (H): ICD-10-CM

## 2020-01-08 DIAGNOSIS — I50.22 CHRONIC SYSTOLIC CONGESTIVE HEART FAILURE (H): ICD-10-CM

## 2020-01-08 LAB
ALBUMIN SERPL-MCNC: 4.3 G/DL (ref 3.4–5)
ALP SERPL-CCNC: 87 U/L (ref 40–150)
ALT SERPL W P-5'-P-CCNC: 19 U/L (ref 0–70)
ANION GAP SERPL CALCULATED.3IONS-SCNC: 6 MMOL/L (ref 3–14)
AST SERPL W P-5'-P-CCNC: 8 U/L (ref 0–45)
BILIRUB SERPL-MCNC: 0.9 MG/DL (ref 0.2–1.3)
BUN SERPL-MCNC: 16 MG/DL (ref 7–30)
CALCIUM SERPL-MCNC: 9.1 MG/DL (ref 8.5–10.1)
CHLORIDE SERPL-SCNC: 110 MMOL/L (ref 94–109)
CHOLEST SERPL-MCNC: 128 MG/DL
CO2 SERPL-SCNC: 25 MMOL/L (ref 20–32)
CREAT SERPL-MCNC: 1.02 MG/DL (ref 0.66–1.25)
EST. AVERAGE GLUCOSE BLD GHB EST-MCNC: 134 MG/DL
GFR SERPL CREATININE-BSD FRML MDRD: 66 ML/MIN/{1.73_M2}
GLUCOSE SERPL-MCNC: 137 MG/DL (ref 70–99)
HBA1C MFR BLD: 6.3 % (ref 0–5.6)
HDLC SERPL-MCNC: 37 MG/DL
LDLC SERPL CALC-MCNC: 73 MG/DL
NONHDLC SERPL-MCNC: 91 MG/DL
POTASSIUM SERPL-SCNC: 4.2 MMOL/L (ref 3.4–5.3)
PROT SERPL-MCNC: 7.4 G/DL (ref 6.8–8.8)
SODIUM SERPL-SCNC: 141 MMOL/L (ref 133–144)
T4 FREE SERPL-MCNC: 1.31 NG/DL (ref 0.76–1.46)
TRIGL SERPL-MCNC: 90 MG/DL
TSH SERPL DL<=0.005 MIU/L-ACNC: 4.42 MU/L (ref 0.4–4)

## 2020-01-08 PROCEDURE — 84443 ASSAY THYROID STIM HORMONE: CPT | Mod: ZL | Performed by: FAMILY MEDICINE

## 2020-01-08 PROCEDURE — 80061 LIPID PANEL: CPT | Mod: ZL | Performed by: FAMILY MEDICINE

## 2020-01-08 PROCEDURE — 83036 HEMOGLOBIN GLYCOSYLATED A1C: CPT | Mod: ZL | Performed by: FAMILY MEDICINE

## 2020-01-08 PROCEDURE — 36415 COLL VENOUS BLD VENIPUNCTURE: CPT | Mod: ZL | Performed by: FAMILY MEDICINE

## 2020-01-08 PROCEDURE — 84439 ASSAY OF FREE THYROXINE: CPT | Mod: ZL | Performed by: FAMILY MEDICINE

## 2020-01-08 PROCEDURE — 80053 COMPREHEN METABOLIC PANEL: CPT | Mod: ZL | Performed by: FAMILY MEDICINE

## 2020-01-08 PROCEDURE — 99214 OFFICE O/P EST MOD 30 MIN: CPT | Performed by: FAMILY MEDICINE

## 2020-01-08 PROCEDURE — G0463 HOSPITAL OUTPT CLINIC VISIT: HCPCS

## 2020-01-08 PROCEDURE — 40000788 ZZHCL STATISTIC ESTIMATED AVERAGE GLUCOSE: Mod: ZL | Performed by: FAMILY MEDICINE

## 2020-01-08 RX ORDER — GLIPIZIDE 2.5 MG/1
2.5 TABLET, EXTENDED RELEASE ORAL DAILY
Qty: 90 TABLET | Refills: 1 | Status: SHIPPED | OUTPATIENT
Start: 2020-01-08 | End: 2020-06-30

## 2020-01-08 RX ORDER — METFORMIN HCL 500 MG
500 TABLET, EXTENDED RELEASE 24 HR ORAL 2 TIMES DAILY WITH MEALS
Qty: 180 TABLET | Refills: 2 | Status: SHIPPED | OUTPATIENT
Start: 2020-01-08 | End: 2020-07-29

## 2020-01-08 RX ORDER — LEVOTHYROXINE SODIUM 150 UG/1
150 TABLET ORAL DAILY
Qty: 90 TABLET | Refills: 0 | Status: SHIPPED | OUTPATIENT
Start: 2020-01-08 | End: 2020-04-06

## 2020-01-08 ASSESSMENT — ANXIETY QUESTIONNAIRES
7. FEELING AFRAID AS IF SOMETHING AWFUL MIGHT HAPPEN: NOT AT ALL
2. NOT BEING ABLE TO STOP OR CONTROL WORRYING: NOT AT ALL
IF YOU CHECKED OFF ANY PROBLEMS ON THIS QUESTIONNAIRE, HOW DIFFICULT HAVE THESE PROBLEMS MADE IT FOR YOU TO DO YOUR WORK, TAKE CARE OF THINGS AT HOME, OR GET ALONG WITH OTHER PEOPLE: NOT DIFFICULT AT ALL
6. BECOMING EASILY ANNOYED OR IRRITABLE: SEVERAL DAYS
GAD7 TOTAL SCORE: 1
1. FEELING NERVOUS, ANXIOUS, OR ON EDGE: NOT AT ALL
5. BEING SO RESTLESS THAT IT IS HARD TO SIT STILL: NOT AT ALL
3. WORRYING TOO MUCH ABOUT DIFFERENT THINGS: NOT AT ALL

## 2020-01-08 ASSESSMENT — PATIENT HEALTH QUESTIONNAIRE - PHQ9
SUM OF ALL RESPONSES TO PHQ QUESTIONS 1-9: 1
5. POOR APPETITE OR OVEREATING: NOT AT ALL

## 2020-01-08 ASSESSMENT — PAIN SCALES - GENERAL: PAINLEVEL: MODERATE PAIN (4)

## 2020-01-08 NOTE — NURSING NOTE
"Chief Complaint   Patient presents with     Diabetes     Musculoskeletal Problem       Initial /70 (BP Location: Right arm, Patient Position: Chair, Cuff Size: Adult Regular)   Pulse 66   Temp 96.7  F (35.9  C) (Tympanic)   Resp 20   Wt 70.3 kg (155 lb)   SpO2 98%   BMI 23.57 kg/m   Estimated body mass index is 23.57 kg/m  as calculated from the following:    Height as of 11/21/19: 1.727 m (5' 8\").    Weight as of this encounter: 70.3 kg (155 lb).  Medication Reconciliation: complete  Jeanne Mckeon LPN    "

## 2020-01-09 ASSESSMENT — ANXIETY QUESTIONNAIRES: GAD7 TOTAL SCORE: 1

## 2020-01-21 ENCOUNTER — HOSPITAL ENCOUNTER (OUTPATIENT)
Dept: INTERVENTIONAL RADIOLOGY/VASCULAR | Facility: HOSPITAL | Age: 85
Discharge: HOME | End: 2020-01-21
Attending: FAMILY MEDICINE | Admitting: FAMILY MEDICINE
Payer: COMMERCIAL

## 2020-01-21 DIAGNOSIS — M16.11 PRIMARY OSTEOARTHRITIS OF RIGHT HIP: ICD-10-CM

## 2020-01-21 DIAGNOSIS — M17.0 BILATERAL PRIMARY OSTEOARTHRITIS OF KNEE: ICD-10-CM

## 2020-01-21 PROCEDURE — 25000128 H RX IP 250 OP 636: Performed by: RADIOLOGY

## 2020-01-21 PROCEDURE — 25000125 ZZHC RX 250: Performed by: RADIOLOGY

## 2020-01-21 PROCEDURE — 20610 DRAIN/INJ JOINT/BURSA W/O US: CPT | Mod: TC,RT

## 2020-01-21 PROCEDURE — 25500064 ZZH RX 255 OP 636: Performed by: RADIOLOGY

## 2020-01-21 RX ORDER — METHYLPREDNISOLONE ACETATE 80 MG/ML
INJECTION, SUSPENSION INTRA-ARTICULAR; INTRALESIONAL; INTRAMUSCULAR; SOFT TISSUE
Status: DISCONTINUED
Start: 2020-01-21 | End: 2020-01-22 | Stop reason: HOSPADM

## 2020-01-21 RX ORDER — METHYLPREDNISOLONE ACETATE 80 MG/ML
80 INJECTION, SUSPENSION INTRA-ARTICULAR; INTRALESIONAL; INTRAMUSCULAR; SOFT TISSUE ONCE
Status: COMPLETED | OUTPATIENT
Start: 2020-01-21 | End: 2020-01-21

## 2020-01-21 RX ORDER — LIDOCAINE HYDROCHLORIDE 10 MG/ML
INJECTION, SOLUTION EPIDURAL; INFILTRATION; INTRACAUDAL; PERINEURAL
Status: DISCONTINUED
Start: 2020-01-21 | End: 2020-01-22 | Stop reason: HOSPADM

## 2020-01-21 RX ADMIN — LIDOCAINE HYDROCHLORIDE 4 ML: 10 INJECTION, SOLUTION EPIDURAL; INFILTRATION; INTRACAUDAL; PERINEURAL at 15:28

## 2020-01-21 RX ADMIN — METHYLPREDNISOLONE ACETATE 80 MG: 80 INJECTION, SUSPENSION INTRA-ARTICULAR; INTRALESIONAL; INTRAMUSCULAR; SOFT TISSUE at 15:28

## 2020-01-21 RX ADMIN — IOHEXOL 2 ML: 300 INJECTION, SOLUTION INTRAVENOUS at 15:28

## 2020-01-21 NOTE — DISCHARGE INSTRUCTIONS
Home number on file [phone] (home)  Is it ok to leave a message at this number(s)? Yes    Dr. Ortiz completed your procedure on 1/21/2020.    Current Pain Level (0-10 Scale): 2/10  Post Pain Level (0-10):  0/10    Radiology Discharge instructions for Steroid Injection    Activity Level:     Do not do any heavy activity or exercise for 24 hours.   Do not drive for 4 hours after your injection.  Diet:   Return to your normal diet.  Medications:   If you have stopped taking your Aspirin, Coumadin/Warfarin, Ibuprofen, or any   other blood thinner for this procedure you may resume in the morning unless   your primary care provider has given you other instructions.    Diabetics may see an increase in blood sugar after steroid injections. If you are concerned about your blood sugar, please contact your family doctor.    Site Care:  Remove the bandage and bathe or shower the morning after the procedure.      Please allow two weeks to experience improvement in your pain.  If you have any further issues, please contact your provider.    Call your Primary Care Provider if you have the following (if your primary care provider is not available please seek emergency care):   Nausea with vomiting   Severe headache   Drowsiness or confusion   Redness or drainage at the injection or puncture site   Temperature over 101 degrees F   Other concerns   Worsening back pain   Stiff neck

## 2020-01-25 DIAGNOSIS — M17.10 ARTHRITIS OF KNEE: ICD-10-CM

## 2020-01-25 DIAGNOSIS — M16.11 PRIMARY OSTEOARTHRITIS OF RIGHT HIP: ICD-10-CM

## 2020-01-29 NOTE — TELEPHONE ENCOUNTER
LaMAPAP 500 MG tablet  st visit date with prescribing provider: 1-8-2020  Last refill date: 5-  Quantity: 120, Refills: 3    Taryn Bueno LPN      Next 5 appointments (look out 90 days)    Feb 06, 2020 10:30 AM CST  (Arrive by 10:15 AM)  Return Visit with Deidre Mishra DPM  [facility] ([facility] ) [address] [place] [address] [phone]   Apr 10, 2020 11:00 AM CDT  (Arrive by 10:45 AM)  SHORT with Bisi Stuart MD  [facility] [address] [place] [address] [id number] [phone]

## 2020-01-30 RX ORDER — PSEUDOEPHED/ACETAMINOPH/DIPHEN 30MG-500MG
TABLET ORAL
Qty: 120 TABLET | Refills: 3 | Status: SHIPPED | OUTPATIENT
Start: 2020-01-30 | End: 2020-08-11

## 2020-02-06 ENCOUNTER — OFFICE VISIT (OUTPATIENT)
Dept: PODIATRY | Facility: OTHER | Age: 85
End: 2020-02-06
Attending: PODIATRIST
Payer: COMMERCIAL

## 2020-02-06 VITALS
TEMPERATURE: 96.5 F | WEIGHT: 152 LBS | DIASTOLIC BLOOD PRESSURE: 69 MMHG | HEART RATE: 61 BPM | BODY MASS INDEX: 23.04 KG/M2 | SYSTOLIC BLOOD PRESSURE: 127 MMHG | OXYGEN SATURATION: 97 % | HEIGHT: 68 IN

## 2020-02-06 DIAGNOSIS — E11.9 DIABETES MELLITUS TYPE 2, NONINSULIN DEPENDENT (H): ICD-10-CM

## 2020-02-06 DIAGNOSIS — L85.3 XEROSIS OF SKIN: ICD-10-CM

## 2020-02-06 DIAGNOSIS — E11.42 DIABETIC POLYNEUROPATHY ASSOCIATED WITH TYPE 2 DIABETES MELLITUS (H): ICD-10-CM

## 2020-02-06 DIAGNOSIS — L60.3 ONYCHODYSTROPHY: Primary | ICD-10-CM

## 2020-02-06 PROCEDURE — G0463 HOSPITAL OUTPT CLINIC VISIT: HCPCS

## 2020-02-06 PROCEDURE — G0463 HOSPITAL OUTPT CLINIC VISIT: HCPCS | Mod: 25

## 2020-02-06 PROCEDURE — 99213 OFFICE O/P EST LOW 20 MIN: CPT | Mod: 25 | Performed by: PODIATRIST

## 2020-02-06 PROCEDURE — 11721 DEBRIDE NAIL 6 OR MORE: CPT | Performed by: PODIATRIST

## 2020-02-06 ASSESSMENT — MIFFLIN-ST. JEOR: SCORE: 1343.97

## 2020-02-06 ASSESSMENT — PAIN SCALES - GENERAL: PAINLEVEL: NO PAIN (0)

## 2020-02-06 NOTE — PROGRESS NOTES
"Chief complaint: Patient presents with:  Toenail: trimming      History of Present Illness: This 85 year old NIDDM male is seen for follow-up management of elongated toenails.     His last appointment was 10 weeks ago, but he doesn't think his nails grow a lot and he thinks he can extend his appointments. One toenail has been catching on his sock(s). He gets some tingling in his toes at night and admits to occasional numbness in his feet. It has been difficult for him to trim and reach his toenails. He says he has a bad knee and a bad hip but he has never had a knee or hip replacement. He is wondering why his nails grow so thick. No further pedal complaints today. Last HbA1C was down to 6.3% from 01/08/2020 from 7.1% on 08/26/2019.      Wt Readings from Last 4 Encounters:   02/06/20 68.9 kg (152 lb)   01/08/20 70.3 kg (155 lb)   11/21/19 68.9 kg (152 lb)   09/10/19 72.2 kg (159 lb 3.2 oz)         /69 (BP Location: Left arm, Patient Position: Sitting, Cuff Size: Adult Regular)   Pulse 61   Temp 96.5  F (35.8  C) (Tympanic)   Ht 1.727 m (5' 8\")   Wt 68.9 kg (152 lb)   SpO2 97%   BMI 23.11 kg/m      Patient Active Problem List   Diagnosis     ACP (advance care planning)     Other specified hypothyroidism     Chronic systolic congestive heart failure (H)     Benign essential hypertension     Hyperlipidemia LDL goal <70     Bilateral carotid artery stenosis     Coronary artery disease involving coronary bypass graft of native heart without angina pectoris     Controlled type 2 diabetes mellitus without complication, without long-term current use of insulin (H)     Type 2 diabetes mellitus without complication, without long-term current use of insulin (H)     Primary osteoarthritis of left knee     Encounter for diabetic foot exam (H)     Neuropathy     Trochanteric bursitis of right hip     Gastroesophageal reflux disease, esophagitis presence not specified     Primary osteoarthritis of right hip     " Bilateral primary osteoarthritis of knee      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)       Past Surgical History:   Procedure Laterality Date     AS CABG, ARTERY-VEIN, FIVE  11/02/2011    off pump       Current Outpatient Medications   Medication     ACETAMINOPHEN EXTRA STRENGTH 500 MG tablet     ASPIRIN PO     atorvastatin (LIPITOR) 20 MG tablet     blood glucose monitoring (ACCU-CHEK KEYANNA) test strip     glipiZIDE (GLUCOTROL XL) 2.5 MG 24 hr tablet     levothyroxine (SYNTHROID/LEVOTHROID) 150 MCG tablet     lisinopril (PRINIVIL/ZESTRIL) 2.5 MG tablet     metFORMIN (GLUCOPHAGE-XR) 500 MG 24 hr tablet     metoprolol succinate ER (TOPROL-XL) 50 MG 24 hr tablet     nitroglycerin (NITROSTAT) 0.4 MG sublingual tablet     ranitidine (ZANTAC) 300 MG tablet     vitamin D (ERGOCALCIFEROL) 12986 UNIT capsule     No current facility-administered medications for this visit.         No Known Allergies    Family History   Problem Relation Age of Onset     Cerebrovascular Disease Father      Coronary Artery Disease Father      Dementia Mother      Coronary Artery Disease Sister      Lung Cancer Sister      Thyroid Disease Daughter        Social History     Socioeconomic History     Marital status:      Spouse name: Luis     Number of children: 5     Years of education: 12     Highest education level: Not on file   Occupational History     Occupation:      Employer: RETIRED   Social Needs     Financial resource strain: Not on file     Food insecurity:     Worry: Not on file     Inability: Not on file     Transportation needs:     Medical: Not on file     Non-medical: Not on file   Tobacco Use     Smoking status: Never Smoker     Smokeless tobacco: Never Used     Tobacco comment: second hand smoke in the house 40 yrs   Substance and Sexual Activity     Alcohol use: No     Alcohol/week: 0.0 oz     Drug use: No     Sexual activity: Never     Partners: Female   Lifestyle     Physical  activity:     Days per week: Not on file     Minutes per session: Not on file     Stress: Not on file   Relationships     Social connections:     Talks on phone: Not on file     Gets together: Not on file     Attends Restorationism service: Not on file     Active member of club or organization: Not on file     Attends meetings of clubs or organizations: Not on file     Relationship status: Not on file     Intimate partner violence:     Fear of current or ex partner: Not on file     Emotionally abused: Not on file     Physically abused: Not on file     Forced sexual activity: Not on file   Other Topics Concern      Service No     Blood Transfusions Yes     Comment: Ok to recieve      Caffeine Concern Yes     Comment: 3 cups daily      Occupational Exposure Not Asked     Hobby Hazards Not Asked     Sleep Concern Not Asked     Stress Concern Not Asked     Weight Concern Not Asked     Special Diet Not Asked     Back Care Not Asked     Exercise Yes     Comment: walking,       Bike Helmet Not Asked     Seat Belt Yes     Self-Exams Not Asked     Parent/sibling w/ CABG, MI or angioplasty before 65F 55M? Not Asked   Social History Narrative     Not on file       ROS: 10 point ROS neg other than the symptoms noted above in the HPI.  EXAM  Constitutional: healthy, alert and no distress    Psychiatric: mentation appears normal and affect normal/bright    VASCULAR:  -Dorsalis pedis pulse +1/4 b/l  -Posterior tibial pulse +1/4 b/l  -Capillary refill time < 3 seconds to b/l hallux  -Hair growth Absent to b/l anterior legs and ankles  NEURO:  -Protective sensation intact with SWM +8/10 RIGHT and +8/10 LEFT on 02/06/2020  -Protective sensation intact with SWM +10/10 RIGHT and +10/10 LEFT on 11/21/2019  -Protective sensation intact with SWM +7/10 RIGHT and +7/10 LEFT on 09/10/2019  -Light touch sensation intact to b/l plantar forefoot  DERM:  -Skin mildly dry, flaking and cool in temperature to bilateral  feet  -Toenails elongated, thickened, dystrophic and discolored with subungual debris x 10  MSK:  -Muscle strength of ankles +5/5 for dorsiflexion, plantarflexion, ABDUction and ADDuction b/l    ============================================================    ASSESSMENT:  (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 Treatment options discussed with no educational barriers noted.  -Nails debrided x 10 without incident  -DM foot exam for patient with sensation in feet  -Diabetic Foot Education provided. This included checking the feet daily looking for new new blisters or wounds, wearing shoes at all times when walking including around the house, and avoiding lotion application between the toes. Any sign of infection in the foot warrant's the patient presenting to the ED as soon as possible.  -Patient in agreement with the above treatment plan and all of patient's questions were answered.      RTC 3 months for diabetic foot exam and high risk nail debridement        Deidre Mishra DPM

## 2020-02-06 NOTE — NURSING NOTE
"Chief Complaint   Patient presents with     Toenail     trimming       Initial /69 (BP Location: Left arm, Patient Position: Sitting, Cuff Size: Adult Regular)   Pulse 61   Temp 96.5  F (35.8  C) (Tympanic)   Ht 1.727 m (5' 8\")   Wt 68.9 kg (152 lb)   SpO2 97%   BMI 23.11 kg/m   Estimated body mass index is 23.11 kg/m  as calculated from the following:    Height as of this encounter: 1.727 m (5' 8\").    Weight as of this encounter: 68.9 kg (152 lb).  Medication Reconciliation: complete  Denisha Barber LPN  "

## 2020-02-29 DIAGNOSIS — E11.9 TYPE 2 DIABETES MELLITUS WITHOUT COMPLICATION, WITHOUT LONG-TERM CURRENT USE OF INSULIN (H): ICD-10-CM

## 2020-02-29 DIAGNOSIS — I10 BENIGN ESSENTIAL HYPERTENSION: ICD-10-CM

## 2020-03-02 RX ORDER — LISINOPRIL 2.5 MG/1
TABLET ORAL
Qty: 90 TABLET | Refills: 1 | Status: SHIPPED | OUTPATIENT
Start: 2020-03-02 | End: 2020-08-31

## 2020-03-14 PROBLEM — E11.65 TYPE 2 DIABETES MELLITUS WITH HYPERGLYCEMIA, WITHOUT LONG-TERM CURRENT USE OF INSULIN (H): Status: ACTIVE | Noted: 2017-01-05

## 2020-04-05 DIAGNOSIS — E03.8 OTHER SPECIFIED HYPOTHYROIDISM: ICD-10-CM

## 2020-04-06 DIAGNOSIS — I25.810 CORONARY ARTERY DISEASE INVOLVING CORONARY BYPASS GRAFT OF NATIVE HEART WITHOUT ANGINA PECTORIS: ICD-10-CM

## 2020-04-06 DIAGNOSIS — E03.8 OTHER SPECIFIED HYPOTHYROIDISM: ICD-10-CM

## 2020-04-06 RX ORDER — METOPROLOL SUCCINATE 50 MG/1
TABLET, EXTENDED RELEASE ORAL
Qty: 90 TABLET | Refills: 1 | Status: SHIPPED | OUTPATIENT
Start: 2020-04-06 | End: 2020-10-05

## 2020-04-06 RX ORDER — LEVOTHYROXINE SODIUM 150 UG/1
TABLET ORAL
Qty: 90 TABLET | Refills: 0 | Status: SHIPPED | OUTPATIENT
Start: 2020-04-06 | End: 2020-04-06

## 2020-04-06 RX ORDER — LEVOTHYROXINE SODIUM 150 UG/1
TABLET ORAL
Qty: 90 TABLET | Refills: 0 | OUTPATIENT
Start: 2020-04-06

## 2020-04-06 RX ORDER — LEVOTHYROXINE SODIUM 150 UG/1
TABLET ORAL
Qty: 90 TABLET | Refills: 0 | Status: SHIPPED | OUTPATIENT
Start: 2020-04-06 | End: 2020-06-30

## 2020-04-06 NOTE — TELEPHONE ENCOUNTER
SYNTHROID      Last Written Prescription Date:  01/08/2020  Last Fill Quantity: 90,   # refills: 0  Last Office Visit: 02/06/2020

## 2020-04-08 ENCOUNTER — VIRTUAL VISIT (OUTPATIENT)
Dept: FAMILY MEDICINE | Facility: OTHER | Age: 85
End: 2020-04-08
Attending: FAMILY MEDICINE
Payer: COMMERCIAL

## 2020-04-08 DIAGNOSIS — K21.9 GASTROESOPHAGEAL REFLUX DISEASE, ESOPHAGITIS PRESENCE NOT SPECIFIED: ICD-10-CM

## 2020-04-08 DIAGNOSIS — M17.0 BILATERAL PRIMARY OSTEOARTHRITIS OF KNEE: ICD-10-CM

## 2020-04-08 DIAGNOSIS — E11.42 DIABETIC POLYNEUROPATHY ASSOCIATED WITH TYPE 2 DIABETES MELLITUS (H): Primary | ICD-10-CM

## 2020-04-08 DIAGNOSIS — E03.8 OTHER SPECIFIED HYPOTHYROIDISM: ICD-10-CM

## 2020-04-08 DIAGNOSIS — E78.5 HYPERLIPIDEMIA LDL GOAL <70: ICD-10-CM

## 2020-04-08 DIAGNOSIS — E11.65 TYPE 2 DIABETES MELLITUS WITH HYPERGLYCEMIA, WITHOUT LONG-TERM CURRENT USE OF INSULIN (H): ICD-10-CM

## 2020-04-08 PROCEDURE — 99214 OFFICE O/P EST MOD 30 MIN: CPT | Mod: 95 | Performed by: FAMILY MEDICINE

## 2020-04-08 NOTE — PROGRESS NOTES
"Subjective     Nori Looney is a 86 year old male who is being evaluated via a billable telephone visit.      The patient has been notified of following:     \"This telephone visit will be conducted via a call between you and your physician/provider. We have found that certain health care needs can be provided without the need for a physical exam.  This service lets us provide the care you need with a short phone conversation.  If a prescription is necessary we can send it directly to your pharmacy.  If lab work is needed we can place an order for that and you can then stop by our lab to have the test done at a later time.    Telephone visits are billed at different rates depending on your insurance coverage. During this emergency period, for some insurers they may be billed the same as an in-person visit.  Please reach out to your insurance provider with any questions.    If during the course of the call the physician/provider feels a telephone visit is not appropriate, you will not be charged for this service.\"    Patient has given verbal consent for Telephone visit?  Yes    Nori Looney complains of No chief complaint on file.      ALLERGIES  Patient has no known allergies.    Diabetes Follow-up      How often are you checking your blood sugar? Not at all    What concerns do you have today about your diabetes? None     Do you have any of these symptoms? (Select all that apply)  No numbness or tingling in feet.  No redness, sores or blisters on feet.  No complaints of excessive thirst.  No reports of blurry vision.  No significant changes to weight.    Have you had a diabetic eye exam in the last 12 months? No                Hyperlipidemia Follow-Up      Are you regularly taking any medication or supplement to lower your cholesterol?   Yes- lipitor    Are you having muscle aches or other side effects that you think could be caused by your cholesterol lowering medication?  No    Hypertension Follow-up      Do you " check your blood pressure regularly outside of the clinic? Yes     Are you following a low salt diet? Yes    Are your blood /pressures ever more than 140 on the top number (systolic)    than 90 on the bottom number (diastolic), for example 140/90? No    BP Readings from Last 2 Encounters:   02/06/20 127/69   01/08/20 138/70     Hemoglobin A1C (%)   Date Value   01/08/2020 6.3 (H)   08/26/2019 7.1 (H)     LDL Cholesterol Calculated (mg/dL)   Date Value   01/08/2020 73   11/08/2018 79       Hypothyroidism Follow-up      Since last visit, patient describes the following symptoms: Weight stable, no hair loss, no skin changes, no constipation, no loose stools      How many servings of fruits and vegetables do you eat daily?  0-1    On average, how many sweetened beverages do you drink each day (Examples: soda, juice, sweet tea, etc.  Do NOT count diet or artificially sweetened beverages)?   Pop twice weekly    How many days per week do you exercise enough to make your heart beat faster? 6    How many minutes a day do you exercise enough to make your heart beat faster? 9 or less    How many days per week do you miss taking your medication? 0        Musculoskeletal problem/pain      Duration: years    Description  Location: bilateral knees    Intensity:  moderate, severe    Accompanying signs and symptoms: radiation of pain to lower legs    History  Previous similar problem: YES  Previous evaluation:  x-ray     Precipitating or alleviating factors:  Trauma or overuse: YES  Aggravating factors include: climbing stairs, exercise and overuse    Therapies tried and outcome: rest/inactivity, deep heating rub and steroid injections      Patient Active Problem List   Diagnosis     ACP (advance care planning)     Other specified hypothyroidism     Chronic systolic congestive heart failure (H)     Benign essential hypertension     Hyperlipidemia LDL goal <70     Bilateral carotid artery stenosis     Coronary artery disease  involving coronary bypass graft of native heart without angina pectoris     Controlled type 2 diabetes mellitus without complication, without long-term current use of insulin (H)     Type 2 diabetes mellitus with hyperglycemia, without long-term current use of insulin (H)     Primary osteoarthritis of left knee     Encounter for diabetic foot exam (H)     Neuropathy     Trochanteric bursitis of right hip     Gastroesophageal reflux disease, esophagitis presence not specified     Primary osteoarthritis of right hip     Bilateral primary osteoarthritis of knee      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)     Past Surgical History:   Procedure Laterality Date     AS CABG, ARTERY-VEIN, FIVE  11/02/2011    off pump       Social History     Tobacco Use     Smoking status: Never Smoker     Smokeless tobacco: Never Used     Tobacco comment: second hand smoke in the house 40 yrs   Substance Use Topics     Alcohol use: No     Alcohol/week: 0.0 standard drinks     Family History   Problem Relation Age of Onset     Cerebrovascular Disease Father      Coronary Artery Disease Father      Dementia Mother      Coronary Artery Disease Sister      Lung Cancer Sister      Thyroid Disease Daughter          Current Outpatient Medications   Medication Sig Dispense Refill     ACETAMINOPHEN EXTRA STRENGTH 500 MG tablet Take 2 Tabs by mouth two times a day as needed for mild pain. 120 tablet 3     ASPIRIN PO Take 325 mg by mouth daily       atorvastatin (LIPITOR) 20 MG tablet Take 1 Tab by mouth one time a day. Patient due for office visit and labs. 90 tablet 3     blood glucose monitoring (ACCU-CHEK KEYANNA) test strip by In Vitro route 2 times daily .       glipiZIDE (GLUCOTROL XL) 2.5 MG 24 hr tablet Take 1 tablet (2.5 mg) by mouth daily 90 tablet 1     levothyroxine (SYNTHROID/LEVOTHROID) 150 MCG tablet Take 1 Tab by mouth one time a day. 90 tablet 0     lisinopril (ZESTRIL) 2.5 MG tablet Take 1 Tab by mouth  one time a day. 90 tablet 1     metFORMIN (GLUCOPHAGE-XR) 500 MG 24 hr tablet Take 1 tablet (500 mg) by mouth 2 times daily (with meals) 180 tablet 2     metoprolol succinate ER (TOPROL-XL) 50 MG 24 hr tablet Take 1 Tab by mouth one time a day. 90 tablet 1     ranitidine (ZANTAC) 300 MG tablet Take 1 tablet (300 mg) by mouth At Bedtime 90 tablet 1     vitamin D (ERGOCALCIFEROL) 92845 UNIT capsule        nitroglycerin (NITROSTAT) 0.4 MG sublingual tablet For chest pain place 1 tablet under the tongue every 5 minutes for 3 doses. If symptoms persist 5 minutes after 1st dose call 911. (Patient not taking: Reported on 4/8/2020) 25 tablet 0     No Known Allergies  Recent Labs   Lab Test 01/08/20  0857 08/26/19  1057 05/23/19  1352  11/08/18  1100  10/30/17  0856   A1C 6.3* 7.1* 7.1*   < > 7.6*   < > 7.4*   LDL 73  --   --   --  79  --  94   HDL 37*  --   --   --  35*  --  39*   TRIG 90  --   --   --  120  --  89   ALT 19  --   --   --  30  --  36   CR 1.02  --   --   --  1.05   < > 0.81   GFRESTIMATED 66  --   --   --  67   < > >90   GFRESTBLACK 77  --   --   --  81   < > >90   POTASSIUM 4.2  --   --   --  4.3   < > 4.7   TSH 4.42* 3.97  --   --   --    < > 6.03*    < > = values in this interval not displayed.      BP Readings from Last 3 Encounters:   02/06/20 127/69   01/08/20 138/70   11/21/19 121/75    Wt Readings from Last 3 Encounters:   02/06/20 68.9 kg (152 lb)   01/08/20 70.3 kg (155 lb)   11/21/19 68.9 kg (152 lb)                    Reviewed and updated as needed this visit by Provider         Review of Systems   ROS COMP: Constitutional, HEENT, cardiovascular, pulmonary, gi and gu systems are negative, except as otherwise noted.       Objective   Reported vitals:  There were no vitals taken for this visit.       Diagnostic Test Results:  Labs reviewed in Epic  none         Assessment/Plan:  1. 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)  stable    2. Gastroesophageal reflux disease,  esophagitis presence not specified  refilled  - ranitidine (ZANTAC) 300 MG tablet; Take 1 tablet (300 mg) by mouth At Bedtime  Dispense: 90 tablet; Refill: 1    3. Hyperlipidemia LDL goal <70  Will be due for fasting labs   Follow-up 2 mos    4. Other specified hypothyroidism  Follow-up 2 mos    5. Type 2 diabetes mellitus with hyperglycemia, without long-term current use of insulin (H)  Follow-up 2 mos for labs    6. Bilateral primary osteoarthritis of knee   Using cream or foam OTC which gives him some relief for a few day but his knees are his biggest complaint for now      No follow-ups on file.      Phone call duration:  11 minutes    Bisi Stuart MD

## 2020-04-23 ENCOUNTER — TELEPHONE (OUTPATIENT)
Dept: FAMILY MEDICINE | Facility: OTHER | Age: 85
End: 2020-04-23

## 2020-04-23 DIAGNOSIS — K21.9 GASTROESOPHAGEAL REFLUX DISEASE, ESOPHAGITIS PRESENCE NOT SPECIFIED: Primary | ICD-10-CM

## 2020-04-23 RX ORDER — FAMOTIDINE 40 MG/1
40 TABLET, FILM COATED ORAL DAILY
Qty: 90 TABLET | Refills: 2 | Status: SHIPPED | OUTPATIENT
Start: 2020-04-23 | End: 2020-10-14

## 2020-04-23 NOTE — TELEPHONE ENCOUNTER
Fax received from pharmacy, ranitidine no longer available. Need alternative. Please advise. Thank you!

## 2020-04-29 DIAGNOSIS — K21.9 GASTROESOPHAGEAL REFLUX DISEASE, ESOPHAGITIS PRESENCE NOT SPECIFIED: ICD-10-CM

## 2020-06-26 DIAGNOSIS — I10 BENIGN ESSENTIAL HYPERTENSION: ICD-10-CM

## 2020-06-26 DIAGNOSIS — E11.9 TYPE 2 DIABETES MELLITUS WITHOUT COMPLICATION, WITHOUT LONG-TERM CURRENT USE OF INSULIN (H): ICD-10-CM

## 2020-06-26 DIAGNOSIS — E03.8 OTHER SPECIFIED HYPOTHYROIDISM: ICD-10-CM

## 2020-06-29 NOTE — TELEPHONE ENCOUNTER
synthroid      Last Written Prescription Date:  4.6.2020  Last Fill Quantity: 90,   # refills: 0  Last Office Visit: 4.8.2020    glipizide      Last Written Prescription Date:  1.8.2020  Last Fill Quantity: 90,   # refills: 1  Last Office Visit: 4.8.2020    lisinopril      Last Written Prescription Date:  3.2.2020  Last Fill Quantity: 90,   # refills: 1  Last Office Visit: 4.8.2020

## 2020-06-30 RX ORDER — GLIPIZIDE 2.5 MG/1
TABLET, EXTENDED RELEASE ORAL
Qty: 90 TABLET | Refills: 1 | Status: SHIPPED | OUTPATIENT
Start: 2020-06-30 | End: 2021-01-18

## 2020-06-30 RX ORDER — LISINOPRIL 2.5 MG/1
TABLET ORAL
Qty: 90 TABLET | Refills: 1 | OUTPATIENT
Start: 2020-06-30

## 2020-07-01 DIAGNOSIS — E11.9 TYPE 2 DIABETES MELLITUS WITHOUT COMPLICATION, WITHOUT LONG-TERM CURRENT USE OF INSULIN (H): ICD-10-CM

## 2020-07-01 DIAGNOSIS — I10 BENIGN ESSENTIAL HYPERTENSION: ICD-10-CM

## 2020-07-01 RX ORDER — LEVOTHYROXINE SODIUM 150 UG/1
TABLET ORAL
Qty: 90 TABLET | Refills: 0 | Status: SHIPPED | OUTPATIENT
Start: 2020-07-01 | End: 2020-07-29

## 2020-07-02 RX ORDER — LISINOPRIL 2.5 MG/1
TABLET ORAL
Qty: 90 TABLET | Refills: 1 | OUTPATIENT
Start: 2020-07-02

## 2020-07-21 NOTE — PROGRESS NOTES
Subjective     Nori Looney is a 86 year old male who presents to clinic today for the following health issues:    HPI       Diabetes Follow-up      How often are you checking your blood sugar? Not at all    What concerns do you have today about your diabetes? None     Do you have any of these symptoms? (Select all that apply)  No numbness or tingling in feet.  No redness, sores or blisters on feet.  No complaints of excessive thirst.  No reports of blurry vision.  No significant changes to weight.    Have you had a diabetic eye exam in the last 12 months? No                Hyperlipidemia Follow-Up      Are you regularly taking any medication or supplement to lower your cholesterol?   Yes- lipitor    Are you having muscle aches or other side effects that you think could be caused by your cholesterol lowering medication?  No    Hypertension Follow-up      Do you check your blood pressure regularly outside of the clinic? No     Are you following a low salt diet? No    Are your blood pressures ever more than 140 on the top number (systolic) OR more   than 90 on the bottom number (diastolic), for example 140/90? No    BP Readings from Last 2 Encounters:   07/29/20 134/62   02/06/20 127/69     Hemoglobin A1C (%)   Date Value   01/08/2020 6.3 (H)   08/26/2019 7.1 (H)     LDL Cholesterol Calculated (mg/dL)   Date Value   01/08/2020 73   11/08/2018 79         How many servings of fruits and vegetables do you eat daily?  0-1    On average, how many sweetened beverages do you drink each day (Examples: soda, juice, sweet tea, etc.  Do NOT count diet or artificially sweetened beverages)?   1    How many days per week do you exercise enough to make your heart beat faster? 7    How many minutes a day do you exercise enough to make your heart beat faster? 60 or more    How many days per week do you miss taking your medication? 0    Musculoskeletal problem/pain      Duration: ongoing    Description  Location: bilateral  knees    Accompanying signs and symptoms: none    History  Previous similar problem: YES  Previous evaluation:  x-ray    Precipitating or alleviating factors:  Trauma or overuse: no   Aggravating factors include: sitting and climbing stairs    Therapies tried and outcome: Steroid injections. Is hoping to get both knees done today as it has been over a year he states.       Patient Active Problem List   Diagnosis     ACP (advance care planning)     Other specified hypothyroidism     Chronic systolic congestive heart failure (H)     Benign essential hypertension     Hyperlipidemia LDL goal <70     Bilateral carotid artery stenosis     Coronary artery disease involving coronary bypass graft of native heart without angina pectoris     Type 2 diabetes mellitus with hyperglycemia, without long-term current use of insulin (H)     Encounter for diabetic foot exam (H)     Trochanteric bursitis of right hip     Gastroesophageal reflux disease, esophagitis presence not specified     Primary osteoarthritis of right hip     Bilateral primary osteoarthritis of knee      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)     Past Surgical History:   Procedure Laterality Date     AS CABG, ARTERY-VEIN, FIVE  11/02/2011    off pump       Social History     Tobacco Use     Smoking status: Never Smoker     Smokeless tobacco: Never Used     Tobacco comment: second hand smoke in the house 40 yrs   Substance Use Topics     Alcohol use: No     Alcohol/week: 0.0 standard drinks     Family History   Problem Relation Age of Onset     Cerebrovascular Disease Father      Coronary Artery Disease Father      Dementia Mother      Coronary Artery Disease Sister      Lung Cancer Sister      Thyroid Disease Daughter          Current Outpatient Medications   Medication Sig Dispense Refill     ACETAMINOPHEN EXTRA STRENGTH 500 MG tablet Take 2 Tabs by mouth two times a day as needed for mild pain. 120 tablet 3     ASPIRIN PO Take 325  mg by mouth daily       atorvastatin (LIPITOR) 20 MG tablet Take 1 Tab by mouth one time a day. Patient due for office visit and labs. 90 tablet 3     blood glucose monitoring (ACCU-CHEK KEYANNA) test strip by In Vitro route 2 times daily .       famotidine (PEPCID) 40 MG tablet Take 1 tablet (40 mg) by mouth daily 90 tablet 2     glipiZIDE (GLUCOTROL XL) 2.5 MG 24 hr tablet Take 1 Tab by mouth one time a day. 90 tablet 1     levothyroxine (SYNTHROID/LEVOTHROID) 150 MCG tablet Take 1 Tab by mouth one time a day. 90 tablet 0     lisinopril (ZESTRIL) 2.5 MG tablet Take 1 Tab by mouth one time a day. 90 tablet 1     metFORMIN (GLUCOPHAGE-XR) 500 MG 24 hr tablet Take 1 tablet (500 mg) by mouth 2 times daily (with meals) 180 tablet 2     metoprolol succinate ER (TOPROL-XL) 50 MG 24 hr tablet Take 1 Tab by mouth one time a day. 90 tablet 1     nitroglycerin (NITROSTAT) 0.4 MG sublingual tablet For chest pain place 1 tablet under the tongue every 5 minutes for 3 doses. If symptoms persist 5 minutes after 1st dose call 911. 25 tablet 0     vitamin D (ERGOCALCIFEROL) 80110 UNIT capsule        No Known Allergies  Recent Labs   Lab Test 01/08/20  0857 08/26/19  1057 05/23/19  1352  11/08/18  1100  10/30/17  0856   A1C 6.3* 7.1* 7.1*   < > 7.6*   < > 7.4*   LDL 73  --   --   --  79  --  94   HDL 37*  --   --   --  35*  --  39*   TRIG 90  --   --   --  120  --  89   ALT 19  --   --   --  30  --  36   CR 1.02  --   --   --  1.05   < > 0.81   GFRESTIMATED 66  --   --   --  67   < > >90   GFRESTBLACK 77  --   --   --  81   < > >90   POTASSIUM 4.2  --   --   --  4.3   < > 4.7   TSH 4.42* 3.97  --   --   --    < > 6.03*    < > = values in this interval not displayed.      BP Readings from Last 3 Encounters:   07/29/20 134/62   02/06/20 127/69   01/08/20 138/70    Wt Readings from Last 3 Encounters:   07/29/20 67.6 kg (149 lb)   02/06/20 68.9 kg (152 lb)   01/08/20 70.3 kg (155 lb)                    Reviewed and updated as needed this  visit by Provider         Review of Systems   Constitutional, HEENT, cardiovascular, pulmonary, gi and gu systems are negative, except as otherwise noted.      Objective    /62 (BP Location: Right arm, Patient Position: Chair, Cuff Size: Adult Regular)   Pulse 61   Temp 96.9  F (36.1  C) (Tympanic)   Resp 20   Wt 67.6 kg (149 lb)   SpO2 98%   BMI 22.66 kg/m    Body mass index is 22.66 kg/m .  Physical Exam   GENERAL: healthy, alert and no distress  NECK: no adenopathy, no asymmetry, masses, or scars and thyroid normal to palpation  RESP: lungs clear to auscultation - no rales, rhonchi or wheezes  CV: regular rate and rhythm, normal S1 S2, no S3 or S4, no murmur, click or rub, no peripheral edema and peripheral pulses strong  ABDOMEN: soft, nontender, no hepatosplenomegaly, no masses and bowel sounds normal  MS: decreased range of motion bilateral knees and arthritic changes apparent  PSYCH: mentation appears normal, affect normal/bright  Diabetic foot exam: normal DP and PT pulses, no ulcerative lesions, normal sensory exam, normal monofilament exam, reduced sensation but on repeat, patient could feel, trophic changes some calluses on toes bilaterally and onychomycosis    Diagnostic Test Results:  Labs reviewed in Epic  Results for orders placed or performed in visit on 07/29/20   Hemoglobin A1c     Status: Abnormal   Result Value Ref Range    Hemoglobin A1C 6.5 (H) 0 - 5.6 %   TSH with free T4 reflex     Status: Abnormal   Result Value Ref Range    TSH 0.21 (L) 0.40 - 4.00 mU/L           Assessment & Plan     (E11.65) Type 2 diabetes mellitus with hyperglycemia, without long-term current use of insulin (H)  (primary encounter diagnosis)  Comment:   Plan: C FOOT EXAM  NO CHARGE        Follow-up 3 mos  Diarrhea is better with just 2 metformin per day, will continue    (E78.5) Hyperlipidemia LDL goal <70  Comment:   Plan: utd    (I10) Benign essential hypertension  Comment:   Plan: stable    (E03.8) Other  specified hypothyroidism  Comment:   Plan: labs today  tsh is low, take 150 sat, Sunday and 137 M-F  Follow-up 3 mos    (M17.10) Primary localized osteoarthrosis of lower leg, unspecified laterality  Comment: hasn't had injections in over one year  Plan: Large Joint/Bursa injection and/or drainage         (Shoulder, Knee), triamcinolone (KENALOG-40)         injection 80 mg, triamcinolone (KENALOG-40)         injection 40 mg, lidocaine 1 % injection 14 mL           Patient was agreeable to this plan and had no further questions.  There are no Patient Instructions on file for this visit.    No follow-ups on file.    Bisi Stuart MD  [facility]

## 2020-07-29 ENCOUNTER — OFFICE VISIT (OUTPATIENT)
Dept: FAMILY MEDICINE | Facility: OTHER | Age: 85
End: 2020-07-29
Attending: FAMILY MEDICINE
Payer: COMMERCIAL

## 2020-07-29 VITALS
HEART RATE: 61 BPM | BODY MASS INDEX: 22.66 KG/M2 | TEMPERATURE: 96.9 F | RESPIRATION RATE: 20 BRPM | WEIGHT: 149 LBS | OXYGEN SATURATION: 98 % | DIASTOLIC BLOOD PRESSURE: 62 MMHG | SYSTOLIC BLOOD PRESSURE: 134 MMHG

## 2020-07-29 DIAGNOSIS — E11.65 TYPE 2 DIABETES MELLITUS WITH HYPERGLYCEMIA, WITHOUT LONG-TERM CURRENT USE OF INSULIN (H): Primary | ICD-10-CM

## 2020-07-29 DIAGNOSIS — E78.5 HYPERLIPIDEMIA LDL GOAL <70: ICD-10-CM

## 2020-07-29 DIAGNOSIS — M17.10 PRIMARY LOCALIZED OSTEOARTHROSIS OF LOWER LEG, UNSPECIFIED LATERALITY: ICD-10-CM

## 2020-07-29 DIAGNOSIS — E03.8 OTHER SPECIFIED HYPOTHYROIDISM: ICD-10-CM

## 2020-07-29 DIAGNOSIS — I10 BENIGN ESSENTIAL HYPERTENSION: ICD-10-CM

## 2020-07-29 LAB
EST. AVERAGE GLUCOSE BLD GHB EST-MCNC: 140 MG/DL
HBA1C MFR BLD: 6.5 % (ref 0–5.6)
T4 FREE SERPL-MCNC: 1.39 NG/DL (ref 0.76–1.46)
TSH SERPL DL<=0.005 MIU/L-ACNC: 0.21 MU/L (ref 0.4–4)

## 2020-07-29 PROCEDURE — 36415 COLL VENOUS BLD VENIPUNCTURE: CPT | Mod: ZL | Performed by: FAMILY MEDICINE

## 2020-07-29 PROCEDURE — 20610 DRAIN/INJ JOINT/BURSA W/O US: CPT | Mod: 50 | Performed by: FAMILY MEDICINE

## 2020-07-29 PROCEDURE — 83036 HEMOGLOBIN GLYCOSYLATED A1C: CPT | Mod: ZL | Performed by: FAMILY MEDICINE

## 2020-07-29 PROCEDURE — 40000788 ZZHCL STATISTIC ESTIMATED AVERAGE GLUCOSE: Mod: ZL | Performed by: FAMILY MEDICINE

## 2020-07-29 PROCEDURE — 84443 ASSAY THYROID STIM HORMONE: CPT | Mod: ZL | Performed by: FAMILY MEDICINE

## 2020-07-29 PROCEDURE — 84439 ASSAY OF FREE THYROXINE: CPT | Mod: ZL | Performed by: FAMILY MEDICINE

## 2020-07-29 PROCEDURE — G0463 HOSPITAL OUTPT CLINIC VISIT: HCPCS | Mod: 25

## 2020-07-29 PROCEDURE — 99214 OFFICE O/P EST MOD 30 MIN: CPT | Mod: 25 | Performed by: FAMILY MEDICINE

## 2020-07-29 PROCEDURE — G0463 HOSPITAL OUTPT CLINIC VISIT: HCPCS

## 2020-07-29 RX ORDER — TRIAMCINOLONE ACETONIDE 40 MG/ML
40 INJECTION, SUSPENSION INTRA-ARTICULAR; INTRAMUSCULAR ONCE
Status: COMPLETED | OUTPATIENT
Start: 2020-07-29 | End: 2020-07-29

## 2020-07-29 RX ORDER — TRIAMCINOLONE ACETONIDE 40 MG/ML
80 INJECTION, SUSPENSION INTRA-ARTICULAR; INTRAMUSCULAR ONCE
Status: COMPLETED | OUTPATIENT
Start: 2020-07-29 | End: 2020-07-29

## 2020-07-29 RX ORDER — LIDOCAINE HYDROCHLORIDE 10 MG/ML
14 INJECTION, SOLUTION INFILTRATION; PERINEURAL ONCE
Status: COMPLETED | OUTPATIENT
Start: 2020-07-29 | End: 2020-07-29

## 2020-07-29 RX ORDER — LEVOTHYROXINE SODIUM 137 UG/1
TABLET ORAL
Qty: 60 TABLET | Refills: 0 | Status: SHIPPED | OUTPATIENT
Start: 2020-07-29 | End: 2020-10-05

## 2020-07-29 RX ORDER — METFORMIN HCL 500 MG
500 TABLET, EXTENDED RELEASE 24 HR ORAL
Qty: 90 TABLET | Refills: 2 | Status: SHIPPED | OUTPATIENT
Start: 2020-07-29 | End: 2020-12-22

## 2020-07-29 RX ORDER — LEVOTHYROXINE SODIUM 150 UG/1
TABLET ORAL
Qty: 90 TABLET | Refills: 0 | COMMUNITY
Start: 2020-07-29 | End: 2020-10-05

## 2020-07-29 RX ADMIN — TRIAMCINOLONE ACETONIDE 80 MG: 40 INJECTION, SUSPENSION INTRA-ARTICULAR; INTRAMUSCULAR at 10:26

## 2020-07-29 RX ADMIN — TRIAMCINOLONE ACETONIDE 40 MG: 40 INJECTION, SUSPENSION INTRA-ARTICULAR; INTRAMUSCULAR at 10:26

## 2020-07-29 RX ADMIN — LIDOCAINE HYDROCHLORIDE 14 ML: 10 INJECTION, SOLUTION INFILTRATION; PERINEURAL at 10:20

## 2020-07-29 ASSESSMENT — ANXIETY QUESTIONNAIRES
3. WORRYING TOO MUCH ABOUT DIFFERENT THINGS: NOT AT ALL
2. NOT BEING ABLE TO STOP OR CONTROL WORRYING: NOT AT ALL
7. FEELING AFRAID AS IF SOMETHING AWFUL MIGHT HAPPEN: NOT AT ALL
GAD7 TOTAL SCORE: 0
5. BEING SO RESTLESS THAT IT IS HARD TO SIT STILL: NOT AT ALL
6. BECOMING EASILY ANNOYED OR IRRITABLE: NOT AT ALL
1. FEELING NERVOUS, ANXIOUS, OR ON EDGE: NOT AT ALL

## 2020-07-29 ASSESSMENT — PATIENT HEALTH QUESTIONNAIRE - PHQ9
SUM OF ALL RESPONSES TO PHQ QUESTIONS 1-9: 1
5. POOR APPETITE OR OVEREATING: NOT AT ALL

## 2020-07-29 ASSESSMENT — PAIN SCALES - GENERAL: PAINLEVEL: MODERATE PAIN (5)

## 2020-07-29 NOTE — NURSING NOTE
"Chief Complaint   Patient presents with     Diabetes       Initial /62 (BP Location: Right arm, Patient Position: Chair, Cuff Size: Adult Regular)   Pulse 61   Temp 96.9  F (36.1  C) (Tympanic)   Resp 20   Wt 67.6 kg (149 lb)   SpO2 98%   BMI 22.66 kg/m   Estimated body mass index is 22.66 kg/m  as calculated from the following:    Height as of 2/6/20: 1.727 m (5' 8\").    Weight as of this encounter: 67.6 kg (149 lb).  Medication Reconciliation: complete  Jeanne Mckeon LPN    "

## 2020-07-30 ASSESSMENT — ANXIETY QUESTIONNAIRES: GAD7 TOTAL SCORE: 0

## 2020-08-08 DIAGNOSIS — M17.10 ARTHRITIS OF KNEE: ICD-10-CM

## 2020-08-08 DIAGNOSIS — M16.11 PRIMARY OSTEOARTHRITIS OF RIGHT HIP: ICD-10-CM

## 2020-08-10 NOTE — TELEPHONE ENCOUNTER
ACETAMINOPHEN EXTRA STRENGTH 500 MG tablet       Last Written Prescription Date:  1/30/2020  Last Fill Quantity: 120,   # refills: 3  Last Office Visit: 7/29/2020  Future Office visit:    Next 5 appointments (look out 90 days)    Oct 30, 2020  9:30 AM CDT  (Arrive by 9:15 AM)  SHORT with Bisi Stuart MD  [facility] ([facility] ) [address]  [place] [address]  [phone]

## 2020-08-11 RX ORDER — PSEUDOEPHED/ACETAMINOPH/DIPHEN 30MG-500MG
TABLET ORAL
Qty: 120 TABLET | Refills: 6 | Status: SHIPPED | OUTPATIENT
Start: 2020-08-11 | End: 2021-05-03

## 2020-08-25 ENCOUNTER — TRANSFERRED RECORDS (OUTPATIENT)
Dept: HEALTH INFORMATION MANAGEMENT | Facility: CLINIC | Age: 85
End: 2020-08-25

## 2020-08-25 LAB — RETINOPATHY: NEGATIVE

## 2020-08-30 DIAGNOSIS — I10 BENIGN ESSENTIAL HYPERTENSION: ICD-10-CM

## 2020-08-30 DIAGNOSIS — E11.9 TYPE 2 DIABETES MELLITUS WITHOUT COMPLICATION, WITHOUT LONG-TERM CURRENT USE OF INSULIN (H): ICD-10-CM

## 2020-08-31 RX ORDER — LISINOPRIL 2.5 MG/1
TABLET ORAL
Qty: 90 TABLET | Refills: 1 | Status: SHIPPED | OUTPATIENT
Start: 2020-08-31 | End: 2021-01-06

## 2020-08-31 NOTE — TELEPHONE ENCOUNTER
lisinopril      Last Written Prescription Date:  3/2/20  Last Fill Quantity: 90,   # refills: 1  Last Office Visit: 7/29/20  Future Office visit:    Next 5 appointments (look out 90 days)    Oct 30, 2020  9:30 AM CDT  (Arrive by 9:15 AM)  SHORT with Bisi Stuart MD  [facility] ([facility] ) [address]  [place] [address]  [phone]           Routing refill request to provider for review/approval because:  Drug not on the [facility], [facility] or [facility] refill protocol or controlled substance

## 2020-10-03 DIAGNOSIS — I25.810 CORONARY ARTERY DISEASE INVOLVING CORONARY BYPASS GRAFT OF NATIVE HEART WITHOUT ANGINA PECTORIS: ICD-10-CM

## 2020-10-03 DIAGNOSIS — E03.8 OTHER SPECIFIED HYPOTHYROIDISM: ICD-10-CM

## 2020-10-05 RX ORDER — METOPROLOL SUCCINATE 50 MG/1
TABLET, EXTENDED RELEASE ORAL
Qty: 90 TABLET | Refills: 3 | Status: SHIPPED | OUTPATIENT
Start: 2020-10-05 | End: 2021-10-01

## 2020-10-05 RX ORDER — LEVOTHYROXINE SODIUM 137 UG/1
TABLET ORAL
Qty: 60 TABLET | Refills: 3 | Status: SHIPPED | OUTPATIENT
Start: 2020-10-05 | End: 2021-07-12

## 2020-10-05 RX ORDER — LEVOTHYROXINE SODIUM 150 UG/1
TABLET ORAL
Qty: 90 TABLET | Refills: 3 | Status: SHIPPED | OUTPATIENT
Start: 2020-10-05 | End: 2021-02-01

## 2020-10-05 NOTE — TELEPHONE ENCOUNTER
Metoprolol      Last Written Prescription Date:  4/06/2020  Last Fill Quantity: 90,   # refills: 1  Last Office Visit: 7/29/2020      Levothyroxine       Last Written Prescription Date:  7/29/2020  Last Fill Quantity: 60,   # refills: 0  Last Office Visit: 7/29/2020  Future Office visit:          levothyroxine    150  Last Written Prescription Date:  7/29/2020  Last Fill Quantity: 90,   # refills: 0  Last Office Visit: 7/29/2020  Future Office visit:      Future Office visit:    Next 5 appointments (look out 90 days)    Oct 30, 2020  9:30 AM  (Arrive by 9:15 AM)  SHORT with Bisi Stuart MD  [facility] ([facility] [place] ) [address]  [place] [address]  [phone]

## 2020-10-10 DIAGNOSIS — E78.5 HYPERLIPIDEMIA LDL GOAL <70: ICD-10-CM

## 2020-10-10 DIAGNOSIS — K21.9 GASTROESOPHAGEAL REFLUX DISEASE, UNSPECIFIED WHETHER ESOPHAGITIS PRESENT: Primary | ICD-10-CM

## 2020-10-13 NOTE — TELEPHONE ENCOUNTER
Pepcid       Last Written Prescription Date:  4/23/2020  Last Fill Quantity: 90,   # refills: 2    Lipitor       Last Written Prescription Date:  2/7/2020  Last Fill Quantity: 90,   # refills: 3  Last Office Visit: 7/29/2020  Future Office visit:    Next 5 appointments (look out 90 days)    Oct 30, 2020  9:30 AM  (Arrive by 9:15 AM)  SHORT with Bisi Stuart MD  [facility] ([facility] [place] ) [address]  [place] [address]  [phone]

## 2020-10-14 RX ORDER — ATORVASTATIN CALCIUM 20 MG/1
TABLET, FILM COATED ORAL
Qty: 90 TABLET | Refills: 3 | Status: SHIPPED | OUTPATIENT
Start: 2020-10-14 | End: 2021-10-01

## 2020-10-14 RX ORDER — FAMOTIDINE 40 MG/1
TABLET, FILM COATED ORAL
Qty: 90 TABLET | Refills: 2 | Status: SHIPPED | OUTPATIENT
Start: 2020-10-14 | End: 2021-07-02

## 2020-10-21 NOTE — PROGRESS NOTES
"Subjective     Nori Looney is a 86 year old male who presents to clinic today for the following health issues:    HPI         Diabetes Follow-up      How often are you checking your blood sugar? Not at all    What concerns do you have today about your diabetes? None     Do you have any of these symptoms? (Select all that apply)  Weight loss              Hyperlipidemia Follow-Up      Are you regularly taking any medication or supplement to lower your cholesterol?   Yes- lipitor    Are you having muscle aches or other side effects that you think could be caused by your cholesterol lowering medication?  No    Hypertension Follow-up      Do you check your blood pressure regularly outside of the clinic? No     Are you following a low salt diet? Yes    Are your blood pressures ever more than 140 on the top number (systolic) OR more   than 90 on the bottom number (diastolic), for example 140/90? 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      Objective    /58   Pulse 60   Temp 98  F (36.7  C)   Ht 1.727 m (5' 8\")   Wt 67.1 kg (148 lb)   SpO2 98%   BMI 22.50 kg/m    Body mass index is 22.5 kg/m .  Physical Exam   GENERAL: healthy, alert and no distress  NECK: no adenopathy, no asymmetry, masses, or scars and thyroid normal to palpation  RESP: lungs clear to auscultation - no rales, rhonchi or wheezes  CV: regular " rate and rhythm, normal S1 S2, no S3 or S4, no murmur, click or rub, no peripheral edema and peripheral pulses strong  ABDOMEN: soft, nontender, no hepatosplenomegaly, no masses and bowel sounds normal  MS: decreased range of motion right hip, bilateral knees obviously arthritic  PSYCH: mentation appears normal, affect normal/bright    Results for orders placed or performed in visit on 10/30/20   Lipid Profile (Chol, Trig, HDL, LDL calc)     Status: None   Result Value Ref Range    Cholesterol 113 <200 mg/dL    Triglycerides 119 <150 mg/dL    HDL Cholesterol 42 >39 mg/dL    LDL Cholesterol Calculated 47 <100 mg/dL    Non HDL Cholesterol 71 <130 mg/dL   Comprehensive metabolic panel     Status: Abnormal   Result Value Ref Range    Sodium 139 133 - 144 mmol/L    Potassium 4.5 3.4 - 5.3 mmol/L    Chloride 109 94 - 109 mmol/L    Carbon Dioxide 27 20 - 32 mmol/L    Anion Gap 3 3 - 14 mmol/L    Glucose 128 (H) 70 - 99 mg/dL    Urea Nitrogen 20 7 - 30 mg/dL    Creatinine 1.06 0.66 - 1.25 mg/dL    GFR Estimate 63 >60 mL/min/[1.73_m2]    GFR Estimate If Black 73 >60 mL/min/[1.73_m2]    Calcium 8.9 8.5 - 10.1 mg/dL    Bilirubin Total 0.6 0.2 - 1.3 mg/dL    Albumin 3.8 3.4 - 5.0 g/dL    Protein Total 6.9 6.8 - 8.8 g/dL    Alkaline Phosphatase 95 40 - 150 U/L    ALT 21 0 - 70 U/L    AST 14 0 - 45 U/L   TSH with free T4 reflex     Status: None   Result Value Ref Range    TSH 0.63 0.40 - 4.00 mU/L   Hemoglobin A1c     Status: Abnormal   Result Value Ref Range    Hemoglobin A1C 6.4 (H) 0 - 5.6 %   Estimated Average Glucose     Status: None   Result Value Ref Range    Estimated Average Glucose 137 mg/dL           Assessment & Plan     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)  stable    Type 2 diabetes mellitus with hyperglycemia, without long-term current use of insulin (H)  Follow-up 3 mos, doing well  - Albumin Random Urine Quantitative with Creat Ratio; Future  - Comprehensive metabolic panel;  Future  - Lipid Profile (Chol, Trig, HDL, LDL calc); Future    Hyperlipidemia LDL goal <70  Labs improved  - Comprehensive metabolic panel; Future  - Lipid Profile (Chol, Trig, HDL, LDL calc); Future    Other specified hypothyroidism  Finally stable, maintain current dose    Gastroesophageal reflux disease, unspecified whether esophagitis present  Stable on meds    Chronic systolic congestive heart failure (H)  No concerns, managed with medication    Benign essential hypertension  Doing well, refill meds    Need for prophylactic vaccination and inoculation against influenza    - FLUZONE HIGH DOSE 65+  [90082]  - ADMIN INFLUENZA (For MEDICARE Patients ONLY) []    Chronic bilateral low back pain without sciatica  If hip injection doesn't help, consider xray and possible MRI for injection sthere  - XR LUMBAR SPINE 2/3 VIEWS (Clinic Performed); Future    Primary osteoarthritis of right hip  Willing to try another injection  - XR Joint Injection Major Right; Future      Patient was agreeable to this plan and had no further questions.  There are no Patient Instructions on file for this visit.    Return in about 3 months (around 1/30/2021) for Follow up, with me/dm2/htn/lipid/joints -- non-fasting.    Bisi Stuart MD  [facility] [place]

## 2020-10-30 ENCOUNTER — OFFICE VISIT (OUTPATIENT)
Dept: FAMILY MEDICINE | Facility: OTHER | Age: 85
End: 2020-10-30
Attending: FAMILY MEDICINE
Payer: COMMERCIAL

## 2020-10-30 VITALS
OXYGEN SATURATION: 98 % | DIASTOLIC BLOOD PRESSURE: 58 MMHG | BODY MASS INDEX: 22.43 KG/M2 | SYSTOLIC BLOOD PRESSURE: 118 MMHG | HEART RATE: 60 BPM | HEIGHT: 68 IN | TEMPERATURE: 98 F | WEIGHT: 148 LBS

## 2020-10-30 DIAGNOSIS — G89.29 CHRONIC BILATERAL LOW BACK PAIN WITHOUT SCIATICA: ICD-10-CM

## 2020-10-30 DIAGNOSIS — M54.50 CHRONIC BILATERAL LOW BACK PAIN WITHOUT SCIATICA: ICD-10-CM

## 2020-10-30 DIAGNOSIS — I50.22 CHRONIC SYSTOLIC CONGESTIVE HEART FAILURE (H): ICD-10-CM

## 2020-10-30 DIAGNOSIS — E03.8 OTHER SPECIFIED HYPOTHYROIDISM: ICD-10-CM

## 2020-10-30 DIAGNOSIS — Z23 NEED FOR PROPHYLACTIC VACCINATION AND INOCULATION AGAINST INFLUENZA: ICD-10-CM

## 2020-10-30 DIAGNOSIS — I10 BENIGN ESSENTIAL HYPERTENSION: ICD-10-CM

## 2020-10-30 DIAGNOSIS — E78.5 HYPERLIPIDEMIA LDL GOAL <70: ICD-10-CM

## 2020-10-30 DIAGNOSIS — E11.65 TYPE 2 DIABETES MELLITUS WITH HYPERGLYCEMIA, WITHOUT LONG-TERM CURRENT USE OF INSULIN (H): ICD-10-CM

## 2020-10-30 DIAGNOSIS — K21.9 GASTROESOPHAGEAL REFLUX DISEASE, UNSPECIFIED WHETHER ESOPHAGITIS PRESENT: ICD-10-CM

## 2020-10-30 DIAGNOSIS — E11.42 DIABETIC POLYNEUROPATHY ASSOCIATED WITH TYPE 2 DIABETES MELLITUS (H): Primary | ICD-10-CM

## 2020-10-30 DIAGNOSIS — M16.11 PRIMARY OSTEOARTHRITIS OF RIGHT HIP: ICD-10-CM

## 2020-10-30 LAB
ALBUMIN SERPL-MCNC: 3.8 G/DL (ref 3.4–5)
ALP SERPL-CCNC: 95 U/L (ref 40–150)
ALT SERPL W P-5'-P-CCNC: 21 U/L (ref 0–70)
ANION GAP SERPL CALCULATED.3IONS-SCNC: 3 MMOL/L (ref 3–14)
AST SERPL W P-5'-P-CCNC: 14 U/L (ref 0–45)
BILIRUB SERPL-MCNC: 0.6 MG/DL (ref 0.2–1.3)
BUN SERPL-MCNC: 20 MG/DL (ref 7–30)
CALCIUM SERPL-MCNC: 8.9 MG/DL (ref 8.5–10.1)
CHLORIDE SERPL-SCNC: 109 MMOL/L (ref 94–109)
CHOLEST SERPL-MCNC: 113 MG/DL
CO2 SERPL-SCNC: 27 MMOL/L (ref 20–32)
CREAT SERPL-MCNC: 1.06 MG/DL (ref 0.66–1.25)
EST. AVERAGE GLUCOSE BLD GHB EST-MCNC: 137 MG/DL
GFR SERPL CREATININE-BSD FRML MDRD: 63 ML/MIN/{1.73_M2}
GLUCOSE SERPL-MCNC: 128 MG/DL (ref 70–99)
HBA1C MFR BLD: 6.4 % (ref 0–5.6)
HDLC SERPL-MCNC: 42 MG/DL
LDLC SERPL CALC-MCNC: 47 MG/DL
NONHDLC SERPL-MCNC: 71 MG/DL
POTASSIUM SERPL-SCNC: 4.5 MMOL/L (ref 3.4–5.3)
PROT SERPL-MCNC: 6.9 G/DL (ref 6.8–8.8)
SODIUM SERPL-SCNC: 139 MMOL/L (ref 133–144)
TRIGL SERPL-MCNC: 119 MG/DL
TSH SERPL DL<=0.005 MIU/L-ACNC: 0.63 MU/L (ref 0.4–4)

## 2020-10-30 PROCEDURE — 84443 ASSAY THYROID STIM HORMONE: CPT | Mod: ZL | Performed by: FAMILY MEDICINE

## 2020-10-30 PROCEDURE — 90662 IIV NO PRSV INCREASED AG IM: CPT

## 2020-10-30 PROCEDURE — 99214 OFFICE O/P EST MOD 30 MIN: CPT | Performed by: FAMILY MEDICINE

## 2020-10-30 PROCEDURE — 36415 COLL VENOUS BLD VENIPUNCTURE: CPT | Mod: ZL | Performed by: FAMILY MEDICINE

## 2020-10-30 PROCEDURE — 80061 LIPID PANEL: CPT | Mod: ZL | Performed by: FAMILY MEDICINE

## 2020-10-30 PROCEDURE — G0463 HOSPITAL OUTPT CLINIC VISIT: HCPCS | Mod: 25

## 2020-10-30 PROCEDURE — 83036 HEMOGLOBIN GLYCOSYLATED A1C: CPT | Mod: ZL | Performed by: FAMILY MEDICINE

## 2020-10-30 PROCEDURE — 80053 COMPREHEN METABOLIC PANEL: CPT | Mod: ZL | Performed by: FAMILY MEDICINE

## 2020-10-30 PROCEDURE — 999N001182 HC STATISTIC ESTIMATED AVERAGE GLUCOSE: Mod: ZL | Performed by: FAMILY MEDICINE

## 2020-10-30 ASSESSMENT — PAIN SCALES - GENERAL: PAINLEVEL: MODERATE PAIN (5)

## 2020-10-30 ASSESSMENT — MIFFLIN-ST. JEOR: SCORE: 1325.82

## 2020-10-30 NOTE — NURSING NOTE
"Chief Complaint   Patient presents with     Diabetes       Initial /58   Pulse 60   Temp 98  F (36.7  C)   Ht 1.727 m (5' 8\")   Wt 67.1 kg (148 lb)   SpO2 98%   BMI 22.50 kg/m   Estimated body mass index is 22.5 kg/m  as calculated from the following:    Height as of this encounter: 1.727 m (5' 8\").    Weight as of this encounter: 67.1 kg (148 lb).  Medication Reconciliation: arturo Bryan  "

## 2020-11-13 ENCOUNTER — HOSPITAL ENCOUNTER (OUTPATIENT)
Dept: INTERVENTIONAL RADIOLOGY/VASCULAR | Facility: HOSPITAL | Age: 85
End: 2020-11-13
Attending: FAMILY MEDICINE
Payer: COMMERCIAL

## 2020-11-13 ENCOUNTER — HOSPITAL ENCOUNTER (OUTPATIENT)
Facility: HOSPITAL | Age: 85
Discharge: HOME | End: 2020-11-13
Attending: RADIOLOGY | Admitting: RADIOLOGY
Payer: COMMERCIAL

## 2020-11-13 DIAGNOSIS — M16.11 PRIMARY OSTEOARTHRITIS OF RIGHT HIP: ICD-10-CM

## 2020-11-13 PROCEDURE — 77002 NEEDLE LOCALIZATION BY XRAY: CPT

## 2020-11-13 PROCEDURE — 255N000002 HC RX 255 OP 636: Performed by: RADIOLOGY

## 2020-11-13 PROCEDURE — 250N000009 HC RX 250: Performed by: RADIOLOGY

## 2020-11-13 PROCEDURE — 250N000011 HC RX IP 250 OP 636: Performed by: RADIOLOGY

## 2020-11-13 RX ORDER — LIDOCAINE HYDROCHLORIDE 10 MG/ML
INJECTION, SOLUTION EPIDURAL; INFILTRATION; INTRACAUDAL; PERINEURAL
Status: DISCONTINUED
Start: 2020-11-13 | End: 2020-11-14 | Stop reason: HOSPADM

## 2020-11-13 RX ORDER — METHYLPREDNISOLONE ACETATE 80 MG/ML
80 INJECTION, SUSPENSION INTRA-ARTICULAR; INTRALESIONAL; INTRAMUSCULAR; SOFT TISSUE ONCE
Status: COMPLETED | OUTPATIENT
Start: 2020-11-13 | End: 2020-11-13

## 2020-11-13 RX ORDER — METHYLPREDNISOLONE ACETATE 80 MG/ML
INJECTION, SUSPENSION INTRA-ARTICULAR; INTRALESIONAL; INTRAMUSCULAR; SOFT TISSUE
Status: DISCONTINUED
Start: 2020-11-13 | End: 2020-11-14 | Stop reason: HOSPADM

## 2020-11-13 RX ORDER — IOPAMIDOL 612 MG/ML
50 INJECTION, SOLUTION INTRAVASCULAR ONCE
Status: COMPLETED | OUTPATIENT
Start: 2020-11-13 | End: 2020-11-13

## 2020-11-13 RX ADMIN — LIDOCAINE HYDROCHLORIDE 4 ML: 10 INJECTION, SOLUTION EPIDURAL; INFILTRATION; INTRACAUDAL; PERINEURAL at 13:30

## 2020-11-13 RX ADMIN — IOPAMIDOL 6 ML: 612 INJECTION, SOLUTION INTRAVENOUS at 13:30

## 2020-11-13 RX ADMIN — METHYLPREDNISOLONE ACETATE 80 MG: 80 INJECTION, SUSPENSION INTRA-ARTICULAR; INTRALESIONAL; INTRAMUSCULAR; SOFT TISSUE at 13:30

## 2020-11-13 NOTE — DISCHARGE INSTRUCTIONS
Home number on file [phone] (home) or Cell number on file:    Telephone Information:   Mobile [phone]     Is it ok to leave a message at this number(s)? Yes    Dr. Ortiz completed your procedure on 11/13/2020.    Current Pain Level (0-10 Scale): 6/10  Post Pain Level (0-10):  4/10    Radiology Discharge instructions for Steroid Injection    Activity Level:     Do not do any heavy activity or exercise for 24 hours.   Do not drive for 4 hours after your injection.  Diet:   Return to your normal diet.  Medications:   If you have stopped taking your Aspirin, Coumadin/Warfarin, Ibuprofen, or any   other blood thinner for this procedure you may resume in the morning unless   your primary care provider has given you other instructions.    Diabetics may see an increase in blood sugar after steroid injections. If you are concerned about your blood sugar, please contact your family doctor.    Site Care:  Remove the bandage and bathe or shower the morning after the procedure.      Please allow two weeks to experience improvement in your pain.  If you have any further issues, please contact your provider.    Call your Primary Care Provider if you have the following (if your primary care provider is not available please seek emergency care):   Nausea with vomiting   Severe headache   Drowsiness or confusion   Redness or drainage at the injection or puncture site   Temperature over 101 degrees F   Other concerns   Worsening back pain   Stiff neck

## 2020-11-13 NOTE — IP AVS SNAPSHOT
[facility]  [address] [address]  Phone: [phone]  Fax: [phone]                                    After Visit Summary   11/13/2020    Nori Looney    MRN: [id number]           After Visit Summary Signature Page    I have received my discharge instructions, and my questions have been answered. I have discussed any challenges I see with this plan with the nurse or doctor.    ..........................................................................................................................................  Patient/Patient Representative Signature      ..........................................................................................................................................  Patient Representative Print Name and Relationship to Patient    ..................................................               ................................................  Date                                   Time    ..........................................................................................................................................  Reviewed by Signature/Title    ...................................................              ..............................................  Date                                               Time          22EPIC Rev 08/18

## 2020-11-17 DIAGNOSIS — E11.65 TYPE 2 DIABETES MELLITUS WITH HYPERGLYCEMIA, WITHOUT LONG-TERM CURRENT USE OF INSULIN (H): Primary | ICD-10-CM

## 2020-11-27 ENCOUNTER — TELEPHONE (OUTPATIENT)
Dept: FAMILY MEDICINE | Facility: OTHER | Age: 85
End: 2020-11-27

## 2020-11-27 DIAGNOSIS — E11.65 TYPE 2 DIABETES MELLITUS WITH HYPERGLYCEMIA, WITHOUT LONG-TERM CURRENT USE OF INSULIN (H): ICD-10-CM

## 2020-12-21 DIAGNOSIS — E11.65 TYPE 2 DIABETES MELLITUS WITH HYPERGLYCEMIA, WITHOUT LONG-TERM CURRENT USE OF INSULIN (H): ICD-10-CM

## 2020-12-21 NOTE — TELEPHONE ENCOUNTER
metFORMIN (GLUCOPHAGE-XR) 500 MG 24 hr tablet    Last Written Prescription Date:  7/29/20  Last Fill Quantity: 90,   # refills: 2  Last Office Visit: 10/3/20  Future Office visit:    Next 5 appointments (look out 90 days)    Feb 01, 2021 10:00 AM  (Arrive by 9:45 AM)  SHORT with Bisi Stuart MD  [facility] [place] ([facility] ) [address]  [place] [address]  [phone]           Routing refill request to provider for review/approval

## 2020-12-22 RX ORDER — METFORMIN HCL 500 MG
TABLET, EXTENDED RELEASE 24 HR ORAL
Qty: 180 TABLET | Refills: 0 | Status: SHIPPED | OUTPATIENT
Start: 2020-12-22 | End: 2021-01-06

## 2020-12-22 NOTE — TELEPHONE ENCOUNTER
Biguanide Agents Weredi7612/21/2020 12:57 PM   Patient does NOT have a diagnosis of CHF.     Pt of .    Please note that current Epic order for Metformin is for once daily and pharmacy request is for BID.    Please advise on dose.      Irma Preston RN

## 2021-01-05 DIAGNOSIS — E11.65 TYPE 2 DIABETES MELLITUS WITH HYPERGLYCEMIA, WITHOUT LONG-TERM CURRENT USE OF INSULIN (H): ICD-10-CM

## 2021-01-05 DIAGNOSIS — E11.9 TYPE 2 DIABETES MELLITUS WITHOUT COMPLICATION, WITHOUT LONG-TERM CURRENT USE OF INSULIN (H): ICD-10-CM

## 2021-01-05 DIAGNOSIS — I10 BENIGN ESSENTIAL HYPERTENSION: ICD-10-CM

## 2021-01-06 RX ORDER — METFORMIN HCL 500 MG
TABLET, EXTENDED RELEASE 24 HR ORAL
Qty: 180 TABLET | Refills: 0 | Status: SHIPPED | OUTPATIENT
Start: 2021-01-06 | End: 2021-04-05

## 2021-01-06 RX ORDER — LISINOPRIL 2.5 MG/1
TABLET ORAL
Qty: 90 TABLET | Refills: 1 | Status: SHIPPED | OUTPATIENT
Start: 2021-01-06 | End: 2021-07-02

## 2021-01-06 NOTE — TELEPHONE ENCOUNTER
metformin      Last Written Prescription Date:  12/22/2020, just got   Last Fill Quantity: 180,   # refills: 0  Last Office Visit: 10/30/2020  Future Office visit:    Next 5 appointments (look out 90 days)    Feb 01, 2021 10:00 AM  (Arrive by 9:45 AM)  SHORT with Bisi Stuart MD  [facility] [place] ([facility] [place] ) [address]  [place] [address]  [phone]         lisinopril      Last Written Prescription Date:  8/31/2020  Last Fill Quantity: 90,   # refills: 1  Last Office Visit: 10/20/2020  Future Office visit:    Next 5 appointments (look out 90 days)    Feb 01, 2021 10:00 AM  (Arrive by 9:45 AM)  SHORT with Bisi Stuart MD  [facility] [place] ([facility] [place] ) [address]  [place] [address]  [phone]

## 2021-01-16 DIAGNOSIS — E11.9 TYPE 2 DIABETES MELLITUS WITHOUT COMPLICATION, WITHOUT LONG-TERM CURRENT USE OF INSULIN (H): ICD-10-CM

## 2021-01-18 RX ORDER — GLIPIZIDE 2.5 MG/1
TABLET, EXTENDED RELEASE ORAL
Qty: 90 TABLET | Refills: 0 | Status: SHIPPED | OUTPATIENT
Start: 2021-01-18 | End: 2021-04-05

## 2021-01-29 NOTE — PROGRESS NOTES
Assessment & Plan     Other specified hypothyroidism  Corrected sigs  Recheck today  - levothyroxine (SYNTHROID/LEVOTHROID) 150 MCG tablet; Take 1 Tab by mouth one time a day on Saturday and Sunday only  - TSH with free T4 reflex  - Hemoglobin A1c    Type 2 diabetes mellitus with hyperglycemia, without long-term current use of insulin (H)  Labs today, doing well  - Albumin Random Urine Quantitative with Creat Ratio  - Estimated Average Glucose    Hyperlipidemia LDL goal <70  stable    Benign essential hypertension  Looks great    DDD (degenerative disc disease), lumbar  Would like to try some patches, get xr lumbar today that was supposed to be done last visit  Discussed possible ESIs pending results as his hip injection only lasted 1 month  - lidocaine (LIDODERM) 5 % patch; Place 1 patch onto the skin every 24 hours To prevent lidocaine toxicity, patient should be patch free for 12 hrs daily.    Allergic arthritis of right hip    - lidocaine (LIDODERM) 5 % patch; Place 1 patch onto the skin every 24 hours To prevent lidocaine toxicity, patient should be patch free for 12 hrs daily.           Patient was agreeable to this plan and had no further questions.  There are no Patient Instructions on file for this visit.    No follow-ups on file.    Bisi Stuart MD  [facility] [place] Julio is a 86 year old who presents to clinic today for the following health issues  accompanied by his daughter:    HPI       Diabetes Follow-up      How often are you checking your blood sugar? Not at all    What concerns do you have today about your diabetes? None     Do you have any of these symptoms? (Select all that apply)  No numbness or tingling in feet.  No redness, sores or blisters on feet.  No complaints of excessive thirst.  No reports of blurry vision.  No significant changes to weight.              Hyperlipidemia Follow-Up      Are you regularly taking any medication or supplement to  lower your cholesterol?   Yes- Lipitor 20mg    Are you having muscle aches or other side effects that you think could be caused by your cholesterol lowering medication?  No    Hypertension Follow-up      Do you check your blood pressure regularly outside of the clinic? No     Are you following a low salt diet? Yes    Are your blood pressures ever more than 140 on the top number (systolic) OR more   than 90 on the bottom number (diastolic), for example 140/90? No    BP Readings from Last 2 Encounters:   10/30/20 118/58   07/29/20 134/62     Hemoglobin A1C (%)   Date Value   10/30/2020 6.4 (H)   07/29/2020 6.5 (H)     LDL Cholesterol Calculated (mg/dL)   Date Value   10/30/2020 47   01/08/2020 73         How many servings of fruits and vegetables do you eat daily?  0-1    On average, how many sweetened beverages do you drink each day (Examples: soda, juice, sweet tea, etc.  Do NOT count diet or artificially sweetened beverages)?   1    How many days per week do you exercise enough to make your heart beat faster? 7    How many minutes a day do you exercise enough to make your heart beat faster? 20 - 29    How many days per week do you miss taking your medication? 0    Musculoskeletal problem/pain  Onset/Duration: 3 months  Description  Location: Hip - right  Joint Swelling: no  Redness: no  Pain: YES- 5/10  Warmth: no  Intensity:  moderate  Progression of Symptoms:  worsening  Accompanying signs and symptoms:   Fevers: no  Numbness/tingling/weakness: YES- goes down leg to knee  History  Trauma to the area: no  Recent illness:  no  Previous similar problem: YES  Previous evaluation:  YES- Xray   Precipitating or alleviating factors:  Aggravating factors include: sitting, standing, walking, climbing stairs, exercise and overuse  Therapies tried and outcome: acetaminophen and cortisone injections      Review of Systems   Constitutional, HEENT, cardiovascular, pulmonary, gi and gu systems are negative, except as otherwise  "noted.      Objective    /66 (BP Location: Left arm, Patient Position: Sitting, Cuff Size: Adult Large)   Pulse 55   Temp 97.4  F (36.3  C) (Tympanic)   Ht 1.727 m (5' 8\")   Wt 66.2 kg (146 lb)   SpO2 94%   BMI 22.20 kg/m    There is no height or weight on file to calculate BMI.  Physical Exam   GENERAL: healthy, alert and no distress  NECK: no adenopathy, no asymmetry, masses, or scars and thyroid normal to palpation  RESP: lungs clear to auscultation - no rales, rhonchi or wheezes  CV: regular rate and rhythm, normal S1 S2, no S3 or S4, no murmur, click or rub, no peripheral edema and peripheral pulses strong  ABDOMEN: soft, nontender, no hepatosplenomegaly, no masses and bowel sounds normal  MS: no gross musculoskeletal defects noted, no edema  PSYCH: mentation appears normal, affect normal/bright    Results for orders placed or performed in visit on 02/01/21   XR LUMBAR SPINE 2/3 VIEWS (Clinic Performed)     Status: None    Narrative    PROCEDURE: XR LUMBAR SPINE 2-3 VIEWS 2/1/2021 11:46 AM    HISTORY: Chronic bilateral low back pain without sciatica; Chronic  bilateral low back pain without sciatica    COMPARISONS: None.    TECHNIQUE: AP and lateral    FINDINGS: 6 degenerative changes are seen in the lumbar facet joints  at L4-L5 and L5-S1. There is spondylolisthesis of L5 on S1 with  forward slippage by approximately 5 mm. The lumbar disks are normal in  height. There is some mild wedging and degenerative osteophytes in the  lower thoracic spine most likely on the basis of Scheuermann's  disease.         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

## 2021-02-01 ENCOUNTER — ANCILLARY PROCEDURE (OUTPATIENT)
Dept: GENERAL RADIOLOGY | Facility: OTHER | Age: 86
End: 2021-02-01
Attending: FAMILY MEDICINE
Payer: COMMERCIAL

## 2021-02-01 ENCOUNTER — OFFICE VISIT (OUTPATIENT)
Dept: FAMILY MEDICINE | Facility: OTHER | Age: 86
End: 2021-02-01
Attending: FAMILY MEDICINE
Payer: COMMERCIAL

## 2021-02-01 VITALS
DIASTOLIC BLOOD PRESSURE: 66 MMHG | WEIGHT: 146 LBS | BODY MASS INDEX: 22.13 KG/M2 | HEART RATE: 55 BPM | OXYGEN SATURATION: 94 % | SYSTOLIC BLOOD PRESSURE: 112 MMHG | TEMPERATURE: 97.4 F | HEIGHT: 68 IN

## 2021-02-01 DIAGNOSIS — E78.5 HYPERLIPIDEMIA LDL GOAL <70: ICD-10-CM

## 2021-02-01 DIAGNOSIS — G89.29 CHRONIC BILATERAL LOW BACK PAIN WITHOUT SCIATICA: ICD-10-CM

## 2021-02-01 DIAGNOSIS — M54.50 CHRONIC BILATERAL LOW BACK PAIN WITHOUT SCIATICA: ICD-10-CM

## 2021-02-01 DIAGNOSIS — E11.65 TYPE 2 DIABETES MELLITUS WITH HYPERGLYCEMIA, WITHOUT LONG-TERM CURRENT USE OF INSULIN (H): Primary | ICD-10-CM

## 2021-02-01 DIAGNOSIS — M13.851 ALLERGIC ARTHRITIS OF RIGHT HIP: ICD-10-CM

## 2021-02-01 DIAGNOSIS — E03.8 OTHER SPECIFIED HYPOTHYROIDISM: ICD-10-CM

## 2021-02-01 DIAGNOSIS — I10 BENIGN ESSENTIAL HYPERTENSION: ICD-10-CM

## 2021-02-01 DIAGNOSIS — M51.369 DDD (DEGENERATIVE DISC DISEASE), LUMBAR: ICD-10-CM

## 2021-02-01 LAB
CREAT UR-MCNC: 162 MG/DL
EST. AVERAGE GLUCOSE BLD GHB EST-MCNC: 128 MG/DL
HBA1C MFR BLD: 6.1 % (ref 0–5.6)
MICROALBUMIN UR-MCNC: 9 MG/L
MICROALBUMIN/CREAT UR: 5.5 MG/G CR (ref 0–17)
TSH SERPL DL<=0.005 MIU/L-ACNC: 0.46 MU/L (ref 0.4–4)

## 2021-02-01 PROCEDURE — 72100 X-RAY EXAM L-S SPINE 2/3 VWS: CPT | Mod: TC

## 2021-02-01 PROCEDURE — 36416 COLLJ CAPILLARY BLOOD SPEC: CPT | Mod: ZL | Performed by: FAMILY MEDICINE

## 2021-02-01 PROCEDURE — 84443 ASSAY THYROID STIM HORMONE: CPT | Mod: ZL | Performed by: FAMILY MEDICINE

## 2021-02-01 PROCEDURE — 36415 COLL VENOUS BLD VENIPUNCTURE: CPT | Mod: ZL | Performed by: FAMILY MEDICINE

## 2021-02-01 PROCEDURE — 82043 UR ALBUMIN QUANTITATIVE: CPT | Mod: ZL | Performed by: FAMILY MEDICINE

## 2021-02-01 PROCEDURE — G0463 HOSPITAL OUTPT CLINIC VISIT: HCPCS | Mod: 25

## 2021-02-01 PROCEDURE — 99214 OFFICE O/P EST MOD 30 MIN: CPT | Performed by: FAMILY MEDICINE

## 2021-02-01 PROCEDURE — 999N001182 HC STATISTIC ESTIMATED AVERAGE GLUCOSE: Mod: ZL | Performed by: FAMILY MEDICINE

## 2021-02-01 PROCEDURE — 83036 HEMOGLOBIN GLYCOSYLATED A1C: CPT | Mod: ZL | Performed by: FAMILY MEDICINE

## 2021-02-01 RX ORDER — CHOLECALCIFEROL (VITAMIN D3) 50 MCG
TABLET ORAL DAILY
COMMUNITY

## 2021-02-01 RX ORDER — LIDOCAINE 50 MG/G
1 PATCH TOPICAL EVERY 24 HOURS
Qty: 30 PATCH | Refills: 3 | Status: SHIPPED | OUTPATIENT
Start: 2021-02-01 | End: 2021-05-03

## 2021-02-01 RX ORDER — LEVOTHYROXINE SODIUM 150 UG/1
TABLET ORAL
Qty: 90 TABLET | Refills: 3 | COMMUNITY
Start: 2021-02-01 | End: 2021-10-01

## 2021-02-01 ASSESSMENT — PAIN SCALES - GENERAL: PAINLEVEL: MODERATE PAIN (5)

## 2021-02-01 ASSESSMENT — MIFFLIN-ST. JEOR: SCORE: 1316.75

## 2021-02-01 NOTE — NURSING NOTE
"Chief Complaint   Patient presents with     Diabetes     Lipids     Hypertension       Initial /66 (BP Location: Left arm, Patient Position: Sitting, Cuff Size: Adult Large)   Pulse 55   Temp 97.4  F (36.3  C) (Tympanic)   Ht 1.727 m (5' 8\")   Wt 66.2 kg (146 lb)   SpO2 94%   BMI 22.20 kg/m   Estimated body mass index is 22.2 kg/m  as calculated from the following:    Height as of this encounter: 1.727 m (5' 8\").    Weight as of this encounter: 66.2 kg (146 lb).  Medication Reconciliation: complete  Jennifer Mckeon LPN  "

## 2021-02-09 ENCOUNTER — IMMUNIZATION (OUTPATIENT)
Dept: FAMILY MEDICINE | Facility: OTHER | Age: 86
End: 2021-02-09
Attending: FAMILY MEDICINE
Payer: COMMERCIAL

## 2021-02-09 PROCEDURE — 91300 PR COVID VAC PFIZER DIL RECON 30 MCG/0.3 ML IM: CPT

## 2021-03-02 ENCOUNTER — IMMUNIZATION (OUTPATIENT)
Dept: FAMILY MEDICINE | Facility: OTHER | Age: 86
End: 2021-03-02
Attending: FAMILY MEDICINE
Payer: COMMERCIAL

## 2021-03-02 PROCEDURE — 91300 PR COVID VAC PFIZER DIL RECON 30 MCG/0.3 ML IM: CPT

## 2021-04-05 DIAGNOSIS — E11.65 TYPE 2 DIABETES MELLITUS WITH HYPERGLYCEMIA, WITHOUT LONG-TERM CURRENT USE OF INSULIN (H): ICD-10-CM

## 2021-04-05 DIAGNOSIS — E11.9 TYPE 2 DIABETES MELLITUS WITHOUT COMPLICATION, WITHOUT LONG-TERM CURRENT USE OF INSULIN (H): ICD-10-CM

## 2021-04-05 RX ORDER — METFORMIN HCL 500 MG
TABLET, EXTENDED RELEASE 24 HR ORAL
Qty: 180 TABLET | Refills: 0 | Status: SHIPPED | OUTPATIENT
Start: 2021-04-05 | End: 2021-07-02

## 2021-04-05 RX ORDER — GLIPIZIDE 2.5 MG/1
TABLET, EXTENDED RELEASE ORAL
Qty: 90 TABLET | Refills: 0 | Status: SHIPPED | OUTPATIENT
Start: 2021-04-05 | End: 2021-05-03

## 2021-04-05 NOTE — TELEPHONE ENCOUNTER
METFORMIN      Last Written Prescription Date:  1-6-2021  Last Fill Quantity: 180,   # refills: 0  Last Office Visit: 2-1-2021  Future Office visit:    Next 5 appointments (look out 90 days)    May 03, 2021 10:00 AM  (Arrive by 9:45 AM)  SHORT with Bisi Stuart MD  [facility] ([facility] ) [address]  [place] [address]  [phone]           Routing refill request to provider for review/approval because:          GLIPIZIDE      Last Written Prescription Date:  1-  Last Fill Quantity: 90,   # refills: 0  Last Office Visit: 2-1-2021  Future Office visit:    Next 5 appointments (look out 90 days)    May 03, 2021 10:00 AM  (Arrive by 9:45 AM)  SHORT with Bisi Stuart MD  [facility] [place] ([facility] [place] ) [address]  [place] [address]  [phone]           Routing refill request to provider for review/approval because:

## 2021-04-27 NOTE — PROGRESS NOTES
Assessment & Plan     Benign essential hypertension  stable    Hyperlipidemia LDL goal <70  Come fasting next visit    Type 2 diabetes mellitus with hyperglycemia, without long-term current use of insulin (H)  Follow-up 4 mos  Stop glipizide and continue metformin bid  - Hemoglobin A1c; Standing    Primary osteoarthritis of right hip    - acetaminophen (ACETAMINOPHEN EXTRA STRENGTH) 500 MG tablet; Take 2 tablets (1,000 mg) by mouth 2 times daily    Arthritis of knee    - acetaminophen (ACETAMINOPHEN EXTRA STRENGTH) 500 MG tablet; Take 2 tablets (1,000 mg) by mouth 2 times daily    Chronic systolic congestive heart failure (H)  due  - Echocardiogram Complete; Future    Bilateral primary osteoarthritis of knee     - hylan (SYNVISC ONE) injection 48 mg  - hylan (SYNVISC ONE) injection 48 mg    Primary localized osteoarthrosis of lower leg, unspecified laterality  Tolerated well, did have some purple discoloration from local anesthetic in right knee advised them to watch it and if worsens or doesn't resolve to come back in  - hylan (SYNVISC ONE) injection 48 mg  - lidocaine 1 % 6.5 mL  - triamcinolone (KENALOG-40) injection 120 mg  - Large Joint/Bursa injection and/or drainage (Shoulder, Knee)  - hylan (SYNVISC ONE) injection 48 mg  - triamcinolone (KENALOG-40) injection 80 mg  - triamcinolone (KENALOG-40) injection 40 mg  - lidocaine 1 % injection 12 mL        Patient was agreeable to this plan and had no further questions.  There are no Patient Instructions on file for this visit.    No follow-ups on file.    Bisi Stuart MD  [facility] [place] Julio is a 87 year old who presents for the following health issues     HPI     Diabetes Follow-up      How often are you checking your blood sugar? Not at all    What concerns do you have today about your diabetes? None     Do you have any of these symptoms? (Select all that apply)  No numbness or tingling in feet.  No redness, sores or  blisters on feet.  No complaints of excessive thirst.  No reports of blurry vision.  No significant changes to weight.              Hyperlipidemia Follow-Up      Are you regularly taking any medication or supplement to lower your cholesterol?   Yes- lipitor    Are you having muscle aches or other side effects that you think could be caused by your cholesterol lowering medication?  No    Hypertension Follow-up      Do you check your blood pressure regularly outside of the clinic? No     Are you following a low salt diet? Yes    Are your blood pressures ever more than 140 on the top number (systolic) OR more   than 90 on the bottom number (diastolic), for example 140/90? Yes    BP Readings from Last 2 Encounters:   05/03/21 (!) 142/68   02/01/21 112/66     Hemoglobin A1C (%)   Date Value   02/01/2021 6.1 (H)   10/30/2020 6.4 (H)     LDL Cholesterol Calculated (mg/dL)   Date Value   10/30/2020 47   01/08/2020 73       Chronic Pain Follow-Up    Where in your body do you have pain? Bilateral knee pain and right hip   How has your pain affected your ability to work? Not applicable  Which of these pain treatments have you tried since your last clinic visit? Other: lidocaine patches which did not work much. tylenol two tablets in am and one at night.  How well are you sleeping? Fair  How has your mood been since your last visit? About the same  Have you had a significant life event? No  Other aggravating factors: prolonged sitting and and then getting up  Taking medication as directed? Yes    PHQ-9 SCORE 1/8/2020 7/29/2020 5/3/2021   PHQ-9 Total Score 1 1 1     QIAN-7 SCORE 1/8/2020 7/29/2020 5/3/2021   Total Score 1 0 0     No flowsheet data found.  Encounter-Level CSA:    There are no encounter-level csa.     Patient-Level CSA:    There are no patient-level csa.         How many servings of fruits and vegetables do you eat daily?  2-3    On average, how many sweetened beverages do you drink each day (Examples: soda, juice,  sweet tea, etc.  Do NOT count diet or artificially sweetened beverages)?   0    How many days per week do you exercise enough to make your heart beat faster? 7    How many minutes a day do you exercise enough to make your heart beat faster? 60 or more    How many days per week do you miss taking your medication? 0      Review of Systems   Constitutional, HEENT, cardiovascular, pulmonary, gi and gu systems are negative, except as otherwise noted.      Objective    BP (!) 142/68 (BP Location: Right arm, Patient Position: Chair, Cuff Size: Adult Regular)   Pulse 64   Temp 96.5  F (35.8  C) (Tympanic)   Resp 20   Wt 67.4 kg (148 lb 9.6 oz)   SpO2 98%   BMI 22.59 kg/m    Body mass index is 22.59 kg/m .  Physical Exam   GENERAL: healthy, alert and no distress  NECK: no adenopathy, no asymmetry, masses, or scars and thyroid normal to palpation  RESP: lungs clear to auscultation - no rales, rhonchi or wheezes  CV: regular rate and rhythm, normal S1 S2, no S3 or S4, no murmur, click or rub, no peripheral edema and peripheral pulses strong  ABDOMEN: soft, nontender, no hepatosplenomegaly, no masses and bowel sounds normal  MS: decreased range of motion and arthritic changes bilateral knees  PSYCH: mentation appears normal, affect normal/bright    Results for orders placed or performed in visit on 05/03/21   Hemoglobin A1c     Status: Abnormal   Result Value Ref Range    Hemoglobin A1C 6.3 (H) 0 - 5.6 %   TSH with free T4 reflex     Status: None   Result Value Ref Range    TSH 0.77 0.40 - 4.00 mU/L   Estimated Average Glucose     Status: None   Result Value Ref Range    Estimated Average Glucose 134 mg/dL

## 2021-05-03 ENCOUNTER — OFFICE VISIT (OUTPATIENT)
Dept: FAMILY MEDICINE | Facility: OTHER | Age: 86
End: 2021-05-03
Attending: FAMILY MEDICINE
Payer: COMMERCIAL

## 2021-05-03 VITALS
HEART RATE: 64 BPM | DIASTOLIC BLOOD PRESSURE: 68 MMHG | SYSTOLIC BLOOD PRESSURE: 142 MMHG | TEMPERATURE: 96.5 F | BODY MASS INDEX: 22.59 KG/M2 | RESPIRATION RATE: 20 BRPM | WEIGHT: 148.6 LBS | OXYGEN SATURATION: 98 %

## 2021-05-03 DIAGNOSIS — I50.22 CHRONIC SYSTOLIC CONGESTIVE HEART FAILURE (H): ICD-10-CM

## 2021-05-03 DIAGNOSIS — E03.8 OTHER SPECIFIED HYPOTHYROIDISM: ICD-10-CM

## 2021-05-03 DIAGNOSIS — I10 BENIGN ESSENTIAL HYPERTENSION: Primary | ICD-10-CM

## 2021-05-03 DIAGNOSIS — M17.0 BILATERAL PRIMARY OSTEOARTHRITIS OF KNEE: ICD-10-CM

## 2021-05-03 DIAGNOSIS — E11.65 TYPE 2 DIABETES MELLITUS WITH HYPERGLYCEMIA, WITHOUT LONG-TERM CURRENT USE OF INSULIN (H): ICD-10-CM

## 2021-05-03 DIAGNOSIS — M16.11 PRIMARY OSTEOARTHRITIS OF RIGHT HIP: ICD-10-CM

## 2021-05-03 DIAGNOSIS — M17.10 PRIMARY LOCALIZED OSTEOARTHROSIS OF LOWER LEG, UNSPECIFIED LATERALITY: ICD-10-CM

## 2021-05-03 DIAGNOSIS — M17.10 ARTHRITIS OF KNEE: ICD-10-CM

## 2021-05-03 DIAGNOSIS — E78.5 HYPERLIPIDEMIA LDL GOAL <70: ICD-10-CM

## 2021-05-03 LAB
EST. AVERAGE GLUCOSE BLD GHB EST-MCNC: 134 MG/DL
HBA1C MFR BLD: 6.3 % (ref 0–5.6)
TSH SERPL DL<=0.005 MIU/L-ACNC: 0.77 MU/L (ref 0.4–4)

## 2021-05-03 PROCEDURE — G0463 HOSPITAL OUTPT CLINIC VISIT: HCPCS | Mod: 25

## 2021-05-03 PROCEDURE — 99214 OFFICE O/P EST MOD 30 MIN: CPT | Mod: 25 | Performed by: FAMILY MEDICINE

## 2021-05-03 PROCEDURE — 20610 DRAIN/INJ JOINT/BURSA W/O US: CPT | Performed by: FAMILY MEDICINE

## 2021-05-03 PROCEDURE — 36416 COLLJ CAPILLARY BLOOD SPEC: CPT | Mod: ZL | Performed by: FAMILY MEDICINE

## 2021-05-03 PROCEDURE — 83036 HEMOGLOBIN GLYCOSYLATED A1C: CPT | Mod: ZL | Performed by: FAMILY MEDICINE

## 2021-05-03 PROCEDURE — 999N001182 HC STATISTIC ESTIMATED AVERAGE GLUCOSE: Mod: ZL | Performed by: FAMILY MEDICINE

## 2021-05-03 PROCEDURE — G0463 HOSPITAL OUTPT CLINIC VISIT: HCPCS

## 2021-05-03 PROCEDURE — 36415 COLL VENOUS BLD VENIPUNCTURE: CPT | Mod: ZL | Performed by: FAMILY MEDICINE

## 2021-05-03 PROCEDURE — 84443 ASSAY THYROID STIM HORMONE: CPT | Mod: ZL | Performed by: FAMILY MEDICINE

## 2021-05-03 RX ORDER — ACETAMINOPHEN 500 MG
1000 TABLET ORAL 2 TIMES DAILY
Qty: 120 TABLET | Refills: 6 | COMMUNITY
Start: 2021-05-03 | End: 2021-05-28

## 2021-05-03 RX ORDER — LIDOCAINE HYDROCHLORIDE 10 MG/ML
12 INJECTION, SOLUTION INFILTRATION; PERINEURAL ONCE
Status: DISCONTINUED | OUTPATIENT
Start: 2021-05-03 | End: 2021-08-30

## 2021-05-03 RX ORDER — TRIAMCINOLONE ACETONIDE 40 MG/ML
80 INJECTION, SUSPENSION INTRA-ARTICULAR; INTRAMUSCULAR ONCE
Status: DISCONTINUED | OUTPATIENT
Start: 2021-05-03 | End: 2021-08-30

## 2021-05-03 RX ORDER — TRIAMCINOLONE ACETONIDE 40 MG/ML
120 INJECTION, SUSPENSION INTRA-ARTICULAR; INTRAMUSCULAR ONCE
Status: COMPLETED | OUTPATIENT
Start: 2021-05-03 | End: 2021-05-03

## 2021-05-03 RX ORDER — TRIAMCINOLONE ACETONIDE 40 MG/ML
40 INJECTION, SUSPENSION INTRA-ARTICULAR; INTRAMUSCULAR ONCE
Status: DISCONTINUED | OUTPATIENT
Start: 2021-05-03 | End: 2021-08-30

## 2021-05-03 RX ADMIN — Medication 48 MG: at 12:02

## 2021-05-03 RX ADMIN — TRIAMCINOLONE ACETONIDE 120 MG: 40 INJECTION, SUSPENSION INTRA-ARTICULAR; INTRAMUSCULAR at 12:02

## 2021-05-03 ASSESSMENT — ANXIETY QUESTIONNAIRES
3. WORRYING TOO MUCH ABOUT DIFFERENT THINGS: NOT AT ALL
5. BEING SO RESTLESS THAT IT IS HARD TO SIT STILL: NOT AT ALL
1. FEELING NERVOUS, ANXIOUS, OR ON EDGE: NOT AT ALL
7. FEELING AFRAID AS IF SOMETHING AWFUL MIGHT HAPPEN: NOT AT ALL
6. BECOMING EASILY ANNOYED OR IRRITABLE: NOT AT ALL
2. NOT BEING ABLE TO STOP OR CONTROL WORRYING: NOT AT ALL
GAD7 TOTAL SCORE: 0

## 2021-05-03 ASSESSMENT — PATIENT HEALTH QUESTIONNAIRE - PHQ9
5. POOR APPETITE OR OVEREATING: NOT AT ALL
SUM OF ALL RESPONSES TO PHQ QUESTIONS 1-9: 1

## 2021-05-03 ASSESSMENT — PAIN SCALES - GENERAL: PAINLEVEL: MODERATE PAIN (5)

## 2021-05-03 NOTE — NURSING NOTE
"Chief Complaint   Patient presents with     Diabetes     Hypertension     Lipids       Initial BP (!) 142/68 (BP Location: Right arm, Patient Position: Chair, Cuff Size: Adult Regular)   Pulse 64   Temp 96.5  F (35.8  C) (Tympanic)   Resp 20   Wt 67.4 kg (148 lb 9.6 oz)   SpO2 98%   BMI 22.59 kg/m   Estimated body mass index is 22.59 kg/m  as calculated from the following:    Height as of 2/1/21: 1.727 m (5' 8\").    Weight as of this encounter: 67.4 kg (148 lb 9.6 oz).  Medication Reconciliation: complete  Jeanne Mckeon LPN    "

## 2021-05-03 NOTE — PROGRESS NOTES
PROCEDURE:  JOINT ASPIRATION/INJECTION.         After a discussion of risks, benefits and side effects of procedure, informed patient consent was obtained.       The bilateral knees were prepped and draped in the usual clean fashion (sterile not required for this procedure).  0.5 cc of 1% lidocaine was used for local analgesia on the right knee    INJECTION:  Using 6 cc of 1% lidocaine mixed                           with 60 mg of kenalog, the right knee was successfully injected                           without complication, syringe was then removed and synvisc one syringe connected and injected.  Patient did experience some pain                          relief following injection.  Patient did have some purple discoloration to the surface of his knee with the local anesthetic    Using 6 cc of 1% lidocaine mixed                           with 60 mg of kenalog, the leftt knee was successfully injected                           without complication.  Patient did experience some pain                          relief following injection.

## 2021-05-04 ASSESSMENT — ANXIETY QUESTIONNAIRES: GAD7 TOTAL SCORE: 0

## 2021-05-07 ENCOUNTER — HOSPITAL ENCOUNTER (OUTPATIENT)
Dept: CARDIOLOGY | Facility: HOSPITAL | Age: 86
Discharge: HOME | End: 2021-05-07
Attending: FAMILY MEDICINE | Admitting: INTERNAL MEDICINE
Payer: COMMERCIAL

## 2021-05-07 DIAGNOSIS — I50.22 CHRONIC SYSTOLIC CONGESTIVE HEART FAILURE (H): ICD-10-CM

## 2021-05-07 PROCEDURE — 93306 TTE W/DOPPLER COMPLETE: CPT

## 2021-05-07 PROCEDURE — 93306 TTE W/DOPPLER COMPLETE: CPT | Mod: 26 | Performed by: INTERNAL MEDICINE

## 2021-05-11 ENCOUNTER — TELEPHONE (OUTPATIENT)
Dept: FAMILY MEDICINE | Facility: OTHER | Age: 86
End: 2021-05-11

## 2021-05-28 DIAGNOSIS — M16.11 PRIMARY OSTEOARTHRITIS OF RIGHT HIP: ICD-10-CM

## 2021-05-28 DIAGNOSIS — M17.10 ARTHRITIS OF KNEE: ICD-10-CM

## 2021-05-28 RX ORDER — PSEUDOEPHED/ACETAMINOPH/DIPHEN 30MG-500MG
TABLET ORAL
Qty: 120 TABLET | Refills: 6 | Status: SHIPPED | OUTPATIENT
Start: 2021-05-28 | End: 2022-01-03

## 2021-05-28 NOTE — TELEPHONE ENCOUNTER
Acetaminophen Extra Strength 500mg      Last Written Prescription Date:  5/3/21  Last Fill Quantity: 120,   # refills: 6  Last Office Visit: 5/3/21  Future Office visit:       Routing refill request to provider for review/approval because:

## 2021-07-02 DIAGNOSIS — E11.65 TYPE 2 DIABETES MELLITUS WITH HYPERGLYCEMIA, WITHOUT LONG-TERM CURRENT USE OF INSULIN (H): ICD-10-CM

## 2021-07-02 DIAGNOSIS — E11.9 TYPE 2 DIABETES MELLITUS WITHOUT COMPLICATION, WITHOUT LONG-TERM CURRENT USE OF INSULIN (H): ICD-10-CM

## 2021-07-02 DIAGNOSIS — I10 BENIGN ESSENTIAL HYPERTENSION: ICD-10-CM

## 2021-07-02 DIAGNOSIS — K21.9 GASTROESOPHAGEAL REFLUX DISEASE, UNSPECIFIED WHETHER ESOPHAGITIS PRESENT: ICD-10-CM

## 2021-07-02 RX ORDER — GLIPIZIDE 2.5 MG/1
TABLET, EXTENDED RELEASE ORAL
Qty: 90 TABLET | Refills: 0 | Status: SHIPPED | OUTPATIENT
Start: 2021-07-02 | End: 2021-09-03

## 2021-07-02 RX ORDER — LISINOPRIL 2.5 MG/1
TABLET ORAL
Qty: 90 TABLET | Refills: 1 | Status: SHIPPED | OUTPATIENT
Start: 2021-07-02 | End: 2022-01-03

## 2021-07-02 RX ORDER — FAMOTIDINE 40 MG/1
TABLET, FILM COATED ORAL
Qty: 90 TABLET | Refills: 2 | Status: SHIPPED | OUTPATIENT
Start: 2021-07-02 | End: 2022-04-21

## 2021-07-02 RX ORDER — METFORMIN HCL 500 MG
TABLET, EXTENDED RELEASE 24 HR ORAL
Qty: 180 TABLET | Refills: 0 | Status: SHIPPED | OUTPATIENT
Start: 2021-07-02 | End: 2021-09-03

## 2021-07-02 NOTE — TELEPHONE ENCOUNTER
metformin      Last Written Prescription Date:  4/5/21  Last Fill Quantity: 180,   # refills: 0  Last Office Visit: 5/3/21  Future Office visit:    Next 5 appointments (look out 90 days)    Sep 03, 2021  9:00 AM  (Arrive by 8:45 AM)  SHORT with Bisi Stuart MD  [facility] [place] ([facility] [place] ) [address]  [place] [address]  [phone]           Lisinopril 1/6/21 #90 with 1  pepcid 10/14/20 #90 with 2  Glipizide- not on current med list

## 2021-07-12 DIAGNOSIS — E03.8 OTHER SPECIFIED HYPOTHYROIDISM: ICD-10-CM

## 2021-07-12 RX ORDER — LEVOTHYROXINE SODIUM 137 UG/1
TABLET ORAL
Qty: 60 TABLET | Refills: 3 | Status: SHIPPED | OUTPATIENT
Start: 2021-07-12 | End: 2022-01-03

## 2021-07-12 NOTE — TELEPHONE ENCOUNTER
levothyroxine (SYNTHROID/LEVOTHROID) 137 MCG tablet      Last Written Prescription Date:  10/5/20  Last Fill Quantity: 60,   # refills: 3  Last Office Visit: 5/3/21  Future Office visit:    Next 5 appointments (look out 90 days)    Sep 03, 2021  9:00 AM  (Arrive by 8:45 AM)  SHORT with Bisi Stuart MD  [facility] [place] ([facility] [place] ) [address]  [place] [address]  [phone]

## 2021-07-19 DIAGNOSIS — E03.8 OTHER SPECIFIED HYPOTHYROIDISM: ICD-10-CM

## 2021-07-19 RX ORDER — LEVOTHYROXINE SODIUM 137 UG/1
TABLET ORAL
Qty: 60 TABLET | Refills: 3 | OUTPATIENT
Start: 2021-07-19

## 2021-08-30 ENCOUNTER — OFFICE VISIT (OUTPATIENT)
Dept: PODIATRY | Facility: OTHER | Age: 86
End: 2021-08-30
Attending: PODIATRIST
Payer: COMMERCIAL

## 2021-08-30 VITALS
DIASTOLIC BLOOD PRESSURE: 72 MMHG | HEART RATE: 72 BPM | HEIGHT: 68 IN | BODY MASS INDEX: 20.76 KG/M2 | OXYGEN SATURATION: 98 % | TEMPERATURE: 97.5 F | WEIGHT: 137 LBS | RESPIRATION RATE: 12 BRPM | SYSTOLIC BLOOD PRESSURE: 134 MMHG

## 2021-08-30 DIAGNOSIS — L85.3 XEROSIS OF SKIN: ICD-10-CM

## 2021-08-30 DIAGNOSIS — E11.42 DIABETIC POLYNEUROPATHY ASSOCIATED WITH TYPE 2 DIABETES MELLITUS (H): ICD-10-CM

## 2021-08-30 DIAGNOSIS — L60.3 ONYCHODYSTROPHY: Primary | ICD-10-CM

## 2021-08-30 DIAGNOSIS — E11.9 DIABETES MELLITUS TYPE 2, NONINSULIN DEPENDENT (H): ICD-10-CM

## 2021-08-30 PROCEDURE — G0463 HOSPITAL OUTPT CLINIC VISIT: HCPCS | Mod: 25

## 2021-08-30 PROCEDURE — 99213 OFFICE O/P EST LOW 20 MIN: CPT | Mod: 25 | Performed by: PODIATRIST

## 2021-08-30 PROCEDURE — 11721 DEBRIDE NAIL 6 OR MORE: CPT | Performed by: PODIATRIST

## 2021-08-30 ASSESSMENT — MIFFLIN-ST. JEOR: SCORE: 1270.93

## 2021-08-30 ASSESSMENT — PAIN SCALES - GENERAL: PAINLEVEL: NO PAIN (0)

## 2021-08-30 NOTE — PROGRESS NOTES
"Chief complaint: Patient presents with:  Toenail: Toenail clipping and possible fungas      History of Present Illness: This 87 year old NIDDM male is seen for follow-up management of elongated toenails.     Patient says his RIGHT hallux toenail has been very loose but too thick to trim. He has not had pain from the toe but it has been loose. He says the toenails need to be trimmed. His daughter says she would like her father to get back on a regimen for nail debridement every three months.    Patient says he has been staying active. He likes to dance for exercise.     Patient says he has not applied any lotion to his feet.    Patient denies burning, tingling, and numbness.    No further pedal complaints today.     Last HbA1C was 6.3% on 05/03/2021  Previously 6.3% from 01/08/2020  Previously 7.1% on 08/26/2019        Wt Readings from Last 4 Encounters:   08/30/21 62.1 kg (137 lb)   05/03/21 67.4 kg (148 lb 9.6 oz)   02/01/21 66.2 kg (146 lb)   10/30/20 67.1 kg (148 lb)         /72 (BP Location: Right arm, Patient Position: Sitting, Cuff Size: Adult Regular)   Pulse 72   Temp 97.5  F (36.4  C) (Tympanic)   Resp 12   Ht 1.727 m (5' 8\")   Wt 62.1 kg (137 lb)   SpO2 98%   BMI 20.83 kg/m      Patient Active Problem List   Diagnosis     ACP (advance care planning)     Other specified hypothyroidism     Chronic systolic congestive heart failure (H)     Benign essential hypertension     Hyperlipidemia LDL goal <70     Bilateral carotid artery stenosis     Coronary artery disease involving coronary bypass graft of native heart without angina pectoris     Type 2 diabetes mellitus with hyperglycemia, without long-term current use of insulin (H)     Encounter for diabetic foot exam (H)     Trochanteric bursitis of right hip     Gastroesophageal reflux disease, esophagitis presence not specified     Primary osteoarthritis of right hip     Bilateral primary osteoarthritis of knee      Diabetic polyneuropathy " associated with type 2 diabetes mellitus (H)     Chronic bilateral low back pain without sciatica     DDD (degenerative disc disease), lumbar     Allergic arthritis of right hip       Past Surgical History:   Procedure Laterality Date     AS CABG, ARTERY-VEIN, FIVE  11/02/2011    off pump       Current Outpatient Medications   Medication     ACETAMINOPHEN EXTRA STRENGTH 500 MG tablet     ASPIRIN PO     atorvastatin (LIPITOR) 20 MG tablet     blood glucose (NO BRAND SPECIFIED) lancets standard     blood glucose (NO BRAND SPECIFIED) test strip     blood glucose monitoring (ACCU-CHEK KEYANNA) test strip     blood glucose monitoring (NO BRAND SPECIFIED) meter device kit     famotidine (PEPCID) 40 MG tablet     glipiZIDE (GLUCOTROL XL) 2.5 MG 24 hr tablet     levothyroxine (SYNTHROID/LEVOTHROID) 137 MCG tablet     levothyroxine (SYNTHROID/LEVOTHROID) 150 MCG tablet     lisinopril (ZESTRIL) 2.5 MG tablet     metFORMIN (GLUCOPHAGE-XR) 500 MG 24 hr tablet     metoprolol succinate ER (TOPROL-XL) 50 MG 24 hr tablet     nitroglycerin (NITROSTAT) 0.4 MG sublingual tablet     vitamin D3 (CHOLECALCIFEROL) 50 mcg (2000 units) tablet     No current facility-administered medications for this visit.        No Known Allergies    Family History   Problem Relation Age of Onset     Cerebrovascular Disease Father      Coronary Artery Disease Father      Dementia Mother      Coronary Artery Disease Sister      Lung Cancer Sister      Thyroid Disease Daughter        Social History     Socioeconomic History     Marital status:      Spouse name: Luis     Number of children: 5     Years of education: 12     Highest education level: Not on file   Occupational History     Occupation:      Employer: RETIRED   Social Needs     Financial resource strain: Not on file     Food insecurity:     Worry: Not on file     Inability: Not on file     Transportation needs:     Medical: Not on file     Non-medical: Not on file   Tobacco Use      Smoking status: Never Smoker     Smokeless tobacco: Never Used     Tobacco comment: second hand smoke in the house 40 yrs   Substance and Sexual Activity     Alcohol use: No     Alcohol/week: 0.0 oz     Drug use: No     Sexual activity: Never     Partners: Female   Lifestyle     Physical activity:     Days per week: Not on file     Minutes per session: Not on file     Stress: Not on file   Relationships     Social connections:     Talks on phone: Not on file     Gets together: Not on file     Attends Advent service: Not on file     Active member of club or organization: Not on file     Attends meetings of clubs or organizations: Not on file     Relationship status: Not on file     Intimate partner violence:     Fear of current or ex partner: Not on file     Emotionally abused: Not on file     Physically abused: Not on file     Forced sexual activity: Not on file   Other Topics Concern      Service No     Blood Transfusions Yes     Comment: Ok to recieve      Caffeine Concern Yes     Comment: 3 cups daily      Occupational Exposure Not Asked     Hobby Hazards Not Asked     Sleep Concern Not Asked     Stress Concern Not Asked     Weight Concern Not Asked     Special Diet Not Asked     Back Care Not Asked     Exercise Yes     Comment: walking,       Bike Helmet Not Asked     Seat Belt Yes     Self-Exams Not Asked     Parent/sibling w/ CABG, MI or angioplasty before 65F 55M? Not Asked   Social History Narrative     Not on file       ROS: 10 point ROS neg other than the symptoms noted above in the HPI.  EXAM  Constitutional: healthy, alert and no distress    Psychiatric: mentation appears normal and affect normal/bright    VASCULAR:  -Dorsalis pedis pulse +1/4 b/l  -Posterior tibial pulse +1/4 b/l  -Capillary refill time < 3 seconds to b/l hallux  -Hair growth Absent to b/l anterior legs and ankles  NEURO:  -Protective sensation intact with SWM +8/10 RIGHT and +8/10 LEFT on  08/30/2021  -Protective sensation intact with SWM +8/10 RIGHT and +8/10 LEFT on 02/06/2020  -Protective sensation intact with SWM +10/10 RIGHT and +10/10 LEFT on 11/21/2019  -Protective sensation intact with SWM +7/10 RIGHT and +7/10 LEFT on 09/10/2019  -Light touch sensation intact to b/l plantar forefoot  DERM:  -Skin dry, flaking to bilateral plantar foot (increased near the plantar medial forefoot and  Bilateral rim of the heels)  -Skin temperature mildly cool to bilateral foot  -Toenails elongated, thickened, dystrophic and discolored with subungual debris x 10  MSK:  -Muscle strength of ankles +5/5 for dorsiflexion, plantarflexion, ABDUction and ADDuction b/l    ============================================================    ASSESSMENT:  (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 Treatment options discussed with no educational barriers noted.  -High risk toenail debridement x 10 toenails without incident    -Diabetic Foot Education provided. This included checking the feet daily looking for new new blisters or wounds, wearing shoes at all times when walking including around the house, and avoiding lotion application between the toes. Any sign of infection in the foot warrant's the patient presenting to the ED as soon as possible.    -Xerosis of skin: Discussed xerosis of the skin including the risks of dry, cracking skin creating ulcerations on the feet. These can be painful or can become infected which can then potentially lead to life threatening wounds or amputation. It is important to continue with daily moisturizing lotion to prevent this. Patient expressed understanding. His daughter was with him and she also reminded her father to start using lotion. The cracking and dry skin may  even improve with applying lotion a few times a week if the patient is able to do this. He lives with his daughter at home.    -Patient in agreement with the above treatment plan and all of patient's questions were answered.      RTC 3 months for diabetic foot exam and high risk nail debridement        Deidre Mishra DPM

## 2021-08-30 NOTE — NURSING NOTE
"Chief Complaint   Patient presents with     Toenail     Toenail clipping and possible fungas       Initial /72 (BP Location: Right arm, Patient Position: Sitting, Cuff Size: Adult Regular)   Pulse 72   Temp 97.5  F (36.4  C) (Tympanic)   Resp 12   Ht 1.727 m (5' 8\")   Wt 62.1 kg (137 lb)   SpO2 98%   BMI 20.83 kg/m   Estimated body mass index is 20.83 kg/m  as calculated from the following:    Height as of this encounter: 1.727 m (5' 8\").    Weight as of this encounter: 62.1 kg (137 lb).  Medication Reconciliation: complete  SUSIE SAINI LPN  "

## 2021-09-02 NOTE — PROGRESS NOTES
Assessment & Plan     Type 2 diabetes mellitus with hyperglycemia, without long-term current use of insulin (H)  Follow-up 4 mos  Stop glipizide and one metformin  - metFORMIN (GLUCOPHAGE-XR) 500 MG 24 hr tablet; Take 1 tablet (500 mg) by mouth At Bedtime    Hyperlipidemia LDL goal <70  Fasting labs pending  - Lipid Profile (Chol, Trig, HDL, LDL calc)  - Comprehensive metabolic panel    Other specified hypothyroidism  Weight loss but dancing every Sunday night  - TSH with free T4 reflex    Benign essential hypertension  stable    Chronic systolic congestive heart failure (H)  Doing well    Keratoacanthoma of cheek  Would like removed  - Adult General Surg Referral    Atypical squamoproliferative skin lesion    - Adult General Surg Referral    Right hip pain/arthritis -- ok to use roll on           Patient was agreeable to this plan and had no further questions.  There are no Patient Instructions on file for this visit.    No follow-ups on file.    Bisi Stuart MD  [facility] [place] Julio is a 87 year old who presents for the following health issues     HPI     Diabetes Follow-up      How often are you checking your blood sugar? Not at all    What concerns do you have today about your diabetes? None     Do you have any of these symptoms? (Select all that apply)  Weight loss    Have you had a diabetic eye exam in the last 12 months? No                Hyperlipidemia Follow-Up      Are you regularly taking any medication or supplement to lower your cholesterol?   Yes- Lipitor    Are you having muscle aches or other side effects that you think could be caused by your cholesterol lowering medication?  No    Hypertension Follow-up      Do you check your blood pressure regularly outside of the clinic? No     Are you following a low salt diet? Yes    Are your blood pressures ever more than 140 on the top number (systolic) OR more   than 90 on the bottom number (diastolic), for example  140/90? No    BP Readings from Last 2 Encounters:   09/03/21 126/76   08/30/21 134/72     Hemoglobin A1C (%)   Date Value   05/03/2021 6.3 (H)   02/01/2021 6.1 (H)     LDL Cholesterol Calculated (mg/dL)   Date Value   10/30/2020 47   01/08/2020 73         How many servings of fruits and vegetables do you eat daily?  0-1    On average, how many sweetened beverages do you drink each day (Examples: soda, juice, sweet tea, etc.  Do NOT count diet or artificially sweetened beverages)?   0    How many days per week do you exercise enough to make your heart beat faster? 7    How many minutes a day do you exercise enough to make your heart beat faster? 60 or more    How many days per week do you miss taking your medication? 0    Right hip pain    Review of Systems   Constitutional, HEENT, cardiovascular, pulmonary, gi and gu systems are negative, except as otherwise noted.      Objective    /76 (BP Location: Right arm, Patient Position: Chair, Cuff Size: Adult Regular)   Pulse 70   Temp 97.5  F (36.4  C) (Tympanic)   Resp 16   Wt 62.6 kg (138 lb)   SpO2 97%   BMI 20.98 kg/m    Body mass index is 20.98 kg/m .  Physical Exam   GENERAL: healthy, alert and no distress  NECK: no adenopathy, no asymmetry, masses, or scars and thyroid normal to palpation  RESP: lungs clear to auscultation - no rales, rhonchi or wheezes  CV: regular rate and rhythm, normal S1 S2, no S3 or S4, no murmur, click or rub, no peripheral edema and peripheral pulses strong  ABDOMEN: soft, nontender, no hepatosplenomegaly, no masses and bowel sounds normal  MS: no gross musculoskeletal defects noted, no edema  PSYCH: mentation appears normal, affect normal/bright    Results for orders placed or performed in visit on 09/03/21   Hemoglobin A1c     Status: Abnormal   Result Value Ref Range    Estimated Average Glucose 126 mg/dL    Hemoglobin A1C 6.0 (H) 0.0 - 5.6 %   Extra Tube     Status: None (In process)    Narrative    The following orders were  created for panel order Extra Tube.  Procedure                               Abnormality         Status                     ---------                               -----------         ------                     Extra Serum Separator Tu...[[id number]]                      In process                 Extra Green Top (Lithium...[[id number]]                      In process                   Please view results for these tests on the individual orders.

## 2021-09-03 ENCOUNTER — OFFICE VISIT (OUTPATIENT)
Dept: FAMILY MEDICINE | Facility: OTHER | Age: 86
End: 2021-09-03
Attending: FAMILY MEDICINE
Payer: COMMERCIAL

## 2021-09-03 ENCOUNTER — LAB (OUTPATIENT)
Dept: LAB | Facility: OTHER | Age: 86
End: 2021-09-03
Payer: COMMERCIAL

## 2021-09-03 VITALS
BODY MASS INDEX: 20.98 KG/M2 | SYSTOLIC BLOOD PRESSURE: 126 MMHG | TEMPERATURE: 97.5 F | DIASTOLIC BLOOD PRESSURE: 76 MMHG | RESPIRATION RATE: 16 BRPM | OXYGEN SATURATION: 97 % | HEART RATE: 70 BPM | WEIGHT: 138 LBS

## 2021-09-03 DIAGNOSIS — D49.2 ATYPICAL SQUAMOPROLIFERATIVE SKIN LESION: ICD-10-CM

## 2021-09-03 DIAGNOSIS — L85.8 KERATOACANTHOMA OF CHEEK: ICD-10-CM

## 2021-09-03 DIAGNOSIS — I50.22 CHRONIC SYSTOLIC CONGESTIVE HEART FAILURE (H): ICD-10-CM

## 2021-09-03 DIAGNOSIS — E03.8 OTHER SPECIFIED HYPOTHYROIDISM: ICD-10-CM

## 2021-09-03 DIAGNOSIS — E11.65 TYPE 2 DIABETES MELLITUS WITH HYPERGLYCEMIA, WITHOUT LONG-TERM CURRENT USE OF INSULIN (H): Primary | ICD-10-CM

## 2021-09-03 DIAGNOSIS — E11.65 TYPE 2 DIABETES MELLITUS WITH HYPERGLYCEMIA, WITHOUT LONG-TERM CURRENT USE OF INSULIN (H): ICD-10-CM

## 2021-09-03 DIAGNOSIS — E78.5 HYPERLIPIDEMIA LDL GOAL <70: ICD-10-CM

## 2021-09-03 DIAGNOSIS — I10 BENIGN ESSENTIAL HYPERTENSION: ICD-10-CM

## 2021-09-03 LAB
ALBUMIN SERPL-MCNC: 3.8 G/DL (ref 3.4–5)
ALP SERPL-CCNC: 97 U/L (ref 40–150)
ALT SERPL W P-5'-P-CCNC: 23 U/L (ref 0–70)
ANION GAP SERPL CALCULATED.3IONS-SCNC: 9 MMOL/L (ref 3–14)
AST SERPL W P-5'-P-CCNC: 15 U/L (ref 0–45)
BILIRUB SERPL-MCNC: 0.5 MG/DL (ref 0.2–1.3)
BUN SERPL-MCNC: 20 MG/DL (ref 7–30)
CALCIUM SERPL-MCNC: 8.8 MG/DL (ref 8.5–10.1)
CHLORIDE BLD-SCNC: 108 MMOL/L (ref 94–109)
CHOLEST SERPL-MCNC: 110 MG/DL
CO2 SERPL-SCNC: 21 MMOL/L (ref 20–32)
CREAT SERPL-MCNC: 0.96 MG/DL (ref 0.66–1.25)
EST. AVERAGE GLUCOSE BLD GHB EST-MCNC: 126 MG/DL
FASTING STATUS PATIENT QL REPORTED: ABNORMAL
GFR SERPL CREATININE-BSD FRML MDRD: 71 ML/MIN/1.73M2
GLUCOSE BLD-MCNC: 104 MG/DL (ref 70–99)
HBA1C MFR BLD: 6 % (ref 0–5.6)
HDLC SERPL-MCNC: 39 MG/DL
HOLD SPECIMEN: NORMAL
HOLD SPECIMEN: NORMAL
LDLC SERPL CALC-MCNC: 51 MG/DL
NONHDLC SERPL-MCNC: 71 MG/DL
POTASSIUM BLD-SCNC: 4.2 MMOL/L (ref 3.4–5.3)
PROT SERPL-MCNC: 7 G/DL (ref 6.8–8.8)
SODIUM SERPL-SCNC: 138 MMOL/L (ref 133–144)
TRIGL SERPL-MCNC: 98 MG/DL
TSH SERPL DL<=0.005 MIU/L-ACNC: 0.78 MU/L (ref 0.4–4)

## 2021-09-03 PROCEDURE — G0463 HOSPITAL OUTPT CLINIC VISIT: HCPCS

## 2021-09-03 PROCEDURE — 36415 COLL VENOUS BLD VENIPUNCTURE: CPT | Mod: ZL

## 2021-09-03 PROCEDURE — 80053 COMPREHEN METABOLIC PANEL: CPT | Mod: ZL | Performed by: FAMILY MEDICINE

## 2021-09-03 PROCEDURE — 84443 ASSAY THYROID STIM HORMONE: CPT | Mod: ZL | Performed by: FAMILY MEDICINE

## 2021-09-03 PROCEDURE — 82465 ASSAY BLD/SERUM CHOLESTEROL: CPT | Mod: ZL | Performed by: FAMILY MEDICINE

## 2021-09-03 PROCEDURE — 99214 OFFICE O/P EST MOD 30 MIN: CPT | Performed by: FAMILY MEDICINE

## 2021-09-03 PROCEDURE — 83036 HEMOGLOBIN GLYCOSYLATED A1C: CPT | Mod: ZL

## 2021-09-03 RX ORDER — METFORMIN HCL 500 MG
500 TABLET, EXTENDED RELEASE 24 HR ORAL AT BEDTIME
Qty: 90 TABLET | Refills: 1 | Status: SHIPPED | OUTPATIENT
Start: 2021-09-03 | End: 2022-04-21

## 2021-09-03 ASSESSMENT — PAIN SCALES - GENERAL: PAINLEVEL: MILD PAIN (3)

## 2021-09-03 NOTE — NURSING NOTE
"Chief Complaint   Patient presents with     Diabetes       Initial /76 (BP Location: Right arm, Patient Position: Chair, Cuff Size: Adult Regular)   Pulse 70   Temp 97.5  F (36.4  C) (Tympanic)   Resp 16   Wt 62.6 kg (138 lb)   SpO2 97%   BMI 20.98 kg/m   Estimated body mass index is 20.98 kg/m  as calculated from the following:    Height as of 8/30/21: 1.727 m (5' 8\").    Weight as of this encounter: 62.6 kg (138 lb).  Medication Reconciliation: complete  Jeanne Mckeon LPN    "

## 2021-09-03 NOTE — LETTER
September 16, 2021      Nori Looney  [address] [place] [address]        Dear ,    We are writing to inform you of your test results.    Your test results fall within the expected range(s) or remain unchanged from previous results.  Please continue with current treatment plan. Labs look better than last year. Keep up the great work!!     Resulted Orders   Lipid Profile (Chol, Trig, HDL, LDL calc)   Result Value Ref Range    Cholesterol 110 <200 mg/dL      Comment:      Age 0-19 years  Desirable: <170 mg/dL  Borderline high:  170-199 mg/dl  High:            >199 mg/dl    Age 20 years and older  Desirable: <200 mg/dL    Triglycerides 98 <150 mg/dL      Comment:      0-9 years:  Normal:    Less than 75 mg/dL  Borderline high:  75-99 mg/dL  High:             Greater than or equal to 100 mg/dL    0-19 years:  Normal:    Less than 90 mg/dL  Borderline high:   mg/dL  High:             Greater than or equal to 130 mg/dL    20 years and older:  Normal:    Less than 150 mg/dL  Borderline high:  150-199 mg/dL  High:             200-499 mg/dL  Very high:   Greater than or equal to 500 mg/dL    Direct Measure HDL 39 (L) >=40 mg/dL      Comment:      0-19 years:       Greater than or equal to 45 mg/dL   Low: Less than 40 mg/dL   Borderline low: 40-44 mg/dL     20 years and older:   Female: Greater than or equal to 50 mg/dL   Male:   Greater than or equal to 40 mg/dL         LDL Cholesterol Calculated 51 <=100 mg/dL      Comment:      Age 0-19 years:  Desirable: 0-110 mg/dL   Borderline high: 110-129 mg/dL   High: >= 130 mg/dL    Age 20 years and older:  Desirable: <100mg/dL  Above desirable: 100-129 mg/dL   Borderline high: 130-159 mg/dL   High: 160-189 mg/dL   Very high: >= 190 mg/dL    Non HDL Cholesterol 71 <130 mg/dL      Comment:      0-19 years:  Desirable:          Less than 120 mg/dL  Borderline high:   120-144 mg/dL  High:                   Greater than or equal to 145 mg/dL    20 years and  older:  Desirable:          130 mg/dL  Above Desirable: 130-159 mg/dL  Borderline high:   160-189 mg/dL  High:               190-219 mg/dL  Very high:     Greater than or equal to 220 mg/dL    Patient Fasting > 8hrs? Unknown    Comprehensive metabolic panel   Result Value Ref Range    Sodium 138 133 - 144 mmol/L    Potassium 4.2 3.4 - 5.3 mmol/L    Chloride 108 94 - 109 mmol/L    Carbon Dioxide (CO2) 21 20 - 32 mmol/L    Anion Gap 9 3 - 14 mmol/L    Urea Nitrogen 20 7 - 30 mg/dL    Creatinine 0.96 0.66 - 1.25 mg/dL    Calcium 8.8 8.5 - 10.1 mg/dL    Glucose 104 (H) 70 - 99 mg/dL    Alkaline Phosphatase 97 40 - 150 U/L    AST 15 0 - 45 U/L    ALT 23 0 - 70 U/L    Protein Total 7.0 6.8 - 8.8 g/dL    Albumin 3.8 3.4 - 5.0 g/dL    Bilirubin Total 0.5 0.2 - 1.3 mg/dL    GFR Estimate 71 >60 mL/min/1.73m2      Comment:      As of July 11, 2021, eGFR is calculated by the CKD-EPI creatinine equation, without race adjustment. eGFR can be influenced by muscle mass, exercise, and diet. The reported eGFR is an estimation only and is only applicable if the renal function is stable.   TSH with free T4 reflex   Result Value Ref Range    TSH 0.78 0.40 - 4.00 mU/L       If you have any questions or concerns, please call the clinic at the number listed above.       Sincerely,      Bisi Stuart MD

## 2021-09-21 ENCOUNTER — OFFICE VISIT (OUTPATIENT)
Dept: SURGERY | Facility: OTHER | Age: 86
End: 2021-09-21
Attending: FAMILY MEDICINE
Payer: COMMERCIAL

## 2021-09-21 VITALS
DIASTOLIC BLOOD PRESSURE: 80 MMHG | WEIGHT: 140.6 LBS | TEMPERATURE: 97.6 F | HEART RATE: 77 BPM | BODY MASS INDEX: 21.31 KG/M2 | SYSTOLIC BLOOD PRESSURE: 128 MMHG | OXYGEN SATURATION: 98 % | HEIGHT: 68 IN

## 2021-09-21 DIAGNOSIS — D49.2 ATYPICAL SQUAMOPROLIFERATIVE SKIN LESION: ICD-10-CM

## 2021-09-21 DIAGNOSIS — L85.8 KERATOACANTHOMA OF CHEEK: ICD-10-CM

## 2021-09-21 PROCEDURE — 99207 PR NO CHARGE LOS: CPT | Performed by: SURGERY

## 2021-09-21 PROCEDURE — G0463 HOSPITAL OUTPT CLINIC VISIT: HCPCS

## 2021-09-21 ASSESSMENT — PAIN SCALES - GENERAL: PAINLEVEL: NO PAIN (0)

## 2021-09-21 ASSESSMENT — MIFFLIN-ST. JEOR: SCORE: 1287.26

## 2021-09-21 NOTE — PATIENT INSTRUCTIONS
Thank you for allowing Dr. Izquierdo and our surgical team to participate in your care. Please call our health unit coordinator at [phone] with scheduling questions or the nurse at [phone] with any other questions or concerns.    You will be contacted by ENT (otolaryngology department to schedule your consultation appointment. Please call our office at [phone] if you are not contacted in a timely manner.

## 2021-09-21 NOTE — NURSING NOTE
"Chief Complaint   Patient presents with     Consult     left facial lesions, rule out keratoacanthoma of cheek/atypical squamoproliferative skin lesion, Dr. Stuart referring.        Initial /80   Pulse 77   Temp 97.6  F (36.4  C)   Ht 1.727 m (5' 8\")   Wt 63.8 kg (140 lb 9.6 oz)   SpO2 98%   BMI 21.38 kg/m   Estimated body mass index is 21.38 kg/m  as calculated from the following:    Height as of this encounter: 1.727 m (5' 8\").    Weight as of this encounter: 63.8 kg (140 lb 9.6 oz).  Medication Reconciliation: complete  MAME MARTÍNEZ LPN    "

## 2021-09-22 ENCOUNTER — TELEPHONE (OUTPATIENT)
Dept: OTOLARYNGOLOGY | Facility: OTHER | Age: 86
End: 2021-09-22

## 2021-09-22 NOTE — TELEPHONE ENCOUNTER
9/22/21    Referral received. Attempted to contact patient to schedule with Dr. Anderson in ENT Dept. Phone line busy at time of call. Will continue to try to reach patient  following protocol.    -Miladys RANGEL

## 2021-09-30 DIAGNOSIS — I25.810 CORONARY ARTERY DISEASE INVOLVING CORONARY BYPASS GRAFT OF NATIVE HEART WITHOUT ANGINA PECTORIS: ICD-10-CM

## 2021-09-30 DIAGNOSIS — E11.9 TYPE 2 DIABETES MELLITUS WITHOUT COMPLICATION, WITHOUT LONG-TERM CURRENT USE OF INSULIN (H): ICD-10-CM

## 2021-09-30 DIAGNOSIS — E78.5 HYPERLIPIDEMIA LDL GOAL <70: ICD-10-CM

## 2021-09-30 DIAGNOSIS — E03.8 OTHER SPECIFIED HYPOTHYROIDISM: ICD-10-CM

## 2021-10-01 ENCOUNTER — TRANSFERRED RECORDS (OUTPATIENT)
Dept: HEALTH INFORMATION MANAGEMENT | Facility: CLINIC | Age: 86
End: 2021-10-01

## 2021-10-01 LAB — RETINOPATHY: NORMAL

## 2021-10-01 RX ORDER — METOPROLOL SUCCINATE 50 MG/1
TABLET, EXTENDED RELEASE ORAL
Qty: 90 TABLET | Refills: 3 | Status: SHIPPED | OUTPATIENT
Start: 2021-10-01 | End: 2022-10-13

## 2021-10-01 RX ORDER — LEVOTHYROXINE SODIUM 150 UG/1
TABLET ORAL
Qty: 90 TABLET | Refills: 3 | Status: SHIPPED | OUTPATIENT
Start: 2021-10-01 | End: 2022-01-03

## 2021-10-01 RX ORDER — ATORVASTATIN CALCIUM 20 MG/1
TABLET, FILM COATED ORAL
Qty: 90 TABLET | Refills: 3 | Status: SHIPPED | OUTPATIENT
Start: 2021-10-01 | End: 2021-10-26

## 2021-10-01 RX ORDER — GLIPIZIDE 2.5 MG/1
TABLET, EXTENDED RELEASE ORAL
Qty: 90 TABLET | Refills: 0 | OUTPATIENT
Start: 2021-10-01

## 2021-10-06 DIAGNOSIS — E11.9 TYPE 2 DIABETES MELLITUS WITHOUT COMPLICATION, WITHOUT LONG-TERM CURRENT USE OF INSULIN (H): ICD-10-CM

## 2021-10-06 RX ORDER — GLIPIZIDE 2.5 MG/1
TABLET, EXTENDED RELEASE ORAL
Qty: 90 TABLET | Refills: 0 | OUTPATIENT
Start: 2021-10-06

## 2021-10-26 DIAGNOSIS — E78.5 HYPERLIPIDEMIA LDL GOAL <70: ICD-10-CM

## 2021-10-26 DIAGNOSIS — E03.8 OTHER SPECIFIED HYPOTHYROIDISM: ICD-10-CM

## 2021-10-26 NOTE — TELEPHONE ENCOUNTER
Patient's daughter called rachel that her aunt told her that the patient was to change the atorvastatin to 0.5 tab a day instead of one tab one time a day.  She would like to know if this is correct and if it is please change the instructions  on his next prescription bottle.               atorvastatin (LIPITOR) 20 MG tablet      Last Written Prescription Date:  10/1/21  Last Fill Quantity: 90,   # refills: 3  Last Office Visit: 9/3/21  Future Office visit:    Next 5 appointments (look out 90 days)    Dec 03, 2021 10:30 AM  (Arrive by 10:15 AM)  Return Visit with Deidre Mishra DPM  [facility] ([facility] ) [address] [place] [id number] [phone]   Jan 03, 2022  9:00 AM  (Arrive by 8:45 AM)  SHORT with Bisi Stuart MD  [facility] ([facility] ) [address] [place] [address]  [phone]           Routing refill request to provider for review/approval because:  Drug not on the [facility], [facility] or [facility] refill protocol or controlled substance

## 2021-10-27 RX ORDER — ATORVASTATIN CALCIUM 20 MG/1
10 TABLET, FILM COATED ORAL DAILY
Qty: 45 TABLET | Refills: 3 | Status: SHIPPED | OUTPATIENT
Start: 2021-10-27 | End: 2022-01-03 | Stop reason: DRUGHIGH

## 2021-10-27 NOTE — PROGRESS NOTES
Otolaryngology Consultation    Patient: Nori Looney  : 1934    Patient presents with:  Consult: Keratocanthoma of Cheek, Atypical Squamoproliferative Skin Lesion; Referred by Dr. Derek Izquierdo      HPI:  Nori Looney is a 87 year old male seen today for a left cheek and temple facial skin lesion.  Present over 12 months, changing and growing.  No known history of carcinoma skin or melanoma.  Excess sun exposure with sun burns throughout life. No immunodeficiency.  There is no history of solid organ transplant.        He is accompanied by his granddaughter who is a LPN at [facility]   Medication Sig Dispense Refill     ACETAMINOPHEN EXTRA STRENGTH 500 MG tablet Take 2 Tabs by mouth two times a day as needed for mild pain. 120 tablet 6     ASPIRIN PO Take 325 mg by mouth daily       atorvastatin (LIPITOR) 20 MG tablet Take 0.5 tablets (10 mg) by mouth daily 45 tablet 3     blood glucose (NO BRAND SPECIFIED) lancets standard Use to test blood sugar 1 times daily 100 each 3     blood glucose (NO BRAND SPECIFIED) test strip Use to test blood sugar 1 times daily 100 each 3     blood glucose monitoring (ACCU-CHEK KEYANNA) test strip by In Vitro route 2 times daily .       blood glucose monitoring (NO BRAND SPECIFIED) meter device kit Use to test blood sugar 1 times daily 1 kit 0     famotidine (PEPCID) 40 MG tablet Take 1 Tab by mouth one time a day. 90 tablet 2     levothyroxine (SYNTHROID/LEVOTHROID) 137 MCG tablet Take one tablet Monday through Friday 60 tablet 3     levothyroxine (SYNTHROID/LEVOTHROID) 150 MCG tablet Take 1 Tab by mouth one time a day. (Patient taking differently: Take 1 Tab by mouth one time a day only on Saturday and ) 90 tablet 3     lisinopril (ZESTRIL) 2.5 MG tablet Take 1 Tab by mouth one time a day. 90 tablet 1     metFORMIN (GLUCOPHAGE-XR) 500 MG 24 hr tablet Take 1 tablet (500 mg) by mouth At Bedtime 90 tablet 1     metoprolol succinate ER (TOPROL-XL) 50 MG  24 hr tablet Take 1 Tab by mouth one time a day. 90 tablet 3     nitroglycerin (NITROSTAT) 0.4 MG sublingual tablet For chest pain place 1 tablet under the tongue every 5 minutes for 3 doses. If symptoms persist 5 minutes after 1st dose call 911. 25 tablet 0     vitamin D3 (CHOLECALCIFEROL) 50 mcg (2000 units) tablet Take by mouth daily         Allergies: Patient has no known allergies.     Past Medical History:   Diagnosis Date     Chronic diastolic congestive heart failure (H)     EF 85%     Coronary artery disease involving coronary bypass graft of native heart without angina pectoris 1/5/2017    2011 in [place] With Dr. Negro at [facility].       Coronary atherosclerosis of native coronary artery     CABG x 5     Encounter for diabetic foot exam (H) 1/5/2017     Erectile dysfunction due to arterial insufficiency      Generalized osteoarthrosis      HTN (hypertension)      Hyperlipidemia associated with type 2 diabetes mellitus (H)      Hypothyroidism     hashimoto's thyroiditis     Neuropathy 01/2018     Other specified hypothyroidism 6/29/2016     Primary osteoarthritis of left knee 1/5/2017     Stable angina (H)      Type 2 diabetes mellitus without complication, without long-term current use of insulin (H) 1/5/2017       Past Surgical History:   Procedure Laterality Date     AS CABG, ARTERY-VEIN, FIVE  11/02/2011    off pump       ENT family history reviewed    Social History     Tobacco Use     Smoking status: Never Smoker     Smokeless tobacco: Never Used     Tobacco comment: second hand smoke in the house 40 yrs   Vaping Use     Vaping Use: Never used   Substance Use Topics     Alcohol use: No     Alcohol/week: 0.0 standard drinks     Drug use: No       Review of Systems  ROS: 10 point ROS neg other than the symptoms noted above in the HPI and tinnitus, ear plugging    Physical Exam  /62 (BP Location: Left arm, Patient Position: Sitting, Cuff Size: Adult Regular)   Pulse 55   Temp (!) 96.2  F  "(35.7  C) (Tympanic)   Resp 18   Ht 1.727 m (5' 8\")   Wt 63.5 kg (140 lb)   SpO2 97%   BMI 21.29 kg/m    General - The patient is well nourished and well developed, and appears to have good nutritional status.  Alert and oriented to person and place, answers questions and cooperates with examination appropriately.   Head and Face - Normocephalic and atraumatic, with no gross asymmetry noted. The facial nerve is intact, with strong symmetric movements.  There is a raised keratin horn suspicious for keratoacanthoma of the left temple that is over 1.5 cm, no ulceration  There is a left superior medial cheek seborrheic appearing lesion over 1 cm without ulceration  Eyes - Extraocular movements intact, and the pupils were reactive to light.  Sclera were not icteric or injected, conjunctiva were pink and moist.  Neck - No palpable enlarged fixed cervical lymph nodes.  No neck cysts or unusual tenderness to palpation.   No palpable fixed thyroid nodules or concerning goiter.  The trachea is grossly midline.   No palpable parotid or submandibular masses    Office Procedure:   I discussed the risks and complications of skin lesion excision, including local anesthesia, bleeding, infection, injury to the facial nerve, scar formation, hypertrophic healing or keloid, numbness to area, recurrence of lesion, benign versus malignant pathology and possible need for further surgery. All questions were answered.    After informed consent was discussed and a time- out taken, I proceeded to cleanse the skin around the lesion with alcohol.  I then demarcated the lesion parallel to relaxed skin tension lines in a natural crease.  The areas were prepped and draped in the normal sterile fashion.  I then infiltrated the skin with 1% lidocaine with 1:200,000 epinephrine.  I  used a 15-blade to create an elliptical incision around the lesion, with margins of 2 mm of grossly normal skin.  I then elevated the skin ellipse and a thin cuff of " underlying subdermal fat with curved iris scissors.  I dissected down through normal subcutaneous tissue for complete removal of the lesion.    Excision was performed in this fashion of the left cheek and the left temple lesion.    After the lesions were completely removed, I then placed them in formalin for permanent section.  Hemostasis was achieved with direct pressure and hand held cautery. I then irrigated the wound and gently suctioned the area.  I advanced the adjacent skin for tension free closure using tenotomy scissors.  The total length of the incision were  2.1 cm left cheek and 2.0 cm left temple  .  I then proceeded to close the incision by using simple interrupted buried knot sutures, using 4 and 5-0 Vicryl.  The skin edges were then reapproximated using 5-0 nylon, simple interrupted sutures.  Antibiotic ointment was then applied and the wound was dressed.  The patient tolerated the procedure without any difficulty.    Impression/Plan    ICD-10-CM    1. Skin lesion of face  L98.9 EXC BENIGN SKIN LESION FACE/EARS 2.1-3.0 CM     EXC BENIGN SKIN LESION FACE/EARS 1.1-2.0 CM   2. 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  This was discussed today.    Further procedures may include skin excision with frozens and flap repair.    The risks of surgery were discussed, if further surgery is necessary.  These include but are not limited to anesthesia, scar or keloid formation, infection, flap failure, change in appearance of surrounding facial structures,  numbness to the skin, injury to the facial nerve with permanent facial weakness which is exceedingly rare but possible.  Temporary weakness to the face may occur with the use of local anesthesia.    The patient expressed understanding.  All questions were answered.  Photos  were taken.      I have instructed the patient on wound care and signs of infection.  Written instructions provided.  We will contact the patient with pathology results.    Sunscreen use and skin cancer preventive measures were reinforced            Kira Anderson D.O.  Otolaryngology/Head and Neck Surgery  Allergy

## 2021-10-28 ENCOUNTER — OFFICE VISIT (OUTPATIENT)
Dept: OTOLARYNGOLOGY | Facility: OTHER | Age: 86
End: 2021-10-28
Attending: OTOLARYNGOLOGY
Payer: COMMERCIAL

## 2021-10-28 VITALS
OXYGEN SATURATION: 97 % | DIASTOLIC BLOOD PRESSURE: 62 MMHG | HEART RATE: 55 BPM | BODY MASS INDEX: 21.22 KG/M2 | RESPIRATION RATE: 18 BRPM | TEMPERATURE: 96.2 F | WEIGHT: 140 LBS | HEIGHT: 68 IN | SYSTOLIC BLOOD PRESSURE: 128 MMHG

## 2021-10-28 DIAGNOSIS — L85.8 KERATOACANTHOMA OF CHEEK: ICD-10-CM

## 2021-10-28 DIAGNOSIS — L98.9 SKIN LESION OF FACE: Primary | ICD-10-CM

## 2021-10-28 PROCEDURE — 11443 EXC FACE-MM B9+MARG 2.1-3 CM: CPT | Mod: 59 | Performed by: OTOLARYNGOLOGY

## 2021-10-28 PROCEDURE — 88305 TISSUE EXAM BY PATHOLOGIST: CPT | Mod: TC | Performed by: OTOLARYNGOLOGY

## 2021-10-28 PROCEDURE — 88305 TISSUE EXAM BY PATHOLOGIST: CPT | Mod: 26 | Performed by: PATHOLOGY

## 2021-10-28 PROCEDURE — 11442 EXC FACE-MM B9+MARG 1.1-2 CM: CPT | Performed by: OTOLARYNGOLOGY

## 2021-10-28 PROCEDURE — G0463 HOSPITAL OUTPT CLINIC VISIT: HCPCS | Mod: 25

## 2021-10-28 ASSESSMENT — PAIN SCALES - GENERAL: PAINLEVEL: NO PAIN (0)

## 2021-10-28 ASSESSMENT — MIFFLIN-ST. JEOR: SCORE: 1284.54

## 2021-10-28 NOTE — NURSING NOTE
"Chief Complaint   Patient presents with     Consult     Keratocanthoma of Cheek, Atypical Squamoproliferative Skin Lesion; Referred by Dr. Derek Izquierdo       Initial /62 (BP Location: Left arm, Patient Position: Sitting, Cuff Size: Adult Regular)   Pulse 55   Temp (!) 96.2  F (35.7  C) (Tympanic)   Resp 18   Ht 1.727 m (5' 8\")   Wt 63.5 kg (140 lb)   SpO2 97%   BMI 21.29 kg/m   Estimated body mass index is 21.29 kg/m  as calculated from the following:    Height as of this encounter: 1.727 m (5' 8\").    Weight as of this encounter: 63.5 kg (140 lb).  Medication Reconciliation: complete  Ann Pineda LPN    "

## 2021-10-28 NOTE — LETTER
10/28/2021         RE: Nori Looney  [address] [place] [address]        Dear Colleague,    Thank you for referring your patient, Nori Looney, to the [facility]. Please see a copy of my visit note below.    Otolaryngology Consultation    Patient: Nori Looney  : 1934    Patient presents with:  Consult: Keratocanthoma of Cheek, Atypical Squamoproliferative Skin Lesion; Referred by Dr. Derek Izquierdo      HPI:  Nori Looney is a 87 year old male seen today for a left cheek and temple facial skin lesion.  Present over 12 months, changing and growing.  No known history of carcinoma skin or melanoma.  Excess sun exposure with sun burns throughout life. No immunodeficiency.  There is no history of solid organ transplant.        He is accompanied by his granddaughter who is a LPN at [employer]    Current Outpatient Rx   Medication Sig Dispense Refill     ACETAMINOPHEN EXTRA STRENGTH 500 MG tablet Take 2 Tabs by mouth two times a day as needed for mild pain. 120 tablet 6     ASPIRIN PO Take 325 mg by mouth daily       atorvastatin (LIPITOR) 20 MG tablet Take 0.5 tablets (10 mg) by mouth daily 45 tablet 3     blood glucose (NO BRAND SPECIFIED) lancets standard Use to test blood sugar 1 times daily 100 each 3     blood glucose (NO BRAND SPECIFIED) test strip Use to test blood sugar 1 times daily 100 each 3     blood glucose monitoring (ACCU-CHEK KEYANNA) test strip by In Vitro route 2 times daily .       blood glucose monitoring (NO BRAND SPECIFIED) meter device kit Use to test blood sugar 1 times daily 1 kit 0     famotidine (PEPCID) 40 MG tablet Take 1 Tab by mouth one time a day. 90 tablet 2     levothyroxine (SYNTHROID/LEVOTHROID) 137 MCG tablet Take one tablet Monday through Friday 60 tablet 3     levothyroxine (SYNTHROID/LEVOTHROID) 150 MCG tablet Take 1 Tab by mouth one time a day. (Patient taking differently: Take 1 Tab by mouth one time a day only on Saturday and ) 90  tablet 3     lisinopril (ZESTRIL) 2.5 MG tablet Take 1 Tab by mouth one time a day. 90 tablet 1     metFORMIN (GLUCOPHAGE-XR) 500 MG 24 hr tablet Take 1 tablet (500 mg) by mouth At Bedtime 90 tablet 1     metoprolol succinate ER (TOPROL-XL) 50 MG 24 hr tablet Take 1 Tab by mouth one time a day. 90 tablet 3     nitroglycerin (NITROSTAT) 0.4 MG sublingual tablet For chest pain place 1 tablet under the tongue every 5 minutes for 3 doses. If symptoms persist 5 minutes after 1st dose call 911. 25 tablet 0     vitamin D3 (CHOLECALCIFEROL) 50 mcg (2000 units) tablet Take by mouth daily         Allergies: Patient has no known allergies.     Past Medical History:   Diagnosis Date     Chronic diastolic congestive heart failure (H)     EF 85%     Coronary artery disease involving coronary bypass graft of native heart without angina pectoris 1/5/2017    2011 in [place] With Dr. Negro at [facility].       Coronary atherosclerosis of native coronary artery     CABG x 5     Encounter for diabetic foot exam (H) 1/5/2017     Erectile dysfunction due to arterial insufficiency      Generalized osteoarthrosis      HTN (hypertension)      Hyperlipidemia associated with type 2 diabetes mellitus (H)      Hypothyroidism     hashimoto's thyroiditis     Neuropathy 01/2018     Other specified hypothyroidism 6/29/2016     Primary osteoarthritis of left knee 1/5/2017     Stable angina (H)      Type 2 diabetes mellitus without complication, without long-term current use of insulin (H) 1/5/2017       Past Surgical History:   Procedure Laterality Date     AS CABG, ARTERY-VEIN, FIVE  11/02/2011    off pump       ENT family history reviewed    Social History     Tobacco Use     Smoking status: Never Smoker     Smokeless tobacco: Never Used     Tobacco comment: second hand smoke in the house 40 yrs   Vaping Use     Vaping Use: Never used   Substance Use Topics     Alcohol use: No     Alcohol/week: 0.0 standard drinks     Drug use: No       Review  "of Systems  ROS: 10 point ROS neg other than the symptoms noted above in the HPI and tinnitus, ear plugging    Physical Exam  /62 (BP Location: Left arm, Patient Position: Sitting, Cuff Size: Adult Regular)   Pulse 55   Temp (!) 96.2  F (35.7  C) (Tympanic)   Resp 18   Ht 1.727 m (5' 8\")   Wt 63.5 kg (140 lb)   SpO2 97%   BMI 21.29 kg/m    General - The patient is well nourished and well developed, and appears to have good nutritional status.  Alert and oriented to person and place, answers questions and cooperates with examination appropriately.   Head and Face - Normocephalic and atraumatic, with no gross asymmetry noted. The facial nerve is intact, with strong symmetric movements.  There is a raised keratin horn suspicious for keratoacanthoma of the left temple that is over 1.5 cm, no ulceration  There is a left superior medial cheek seborrheic appearing lesion over 1 cm without ulceration  Eyes - Extraocular movements intact, and the pupils were reactive to light.  Sclera were not icteric or injected, conjunctiva were pink and moist.  Neck - No palpable enlarged fixed cervical lymph nodes.  No neck cysts or unusual tenderness to palpation.   No palpable fixed thyroid nodules or concerning goiter.  The trachea is grossly midline.   No palpable parotid or submandibular masses    Office Procedure:   I discussed the risks and complications of skin lesion excision, including local anesthesia, bleeding, infection, injury to the facial nerve, scar formation, hypertrophic healing or keloid, numbness to area, recurrence of lesion, benign versus malignant pathology and possible need for further surgery. All questions were answered.    After informed consent was discussed and a time- out taken, I proceeded to cleanse the skin around the lesion with alcohol.  I then demarcated the lesion parallel to relaxed skin tension lines in a natural crease.  The areas were prepped and draped in the normal sterile " fashion.  I then infiltrated the skin with 1% lidocaine with 1:200,000 epinephrine.  I  used a 15-blade to create an elliptical incision around the lesion, with margins of 2 mm of grossly normal skin.  I then elevated the skin ellipse and a thin cuff of underlying subdermal fat with curved iris scissors.  I dissected down through normal subcutaneous tissue for complete removal of the lesion.    Excision was performed in this fashion of the left cheek and the left temple lesion.    After the lesions were completely removed, I then placed them in formalin for permanent section.  Hemostasis was achieved with direct pressure and hand held cautery. I then irrigated the wound and gently suctioned the area.  I advanced the adjacent skin for tension free closure using tenotomy scissors.  The total length of the incision were  2.1 cm left cheek and 2.0 cm left temple  .  I then proceeded to close the incision by using simple interrupted buried knot sutures, using 4 and 5-0 Vicryl.  The skin edges were then reapproximated using 5-0 nylon, simple interrupted sutures.  Antibiotic ointment was then applied and the wound was dressed.  The patient tolerated the procedure without any difficulty.    Impression/Plan    ICD-10-CM    1. Skin lesion of face  L98.9 EXC BENIGN SKIN LESION FACE/EARS 2.1-3.0 CM     EXC BENIGN SKIN LESION FACE/EARS 1.1-2.0 CM   2. 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  This was discussed today.    Further procedures may include skin excision with frozens and flap repair.    The risks of surgery were discussed, if further surgery is necessary.  These include but are not limited to anesthesia, scar or keloid formation, infection, flap failure, change in appearance of surrounding facial structures,  numbness to the skin,  injury to the facial nerve with permanent facial weakness which is exceedingly rare but possible.  Temporary weakness to the face may occur with the use of local anesthesia.    The patient expressed understanding.  All questions were answered.  Photos were taken.      I have instructed the patient on wound care and signs of infection.  Written instructions provided.  We will contact the patient with pathology results.    Sunscreen use and skin cancer preventive measures were reinforced            Kira Anderson D.O.  Otolaryngology/Head and Neck Surgery  Allergy          Again, thank you for allowing me to participate in the care of your patient.        Sincerely,        Kira Anderson MD

## 2021-10-28 NOTE — PATIENT INSTRUCTIONS
Thank you for allowing Dr. Anderson and our ENT team to participate in your care.  If your medications are too expensive, please give the nurse a call.  We can possibly change this medication.  If you have a scheduling or an appointment question please contact our Health Unit Coordinator at their direct line [phone].   ALL nursing questions or concerns can be directed to your ENT nurse at: [phone] - Glenda      POST PROCEDURE INSTRUCTIONS      Remove your dressing in 24 hours (If you have one)    Wash incision with Gentle Cleanser Twice Daily (Cetaphil, Baby Shampoo, etc)    Apply Bacitracin Ointment Three Times Daily; After 1 week, use Aquaphor Ointment     Cover with a clean dressing if in a dirty herber environment or when wet/soiled    Keep incision clean and dry   Do NOT soak in water such as a tub bath or swimming   Do NOT put make-up, powders, hairspray, lotions, etc on the incision       You can apply ice to the surgical area to help reduce swelling. (no longer than 20 minutes at a time)      You can use acetaminophen(Tylenol) or the prescription you received for pain.       If you have any bleeding, cover the wound with clean gauze and hold pressure for 10 Minutes. If the bleeding does not stop or is heavy and profuse, call the clinic or go to the Urgent Care/Emergency Department.    SIGNS OF INFECTION ARE:    Redness, swelling, red streaks, pus, drainage, warmth, fever, increased pain, foul smell.     Contact your primary health care provider if you notice any of the warning signs.     FOLLOW - UP    Follow-up in clinic for a nurse only visit in 7 days for suture removal.     Pathology results will be called to you when they are back. Usually 7-10 days.      6 WEEKS POST PROCEDURE      Apply ANY type of lotion to the suture site(Example - Vaseline Intensive Care or Vitamin E)    Massage the surgical area 1-2 times daily in a circular motion for 5 minutes, for a period of 2 months. This  will help the scar heal better.

## 2021-11-04 ENCOUNTER — TELEPHONE (OUTPATIENT)
Dept: OTOLARYNGOLOGY | Facility: OTHER | Age: 86
End: 2021-11-04

## 2021-11-04 DIAGNOSIS — L81.9 ATYPICAL PIGMENTED SKIN LESION: Primary | ICD-10-CM

## 2021-11-04 LAB
PATH REPORT.COMMENTS IMP SPEC: NORMAL
PATH REPORT.COMMENTS IMP SPEC: NORMAL
PATH REPORT.FINAL DX SPEC: NORMAL
PATH REPORT.GROSS SPEC: NORMAL
PATH REPORT.MICROSCOPIC SPEC OTHER STN: NORMAL
PATH REPORT.RELEVANT HX SPEC: NORMAL
PHOTO IMAGE: NORMAL

## 2021-11-04 NOTE — TELEPHONE ENCOUNTER
----- Message from Kira Anderson MD sent at 11/4/2021 12:27 PM CDT -----  Glenda please call with results, no further excision needed  Follow-up with Katherine Chacon within 4 months

## 2021-11-05 ENCOUNTER — TELEPHONE (OUTPATIENT)
Dept: DERMATOLOGY | Facility: CLINIC | Age: 86
End: 2021-11-05

## 2021-12-03 ENCOUNTER — OFFICE VISIT (OUTPATIENT)
Dept: PODIATRY | Facility: OTHER | Age: 86
End: 2021-12-03
Attending: PODIATRIST
Payer: COMMERCIAL

## 2021-12-03 VITALS
OXYGEN SATURATION: 97 % | TEMPERATURE: 97 F | WEIGHT: 137 LBS | SYSTOLIC BLOOD PRESSURE: 128 MMHG | RESPIRATION RATE: 16 BRPM | HEART RATE: 60 BPM | BODY MASS INDEX: 20.83 KG/M2 | DIASTOLIC BLOOD PRESSURE: 64 MMHG

## 2021-12-03 DIAGNOSIS — L60.3 ONYCHODYSTROPHY: Primary | ICD-10-CM

## 2021-12-03 DIAGNOSIS — E11.42 DIABETIC POLYNEUROPATHY ASSOCIATED WITH TYPE 2 DIABETES MELLITUS (H): ICD-10-CM

## 2021-12-03 DIAGNOSIS — E11.9 DIABETES MELLITUS TYPE 2, NONINSULIN DEPENDENT (H): ICD-10-CM

## 2021-12-03 DIAGNOSIS — I50.22 CHRONIC SYSTOLIC CONGESTIVE HEART FAILURE (H): ICD-10-CM

## 2021-12-03 DIAGNOSIS — L85.3 XEROSIS OF SKIN: ICD-10-CM

## 2021-12-03 PROCEDURE — G0463 HOSPITAL OUTPT CLINIC VISIT: HCPCS | Mod: 25

## 2021-12-03 PROCEDURE — 11721 DEBRIDE NAIL 6 OR MORE: CPT | Performed by: PODIATRIST

## 2021-12-03 ASSESSMENT — PAIN SCALES - GENERAL: PAINLEVEL: NO PAIN (0)

## 2021-12-03 NOTE — PROGRESS NOTES
Chief complaint: Patient presents with:  Toenail: trimming      History of Present Illness: This 87 year old NIDDM male is seen for follow-up management of elongated toenails.     Patient says his RIGHT hallux toenail had been very loose but too thick to trim,b ut it has been more recently controlled.    Patient says he has been staying active. He likes to dance for exercise.     Patient says he has not applied any lotion to his feet.    Patient says he occasionally gets tingling in his toes.     No further pedal complaints today.     Last HbA1C was 6.3% on 05/03/2021  Previously 6.3% from 01/08/2020  Previously 7.1% on 08/26/2019        Wt Readings from Last 4 Encounters:   12/03/21 62.1 kg (137 lb)   10/28/21 63.5 kg (140 lb)   09/21/21 63.8 kg (140 lb 9.6 oz)   09/03/21 62.6 kg (138 lb)         /64 (BP Location: Right arm, Patient Position: Sitting, Cuff Size: Adult Regular)   Pulse 60   Temp 97  F (36.1  C) (Tympanic)   Resp 16   Wt 62.1 kg (137 lb)   SpO2 97%   BMI 20.83 kg/m      Patient Active Problem List   Diagnosis     ACP (advance care planning)     Other specified hypothyroidism     Chronic systolic congestive heart failure (H)     Benign essential hypertension     Hyperlipidemia LDL goal <70     Bilateral carotid artery stenosis     Coronary artery disease involving coronary bypass graft of native heart without angina pectoris     Type 2 diabetes mellitus with hyperglycemia, without long-term current use of insulin (H)     Encounter for diabetic foot exam (H)     Trochanteric bursitis of right hip     Gastroesophageal reflux disease, esophagitis presence not specified     Primary osteoarthritis of right hip     Bilateral primary osteoarthritis of knee      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)     Chronic bilateral low back pain without sciatica     DDD (degenerative disc disease), lumbar     Allergic arthritis of right hip     Keratoacanthoma of cheek      Atypical squamoproliferative skin lesion     Generalized osteoarthrosis, unspecified site       Past Surgical History:   Procedure Laterality Date     AS CABG, ARTERY-VEIN, FIVE  11/02/2011    off pump       Current Outpatient Medications   Medication     ACETAMINOPHEN EXTRA STRENGTH 500 MG tablet     ASPIRIN PO     atorvastatin (LIPITOR) 20 MG tablet     blood glucose (NO BRAND SPECIFIED) lancets standard     blood glucose (NO BRAND SPECIFIED) test strip     blood glucose monitoring (ACCU-CHEK KEYANNA) test strip     blood glucose monitoring (NO BRAND SPECIFIED) meter device kit     famotidine (PEPCID) 40 MG tablet     levothyroxine (SYNTHROID/LEVOTHROID) 137 MCG tablet     levothyroxine (SYNTHROID/LEVOTHROID) 150 MCG tablet     lisinopril (ZESTRIL) 2.5 MG tablet     metFORMIN (GLUCOPHAGE-XR) 500 MG 24 hr tablet     metoprolol succinate ER (TOPROL-XL) 50 MG 24 hr tablet     nitroglycerin (NITROSTAT) 0.4 MG sublingual tablet     vitamin D3 (CHOLECALCIFEROL) 50 mcg (2000 units) tablet     No current facility-administered medications for this visit.        No Known Allergies    Family History   Problem Relation Age of Onset     Cerebrovascular Disease Father      Coronary Artery Disease Father      Dementia Mother      Coronary Artery Disease Sister      Lung Cancer Sister      Thyroid Disease Daughter        Social History     Socioeconomic History     Marital status:      Spouse name: Luis     Number of children: 5     Years of education: 12     Highest education level: Not on file   Occupational History     Occupation:      Employer: RETIRED   Social Needs     Financial resource strain: Not on file     Food insecurity:     Worry: Not on file     Inability: Not on file     Transportation needs:     Medical: Not on file     Non-medical: Not on file   Tobacco Use     Smoking status: Never Smoker     Smokeless tobacco: Never Used     Tobacco comment: second hand smoke in the house 40 yrs   Substance  and Sexual Activity     Alcohol use: No     Alcohol/week: 0.0 oz     Drug use: No     Sexual activity: Never     Partners: Female   Lifestyle     Physical activity:     Days per week: Not on file     Minutes per session: Not on file     Stress: Not on file   Relationships     Social connections:     Talks on phone: Not on file     Gets together: Not on file     Attends [employer] service: Not on file     Active member of club or organization: Not on file     Attends meetings of clubs or organizations: Not on file     Relationship status: Not on file     Intimate partner violence:     Fear of current or ex partner: Not on file     Emotionally abused: Not on file     Physically abused: Not on file     Forced sexual activity: Not on file   Other Topics Concern      Service No     Blood Transfusions Yes     Comment: Ok to recieve      Caffeine Concern Yes     Comment: 3 cups daily      Occupational Exposure Not Asked     Hobby Hazards Not Asked     Sleep Concern Not Asked     Stress Concern Not Asked     Weight Concern Not Asked     Special Diet Not Asked     Back Care Not Asked     Exercise Yes     Comment: walking,       Bike Helmet Not Asked     Seat Belt Yes     Self-Exams Not Asked     Parent/sibling w/ CABG, MI or angioplasty before 65F 55M? Not Asked   Social History Narrative     Not on file       ROS: 10 point ROS neg other than the symptoms noted above in the HPI.  EXAM  Constitutional: healthy, alert and no distress    Psychiatric: mentation appears normal and affect normal/bright    VASCULAR:  -Dorsalis pedis pulse +1/4 b/l  -Posterior tibial pulse +1/4 b/l  -Capillary refill time < 3 seconds to b/l hallux  -Hair growth Absent to b/l anterior legs and ankles  NEURO:  -Protective sensation intact with SWM +7/10 RIGHT and +7/10 LEFT on 12/03/2021  -Protective sensation intact with SWM +8/10 RIGHT and +8/10 LEFT on 08/30/2021  -Protective sensation intact with SWM +8/10 RIGHT and +8/10 LEFT  on 02/06/2020  -Protective sensation intact with SWM +10/10 RIGHT and +10/10 LEFT on 11/21/2019  -Protective sensation intact with SWM +7/10 RIGHT and +7/10 LEFT on 09/10/2019  -Light touch sensation intact to b/l plantar forefoot  DERM:  -Skin mildly dry, flaking to bilateral plantar foot  -Skin temperature mildly cool to bilateral foot  -Toenails elongated, thickened, dystrophic and discolored with subungual debris x 10  MSK:  -Muscle strength of ankles +5/5 for dorsiflexion, plantarflexion, ABDUction and ADDuction b/l    ============================================================    ASSESSMENT:  (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 Treatment options discussed with no educational barriers noted.  -High risk toenail debridement x 10 toenails without incident    -Diabetic Foot Education provided. This included checking the feet daily looking for new new blisters or wounds, wearing shoes at all times when walking including around the house, and avoiding lotion application between the toes. Any sign of infection in the foot warrant's the patient presenting to the ED as soon as possible.    -Patient in agreement with the above treatment plan and all of patient's questions were answered.      RTC 3 months for diabetic foot exam and high risk nail debridement        Deidre Mishra DPM

## 2021-12-03 NOTE — NURSING NOTE
"Chief Complaint   Patient presents with     Toenail     trimming       Initial /64 (BP Location: Right arm, Patient Position: Sitting, Cuff Size: Adult Regular)   Pulse 60   Temp 97  F (36.1  C) (Tympanic)   Resp 16   Wt 62.1 kg (137 lb)   SpO2 97%   BMI 20.83 kg/m   Estimated body mass index is 20.83 kg/m  as calculated from the following:    Height as of 10/28/21: 1.727 m (5' 8\").    Weight as of this encounter: 62.1 kg (137 lb).  Medication Reconciliation: complete  Denisha Barber LPN  "

## 2021-12-03 NOTE — PATIENT INSTRUCTIONS
Thank you for allowing  and our Podiatry team to participate in your care. Please call our office at [phone] with scheduling questions or with any other questions or concerns.

## 2022-01-03 ENCOUNTER — OFFICE VISIT (OUTPATIENT)
Dept: FAMILY MEDICINE | Facility: OTHER | Age: 87
End: 2022-01-03
Attending: FAMILY MEDICINE
Payer: COMMERCIAL

## 2022-01-03 VITALS
SYSTOLIC BLOOD PRESSURE: 118 MMHG | WEIGHT: 142 LBS | TEMPERATURE: 97.3 F | HEART RATE: 59 BPM | OXYGEN SATURATION: 98 % | DIASTOLIC BLOOD PRESSURE: 56 MMHG | RESPIRATION RATE: 18 BRPM | HEIGHT: 68 IN | BODY MASS INDEX: 21.52 KG/M2

## 2022-01-03 DIAGNOSIS — E11.65 TYPE 2 DIABETES MELLITUS WITH HYPERGLYCEMIA, WITHOUT LONG-TERM CURRENT USE OF INSULIN (H): Primary | ICD-10-CM

## 2022-01-03 DIAGNOSIS — I10 BENIGN ESSENTIAL HYPERTENSION: ICD-10-CM

## 2022-01-03 DIAGNOSIS — E11.42 DIABETIC POLYNEUROPATHY ASSOCIATED WITH TYPE 2 DIABETES MELLITUS (H): ICD-10-CM

## 2022-01-03 DIAGNOSIS — M16.11 PRIMARY OSTEOARTHRITIS OF RIGHT HIP: ICD-10-CM

## 2022-01-03 DIAGNOSIS — E03.8 OTHER SPECIFIED HYPOTHYROIDISM: ICD-10-CM

## 2022-01-03 DIAGNOSIS — M17.10 PRIMARY LOCALIZED OSTEOARTHROSIS OF LOWER LEG, UNSPECIFIED LATERALITY: ICD-10-CM

## 2022-01-03 DIAGNOSIS — I50.22 CHRONIC SYSTOLIC CONGESTIVE HEART FAILURE (H): ICD-10-CM

## 2022-01-03 DIAGNOSIS — E78.5 HYPERLIPIDEMIA LDL GOAL <70: ICD-10-CM

## 2022-01-03 PROBLEM — E11.9 TYPE 2 DIABETES MELLITUS WITHOUT COMPLICATION, WITHOUT LONG-TERM CURRENT USE OF INSULIN (H): Status: ACTIVE | Noted: 2017-01-05

## 2022-01-03 LAB
EST. AVERAGE GLUCOSE BLD GHB EST-MCNC: 140 MG/DL
HBA1C MFR BLD: 6.5 % (ref 0–5.6)

## 2022-01-03 PROCEDURE — G0463 HOSPITAL OUTPT CLINIC VISIT: HCPCS | Mod: 25

## 2022-01-03 PROCEDURE — 20610 DRAIN/INJ JOINT/BURSA W/O US: CPT | Mod: 50 | Performed by: FAMILY MEDICINE

## 2022-01-03 PROCEDURE — 36415 COLL VENOUS BLD VENIPUNCTURE: CPT | Mod: ZL | Performed by: FAMILY MEDICINE

## 2022-01-03 PROCEDURE — 99214 OFFICE O/P EST MOD 30 MIN: CPT | Mod: 25 | Performed by: FAMILY MEDICINE

## 2022-01-03 PROCEDURE — 83036 HEMOGLOBIN GLYCOSYLATED A1C: CPT | Mod: ZL | Performed by: FAMILY MEDICINE

## 2022-01-03 RX ORDER — TRIAMCINOLONE ACETONIDE 40 MG/ML
80 INJECTION, SUSPENSION INTRA-ARTICULAR; INTRAMUSCULAR ONCE
Status: COMPLETED | OUTPATIENT
Start: 2022-01-03 | End: 2022-01-03

## 2022-01-03 RX ORDER — ACETAMINOPHEN 500 MG
TABLET ORAL
Qty: 180 TABLET | Refills: 3 | Status: SHIPPED | OUTPATIENT
Start: 2022-01-03 | End: 2023-08-11

## 2022-01-03 RX ORDER — LEVOTHYROXINE SODIUM 137 UG/1
TABLET ORAL
Qty: 60 TABLET | Refills: 3 | Status: SHIPPED | OUTPATIENT
Start: 2022-01-03 | End: 2023-01-18

## 2022-01-03 RX ORDER — ATORVASTATIN CALCIUM 10 MG/1
10 TABLET, FILM COATED ORAL DAILY
Qty: 90 TABLET | Refills: 3 | Status: SHIPPED | OUTPATIENT
Start: 2022-01-03 | End: 2023-01-18

## 2022-01-03 RX ORDER — ATORVASTATIN CALCIUM 10 MG/1
10 TABLET, FILM COATED ORAL DAILY
COMMUNITY
End: 2022-01-03

## 2022-01-03 RX ORDER — TRIAMCINOLONE ACETONIDE 40 MG/ML
40 INJECTION, SUSPENSION INTRA-ARTICULAR; INTRAMUSCULAR ONCE
Status: COMPLETED | OUTPATIENT
Start: 2022-01-03 | End: 2022-01-03

## 2022-01-03 RX ORDER — LISINOPRIL 2.5 MG/1
TABLET ORAL
Qty: 90 TABLET | Refills: 2 | Status: SHIPPED | OUTPATIENT
Start: 2022-01-03 | End: 2022-10-13

## 2022-01-03 RX ORDER — LIDOCAINE HYDROCHLORIDE 10 MG/ML
13 INJECTION, SOLUTION INFILTRATION; PERINEURAL ONCE
Status: COMPLETED | OUTPATIENT
Start: 2022-01-03 | End: 2022-01-03

## 2022-01-03 RX ORDER — LEVOTHYROXINE SODIUM 150 UG/1
TABLET ORAL
Qty: 30 TABLET | Refills: 3 | Status: SHIPPED | OUTPATIENT
Start: 2022-01-03 | End: 2022-06-17

## 2022-01-03 RX ADMIN — TRIAMCINOLONE ACETONIDE 80 MG: 40 INJECTION, SUSPENSION INTRA-ARTICULAR; INTRAMUSCULAR at 11:47

## 2022-01-03 RX ADMIN — Medication 96 MG: at 11:37

## 2022-01-03 RX ADMIN — TRIAMCINOLONE ACETONIDE 40 MG: 40 INJECTION, SUSPENSION INTRA-ARTICULAR; INTRAMUSCULAR at 11:44

## 2022-01-03 RX ADMIN — LIDOCAINE HYDROCHLORIDE 13 ML: 10 INJECTION, SOLUTION INFILTRATION; PERINEURAL at 11:40

## 2022-01-03 ASSESSMENT — ANXIETY QUESTIONNAIRES
GAD7 TOTAL SCORE: 0
1. FEELING NERVOUS, ANXIOUS, OR ON EDGE: NOT AT ALL
6. BECOMING EASILY ANNOYED OR IRRITABLE: NOT AT ALL
3. WORRYING TOO MUCH ABOUT DIFFERENT THINGS: NOT AT ALL
5. BEING SO RESTLESS THAT IT IS HARD TO SIT STILL: NOT AT ALL
4. TROUBLE RELAXING: NOT AT ALL
2. NOT BEING ABLE TO STOP OR CONTROL WORRYING: NOT AT ALL
7. FEELING AFRAID AS IF SOMETHING AWFUL MIGHT HAPPEN: NOT AT ALL

## 2022-01-03 ASSESSMENT — PATIENT HEALTH QUESTIONNAIRE - PHQ9: SUM OF ALL RESPONSES TO PHQ QUESTIONS 1-9: 0

## 2022-01-03 ASSESSMENT — PAIN SCALES - GENERAL: PAINLEVEL: MODERATE PAIN (5)

## 2022-01-03 ASSESSMENT — MIFFLIN-ST. JEOR: SCORE: 1293.61

## 2022-01-03 NOTE — PROGRESS NOTES
PROCEDURE:  JOINT ASPIRATION/INJECTION.         After a discussion of risks, benefits and side effects of procedure, informed patient consent was obtained.       The bilateral knees were prepped and draped in the usual clean fashion (sterile not required for this procedure).  1.0 cc of 1% lidocaine was used for local analgesia superficially in each knee.      INJECTION:  Using 6 cc of 1% lidocaine mixed                           with 60 mg of Kenalog, the right knee was successfully injected                           without complication, syringe then switched to synvisc syringe which was injected without complication.  Patient did experience some pain                          relief following injection.    Using 6 cc of 1% lidocaine mixed                           with 60 mg of Kenalog, the left knee was successfully injected                           without complication, syringe then switched to synvisc syringe which was injected without complication.  Patient did experience some pain                          relief following injection.

## 2022-01-03 NOTE — LETTER
January 3, 2022      Nori Looney  [address]  [place] [address]        Dear ,    We are writing to inform you of your test results.    A1c is good       Resulted Orders   Hemoglobin A1c   Result Value Ref Range    Estimated Average Glucose 140 mg/dL    Hemoglobin A1C 6.5 (H) 0.0 - 5.6 %      Comment:      Normal <5.7%   Prediabetes 5.7-6.4%    Diabetes 6.5% or higher     Note: Adopted from ADA consensus guidelines.       If you have any questions or concerns, please call the clinic at the number listed above.       Sincerely,      Bisi Stuart MD

## 2022-01-03 NOTE — PROGRESS NOTES
Assessment & Plan     Type 2 diabetes mellitus with hyperglycemia, without long-term current use of insulin (H)  Follow-up 4 mos  - Hemoglobin A1c    Other specified hypothyroidism  refilled  - levothyroxine (SYNTHROID/LEVOTHROID) 150 MCG tablet; Take 1 Tab by mouth daily on Saturday and Sunday.  - levothyroxine (SYNTHROID/LEVOTHROID) 137 MCG tablet; Take one tablet Monday through Friday    Benign essential hypertension  Refilled, continue  - lisinopril (ZESTRIL) 2.5 MG tablet; Take 1 Tab by mouth one time a day.    Primary osteoarthritis of right hip  refilled  - acetaminophen (ACETAMINOPHEN EXTRA STRENGTH) 500 MG tablet; Take 2 Tabs by mouth two times a day as needed for mild pain.    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  - acetaminophen (ACETAMINOPHEN EXTRA STRENGTH) 500 MG tablet; Take 2 Tabs by mouth two times a day as needed for mild pain.  - Large Joint/Bursa injection and/or drainage (Shoulder, Knee)  - hylan (SYNVISC ONE) injection 96 mg  - triamcinolone (KENALOG-40) injection 80 mg  - triamcinolone (KENALOG-40) injection 40 mg  - lidocaine 1 % injection 13 mL        Patient was agreeable to this plan and had no further questions.  There are no Patient Instructions on file for this visit.    No follow-ups on file.    Bisi Stuart MD  [facility] Julio is a 87 year old who presents for the following health issues  accompanied by his daughter.    HPI     Diabetes Follow-up      How often are you checking your blood  sugar? Not at all    What concerns do you have today about your diabetes? None     Do you have any of these symptoms? (Select all that apply)  No numbness or tingling in feet.  No redness, sores or blisters on feet.  No complaints of excessive thirst.  No reports of blurry vision.  No significant changes to weight.    Have you had a diabetic eye exam in the last 12 months? Yes- Date of last eye exam: 10/21,  Location: [place]                Hyperlipidemia Follow-Up      Are you regularly taking any medication or supplement to lower your cholesterol?   Yes- LIpitor 10 mg    Are you having muscle aches or other side effects that you think could be caused by your cholesterol lowering medication?  No    Hypertension Follow-up      Do you check your blood pressure regularly outside of the clinic? No     Are you following a low salt diet? Yes    Are your blood pressures ever more than 140 on the top number (systolic) OR more   than 90 on the bottom number (diastolic), for example 140/90? No    BP Readings from Last 2 Encounters:   12/03/21 128/64   10/28/21 128/62     Hemoglobin A1C POCT (%)   Date Value   05/03/2021 6.3 (H)   02/01/2021 6.1 (H)     Hemoglobin A1C (%)   Date Value   09/03/2021 6.0 (H)     LDL Cholesterol Calculated (mg/dL)   Date Value   09/03/2021 51   10/30/2020 47   01/08/2020 73         How many servings of fruits and vegetables do you eat daily?  0-1    On average, how many sweetened beverages do you drink each day (Examples: soda, juice, sweet tea, etc.  Do NOT count diet or artificially sweetened beverages)?   1    How many days per week do you exercise enough to make your heart beat faster? 3 or less    How many minutes a day do you exercise enough to make your heart beat faster? 10 - 19    How many days per week do you miss taking your medication? 0    Having lots of knee pain bilaterally    Review of Systems   Constitutional, HEENT, cardiovascular, pulmonary, gi and gu systems are negative,  "except as otherwise noted.      Objective    /56 (BP Location: Left arm, Patient Position: Sitting, Cuff Size: Adult Regular)   Pulse 59   Temp 97.3  F (36.3  C) (Tympanic)   Resp 18   Ht 1.727 m (5' 8\")   Wt 64.4 kg (142 lb)   SpO2 98%   BMI 21.59 kg/m    There is no height or weight on file to calculate BMI.  Physical Exam   GENERAL: healthy, alert and no distress  NECK: no adenopathy, no asymmetry, masses, or scars and thyroid normal to palpation  RESP: lungs clear to auscultation - no rales, rhonchi or wheezes  CV: regular rate and rhythm, normal S1 S2, no S3 or S4, no murmur, click or rub, no peripheral edema and peripheral pulses strong  ABDOMEN: soft, nontender, no hepatosplenomegaly, no masses and bowel sounds normal  MS: decreased range of motion with arthritic changes bilaterally  PSYCH: mentation appears normal, affect normal/bright    No results found for any visits on 01/03/22.          "

## 2022-01-03 NOTE — NURSING NOTE
"Chief Complaint   Patient presents with     Hypertension     Diabetes     Lipids       Initial /56 (BP Location: Left arm, Patient Position: Sitting, Cuff Size: Adult Regular)   Pulse 59   Temp 97.3  F (36.3  C) (Tympanic)   Resp 18   Ht 1.727 m (5' 8\")   Wt 64.4 kg (142 lb)   SpO2 98%   BMI 21.59 kg/m   Estimated body mass index is 21.59 kg/m  as calculated from the following:    Height as of this encounter: 1.727 m (5' 8\").    Weight as of this encounter: 64.4 kg (142 lb).  Medication Reconciliation: complete  Jennifer Mckeon LPN  "

## 2022-01-04 ASSESSMENT — ANXIETY QUESTIONNAIRES: GAD7 TOTAL SCORE: 0

## 2022-01-11 DIAGNOSIS — I25.10 CORONARY ARTERY DISEASE INVOLVING NATIVE CORONARY ARTERY OF NATIVE HEART WITHOUT ANGINA PECTORIS: ICD-10-CM

## 2022-01-13 RX ORDER — NITROGLYCERIN 0.4 MG/1
TABLET SUBLINGUAL
Qty: 25 TABLET | Refills: 0 | Status: SHIPPED | OUTPATIENT
Start: 2022-01-13

## 2022-01-13 NOTE — TELEPHONE ENCOUNTER
Nitro      Last Written Prescription Date:  6.26.17  Last Fill Quantity: #25,   # refills: 0  Last Office Visit: 1.3.22  Future Office visit:    Next 5 appointments (look out 90 days)    Mar 04, 2022 10:00 AM  (Arrive by 9:45 AM)  Return Visit with Deidre Mishra DPM  [facility] ([facility] ) [address] [place] [id number] [phone]           Routing refill request to provider for review/approval because:  Drug not on the [facility], [facility] or [facility] refill protocol or controlled substance

## 2022-02-17 PROBLEM — K21.9 GASTROESOPHAGEAL REFLUX DISEASE: Status: ACTIVE | Noted: 2019-08-26

## 2022-02-22 ENCOUNTER — TELEPHONE (OUTPATIENT)
Dept: FAMILY MEDICINE | Facility: OTHER | Age: 87
End: 2022-02-22
Payer: COMMERCIAL

## 2022-02-22 DIAGNOSIS — H93.13 RINGING IN EARS, BILATERAL: Primary | ICD-10-CM

## 2022-02-22 NOTE — TELEPHONE ENCOUNTER
9:59 AM    Reason for Call: Phone Call    Description: Daughter called wanting to schedule appointment for patient with Audiology for ringing in ears and trouble hearing. Referral needed to schedule appointment. Please call back Sanjuana Quinn method for responding to this message: Telephone Call  What is your phone number ? [phone]    If we cannot reach you directly, may we leave a detailed response at the number you provided? Yes    Can this message wait until your PCP/provider returns, if available today? YES    Miladys Helms

## 2022-02-25 ENCOUNTER — OFFICE VISIT (OUTPATIENT)
Dept: OTOLARYNGOLOGY | Facility: OTHER | Age: 87
End: 2022-02-25
Attending: PHYSICIAN ASSISTANT
Payer: COMMERCIAL

## 2022-02-25 ENCOUNTER — OFFICE VISIT (OUTPATIENT)
Dept: AUDIOLOGY | Facility: OTHER | Age: 87
End: 2022-02-25
Attending: FAMILY MEDICINE
Payer: COMMERCIAL

## 2022-02-25 VITALS
BODY MASS INDEX: 21.97 KG/M2 | OXYGEN SATURATION: 94 % | DIASTOLIC BLOOD PRESSURE: 60 MMHG | TEMPERATURE: 97 F | HEART RATE: 98 BPM | SYSTOLIC BLOOD PRESSURE: 140 MMHG | HEIGHT: 67 IN | WEIGHT: 140 LBS

## 2022-02-25 DIAGNOSIS — H90.3 SENSORINEURAL HEARING LOSS (SNHL) OF BOTH EARS: Primary | ICD-10-CM

## 2022-02-25 DIAGNOSIS — H90.3 SENSORINEURAL HEARING LOSS (SNHL) OF BOTH EARS: ICD-10-CM

## 2022-02-25 DIAGNOSIS — H93.13 RINGING IN EARS, BILATERAL: ICD-10-CM

## 2022-02-25 DIAGNOSIS — H61.23 BILATERAL IMPACTED CERUMEN: Primary | ICD-10-CM

## 2022-02-25 DIAGNOSIS — H93.13 TINNITUS, BILATERAL: ICD-10-CM

## 2022-02-25 PROCEDURE — 92504 EAR MICROSCOPY EXAMINATION: CPT | Performed by: PHYSICIAN ASSISTANT

## 2022-02-25 PROCEDURE — 69210 REMOVE IMPACTED EAR WAX UNI: CPT | Performed by: PHYSICIAN ASSISTANT

## 2022-02-25 PROCEDURE — G0268 REMOVAL OF IMPACTED WAX MD: HCPCS | Performed by: PHYSICIAN ASSISTANT

## 2022-02-25 PROCEDURE — 92550 TYMPANOMETRY & REFLEX THRESH: CPT | Performed by: AUDIOLOGIST

## 2022-02-25 PROCEDURE — G0463 HOSPITAL OUTPT CLINIC VISIT: HCPCS

## 2022-02-25 PROCEDURE — 92557 COMPREHENSIVE HEARING TEST: CPT | Performed by: AUDIOLOGIST

## 2022-02-25 PROCEDURE — 69210 REMOVE IMPACTED EAR WAX UNI: CPT | Mod: 50 | Performed by: PHYSICIAN ASSISTANT

## 2022-02-25 ASSESSMENT — PAIN SCALES - GENERAL: PAINLEVEL: MODERATE PAIN (4)

## 2022-02-25 NOTE — LETTER
2022         RE: Nori Looney  [address] [facility] [address]        Dear Colleague,    Thank you for referring your patient, Nori Looney, to the [facility]. Please see a copy of my visit note below.    Otolaryngology Consultation    Patient: Nori Looney  : 1934    Patient presents with:  Cerumen Impaction      HPI:  Nori Looney is a 88 year old male seen today for cerumen impaction.   Nori has felt harder time with hearing/ understanding words.       Denies COM or otologic surgeries.   Denies otalgia, otorrhea  Denies worrisome tinnitus  Denies fluctuating hearing loss or tinnitus.   Denies vertigo or facial paraesthesia.   + Hx of noise exposure. [place] lived [place] farm with noise exposure.   Family hx- No.     Audiogram- pending.   Last audiogram was about 3 years ago.   Current Outpatient Rx   Medication Sig Dispense Refill     acetaminophen (ACETAMINOPHEN EXTRA STRENGTH) 500 MG tablet Take 2 Tabs by mouth two times a day as needed for mild pain. 180 tablet 3     ASPIRIN PO Take 325 mg by mouth daily       atorvastatin (LIPITOR) 10 MG tablet Take 1 tablet (10 mg) by mouth daily 90 tablet 3     blood glucose (NO BRAND SPECIFIED) lancets standard Use to test blood sugar 1 times daily 100 each 3     blood glucose (NO BRAND SPECIFIED) test strip Use to test blood sugar 1 times daily 100 each 3     blood glucose monitoring (ACCU-CHEK KEYANNA) test strip by In Vitro route 2 times daily .       blood glucose monitoring (NO BRAND SPECIFIED) meter device kit Use to test blood sugar 1 times daily 1 kit 0     famotidine (PEPCID) 40 MG tablet Take 1 Tab by mouth one time a day. 90 tablet 2     levothyroxine (SYNTHROID/LEVOTHROID) 137 MCG tablet Take one tablet Monday through Friday 60 tablet 3     levothyroxine (SYNTHROID/LEVOTHROID) 150 MCG tablet Take 1 Tab by mouth daily on Saturday and . 30 tablet 3     lisinopril (ZESTRIL) 2.5 MG tablet Take 1 Tab by mouth one time a  day. 90 tablet 2     metFORMIN (GLUCOPHAGE-XR) 500 MG 24 hr tablet Take 1 tablet (500 mg) by mouth At Bedtime 90 tablet 1     metoprolol succinate ER (TOPROL-XL) 50 MG 24 hr tablet Take 1 Tab by mouth one time a day. 90 tablet 3     nitroGLYcerin (NITROSTAT) 0.4 MG sublingual tablet For chest pain place 1 tablet under the tongue every 5 minutes for 3 doses. If symptoms persist 5 minutes after 1st dose call 911. 25 tablet 0     vitamin D3 (CHOLECALCIFEROL) 50 mcg (2000 units) tablet Take by mouth daily         Allergies: Patient has no known allergies.     Past Medical History:   Diagnosis Date     Chronic diastolic congestive heart failure (H)     EF 85%     Coronary artery disease involving coronary bypass graft of native heart without angina pectoris 1/5/2017    2011 in [place] With Dr. Negro at [facility].       Coronary atherosclerosis of native coronary artery     CABG x 5     Encounter for diabetic foot exam (H) 1/5/2017     Erectile dysfunction due to arterial insufficiency      Generalized osteoarthrosis      HTN (hypertension)      Hyperlipidemia associated with type 2 diabetes mellitus (H)      Hypothyroidism     hashimoto's thyroiditis     Neuropathy 01/2018     Other specified hypothyroidism 6/29/2016     Primary osteoarthritis of left knee 1/5/2017     Stable angina (H)      Type 2 diabetes mellitus without complication, without long-term current use of insulin (H) 1/5/2017       Past Surgical History:   Procedure Laterality Date     AS CABG, ARTERY-VEIN, FIVE  11/02/2011    off pump       ENT family history reviewed    Social History     Tobacco Use     Smoking status: Never Smoker     Smokeless tobacco: Never Used     Tobacco comment: second hand smoke in the house 40 yrs   Vaping Use     Vaping Use: Never used   Substance Use Topics     Alcohol use: No     Alcohol/week: 0.0 standard drinks     Drug use: No       Review of Systems  ROS: 10 point ROS neg other than the symptoms noted above in the  "HPI     Physical Exam  BP (!) 140/60 (BP Location: Left arm, Patient Position: Chair, Cuff Size: Adult Regular)   Pulse 98   Temp 97  F (36.1  C) (Tympanic)   Ht 1.702 m (5' 7\")   Wt 63.5 kg (140 lb)   SpO2 94%   BMI 21.93 kg/m    General - The patient is well nourished and well developed, and appears to have good nutritional status.  Alert and oriented to person and place, answers questions and cooperates with examination appropriately.   Head and Face - Normocephalic and atraumatic, with no gross asymmetry noted.  The facial nerve is intact, with strong symmetric movements.  Voice and Breathing - The patient was breathing comfortably without the use of accessory muscles. There was no wheezing, stridor, or stertor.  The patients voice was clear and strong, and had appropriate pitch and quality.  On examination of the ears, I found that the ear(s) were completely impacted with dense cerumen.  Therefore, I positioned them in the examination chair in a semi-supine position, beginning with the right side.  I used the binocular surgical microscope to perform cerumen removal.  I began by using a cerumen loop to gently lift the edges of the cerumen mass away from the walls of the external canal.  Once I did this, I was able to suction away fragments of wax and debris using suction.  Once the mass was loose enough, the entire plug was pulled from the canal.  The tympanic membrane was intact, no sign of perforation or middle ear effusion.    I turned my attention to the contralateral side once again using the binocular surgical microscope to perform cerumen removal.  I began by using a cerumen loop to gently lift the edges of the cerumen mass away from the walls of the external canal.  Once I did this, I was able to suction away fragments of wax and debris using suction.  Once the mass was loose enough, the entire plug was pulled from the canal.  The tympanic membrane was intact, no sign of perforation or middle ear " effusion.      Mouth - Examination of the oral cavity showed pink, healthy oral mucosa. No lesions or ulcerations noted.  The tongue was mobile and midline, and upper/ lower plates  Throat - The walls of the oropharynx were smooth, pink, moist, symmetric, and had no lesions or ulcerations.  The tonsillar pillars and soft palate were symmetric.  The uvula was midline on elevation.    Neck - Normal midline excursion of the laryngotracheal complex during swallowing.  Full range of motion on passive movement.  Palpation of the occipital, submental, submandibular, internal jugular chain, and supraclavicular nodes did not demonstrate any abnormal lymph nodes or masses.  Palpation of the thyroid was soft and smooth, with no nodules or goiter appreciated.  The trachea was mobile and midline.      Impression and Plan- Nori Looney is a 88 year old male with:    ICD-10-CM    1. Bilateral impacted cerumen  H61.23    2. Sensorineural hearing loss (SNHL) of both ears  H90.3          Ears were cleaned. Tolerated well  Normal ear exam.   Return PRN ear cleaning    Complete audiogram as scheduled.     Karina Summers PA-C  ENT  [facility], [place]          Again, thank you for allowing me to participate in the care of your patient.        Sincerely,        Karina Summers PA-C

## 2022-02-25 NOTE — NURSING NOTE
"Chief Complaint   Patient presents with     Cerumen Impaction       Initial BP (!) 140/60 (BP Location: Left arm, Patient Position: Chair, Cuff Size: Adult Regular)   Pulse 98   Temp 97  F (36.1  C) (Tympanic)   Ht 1.702 m (5' 7\")   Wt 63.5 kg (140 lb)   SpO2 94%   BMI 21.93 kg/m   Estimated body mass index is 21.93 kg/m  as calculated from the following:    Height as of this encounter: 1.702 m (5' 7\").    Weight as of this encounter: 63.5 kg (140 lb).  Medication Reconciliation: complete  VIELKA TIDWELL LPN    "

## 2022-02-25 NOTE — PATIENT INSTRUCTIONS
Ears were cleaned.   Normal ear exam.   Complete hearing test.     Follow up as needed for ear cleaning.       Thank you for allowing Karina Summers PA-C and our ENT team to participate in your care.  If your medications are too expensive, please give the nurse a call.  We can possibly change this medication.  If you have a scheduling or an appointment question please contact our Health Unit Coordinator at [phone], Ext. [phone].    ALL nursing questions or concerns can be directed to your ENT nurse at: [phone] Joleen

## 2022-02-25 NOTE — PROGRESS NOTES
Otolaryngology Consultation    Patient: Nori Looney  : 1934    Patient presents with:  Cerumen Impaction      HPI:  Nori Looney is a 88 year old male seen today for cerumen impaction.   Nori has felt harder time with hearing/ understanding words.       Denies COM or otologic surgeries.   Denies otalgia, otorrhea  Denies worrisome tinnitus  Denies fluctuating hearing loss or tinnitus.   Denies vertigo or facial paraesthesia.   + Hx of noise exposure. [place].   Family hx- No.     Audiogram- pending.   Last audiogram was about 3 years ago.   Current Outpatient Rx   Medication Sig Dispense Refill     acetaminophen (ACETAMINOPHEN EXTRA STRENGTH) 500 MG tablet Take 2 Tabs by mouth two times a day as needed for mild pain. 180 tablet 3     ASPIRIN PO Take 325 mg by mouth daily       atorvastatin (LIPITOR) 10 MG tablet Take 1 tablet (10 mg) by mouth daily 90 tablet 3     blood glucose (NO BRAND SPECIFIED) lancets standard Use to test blood sugar 1 times daily 100 each 3     blood glucose (NO BRAND SPECIFIED) test strip Use to test blood sugar 1 times daily 100 each 3     blood glucose monitoring (ACCU-CHEK KEYANNA) test strip by In Vitro route 2 times daily .       blood glucose monitoring (NO BRAND SPECIFIED) meter device kit Use to test blood sugar 1 times daily 1 kit 0     famotidine (PEPCID) 40 MG tablet Take 1 Tab by mouth one time a day. 90 tablet 2     levothyroxine (SYNTHROID/LEVOTHROID) 137 MCG tablet Take one tablet Monday through Friday 60 tablet 3     levothyroxine (SYNTHROID/LEVOTHROID) 150 MCG tablet Take 1 Tab by mouth daily on Saturday and . 30 tablet 3     lisinopril (ZESTRIL) 2.5 MG tablet Take 1 Tab by mouth one time a day. 90 tablet 2     metFORMIN (GLUCOPHAGE-XR) 500 MG 24 hr tablet Take 1 tablet (500 mg) by mouth At Bedtime 90 tablet 1     metoprolol succinate ER (TOPROL-XL) 50 MG 24 hr tablet Take 1 Tab by mouth one time a day. 90 tablet 3     nitroGLYcerin  "(NITROSTAT) 0.4 MG sublingual tablet For chest pain place 1 tablet under the tongue every 5 minutes for 3 doses. If symptoms persist 5 minutes after 1st dose call 911. 25 tablet 0     vitamin D3 (CHOLECALCIFEROL) 50 mcg (2000 units) tablet Take by mouth daily         Allergies: Patient has no known allergies.     Past Medical History:   Diagnosis Date     Chronic diastolic congestive heart failure (H)     EF 85%     Coronary artery disease involving coronary bypass graft of native heart without angina pectoris 1/5/2017    2011 in [place] With Dr. Negro at [facility].       Coronary atherosclerosis of native coronary artery     CABG x 5     Encounter for diabetic foot exam (H) 1/5/2017     Erectile dysfunction due to arterial insufficiency      Generalized osteoarthrosis      HTN (hypertension)      Hyperlipidemia associated with type 2 diabetes mellitus (H)      Hypothyroidism     hashimoto's thyroiditis     Neuropathy 01/2018     Other specified hypothyroidism 6/29/2016     Primary osteoarthritis of left knee 1/5/2017     Stable angina (H)      Type 2 diabetes mellitus without complication, without long-term current use of insulin (H) 1/5/2017       Past Surgical History:   Procedure Laterality Date     AS CABG, ARTERY-VEIN, FIVE  11/02/2011    off pump       ENT family history reviewed    Social History     Tobacco Use     Smoking status: Never Smoker     Smokeless tobacco: Never Used     Tobacco comment: second hand smoke in the house 40 yrs   Vaping Use     Vaping Use: Never used   Substance Use Topics     Alcohol use: No     Alcohol/week: 0.0 standard drinks     Drug use: No       Review of Systems  ROS: 10 point ROS neg other than the symptoms noted above in the HPI     Physical Exam  BP (!) 140/60 (BP Location: Left arm, Patient Position: Chair, Cuff Size: Adult Regular)   Pulse 98   Temp 97  F (36.1  C) (Tympanic)   Ht 1.702 m (5' 7\")   Wt 63.5 kg (140 lb)   SpO2 94%   BMI 21.93 kg/m    General - " The patient is well nourished and well developed, and appears to have good nutritional status.  Alert and oriented to person and place, answers questions and cooperates with examination appropriately.   Head and Face - Normocephalic and atraumatic, with no gross asymmetry noted.  The facial nerve is intact, with strong symmetric movements.  Voice and Breathing - The patient was breathing comfortably without the use of accessory muscles. There was no wheezing, stridor, or stertor.  The patients voice was clear and strong, and had appropriate pitch and quality.  On examination of the ears, I found that the ear(s) were completely impacted with dense cerumen.  Therefore, I positioned them in the examination chair in a semi-supine position, beginning with the right side.  I used the binocular surgical microscope to perform cerumen removal.  I began by using a cerumen loop to gently lift the edges of the cerumen mass away from the walls of the external canal.  Once I did this, I was able to suction away fragments of wax and debris using suction.  Once the mass was loose enough, the entire plug was pulled from the canal.  The tympanic membrane was intact, no sign of perforation or middle ear effusion.    I turned my attention to the contralateral side once again using the binocular surgical microscope to perform cerumen removal.  I began by using a cerumen loop to gently lift the edges of the cerumen mass away from the walls of the external canal.  Once I did this, I was able to suction away fragments of wax and debris using suction.  Once the mass was loose enough, the entire plug was pulled from the canal.  The tympanic membrane was intact, no sign of perforation or middle ear effusion.      Mouth - Examination of the oral cavity showed pink, healthy oral mucosa. No lesions or ulcerations noted.  The tongue was mobile and midline, and upper/ lower plates  Throat - The walls of the oropharynx were smooth, pink, moist,  symmetric, and had no lesions or ulcerations.  The tonsillar pillars and soft palate were symmetric.  The uvula was midline on elevation.    Neck - Normal midline excursion of the laryngotracheal complex during swallowing.  Full range of motion on passive movement.  Palpation of the occipital, submental, submandibular, internal jugular chain, and supraclavicular nodes did not demonstrate any abnormal lymph nodes or masses.  Palpation of the thyroid was soft and smooth, with no nodules or goiter appreciated.  The trachea was mobile and midline.      Impression and Plan- Nori Looney is a 88 year old male with:    ICD-10-CM    1. Bilateral impacted cerumen  H61.23    2. Sensorineural hearing loss (SNHL) of both ears  H90.3          Ears were cleaned. Tolerated well  Normal ear exam.   Return PRN ear cleaning    Complete audiogram as scheduled.     Karina Summers PA-C  ENT  [facility]

## 2022-02-25 NOTE — PROGRESS NOTES
Audiology Evaluation Completed. Please refer SCANNED AUDIOGRAM and/or TYMPANOGRAM for BACKGROUND, RESULTS, RECOMMENDATIONS.      Christine KHANNA, [facility]  Audiologist #[id number]

## 2022-03-04 ENCOUNTER — OFFICE VISIT (OUTPATIENT)
Dept: PODIATRY | Facility: OTHER | Age: 87
End: 2022-03-04
Attending: PODIATRIST
Payer: COMMERCIAL

## 2022-03-04 VITALS
TEMPERATURE: 96.4 F | HEART RATE: 50 BPM | SYSTOLIC BLOOD PRESSURE: 114 MMHG | OXYGEN SATURATION: 97 % | DIASTOLIC BLOOD PRESSURE: 64 MMHG

## 2022-03-04 DIAGNOSIS — I50.22 CHRONIC SYSTOLIC CONGESTIVE HEART FAILURE (H): ICD-10-CM

## 2022-03-04 DIAGNOSIS — E11.9 DIABETES MELLITUS TYPE 2, NONINSULIN DEPENDENT (H): ICD-10-CM

## 2022-03-04 DIAGNOSIS — L60.3 ONYCHODYSTROPHY: Primary | ICD-10-CM

## 2022-03-04 DIAGNOSIS — L85.3 XEROSIS OF SKIN: ICD-10-CM

## 2022-03-04 DIAGNOSIS — E11.42 DIABETIC POLYNEUROPATHY ASSOCIATED WITH TYPE 2 DIABETES MELLITUS (H): ICD-10-CM

## 2022-03-04 PROCEDURE — G0463 HOSPITAL OUTPT CLINIC VISIT: HCPCS | Mod: 25

## 2022-03-04 PROCEDURE — 11721 DEBRIDE NAIL 6 OR MORE: CPT | Performed by: PODIATRIST

## 2022-03-04 RX ORDER — ATORVASTATIN CALCIUM 10 MG/1
10 TABLET, FILM COATED ORAL
COMMUNITY
Start: 2022-01-03 | End: 2022-06-17

## 2022-03-04 RX ORDER — LISINOPRIL 2.5 MG/1
2.5 TABLET ORAL
COMMUNITY
Start: 2022-01-03 | End: 2022-06-17

## 2022-03-04 RX ORDER — NITROGLYCERIN 0.4 MG/1
TABLET SUBLINGUAL
COMMUNITY
Start: 2022-01-12 | End: 2022-06-17

## 2022-03-04 RX ORDER — LEVOTHYROXINE SODIUM 150 UG/1
150 TABLET ORAL
COMMUNITY
Start: 2022-01-03 | End: 2023-03-03

## 2022-03-04 RX ORDER — ACETAMINOPHEN 500 MG
1000 TABLET ORAL
COMMUNITY
Start: 2022-01-03 | End: 2022-11-18

## 2022-03-04 ASSESSMENT — PAIN SCALES - GENERAL: PAINLEVEL: MODERATE PAIN (4)

## 2022-03-04 NOTE — NURSING NOTE
"Chief Complaint   Patient presents with     Toenail     DFE       Initial /64 (BP Location: Left arm, Patient Position: Sitting, Cuff Size: Adult Regular)   Pulse 50   Temp (!) 96.4  F (35.8  C) (Tympanic)   SpO2 97%  Estimated body mass index is 21.93 kg/m  as calculated from the following:    Height as of 2/25/22: 1.702 m (5' 7\").    Weight as of 2/25/22: 63.5 kg (140 lb).  Medication Reconciliation: complete  Gissel Olivares  "

## 2022-03-04 NOTE — PROGRESS NOTES
Chief complaint: Patient presents with:  Toenail: DFE      History of Present Illness: This 88 year old NIDDM male is seen for follow-up management of elongated toenails.     He has a h/o burning, tingling, and numbness in his feet, especially his LEFT ankle.     His thickened, RIGHT toenail has not caused him any pain.    Patient says he has been staying active. He likes to dance for exercise.     Patient says he has not applied any lotion to his feet.    No further pedal complaints today.       Last HbA1C was 6.5% on 01/03/2022.    Previously  6.3% on 05/03/2021  Previously 6.3% from 01/08/2020  Previously 7.1% on 08/26/2019        Wt Readings from Last 4 Encounters:   02/25/22 63.5 kg (140 lb)   01/03/22 64.4 kg (142 lb)   12/03/21 62.1 kg (137 lb)   10/28/21 63.5 kg (140 lb)         /64 (BP Location: Left arm, Patient Position: Sitting, Cuff Size: Adult Regular)   Pulse 50   Temp (!) 96.4  F (35.8  C) (Tympanic)   SpO2 97%     Patient Active Problem List   Diagnosis     ACP (advance care planning)     Other specified hypothyroidism     Chronic systolic congestive heart failure (H)     Benign essential hypertension     Hyperlipidemia LDL goal <70     Bilateral carotid artery stenosis     Coronary artery disease involving coronary bypass graft of native heart without angina pectoris     Encounter for diabetic foot exam (H)     Trochanteric bursitis of right hip     Gastroesophageal reflux disease, esophagitis presence not specified     Primary osteoarthritis of right hip     Bilateral primary osteoarthritis of knee      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)     Chronic bilateral low back pain without sciatica     DDD (degenerative disc disease), lumbar     Allergic arthritis of right hip     Keratoacanthoma of cheek     Atypical squamoproliferative skin lesion     Generalized osteoarthrosis, unspecified site     Primary localized osteoarthrosis of lower leg, unspecified  laterality     Type 2 diabetes mellitus with hyperglycemia, without long-term current use of insulin (H)       Past Surgical History:   Procedure Laterality Date     AS CABG, ARTERY-VEIN, FIVE  11/02/2011    off pump       Current Outpatient Medications   Medication     acetaminophen (ACETAMINOPHEN EXTRA STRENGTH) 500 MG tablet     acetaminophen (TYLENOL) 500 MG tablet     ASPIRIN PO     atorvastatin (LIPITOR) 10 MG tablet     atorvastatin (LIPITOR) 10 MG tablet     blood glucose (NO BRAND SPECIFIED) lancets standard     blood glucose (NO BRAND SPECIFIED) test strip     blood glucose monitoring (ACCU-CHEK KEYANNA) test strip     blood glucose monitoring (NO BRAND SPECIFIED) meter device kit     famotidine (PEPCID) 40 MG tablet     levothyroxine (SYNTHROID/LEVOTHROID) 137 MCG tablet     levothyroxine (SYNTHROID/LEVOTHROID) 150 MCG tablet     levothyroxine (SYNTHROID/LEVOTHROID) 150 MCG tablet     lisinopril (ZESTRIL) 2.5 MG tablet     lisinopril (ZESTRIL) 2.5 MG tablet     metFORMIN (GLUCOPHAGE-XR) 500 MG 24 hr tablet     metoprolol succinate ER (TOPROL-XL) 50 MG 24 hr tablet     nitroGLYcerin (NITROSTAT) 0.4 MG sublingual tablet     nitroGLYcerin (NITROSTAT) 0.4 MG sublingual tablet     vitamin D3 (CHOLECALCIFEROL) 50 mcg (2000 units) tablet     No current facility-administered medications for this visit.        No Known Allergies    Family History   Problem Relation Age of Onset     Cerebrovascular Disease Father      Coronary Artery Disease Father      Dementia Mother      Coronary Artery Disease Sister      Lung Cancer Sister      Thyroid Disease Daughter        Social History     Socioeconomic History     Marital status:      Spouse name: Luis     Number of children: 5     Years of education: 12     Highest education level: Not on file   Occupational History     Occupation:      Employer: RETIRED   Social Needs     Financial resource strain: Not on file     Food insecurity:     Worry: Not on  file     Inability: Not on file     Transportation needs:     Medical: Not on file     Non-medical: Not on file   Tobacco Use     Smoking status: Never Smoker     Smokeless tobacco: Never Used     Tobacco comment: second hand smoke in the house 40 yrs   Substance and Sexual Activity     Alcohol use: No     Alcohol/week: 0.0 oz     Drug use: No     Sexual activity: Never     Partners: Female   Lifestyle     Physical activity:     Days per week: Not on file     Minutes per session: Not on file     Stress: Not on file   Relationships     Social connections:     Talks on phone: Not on file     Gets together: Not on file     Attends Confucianism service: Not on file     Active member of club or organization: Not on file     Attends meetings of clubs or organizations: Not on file     Relationship status: Not on file     Intimate partner violence:     Fear of current or ex partner: Not on file     Emotionally abused: Not on file     Physically abused: Not on file     Forced sexual activity: Not on file   Other Topics Concern      Service No     Blood Transfusions Yes     Comment: Ok to recieve      Caffeine Concern Yes     Comment: 3 cups daily      Occupational Exposure Not Asked     Hobby Hazards Not Asked     Sleep Concern Not Asked     Stress Concern Not Asked     Weight Concern Not Asked     Special Diet Not Asked     Back Care Not Asked     Exercise Yes     Comment: walking,       Bike Helmet Not Asked     Seat Belt Yes     Self-Exams Not Asked     Parent/sibling w/ CABG, MI or angioplasty before 65F 55M? Not Asked   Social History Narrative     Not on file       ROS: 10 point ROS neg other than the symptoms noted above in the HPI.  EXAM  Constitutional: healthy, alert and no distress    Psychiatric: mentation appears normal and affect normal/bright    VASCULAR:  -Dorsalis pedis pulse +1/4 b/l  -Posterior tibial pulse +1/4 b/l  -Capillary refill time < 3 seconds to b/l hallux  -Hair growth Absent to  b/l anterior legs and ankles  NEURO:  -Protective sensation intact with SWM +9/10 RIGHT and +7/10 LEFT on 03/04/2022  -Protective sensation intact with SWM +7/10 RIGHT and +7/10 LEFT on 12/03/2021  -Protective sensation intact with SWM +8/10 RIGHT and +8/10 LEFT on 08/30/2021  -Protective sensation intact with SWM +8/10 RIGHT and +8/10 LEFT on 02/06/2020  -Protective sensation intact with SWM +10/10 RIGHT and +10/10 LEFT on 11/21/2019  -Protective sensation intact with SWM +7/10 RIGHT and +7/10 LEFT on 09/10/2019  -Light touch sensation intact to b/l plantar forefoot  DERM:  -Skin mildly dry, flaking to bilateral plantar foot  -Skin temperature mildly cool to bilateral foot  -Toenails elongated, thickened, dystrophic and discolored with subungual debris x 10  MSK:  -Muscle strength of ankles +5/5 for dorsiflexion, plantarflexion, ABDUction and ADDuction b/l    ============================================================    ASSESSMENT:  (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 Treatment options discussed with no educational barriers noted.  -High risk toenail debridement x 10 toenails without incident    -Diabetic Foot Education provided. This included checking the feet daily looking for new new blisters or wounds, wearing shoes at all times when walking including around the house, and avoiding lotion application between the toes. Any sign of infection in the foot warrant's the patient presenting to the ED as soon as possible.    -Patient in agreement with the above treatment plan and all of patient's questions were answered.      RTC 3 months for diabetic foot exam and high risk nail  debridement        Deidre Mishra, LEONIDASM

## 2022-04-19 DIAGNOSIS — K21.9 GASTROESOPHAGEAL REFLUX DISEASE, UNSPECIFIED WHETHER ESOPHAGITIS PRESENT: ICD-10-CM

## 2022-04-19 DIAGNOSIS — E11.65 TYPE 2 DIABETES MELLITUS WITH HYPERGLYCEMIA, WITHOUT LONG-TERM CURRENT USE OF INSULIN (H): ICD-10-CM

## 2022-04-21 RX ORDER — FAMOTIDINE 40 MG/1
TABLET, FILM COATED ORAL
Qty: 90 TABLET | Refills: 2 | Status: SHIPPED | OUTPATIENT
Start: 2022-04-21 | End: 2022-11-30

## 2022-04-21 RX ORDER — METFORMIN HCL 500 MG
500 TABLET, EXTENDED RELEASE 24 HR ORAL AT BEDTIME
Qty: 90 TABLET | Refills: 1 | Status: SHIPPED | OUTPATIENT
Start: 2022-04-21 | End: 2022-10-13

## 2022-04-21 NOTE — TELEPHONE ENCOUNTER
Famotidine      Last Written Prescription Date:  7/2/21  Last Fill Quantity: 90,   # refills: 2  Last Office Visit: 1/3/22  Future Office visit:    Next 5 appointments (look out 90 days)    May 04, 2022  8:45 AM  (Arrive by 8:30 AM)  [facility] with Bisi [facility] [address]  [facility] [address]  [phone]   Jun 06, 2022 10:00 AM  (Arrive by 9:45 AM)  Return Visit with Deidre Mishra [facility] ([facility] ) [address] [place] [id number] [phone]           MEtformin      Last Written Prescription Date:  9/3/21  Last Fill Quantity: 90,   # refills: 1  Last Office Visit: 1/3/22  Future Office visit:    Next 5 appointments (look out 90 days)    May 04, 2022  8:45 AM  (Arrive by 8:30 AM)  SHORT with Bisi Stuart MD  [facility] ([facility] ) [address]  [facility] [address]  [phone]   Jun 06, 2022 10:00 AM  (Arrive by 9:45 AM)  Return Visit with Deidre Mishra [facility] ([facility] ) [address] [place] [id number] [phone]

## 2022-05-04 ENCOUNTER — LAB (OUTPATIENT)
Dept: LAB | Facility: OTHER | Age: 87
End: 2022-05-04
Payer: COMMERCIAL

## 2022-05-04 ENCOUNTER — OFFICE VISIT (OUTPATIENT)
Dept: FAMILY MEDICINE | Facility: OTHER | Age: 87
End: 2022-05-04
Attending: FAMILY MEDICINE
Payer: COMMERCIAL

## 2022-05-04 ENCOUNTER — ANCILLARY PROCEDURE (OUTPATIENT)
Dept: GENERAL RADIOLOGY | Facility: OTHER | Age: 87
End: 2022-05-04
Attending: FAMILY MEDICINE
Payer: COMMERCIAL

## 2022-05-04 VITALS
OXYGEN SATURATION: 98 % | HEIGHT: 67 IN | TEMPERATURE: 97 F | DIASTOLIC BLOOD PRESSURE: 66 MMHG | WEIGHT: 139.2 LBS | HEART RATE: 57 BPM | BODY MASS INDEX: 21.85 KG/M2 | SYSTOLIC BLOOD PRESSURE: 114 MMHG

## 2022-05-04 DIAGNOSIS — I10 BENIGN ESSENTIAL HYPERTENSION: Primary | ICD-10-CM

## 2022-05-04 DIAGNOSIS — R07.89 ATYPICAL CHEST PAIN: ICD-10-CM

## 2022-05-04 DIAGNOSIS — M17.0 PRIMARY OSTEOARTHRITIS OF BOTH KNEES: ICD-10-CM

## 2022-05-04 DIAGNOSIS — E11.65 TYPE 2 DIABETES MELLITUS WITH HYPERGLYCEMIA, WITHOUT LONG-TERM CURRENT USE OF INSULIN (H): ICD-10-CM

## 2022-05-04 DIAGNOSIS — R91.8 PULMONARY NODULES: ICD-10-CM

## 2022-05-04 DIAGNOSIS — E03.8 OTHER SPECIFIED HYPOTHYROIDISM: ICD-10-CM

## 2022-05-04 DIAGNOSIS — I50.22 CHRONIC SYSTOLIC CONGESTIVE HEART FAILURE (H): ICD-10-CM

## 2022-05-04 DIAGNOSIS — Z79.4 LONG TERM (CURRENT) USE OF INSULIN (H): ICD-10-CM

## 2022-05-04 DIAGNOSIS — E78.5 HYPERLIPIDEMIA LDL GOAL <70: ICD-10-CM

## 2022-05-04 DIAGNOSIS — E11.9 TYPE 2 DIABETES MELLITUS (H): Primary | ICD-10-CM

## 2022-05-04 LAB
EST. AVERAGE GLUCOSE BLD GHB EST-MCNC: 140 MG/DL
HBA1C MFR BLD: 6.5 % (ref 0–5.6)

## 2022-05-04 PROCEDURE — G0463 HOSPITAL OUTPT CLINIC VISIT: HCPCS | Mod: 25

## 2022-05-04 PROCEDURE — 36415 COLL VENOUS BLD VENIPUNCTURE: CPT | Mod: ZL | Performed by: FAMILY MEDICINE

## 2022-05-04 PROCEDURE — 93005 ELECTROCARDIOGRAM TRACING: CPT | Performed by: FAMILY MEDICINE

## 2022-05-04 PROCEDURE — 83036 HEMOGLOBIN GLYCOSYLATED A1C: CPT | Mod: ZL | Performed by: FAMILY MEDICINE

## 2022-05-04 PROCEDURE — 99214 OFFICE O/P EST MOD 30 MIN: CPT | Performed by: FAMILY MEDICINE

## 2022-05-04 PROCEDURE — 93010 ELECTROCARDIOGRAM REPORT: CPT | Mod: 77 | Performed by: INTERNAL MEDICINE

## 2022-05-04 PROCEDURE — 71046 X-RAY EXAM CHEST 2 VIEWS: CPT | Mod: TC

## 2022-05-04 RX ORDER — LEVOTHYROXINE SODIUM 137 UG/1
137 TABLET ORAL
COMMUNITY
Start: 2022-01-03 | End: 2022-06-17

## 2022-05-04 RX ORDER — METFORMIN HCL 500 MG
1 TABLET, EXTENDED RELEASE 24 HR ORAL AT BEDTIME
COMMUNITY
Start: 2022-04-21 | End: 2022-06-17

## 2022-05-04 RX ORDER — FAMOTIDINE 40 MG/1
40 TABLET, FILM COATED ORAL
COMMUNITY
Start: 2022-04-21 | End: 2022-06-17

## 2022-05-04 ASSESSMENT — PAIN SCALES - GENERAL: PAINLEVEL: NO PAIN (0)

## 2022-05-04 NOTE — PROGRESS NOTES
"  {PROVIDER CHARTING PREFERENCE:649907}    Abel Julio is a 88 year old who presents for the following health issues {ACCOMPANIED BY STATEMENT (Optional):615666}    HPI     Diabetes Follow-up      How often are you checking your blood sugar? { :605639}    What concerns do you have today about your diabetes? { :573923::\"None\"}     Do you have any of these symptoms? (Select all that apply)  { :430887}      {Reference  Diabetes Management Resources :413789}    {Reference  Diabetes Log - 7 days :199925}    Hyperlipidemia Follow-Up      Are you regularly taking any medication or supplement to lower your cholesterol?   { :654335}    Are you having muscle aches or other side effects that you think could be caused by your cholesterol lowering medication?  { :252475}    Hypertension Follow-up      Do you check your blood pressure regularly outside of the clinic? { :260280}     Are you following a low salt diet? { :985227}    Are your blood pressures ever more than 140 on the top number (systolic) OR more   than 90 on the bottom number (diastolic), for example 140/90? { :946998}    BP Readings from Last 2 Encounters:   03/04/22 114/64   02/25/22 (!) 140/60     Hemoglobin A1C POCT (%)   Date Value   05/03/2021 6.3 (H)   02/01/2021 6.1 (H)     Hemoglobin A1C (%)   Date Value   01/03/2022 6.5 (H)   09/03/2021 6.0 (H)     LDL Cholesterol Calculated (mg/dL)   Date Value   09/03/2021 51   10/30/2020 47   01/08/2020 73         How many servings of fruits and vegetables do you eat daily?  { :573953}    On average, how many sweetened beverages do you drink each day (Examples: soda, juice, sweet tea, etc.  Do NOT count diet or artificially sweetened beverages)?   { 1-11:805958}    How many days per week do you exercise enough to make your heart beat faster? { :947317}    How many minutes a day do you exercise enough to make your heart beat faster? { :093052}    How many days per week do you miss taking your medication? {0-7 " :153893}    {additonal problems for provider to add (Optional):165663}    Review of Systems   {ROS COMP (Optional):714622}      Objective    There were no vitals taken for this visit.  There is no height or weight on file to calculate BMI.  Physical Exam   {Exam List (Optional):163453}    {Diagnostic Test Results (Optional):727007}    {AMBULATORY ATTESTATION (Optional):660920}

## 2022-05-04 NOTE — NURSING NOTE
"Chief Complaint   Patient presents with     Diabetes     Lipids       Initial /66 (BP Location: Left arm, Patient Position: Sitting, Cuff Size: Adult Regular)   Pulse 57   Temp 97  F (36.1  C) (Tympanic)   Ht 1.702 m (5' 7\")   Wt 63.1 kg (139 lb 3.2 oz)   SpO2 98%   BMI 21.80 kg/m   Estimated body mass index is 21.8 kg/m  as calculated from the following:    Height as of this encounter: 1.702 m (5' 7\").    Weight as of this encounter: 63.1 kg (139 lb 3.2 oz).  Medication Reconciliation: complete  Marsha Stoner LPN  "

## 2022-05-04 NOTE — LETTER
My Heart Failure Action Plan   Name: Nori Looney    YOB: 1934   Date: 5/4/2022    My doctor: Bisi Stuart [facility] [address] [place] [address]  [phone]  My Diagnosis: Bipass   My Exercise Goal: 30 minutes daily  .     My Weight Plan:   Wt Readings from Last 2 Encounters:   02/25/22 63.5 kg (140 lb)   01/03/22 64.4 kg (142 lb)     Weigh yourself daily using the same scale. If you gain more than 2 pounds in 24 hours or 5 pounds in a week call the clinic    My Diet Goal: No added salt    Emergency Room Visits:    Our goal is to improve your quality of life and help you avoid a visit to the emergency room or hospital.  If we work together, we can achieve this goal. But, if you feel you need to call 911 or go to the emergency room, please do so.  If you go to the emergency room, please bring your list of medicines and your daily weight chart with you.       GREEN ZONE     Doing well today    Weight gained is no more than 2 pounds a day or 5 pounds a week.    No swelling in feet, ankles, legs or stomach.    No more swelling than usual.    No more trouble breathing than usual.    No change in my sleep.    No other problems. Actions:    I am doing fine.  I will take my medicine, follow my diet, see my doctor, exercise, and watch for symptoms.           YELLOW ZONE         Having a bad day or flare up    Weight gain of more than 2 pounds in one day or 5 pounds in one week.    New swelling in ankle, leg, knee or thigh.    Bloating in belly, pants feel tighter.    Swelling in hands or face.    Coughing or trouble breathing while walking or talking.    Harder to breathe last night.    Have trouble sleeping, wake up short of breath.    Much more tired than usual.    Not eating.    Pain in my chest or bad leg cramps.    Feel weak or dizzy. Actions:    I need to take action and call my doctor or nurse today.                 RED ZONE         Need medical care  now    Weight gain of 5 pounds overnight.    Chest pain or pressure that does not go away.    Feel less alert.    Wheezing or have trouble breathing when at rest.    Cannot sleep lying down.    Cannot take my water pill.    Pass out or faint. Actions:    I need to call my doctor or nurse now!    Call 911 if I have chest pain or cannot breathe.

## 2022-05-04 NOTE — PROGRESS NOTES
Assessment & Plan     Benign essential hypertension  stable    Hyperlipidemia LDL goal <70  stable    Chronic systolic congestive heart failure (H)  stable    Other specified hypothyroidism  stable    Type 2 diabetes mellitus with hyperglycemia, without long-term current use of insulin (H)  Labs good, follow-up 4-5 mos fasting   - Albumin Random Urine Quantitative with Creat Ratio; Future  - Hemoglobin A1c    Atypical chest pain  Xray shows more gas and stool, increase fruits and veg  - XR CHEST 2 VW (Clinic Performed); Future  - EKG 12-lead complete w/read - (Clinic Performed)  - Troponin I; Future    Pulmonary nodules  3mm nodule right upper lobe, discussed if he changes his mind after talking to daughters, we could cancel  - CT Chest w/o Contrast; Future    Primary osteoarthritis of both knees  Ok to trial superficially  - diclofenac (VOLTAREN) 1 % topical gel; Apply 4 g topically 4 times daily To the knees         Patient was agreeable to this plan and had no further questions.  There are no Patient Instructions on file for this visit.    No follow-ups on file.    Bisi Stuart MD  [facility] - MADHAV Julio is a 88 year old who presents for the following health issues     HPI     Diabetes Follow-up      How often are you checking your blood sugar? Not at all    What concerns do you have today about your diabetes? None     Do you have any of these symptoms? (Select all that apply)  No numbness or tingling in feet.  No redness, sores or blisters on feet.  No complaints of excessive thirst.  No reports of blurry vision.  No significant changes to weight.              Hyperlipidemia Follow-Up      Are you regularly taking any medication or supplement to lower your cholesterol?   Yes- Lipitor    Are you having muscle aches or other side effects that you think could be caused by your cholesterol lowering medication?  No    Hypertension Follow-up      Do you check your blood pressure  "regularly outside of the clinic? No     Are you following a low salt diet? Yes    Are your blood pressures ever more than 140 on the top number (systolic) OR more   than 90 on the bottom number (diastolic), for example 140/90? No    BP Readings from Last 2 Encounters:   05/04/22 114/66   03/04/22 114/64     Hemoglobin A1C POCT (%)   Date Value   05/03/2021 6.3 (H)   02/01/2021 6.1 (H)     Hemoglobin A1C (%)   Date Value   05/04/2022 6.5 (H)   01/03/2022 6.5 (H)     LDL Cholesterol Calculated (mg/dL)   Date Value   09/03/2021 51   10/30/2020 47   01/08/2020 73         How many servings of fruits and vegetables do you eat daily?  0-1    On average, how many sweetened beverages do you drink each day (Examples: soda, juice, sweet tea, etc.  Do NOT count diet or artificially sweetened beverages)?   1    How many days per week do you exercise enough to make your heart beat faster? 3 or less    How many minutes a day do you exercise enough to make your heart beat faster? 10 - 19    How many days per week do you miss taking your medication? 0        Review of Systems   Constitutional, HEENT, cardiovascular, pulmonary, gi and gu systems are negative, except as otherwise noted.      Objective    /66 (BP Location: Left arm, Patient Position: Sitting, Cuff Size: Adult Regular)   Pulse 57   Temp 97  F (36.1  C) (Tympanic)   Ht 1.702 m (5' 7\")   Wt 63.1 kg (139 lb 3.2 oz)   SpO2 98%   BMI 21.80 kg/m    Body mass index is 21.8 kg/m .  Physical Exam   GENERAL: healthy, alert and no distress  NECK: no adenopathy, no asymmetry, masses, or scars and thyroid normal to palpation  RESP: lungs clear to auscultation - no rales, rhonchi or wheezes  CV: regular rate and rhythm, normal S1 S2, no S3 or S4, no murmur, click or rub, no peripheral edema and peripheral pulses strong  ABDOMEN: soft, nontender, no hepatosplenomegaly, no masses and bowel sounds normal  MS: no gross musculoskeletal defects noted, no edema  PSYCH: mentation " appears normal, affect normal/bright    Results for orders placed or performed in visit on 05/04/22   XR CHEST 2 VW (Clinic Performed)     Status: None    Narrative    PROCEDURE:  XR CHEST 2 VW    HISTORY:  Atypical chest pain.     COMPARISON:  2019    FINDINGS:   The cardiac silhouette is normal in size. Postoperative changes are  seen in the mediastinum The pulmonary vasculature is normal.  There is  a questionable 3 mm diameter nodule underlying the anterior right  third rib. No pleural effusion or pneumothorax.      Impression    IMPRESSION:  Questionable 3 mm diameter nodule in the right lung not  seen on previous examinations in 2019      GUNJAN SEGUNDO MD         SYSTEM ID:  [id number]   Results for orders placed or performed in visit on 05/04/22   Hemoglobin A1c     Status: Abnormal   Result Value Ref Range    Estimated Average Glucose 140 mg/dL    Hemoglobin A1C 6.5 (H) 0.0 - 5.6 %

## 2022-05-05 ENCOUNTER — LAB (OUTPATIENT)
Dept: LAB | Facility: OTHER | Age: 87
End: 2022-05-05
Payer: COMMERCIAL

## 2022-05-05 ENCOUNTER — TELEPHONE (OUTPATIENT)
Dept: FAMILY MEDICINE | Facility: OTHER | Age: 87
End: 2022-05-05

## 2022-05-05 DIAGNOSIS — R07.89 ATYPICAL CHEST PAIN: ICD-10-CM

## 2022-05-05 DIAGNOSIS — E11.9 TYPE 2 DIABETES MELLITUS (H): ICD-10-CM

## 2022-05-05 DIAGNOSIS — Z79.4 LONG TERM (CURRENT) USE OF INSULIN (H): ICD-10-CM

## 2022-05-05 LAB
CREAT UR-MCNC: 70 MG/DL
MICROALBUMIN UR-MCNC: <5 MG/L
MICROALBUMIN/CREAT UR: NORMAL MG/G{CREAT}
TROPONIN I SERPL HS-MCNC: 7 NG/L

## 2022-05-05 PROCEDURE — 36415 COLL VENOUS BLD VENIPUNCTURE: CPT | Mod: ZL

## 2022-05-05 PROCEDURE — 84484 ASSAY OF TROPONIN QUANT: CPT | Mod: ZL

## 2022-05-05 PROCEDURE — 82043 UR ALBUMIN QUANTITATIVE: CPT | Mod: ZL

## 2022-05-05 NOTE — TELEPHONE ENCOUNTER
Patient's daughter calling back for results. Informed daughter this writer is unable to give lab results due to no consent to communicate on file. Daughter verbalized understanding and will have patient call back. Advised daughter if patient would like us to be able to speak with her that a consent to communicate can be filled out. Daughter verbalized understanding.

## 2022-05-09 ENCOUNTER — HOSPITAL ENCOUNTER (OUTPATIENT)
Dept: CT IMAGING | Facility: HOSPITAL | Age: 87
Discharge: HOME | End: 2022-05-09
Attending: FAMILY MEDICINE | Admitting: FAMILY MEDICINE
Payer: COMMERCIAL

## 2022-05-09 DIAGNOSIS — R91.8 PULMONARY NODULES: ICD-10-CM

## 2022-05-09 PROCEDURE — 71250 CT THORAX DX C-: CPT

## 2022-05-24 ENCOUNTER — OFFICE VISIT (OUTPATIENT)
Dept: AUDIOLOGY | Facility: OTHER | Age: 87
End: 2022-05-24
Attending: AUDIOLOGIST
Payer: COMMERCIAL

## 2022-05-24 DIAGNOSIS — H90.3 SENSORINEURAL HEARING LOSS (SNHL) OF BOTH EARS: Primary | ICD-10-CM

## 2022-05-24 PROCEDURE — 92591 HC HEARING AID EXAM BINAURAL: CPT | Performed by: AUDIOLOGIST

## 2022-05-24 NOTE — PROGRESS NOTES
BACKGROUND:  Nori was seen today for consult regarding hearing aids. The patient notes difficulty with communication in a variety of listening situations and would like to explore possible benefit obtained via use of amplification.      Patient has received medical clearance for hearing aid use from Karina ROYAL.  Nori is a binaural hearing aid candidate and will receive a Hearing Aid Recommendation today to follow [facility] CONTRACT GUIDELINES      RESULTS:  Audiology  reviewed below information:      Estimated insurance coverage for hearing aids reviewed.    [facility] form titled 'Legal rights and consumer information about purchasing a hearing instrument verbally reviewed and given to patient. Trial Period, cancellation fee, warranties highlighted. Patient risk factors have been provided to the patient in writing prior to the sale of the hearing aid per FDA regulation. The risk factors are also available in the User Instructional Booklet to be presented on the day of the hearing aid fitting.    ABN (Advanced Beneficiary Notice of Non-Coverage) completed and copy given to patient.       Hearing aid recommendation provided by Audiologist.    Thru use of hearing aids patient would like to improve ability to hear:    where there are groups and noise.     to hear the television at a lower volume to enjoy this activity with other people.     Nori also reports trouble hearing in the car, at home, and on the telephone if there is not a volume control.      Discussed hearing aid styles, technology, features, and options at length with Nori.  Sample devices were shown. Pros/Cons of options reviewed.  Expectations for amplification discussed. .Also discussed the importance of binaural amplification for hearing speech in the presence of background noise and localizing the directions of sounds.  Wireless options were also discussed at length and sample devices were shown.        Hearing Aid Recommendations: Hearing Aid Recommendation reviewed with patient. Pt chose to proceed with order and Purchase Agreement completed. Copies provided to patient.      Hearing Aids:  Oticon More 2 miniRITE R  Color: beige  Battery Size: rechargeable  Earmold/Tips: 2-85 10mm open    RECOMMENDATIONS:  The 45 day trial period was explained to patient, and they expressed understanding. Mr. Looney signed the Hearing Aid Purchase Agreement and was given a copy, as well as details on his hearing aids. Patient was counseled that exact out of pocket amounts cannot be determined for hearing aid claims being sent to insurance. Any insurance coverage information presented to the patient is an estimate only, and is not a guarantee of payment. Patient has been advised to check with their own insurance.      Patient scheduled to return to clinic in 2 weeks for hearing aid fitting, programming and orientation.    Christine Sebastian M.S.,[facility]  Audiologist #[id number]

## 2022-06-06 ENCOUNTER — OFFICE VISIT (OUTPATIENT)
Dept: PODIATRY | Facility: OTHER | Age: 87
End: 2022-06-06
Attending: PODIATRIST
Payer: COMMERCIAL

## 2022-06-06 VITALS
SYSTOLIC BLOOD PRESSURE: 130 MMHG | OXYGEN SATURATION: 97 % | TEMPERATURE: 97.4 F | DIASTOLIC BLOOD PRESSURE: 65 MMHG | HEART RATE: 60 BPM

## 2022-06-06 DIAGNOSIS — E11.9 DIABETES MELLITUS TYPE 2, NONINSULIN DEPENDENT (H): ICD-10-CM

## 2022-06-06 DIAGNOSIS — E11.42 DIABETIC POLYNEUROPATHY ASSOCIATED WITH TYPE 2 DIABETES MELLITUS (H): ICD-10-CM

## 2022-06-06 DIAGNOSIS — B35.3 TINEA PEDIS OF BOTH FEET: Primary | ICD-10-CM

## 2022-06-06 DIAGNOSIS — L85.3 XEROSIS OF SKIN: ICD-10-CM

## 2022-06-06 DIAGNOSIS — L60.3 ONYCHODYSTROPHY: ICD-10-CM

## 2022-06-06 DIAGNOSIS — I50.22 CHRONIC SYSTOLIC CONGESTIVE HEART FAILURE (H): ICD-10-CM

## 2022-06-06 PROCEDURE — G0463 HOSPITAL OUTPT CLINIC VISIT: HCPCS | Mod: 25

## 2022-06-06 PROCEDURE — 11721 DEBRIDE NAIL 6 OR MORE: CPT | Performed by: PODIATRIST

## 2022-06-06 PROCEDURE — 99213 OFFICE O/P EST LOW 20 MIN: CPT | Mod: 25 | Performed by: PODIATRIST

## 2022-06-06 RX ORDER — KETOCONAZOLE 20 MG/G
CREAM TOPICAL DAILY
Qty: 60 G | Refills: 1 | Status: SHIPPED | OUTPATIENT
Start: 2022-06-06 | End: 2022-11-18

## 2022-06-06 ASSESSMENT — PAIN SCALES - GENERAL: PAINLEVEL: NO PAIN (0)

## 2022-06-06 NOTE — PATIENT INSTRUCTIONS
Treatment plan for Athlete's Foot:  ---Apply an anti-fungal foot cream to the feet every morning and evening  ---Apply an anti-fungal foot powder to the feet daily prior to applying white, cotton socks  ---Switch out socks with a fresh powder nursing home through the day if socks are moist  ---Spray an anti-fungal spray or Lysol spray to shoes daily   ---Spray tub/shower with a bleach based shower  daily after use      -You may purchase over-the-counter creams and powders if insurance does not cover the prescriptions that were prescribed for you.    -----------------------------    -To prevent the rubbing of the RIGHT great toe and RIGHT second toe  ---Dispensed size small toe sleeve. Patient may find these at [facility] or [facility]. Do not wear the toe sleeve(s) at night, but only wear the sleeve in shoes and/or socks during the day. The sleeves are re-usable and hand washable until they wear out.

## 2022-06-06 NOTE — PROGRESS NOTES
Chief complaint: Patient presents with:  Toenail: dfe      History of Present Illness: This 88 year old NIDDM male is seen for follow-up management of elongated toenails.     He has had increased flaking of the skin the past month. He tried to apply Lena Lotion but it has not improved. He is wondering how to treat the flaking skin.    He has a little burning, tingling, and numbness in his feet, but not a lot.    His thickened, RIGHT toenail has not caused him pain recently.    Patient says he has been staying active. He likes to dance for exercise.     Lastly, his RIGHT hallux is rubbing a bit on his RIGHT second toe and he is not sure how to decrease the rubbing of the skin.    No further pedal complaints today.       Last HbA1C was 6.5% on 01/03/2022.    Previously  6.3% on 05/03/2021  Previously 6.3% from 01/08/2020  Previously 7.1% on 08/26/2019        Wt Readings from Last 4 Encounters:   05/04/22 63.1 kg (139 lb 3.2 oz)   02/25/22 63.5 kg (140 lb)   01/03/22 64.4 kg (142 lb)   12/03/21 62.1 kg (137 lb)         /65 (BP Location: Left arm, Patient Position: Sitting, Cuff Size: Adult Regular)   Pulse 60   Temp 97.4  F (36.3  C) (Tympanic)   SpO2 97%     Patient Active Problem List   Diagnosis     ACP (advance care planning)     Other specified hypothyroidism     Chronic systolic congestive heart failure (H)     Benign essential hypertension     Hyperlipidemia LDL goal <70     Bilateral carotid artery stenosis     Coronary artery disease involving coronary bypass graft of native heart without angina pectoris     Encounter for diabetic foot exam (H)     Trochanteric bursitis of right hip     Gastroesophageal reflux disease, esophagitis presence not specified     Primary osteoarthritis of right hip     Bilateral primary osteoarthritis of knee      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)     Chronic bilateral low back pain without sciatica     DDD (degenerative disc disease),  lumbar     Allergic arthritis of right hip     Keratoacanthoma of cheek     Atypical squamoproliferative skin lesion     Generalized osteoarthrosis, unspecified site     Primary localized osteoarthrosis of lower leg, unspecified laterality     Type 2 diabetes mellitus with hyperglycemia, without long-term current use of insulin (H)     Atypical chest pain     Pulmonary nodules       Past Surgical History:   Procedure Laterality Date     AS CABG, ARTERY-VEIN, FIVE  11/02/2011    off pump       Current Outpatient Medications   Medication     acetaminophen (ACETAMINOPHEN EXTRA STRENGTH) 500 MG tablet     acetaminophen (TYLENOL) 500 MG tablet     ASPIRIN PO     atorvastatin (LIPITOR) 10 MG tablet     atorvastatin (LIPITOR) 10 MG tablet     blood glucose (NO BRAND SPECIFIED) lancets standard     blood glucose (NO BRAND SPECIFIED) test strip     blood glucose (NO BRAND SPECIFIED) test strip     blood glucose monitoring (NO BRAND SPECIFIED) meter device kit     diclofenac (VOLTAREN) 1 % topical gel     famotidine (PEPCID) 40 MG tablet     famotidine (PEPCID) 40 MG tablet     levothyroxine (SYNTHROID/LEVOTHROID) 137 MCG tablet     levothyroxine (SYNTHROID/LEVOTHROID) 137 MCG tablet     levothyroxine (SYNTHROID/LEVOTHROID) 150 MCG tablet     levothyroxine (SYNTHROID/LEVOTHROID) 150 MCG tablet     lisinopril (ZESTRIL) 2.5 MG tablet     lisinopril (ZESTRIL) 2.5 MG tablet     metFORMIN (GLUCOPHAGE-XR) 500 MG 24 hr tablet     metFORMIN (GLUCOPHAGE-XR) 500 MG 24 hr tablet     metoprolol succinate ER (TOPROL-XL) 50 MG 24 hr tablet     nitroGLYcerin (NITROSTAT) 0.4 MG sublingual tablet     nitroGLYcerin (NITROSTAT) 0.4 MG sublingual tablet     vitamin D3 (CHOLECALCIFEROL) 50 mcg (2000 units) tablet     No current facility-administered medications for this visit.        No Known Allergies    Family History   Problem Relation Age of Onset     Cerebrovascular Disease Father      Coronary Artery Disease Father      Dementia Mother       Coronary Artery Disease Sister      Lung Cancer Sister      Thyroid Disease Daughter        Social History     Socioeconomic History     Marital status:      Spouse name: Luis     Number of children: 5     Years of education: 12     Highest education level: Not on file   Occupational History     Occupation:      Employer: RETIRED   Social Needs     Financial resource strain: Not on file     Food insecurity:     Worry: Not on file     Inability: Not on file     Transportation needs:     Medical: Not on file     Non-medical: Not on file   Tobacco Use     Smoking status: Never Smoker     Smokeless tobacco: Never Used     Tobacco comment: second hand smoke in the house 40 yrs   Substance and Sexual Activity     Alcohol use: No     Alcohol/week: 0.0 oz     Drug use: No     Sexual activity: Never     Partners: Female   Lifestyle     Physical activity:     Days per week: Not on file     Minutes per session: Not on file     Stress: Not on file   Relationships     Social connections:     Talks on phone: Not on file     Gets together: Not on file     Attends Confucianism service: Not on file     Active member of club or organization: Not on file     Attends meetings of clubs or organizations: Not on file     Relationship status: Not on file     Intimate partner violence:     Fear of current or ex partner: Not on file     Emotionally abused: Not on file     Physically abused: Not on file     Forced sexual activity: Not on file   Other Topics Concern      Service No     Blood Transfusions Yes     Comment: Ok to recieve      Caffeine Concern Yes     Comment: 3 cups daily      Occupational Exposure Not Asked     Hobby Hazards Not Asked     Sleep Concern Not Asked     Stress Concern Not Asked     Weight Concern Not Asked     Special Diet Not Asked     Back Care Not Asked     Exercise Yes     Comment: walking,       Bike Helmet Not Asked     Seat Belt Yes     Self-Exams Not Asked      Parent/sibling w/ CABG, MI or angioplasty before 65F 55M? Not Asked   Social History Narrative     Not on file       ROS: 10 point ROS neg other than the symptoms noted above in the HPI.  EXAM  Constitutional: healthy, alert and no distress    Psychiatric: mentation appears normal and affect normal/bright    VASCULAR:  -Dorsalis pedis pulse +1/4 b/l  -Posterior tibial pulse +1/4 b/l  -Capillary refill time < 3 seconds to b/l hallux  -Hair growth Absent to b/l anterior legs and ankles  NEURO:  -Protective sensation intact with SWM +7/10 RIGHT and +8/10 LEFT on 06/06/2022  -Protective sensation intact with SWM +9/10 RIGHT and +7/10 LEFT on 03/04/2022  -Protective sensation intact with SWM +7/10 RIGHT and +7/10 LEFT on 12/03/2021  -Protective sensation intact with SWM +8/10 RIGHT and +8/10 LEFT on 08/30/2021  -Protective sensation intact with SWM +8/10 RIGHT and +8/10 LEFT on 02/06/2020  -Protective sensation intact with SWM +10/10 RIGHT and +10/10 LEFT on 11/21/2019  -Protective sensation intact with SWM +7/10 RIGHT and +7/10 LEFT on 09/10/2019  -Light touch sensation intact to b/l plantar forefoot  DERM:  -Skin moderately dry, flaking to bilateral plantar foot  -Skin mild-to-moderately erythematous with flaking of skin to bilateral plantar feet in a moccasin distribution  -Skin temperature mildly cool to bilateral foot  -Toenails elongated, thickened, dystrophic and discolored with subungual debris x 10  MSK:  -Muscle strength of ankles +5/5 for dorsiflexion, plantarflexion, ABDUction and ADDuction b/l    ============================================================    ASSESSMENT:  (B35.3) Tinea pedis of both feet  (primary encounter diagnosis)    (L60.3) Onychodystrophy     (L85.3) Xerosis of skin    (E11.9) Diabetes mellitus type 2, noninsulin dependent (H)    (E11.42) 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)    (I50.22) Chronic systolic congestive heart failure (H)  ---Can affect edema  of the lower extremities      PLAN:  -Patient evaluated and examined. Treatment options discussed with no educational barriers noted.  Tinea Pedis:  -Discussed Athlete's Foot and treatment regimens. Treatment plan:  ---Apply an anti-fungal foot cream to the feet every morning and evening  ---Apply an anti-fungal foot powder to the feet daily prior to applying white, cotton socks  ---Switch out socks with a fresh powder correction through the day if socks are moist  ---Spray an anti-fungal spray or Lysol spray to shoes daily   ---Spray tub/shower with a bleach based shower  daily after use  ---Patient may also find the powder / lotion OTC    -High risk toenail debridement x 10 toenails without incident    -Diabetic Foot Education provided. This included checking the feet daily looking for new new blisters or wounds, wearing shoes at all times when walking including around the house, and avoiding lotion application between the toes. Any sign of infection in the foot warrant's the patient presenting to the ED as soon as possible.    -To prevent the rubbing of the RIGHT hallux and RIGHT second toe:  ---Dispensed size small toe sleeve. Patient may find these at [facility] or [facility]. Patient should not wear the toe sleeve(s) at night, but only wear the sleeve in shoes and/or socks during the day. The sleeves are re-usable and hand washable until they wear out.    -Patient in agreement with the above treatment plan and all of patient's questions were answered.      RTC 3 months for diabetic foot exam and high risk nail debridement        Deidre Mishra DPM, SABAS

## 2022-06-06 NOTE — NURSING NOTE
"Chief Complaint   Patient presents with     Toenail     dfe       Initial /65 (BP Location: Left arm, Patient Position: Sitting, Cuff Size: Adult Regular)   Pulse 60   Temp 97.4  F (36.3  C) (Tympanic)   SpO2 97%  Estimated body mass index is 21.8 kg/m  as calculated from the following:    Height as of 5/4/22: 1.702 m (5' 7\").    Weight as of 5/4/22: 63.1 kg (139 lb 3.2 oz).  Medication Reconciliation: arturo Olivares  "

## 2022-06-08 ENCOUNTER — OFFICE VISIT (OUTPATIENT)
Dept: AUDIOLOGY | Facility: OTHER | Age: 87
End: 2022-06-08
Attending: AUDIOLOGIST
Payer: COMMERCIAL

## 2022-06-08 DIAGNOSIS — H90.3 SENSORINEURAL HEARING LOSS (SNHL) OF BOTH EARS: Primary | ICD-10-CM

## 2022-06-08 PROCEDURE — V5160 DISPENSING FEE BINAURAL: HCPCS | Performed by: AUDIOLOGIST

## 2022-06-08 PROCEDURE — V5011 HEARING AID FITTING/CHECKING: HCPCS | Mod: RT | Performed by: AUDIOLOGIST

## 2022-06-08 PROCEDURE — 92593 HC HEARING AID CHECK, BINAURAL: CPT | Performed by: AUDIOLOGIST

## 2022-06-08 PROCEDURE — V5261 HEARING AID, DIGIT, BIN, BTE: HCPCS | Mod: NU | Performed by: AUDIOLOGIST

## 2022-06-08 PROCEDURE — V5020 CONFORMITY EVALUATION: HCPCS | Mod: LT | Performed by: AUDIOLOGIST

## 2022-06-08 NOTE — PROGRESS NOTES
AUDIOLOGY REPORT    SUBJECTIVE: Nori Looney, a 88 year old male, was seen in the Audiology Clinic at [facility] today for a Binaural hearing aid fitting. Previous results have revealed a bilateral  sensorineural hearing loss.     OBJECTIVE:  Prior to fitting, a hearing aid check was performed to ensure device functionality. The hearing aid conformity evaluation was completed.The hearing aids were placed and they provided a good fit. Real-ear-probe-microphone measurements were completed on the [employer] system and were a good match to NAL-NL2 target with soft sounds audible, moderate sounds comfortable, and loud sounds below discomfort. UCLs are verified through maximum power output measures and demonstrate appropriate limiting of loud inputs.     Hearing Device Info  6/8/2022    Left Ear Make: Oticon   Left Ear Model #: More 2 miniRITE R   Left Ear Style: BTE   Left HA Warranty End Date: 6/23/2025   Left HA Serial #: [id number]   Right Ear Make: Oticon   Right Ear Model #: More 2 miniRITE R   Right Ear Style: BTE   Right HA Warranty End Date: 6/23/2025   Right MAHARAJ Serial #: [id number]   Fitting Plan: Medical Assistance    6/8/2022     8mm open domes.     ASSESSMENT: Hearing aid fitting completed today. Verification measures were performed. Patient and/or caregiver demonstrated ability to insert and remove hearing aids and adjust volume toggle.    Christine Sebastian M.S., [facility]-A  Audiologist #[id number]      HEARING AID ORIENTATION/AUDIOLOGY ASSISTANT      Mr. Looney was oriented to proper hearing aid use, care, cleaning (no water, dry brush), batteries (size Rechargeable, low-battery signal), aid insertion/removal, user booklet, warranty information, storage cases, and other hearing aid details. The patient confirmed understanding of hearing aid use and care, and showed proper insertion of hearing aid while in the office today. Mr. Looney reported good volume and sound quality today.    Chelle  Remedios  Audiology Assistant  [facility]  [phone]        PLAN: Mr. Looney will return for follow-up in 2-3 weeks for a hearing aid review appointment. Please call this clinic with questions regarding today s appointment.

## 2022-06-17 ENCOUNTER — OFFICE VISIT (OUTPATIENT)
Dept: AUDIOLOGY | Facility: OTHER | Age: 87
End: 2022-06-17
Attending: AUDIOLOGIST
Payer: COMMERCIAL

## 2022-06-17 ENCOUNTER — OFFICE VISIT (OUTPATIENT)
Dept: OTOLARYNGOLOGY | Facility: OTHER | Age: 87
End: 2022-06-17
Attending: AUDIOLOGIST
Payer: COMMERCIAL

## 2022-06-17 VITALS
BODY MASS INDEX: 22.13 KG/M2 | DIASTOLIC BLOOD PRESSURE: 70 MMHG | HEART RATE: 62 BPM | WEIGHT: 141 LBS | OXYGEN SATURATION: 98 % | TEMPERATURE: 96.8 F | SYSTOLIC BLOOD PRESSURE: 128 MMHG | HEIGHT: 67 IN

## 2022-06-17 DIAGNOSIS — L57.0 ACTINIC KERATOSIS: ICD-10-CM

## 2022-06-17 DIAGNOSIS — H61.20 IMPACTED CERUMEN: ICD-10-CM

## 2022-06-17 DIAGNOSIS — H90.3 SENSORINEURAL HEARING LOSS (SNHL) OF BOTH EARS: Primary | ICD-10-CM

## 2022-06-17 DIAGNOSIS — H61.23 BILATERAL IMPACTED CERUMEN: Primary | ICD-10-CM

## 2022-06-17 PROCEDURE — 69210 REMOVE IMPACTED EAR WAX UNI: CPT | Performed by: NURSE PRACTITIONER

## 2022-06-17 PROCEDURE — 92504 EAR MICROSCOPY EXAMINATION: CPT | Performed by: NURSE PRACTITIONER

## 2022-06-17 PROCEDURE — G0463 HOSPITAL OUTPT CLINIC VISIT: HCPCS

## 2022-06-17 PROCEDURE — V5275 EAR IMPRESSION: HCPCS | Performed by: AUDIOLOGIST

## 2022-06-17 PROCEDURE — 99213 OFFICE O/P EST LOW 20 MIN: CPT | Mod: 25 | Performed by: NURSE PRACTITIONER

## 2022-06-17 ASSESSMENT — PAIN SCALES - GENERAL: PAINLEVEL: NO PAIN (0)

## 2022-06-17 NOTE — LETTER
6/17/2022         RE: Nori Looney  [address] [facility] [id number]        Dear Colleague,    Thank you for referring your patient, Nori Looney, to the [facility]. Please see a copy of my visit note below.    Otolaryngology Note         Chief Complaint:     Patient presents with:  Cerumen Impaction: Ear cleaning           History of Present Illness:     Nori Looney is a 88 year old male who presents today for ear cleaning.  He feels that his hearing has gradually changed over time.     He last had ears cleaned about 5 months ago.   + tinnitus - chronic, not bothersome  No vertigo, facial numbness or weakness.      No otalgia, otorrhea.    No  hx of COM or otologic surgeries.   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 180 tablet 3     acetaminophen (TYLENOL) 500 MG tablet Take 1,000 mg by mouth       ASPIRIN PO Take 325 mg by mouth daily       atorvastatin (LIPITOR) 10 MG tablet Take 1 tablet (10 mg) by mouth daily 90 tablet 3     blood glucose (NO BRAND SPECIFIED) lancets standard Use to test blood sugar 1 times daily 100 each 3     blood glucose (NO BRAND SPECIFIED) test strip Use to test blood sugar 1 times daily 100 each 3     blood glucose (NO BRAND SPECIFIED) test strip by In Vitro route 2 times daily .       blood glucose monitoring (NO BRAND SPECIFIED) meter device kit Use to test blood sugar 1 times daily 1 kit 0     diclofenac (VOLTAREN) 1 % topical gel Apply 4 g topically 4 times daily To the knees 150 g 3     famotidine (PEPCID) 40 MG tablet Take 1 Tab by mouth one time a day. 90 tablet 2     ketoconazole (NIZORAL) 2 % external cream Apply topically daily 60 g 1     levothyroxine  (SYNTHROID/LEVOTHROID) 137 MCG tablet Take one tablet Monday through Friday 60 tablet 3     levothyroxine (SYNTHROID/LEVOTHROID) 150 MCG tablet Take 150 mcg by mouth       lisinopril (ZESTRIL) 2.5 MG tablet Take 1 Tab by mouth one time a day. 90 tablet 2     metFORMIN (GLUCOPHAGE-XR) 500 MG 24 hr tablet Take 1 tablet (500 mg) by mouth At Bedtime 90 tablet 1     metoprolol succinate ER (TOPROL-XL) 50 MG 24 hr tablet Take 1 Tab by mouth one time a day. 90 tablet 3     miconazole (MICATIN) 2 % external powder Apply topically as needed for itching or other 90 g 1     nitroGLYcerin (NITROSTAT) 0.4 MG sublingual tablet For chest pain place 1 tablet under the tongue every 5 minutes for 3 doses. If symptoms persist 5 minutes after 1st dose call 911. 25 tablet 0     vitamin D3 (CHOLECALCIFEROL) 50 mcg (2000 units) tablet Take by mouth daily              Allergies:     Allergies: Patient has no known allergies.          Past Medical History:     Past Medical History:   Diagnosis Date     Chronic diastolic congestive heart failure (H)     EF 85%     Coronary artery disease involving coronary bypass graft of native heart without angina pectoris 1/5/2017    2011 in [place] With Dr. Negro at [facility].       Coronary atherosclerosis of native coronary artery     CABG x 5     Encounter for diabetic foot exam (H) 1/5/2017     Erectile dysfunction due to arterial insufficiency      Generalized osteoarthrosis      HTN (hypertension)      Hyperlipidemia associated with type 2 diabetes mellitus (H)      Hypothyroidism     hashimoto's thyroiditis     Neuropathy 01/2018     Other specified hypothyroidism 6/29/2016     Primary osteoarthritis of left knee 1/5/2017     Stable angina (H)      Type 2 diabetes mellitus without complication, without long-term current use of insulin (H) 1/5/2017            Past Surgical History:     Past Surgical History:   Procedure Laterality Date     AS CABG, ARTERY-VEIN, FIVE  11/02/2011    off pump  "      ENT family history reviewed         Social History:     Social History     Tobacco Use     Smoking status: Never Smoker     Smokeless tobacco: Never Used     Tobacco comment: second hand smoke in the house 40 yrs   Vaping Use     Vaping Use: Never used   Substance Use Topics     Alcohol use: No     Alcohol/week: 0.0 standard drinks     Drug use: No            Review of Systems:     ROS: See HPI         Physical Exam:     /70 (BP Location: Left arm, Cuff Size: Adult Regular)   Pulse 62   Temp 96.8  F (36  C) (Tympanic)   Ht 1.702 m (5' 7\")   Wt 64 kg (141 lb)   SpO2 98%   BMI 22.08 kg/m      General - The patient is well nourished and well developed, and appears to have good nutritional status.  Alert and oriented to person and place, answers questions and cooperates with examination appropriately.   Head and Face - Normocephalic and atraumatic, with no gross asymmetry noted.  The facial nerve is intact, with strong symmetric movements.  Voice and Breathing - The patient was breathing comfortably without the use of accessory muscles. There was no wheezing, stridor. The patients voice was clear and strong, and had appropriate pitch and quality.  Ears - right external ear- right superior helix has slightly raised crusted lesion consistent with actinic keratosis.  The left helix has a similar slightly raised crusted lesion consistent with actinic keratosis.  No ulcerations or bleeding.  The ears were examined with binocular microscopy and with otoscope.  External ears normal. Right canal partially cerumen occluded.  Left canal cerumen occluded.  The right ear was cleaned with #5 #7 Garcia.   Right tympanic membrane is intact without effusion, retraction or mass. The left ear was cleaned with #7 Garcia.  Left tympanic membrane is intact without effusion, retraction or mass.  Eyes - Extraocular movements intact, sclera were not icteric or injected, conjunctiva were pink and moist.  Mouth - Examination " of the oral cavity showed pink, healthy oral mucosa. Dentition in good condition. No lesions or ulcerations noted. The tongue was mobile and midline.   Throat - The walls of the oropharynx were smooth, pink, moist, symmetric, and had no lesions or ulcerations.  The tonsillar pillars and soft palate were symmetric. The uvula was midline on elevation.    Neck - no palpable lymphadenopathy.  Palpation of the thyroid was soft and smooth, with no nodules or goiter appreciated.  The trachea was mobile and midline.  Nose - External contour is symmetric, no gross deflection or scars.  Nasal mucosa is pink and moist with no abnormal mucus.  The septum and turbinates were evaluated with nasal speculum, no polyps, masses, or purulence noted on examination of anterior nasal cavity         Assessment and Plan:       ICD-10-CM    1. Bilateral impacted cerumen  H61.23    2. Actinic keratosis  L57.0     Bilateral helix     Follow-up with audiology as scheduled.    Nancy MARQUEZ  [facility] ENT        Again, thank you for allowing me to participate in the care of your patient.        Sincerely,        Nancy Miranda NP

## 2022-06-17 NOTE — PATIENT INSTRUCTIONS
Thank you for allowing Nancy Miranda and our ENT team to participate in your care.  If your medications are too expensive, please give the nurse a call.  We can possibly change this medication.  If you have a scheduling or an appointment question please contact our Health Unit Coordinator at their direct line [phone] ext [phone].   ALL nursing questions or concerns can be directed to your ENT nurse at: [phone] - Xng     Watch the growths on your left ear, call ENT if you would like us to take them off.

## 2022-06-17 NOTE — PROGRESS NOTES
Background: Patient seen for ear impressions for custom micromolds for his new hearing aids. Patient is unable to properly insert non-custom domes with retention hooks.    For patient benefit  ease of use/insertion custom micromolds are recommended.    Patient also reports his left aid is dead due to wax and he wiped them off but does not remember how to replace the filter. He has not worn them since the first day.    He also removed them from the  vs leaving them in the  when not in use thus they are dead and need recharging.    Results: Otoscopy shows minor cerumen. Ear impression taken without incident.    After removal the left canal is impacted possibly as minimal cerumen around canal walls accumulated with narrow canals.    Recommendations: Overbook request with ENT in [facility] which we are appreciative of accommodation today. Discussed with patient and plan moving forward.     Patient's hearing aids are retained here as he is not wearing them at this time. He is scheduled for earmold fitting and to review hearing aid cleaning and charging.  A family member is requested to attend for hearing aid care support. He will ask his daughter who works at this facility.    Follow up in 2 weeks/or when custom earmolds are available to fit.    Christine Sebastian M.S., [facility]A  Audiologist #[id number]

## 2022-06-17 NOTE — PROGRESS NOTES
Otolaryngology Note         Chief Complaint:     Patient presents with:  Cerumen Impaction: Ear cleaning           History of Present Illness:     Nori Looney is a 88 year old male who presents today for ear cleaning.  He feels that his hearing has gradually changed over time.     He last had ears cleaned about 5 months ago.   + tinnitus - chronic, not bothersome  No vertigo, facial numbness or weakness.      No otalgia, otorrhea.    No  hx of COM or otologic surgeries.   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 180 tablet 3     acetaminophen (TYLENOL) 500 MG tablet Take 1,000 mg by mouth       ASPIRIN PO Take 325 mg by mouth daily       atorvastatin (LIPITOR) 10 MG tablet Take 1 tablet (10 mg) by mouth daily 90 tablet 3     blood glucose (NO BRAND SPECIFIED) lancets standard Use to test blood sugar 1 times daily 100 each 3     blood glucose (NO BRAND SPECIFIED) test strip Use to test blood sugar 1 times daily 100 each 3     blood glucose (NO BRAND SPECIFIED) test strip by In Vitro route 2 times daily .       blood glucose monitoring (NO BRAND SPECIFIED) meter device kit Use to test blood sugar 1 times daily 1 kit 0     diclofenac (VOLTAREN) 1 % topical gel Apply 4 g topically 4 times daily To the knees 150 g 3     famotidine (PEPCID) 40 MG tablet Take 1 Tab by mouth one time a day. 90 tablet 2     ketoconazole (NIZORAL) 2 % external cream Apply topically daily 60 g 1     levothyroxine (SYNTHROID/LEVOTHROID) 137 MCG tablet Take one tablet Monday through Friday 60 tablet 3     levothyroxine (SYNTHROID/LEVOTHROID) 150 MCG tablet Take 150 mcg by mouth       lisinopril (ZESTRIL) 2.5 MG tablet Take 1 Tab by mouth one time a day. 90 tablet 2      metFORMIN (GLUCOPHAGE-XR) 500 MG 24 hr tablet Take 1 tablet (500 mg) by mouth At Bedtime 90 tablet 1     metoprolol succinate ER (TOPROL-XL) 50 MG 24 hr tablet Take 1 Tab by mouth one time a day. 90 tablet 3     miconazole (MICATIN) 2 % external powder Apply topically as needed for itching or other 90 g 1     nitroGLYcerin (NITROSTAT) 0.4 MG sublingual tablet For chest pain place 1 tablet under the tongue every 5 minutes for 3 doses. If symptoms persist 5 minutes after 1st dose call 911. 25 tablet 0     vitamin D3 (CHOLECALCIFEROL) 50 mcg (2000 units) tablet Take by mouth daily              Allergies:     Allergies: Patient has no known allergies.          Past Medical History:     Past Medical History:   Diagnosis Date     Chronic diastolic congestive heart failure (H)     EF 85%     Coronary artery disease involving coronary bypass graft of native heart without angina pectoris 1/5/2017    2011 in [place] With Dr. Negro at [facility].       Coronary atherosclerosis of native coronary artery     CABG x 5     Encounter for diabetic foot exam (H) 1/5/2017     Erectile dysfunction due to arterial insufficiency      Generalized osteoarthrosis      HTN (hypertension)      Hyperlipidemia associated with type 2 diabetes mellitus (H)      Hypothyroidism     hashimoto's thyroiditis     Neuropathy 01/2018     Other specified hypothyroidism 6/29/2016     Primary osteoarthritis of left knee 1/5/2017     Stable angina (H)      Type 2 diabetes mellitus without complication, without long-term current use of insulin (H) 1/5/2017            Past Surgical History:     Past Surgical History:   Procedure Laterality Date     AS CABG, ARTERY-VEIN, FIVE  11/02/2011    off pump       ENT family history reviewed         Social History:     Social History     Tobacco Use     Smoking status: Never Smoker     Smokeless tobacco: Never Used     Tobacco comment: second hand smoke in the house 40 yrs   Vaping Use     Vaping Use: Never used  "  Substance Use Topics     Alcohol use: No     Alcohol/week: 0.0 standard drinks     Drug use: No            Review of Systems:     ROS: See HPI         Physical Exam:     /70 (BP Location: Left arm, Cuff Size: Adult Regular)   Pulse 62   Temp 96.8  F (36  C) (Tympanic)   Ht 1.702 m (5' 7\")   Wt 64 kg (141 lb)   SpO2 98%   BMI 22.08 kg/m      General - The patient is well nourished and well developed, and appears to have good nutritional status.  Alert and oriented to person and place, answers questions and cooperates with examination appropriately.   Head and Face - Normocephalic and atraumatic, with no gross asymmetry noted.  The facial nerve is intact, with strong symmetric movements.  Voice and Breathing - The patient was breathing comfortably without the use of accessory muscles. There was no wheezing, stridor. The patients voice was clear and strong, and had appropriate pitch and quality.  Ears - right external ear- right superior helix has slightly raised crusted lesion consistent with actinic keratosis.  The left helix has a similar slightly raised crusted lesion consistent with actinic keratosis.  No ulcerations or bleeding.  The ears were examined with binocular microscopy and with otoscope.  External ears normal. Right canal partially cerumen occluded.  Left canal cerumen occluded.  The right ear was cleaned with #5 #7 Garcia.   Right tympanic membrane is intact without effusion, retraction or mass. The left ear was cleaned with #7 Garcia.  Left tympanic membrane is intact without effusion, retraction or mass.  Eyes - Extraocular movements intact, sclera were not icteric or injected, conjunctiva were pink and moist.  Mouth - Examination of the oral cavity showed pink, healthy oral mucosa. Dentition in good condition. No lesions or ulcerations noted. The tongue was mobile and midline.   Throat - The walls of the oropharynx were smooth, pink, moist, symmetric, and had no lesions or " ulcerations.  The tonsillar pillars and soft palate were symmetric. The uvula was midline on elevation.    Neck - no palpable lymphadenopathy.  Palpation of the thyroid was soft and smooth, with no nodules or goiter appreciated.  The trachea was mobile and midline.  Nose - External contour is symmetric, no gross deflection or scars.  Nasal mucosa is pink and moist with no abnormal mucus.  The septum and turbinates were evaluated with nasal speculum, no polyps, masses, or purulence noted on examination of anterior nasal cavity         Assessment and Plan:       ICD-10-CM    1. Bilateral impacted cerumen  H61.23    2. Actinic keratosis  L57.0     Bilateral helix     Follow-up with audiology as scheduled.    Nancy Miranda NP-C  [facility] ENT

## 2022-06-17 NOTE — Clinical Note
Yo King,  can you take a look at his audiogram from 2/25/2022?  It shows bilateral PE tubes and mild hearing loss.  He does not have a history of tubes.  I am wondering if this is correct information in his audio.  Can we discuss?  Thanks Verna

## 2022-06-17 NOTE — NURSING NOTE
"Chief Complaint   Patient presents with     Cerumen Impaction     Ear cleaning       Initial /70 (BP Location: Left arm, Cuff Size: Adult Regular)   Pulse 62   Temp 96.8  F (36  C) (Tympanic)   Ht 1.702 m (5' 7\")   Wt 64 kg (141 lb)   SpO2 98%   BMI 22.08 kg/m   Estimated body mass index is 22.08 kg/m  as calculated from the following:    Height as of this encounter: 1.702 m (5' 7\").    Weight as of this encounter: 64 kg (141 lb).  Medication Reconciliation: complete  Joleen Pacheco LPN    "

## 2022-06-30 ENCOUNTER — OFFICE VISIT (OUTPATIENT)
Dept: AUDIOLOGY | Facility: OTHER | Age: 87
End: 2022-06-30
Attending: AUDIOLOGIST
Payer: COMMERCIAL

## 2022-06-30 DIAGNOSIS — H90.3 SENSORINEURAL HEARING LOSS (SNHL) OF BOTH EARS: Primary | ICD-10-CM

## 2022-06-30 PROCEDURE — V5264 EAR MOLD/INSERT: HCPCS | Mod: LT | Performed by: AUDIOLOGIST

## 2022-06-30 NOTE — PROGRESS NOTES
Background: Patient seen for custom micromold/earmold fitting both aids due to difficulty inserting domes.     Results: Right #[id number] and Left [id number] was fit with wire length size 3-85 left and 4-85 right.    Reviewed insertion. Discussed using finger strength more effectively. He was able to insert and remove.    Recommend: Patient has difficulty with finger numbness and family support encouraged.    Follow up scheduled.    Christine Sebastian M.S., [facility]-A  Audiologist #[id number]

## 2022-07-14 ENCOUNTER — OFFICE VISIT (OUTPATIENT)
Dept: AUDIOLOGY | Facility: OTHER | Age: 87
End: 2022-07-14
Attending: AUDIOLOGIST
Payer: COMMERCIAL

## 2022-07-14 DIAGNOSIS — H90.3 SENSORINEURAL HEARING LOSS (SNHL) OF BOTH EARS: Primary | ICD-10-CM

## 2022-07-14 PROCEDURE — 92593 HC HEARING AID CHECK, BINAURAL: CPT | Performed by: AUDIOLOGIST

## 2022-07-14 NOTE — PROGRESS NOTES
Background: New full skeleton earmolds were attached showing improved retention over half-skeleton. Patient did not have concerns with half-eusebio but has not been wearing often reporting it is haying season.    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 He thinks they are a hassle since he's haying outside.    He discussed getting them  again in 2 months when summer is over.    Results: Reviewed when to wear and not wear aids and benefits when not in noise. He reports TV is less loud.  Results reviewed and hearing aid candidacy, purpose, and benefits.    Rather than return and start over again in 2 months he chose to keep the aids and wear part-time in summer.     Recommendations: Encouraged to use when not in noise such as evenings and weekends and then increase wearing time when haying season is over.     He was in agreement.    Discussed return for help if he forgets how to clean them. He reports he has the booklet to read.    Christine Sebastian M.S., [facility]-A  Audiologist #[id number]

## 2022-08-15 ENCOUNTER — OFFICE VISIT (OUTPATIENT)
Dept: PODIATRY | Facility: OTHER | Age: 87
End: 2022-08-15
Attending: PODIATRIST
Payer: COMMERCIAL

## 2022-08-15 VITALS
DIASTOLIC BLOOD PRESSURE: 69 MMHG | SYSTOLIC BLOOD PRESSURE: 114 MMHG | OXYGEN SATURATION: 97 % | TEMPERATURE: 96.3 F | HEART RATE: 59 BPM

## 2022-08-15 DIAGNOSIS — E11.42 DIABETIC POLYNEUROPATHY ASSOCIATED WITH TYPE 2 DIABETES MELLITUS (H): Primary | ICD-10-CM

## 2022-08-15 DIAGNOSIS — L60.3 ONYCHODYSTROPHY: ICD-10-CM

## 2022-08-15 DIAGNOSIS — E11.9 DIABETES MELLITUS TYPE 2, NONINSULIN DEPENDENT (H): ICD-10-CM

## 2022-08-15 DIAGNOSIS — I50.22 CHRONIC SYSTOLIC CONGESTIVE HEART FAILURE (H): ICD-10-CM

## 2022-08-15 DIAGNOSIS — L85.3 XEROSIS OF SKIN: ICD-10-CM

## 2022-08-15 PROCEDURE — 11721 DEBRIDE NAIL 6 OR MORE: CPT | Performed by: PODIATRIST

## 2022-08-15 PROCEDURE — G0463 HOSPITAL OUTPT CLINIC VISIT: HCPCS | Mod: 25

## 2022-08-15 ASSESSMENT — PAIN SCALES - GENERAL: PAINLEVEL: NO PAIN (0)

## 2022-08-15 NOTE — PROGRESS NOTES
Chief complaint: Patient presents with:  Toenail: DFE      History of Present Illness: This 88 year old NIDDM male is seen for follow-up management of elongated toenails.     He says he had Athlete's Foot, but he purchased an anti-fungal spray and it resolved the peeling skin on his feet.    He has a little burning, tingling, and numbness in his feet, but not a lot.      Patient says he has been staying active. He likes to dance for exercise.     No further pedal complaints today.         Last HbA1C was 6.5% on 05/04/2022.    Previously 6.5% on 01/03/2022.    Previously  6.3% on 05/03/2021  Previously 6.3% from 01/08/2020  Previously 7.1% on 08/26/2019        Wt Readings from Last 4 Encounters:   06/17/22 64 kg (141 lb)   05/04/22 63.1 kg (139 lb 3.2 oz)   02/25/22 63.5 kg (140 lb)   01/03/22 64.4 kg (142 lb)         /69 (BP Location: Left arm, Patient Position: Sitting, Cuff Size: Adult Regular)   Pulse 59   Temp (!) 96.3  F (35.7  C) (Tympanic)   SpO2 97%     Patient Active Problem List   Diagnosis     ACP (advance care planning)     Other specified hypothyroidism     Chronic systolic congestive heart failure (H)     Benign essential hypertension     Hyperlipidemia LDL goal <70     Bilateral carotid artery stenosis     Coronary artery disease involving coronary bypass graft of native heart without angina pectoris     Encounter for diabetic foot exam (H)     Trochanteric bursitis of right hip     Gastroesophageal reflux disease, esophagitis presence not specified     Primary osteoarthritis of right hip     Bilateral primary osteoarthritis of knee      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)     Chronic bilateral low back pain without sciatica     DDD (degenerative disc disease), lumbar     Allergic arthritis of right hip     Keratoacanthoma of cheek     Atypical squamoproliferative skin lesion     Generalized osteoarthrosis, unspecified site     Primary localized osteoarthrosis  of lower leg, unspecified laterality     Type 2 diabetes mellitus with hyperglycemia, without long-term current use of insulin (H)     Atypical chest pain     Pulmonary nodules       Past Surgical History:   Procedure Laterality Date     AS CABG, ARTERY-VEIN, FIVE  11/02/2011    off pump       Current Outpatient Medications   Medication     acetaminophen (ACETAMINOPHEN EXTRA STRENGTH) 500 MG tablet     acetaminophen (TYLENOL) 500 MG tablet     ASPIRIN PO     atorvastatin (LIPITOR) 10 MG tablet     blood glucose (NO BRAND SPECIFIED) lancets standard     blood glucose (NO BRAND SPECIFIED) test strip     blood glucose (NO BRAND SPECIFIED) test strip     blood glucose monitoring (NO BRAND SPECIFIED) meter device kit     diclofenac (VOLTAREN) 1 % topical gel     famotidine (PEPCID) 40 MG tablet     ketoconazole (NIZORAL) 2 % external cream     levothyroxine (SYNTHROID/LEVOTHROID) 137 MCG tablet     levothyroxine (SYNTHROID/LEVOTHROID) 150 MCG tablet     lisinopril (ZESTRIL) 2.5 MG tablet     metFORMIN (GLUCOPHAGE-XR) 500 MG 24 hr tablet     metoprolol succinate ER (TOPROL-XL) 50 MG 24 hr tablet     miconazole (MICATIN) 2 % external powder     nitroGLYcerin (NITROSTAT) 0.4 MG sublingual tablet     vitamin D3 (CHOLECALCIFEROL) 50 mcg (2000 units) tablet     No current facility-administered medications for this visit.        No Known Allergies    Family History   Problem Relation Age of Onset     Cerebrovascular Disease Father      Coronary Artery Disease Father      Dementia Mother      Coronary Artery Disease Sister      Lung Cancer Sister      Thyroid Disease Daughter        Social History     Socioeconomic History     Marital status:      Spouse name: Luis     Number of children: 5     Years of education: 12     Highest education level: Not on file   Occupational History     Occupation:      Employer: RETIRED   Social Needs     Financial resource strain: Not on file     Food insecurity:     Worry:  Not on file     Inability: Not on file     Transportation needs:     Medical: Not on file     Non-medical: Not on file   Tobacco Use     Smoking status: Never Smoker     Smokeless tobacco: Never Used     Tobacco comment: second hand smoke in the house 40 yrs   Substance and Sexual Activity     Alcohol use: No     Alcohol/week: 0.0 oz     Drug use: No     Sexual activity: Never     Partners: Female   Lifestyle     Physical activity:     Days per week: Not on file     Minutes per session: Not on file     Stress: Not on file   Relationships     Social connections:     Talks on phone: Not on file     Gets together: Not on file     Attends Cheondoism service: Not on file     Active member of club or organization: Not on file     Attends meetings of clubs or organizations: Not on file     Relationship status: Not on file     Intimate partner violence:     Fear of current or ex partner: Not on file     Emotionally abused: Not on file     Physically abused: Not on file     Forced sexual activity: Not on file   Other Topics Concern      Service No     Blood Transfusions Yes     Comment: Ok to recieve      Caffeine Concern Yes     Comment: 3 cups daily      Occupational Exposure Not Asked     Hobby Hazards Not Asked     Sleep Concern Not Asked     Stress Concern Not Asked     Weight Concern Not Asked     Special Diet Not Asked     Back Care Not Asked     Exercise Yes     Comment: walking,       Bike Helmet Not Asked     Seat Belt Yes     Self-Exams Not Asked     Parent/sibling w/ CABG, MI or angioplasty before 65F 55M? Not Asked   Social History Narrative     Not on file       ROS: 10 point ROS neg other than the symptoms noted above in the HPI.  EXAM  Constitutional: healthy, alert and no distress    Psychiatric: mentation appears normal and affect normal/bright    VASCULAR:  -Dorsalis pedis pulse +1/4 b/l  -Posterior tibial pulse +1/4 b/l  -Capillary refill time < 3 seconds to b/l hallux  -Hair growth  Absent to b/l anterior legs and ankles  NEURO:  -Protective sensation intact with SWM +7/10 RIGHT and +8/10 LEFT on 08/15/2022  -Protective sensation intact with SWM +7/10 RIGHT and +8/10 LEFT on 06/06/2022  -Protective sensation intact with SWM +9/10 RIGHT and +7/10 LEFT on 03/04/2022  -Protective sensation intact with SWM +7/10 RIGHT and +7/10 LEFT on 12/03/2021  -Protective sensation intact with SWM +8/10 RIGHT and +8/10 LEFT on 08/30/2021  -Protective sensation intact with SWM +8/10 RIGHT and +8/10 LEFT on 02/06/2020  -Protective sensation intact with SWM +10/10 RIGHT and +10/10 LEFT on 11/21/2019  -Protective sensation intact with SWM +7/10 RIGHT and +7/10 LEFT on 09/10/2019  -Light touch sensation intact to b/l plantar forefoot  DERM:  -Skin mildly dry, flaking to bilateral digits only (improved to the remainder of the foot)   -Skin mild-to-moderately erythematous with flaking of skin to bilateral plantar feet in a moccasin distribution  -Skin temperature mildly cool to bilateral foot  -Toenails elongated, thickened, dystrophic and discolored with subungual debris x 10  MSK:  -Muscle strength of ankles +5/5 for dorsiflexion, plantarflexion, ABDUction and ADDuction b/l    ============================================================    ASSESSMENT:    (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 Treatment options discussed with no educational barriers noted.  Tinea Pedis: Greatly improved. Continue anti-fungal spry as needed    -High risk toenail debridement x 10 toenails without incident    -Diabetic Foot Education provided. This included checking the feet daily looking for new new  blisters or wounds, wearing shoes at all times when walking including around the house, and avoiding lotion application between the toes. Any sign of infection in the foot warrant's the patient presenting to the ED as soon as possible.    -Patient in agreement with the above treatment plan and all of patient's questions were answered.      RTC 3 months for diabetic foot exam and high risk nail debridement        Deidre Mishra DPM, LEONIDASM

## 2022-08-15 NOTE — NURSING NOTE
"Chief Complaint   Patient presents with     Toenail     DFE       Initial /69 (BP Location: Left arm, Patient Position: Sitting, Cuff Size: Adult Regular)   Pulse 59   Temp (!) 96.3  F (35.7  C) (Tympanic)   SpO2 97%  Estimated body mass index is 22.08 kg/m  as calculated from the following:    Height as of 6/17/22: 1.702 m (5' 7\").    Weight as of 6/17/22: 64 kg (141 lb).  Medication Reconciliation: complete  Gissel Olivares  "

## 2022-08-26 DIAGNOSIS — M16.11 PRIMARY OSTEOARTHRITIS OF RIGHT HIP: ICD-10-CM

## 2022-08-26 DIAGNOSIS — M17.10 ARTHRITIS OF KNEE: ICD-10-CM

## 2022-08-29 RX ORDER — ACETAMINOPHEN 500 MG/1
TABLET, FILM COATED ORAL
Qty: 120 TABLET | Refills: 6 | Status: SHIPPED | OUTPATIENT
Start: 2022-08-29 | End: 2022-11-18

## 2022-08-29 NOTE — TELEPHONE ENCOUNTER
Pain relief      Last Written Prescription Date:  1/3/22  Last Fill Quantity: 180,   # refills: 3  Last Office Visit: 5/4/22  Future Office visit:    Next 5 appointments (look out 90 days)    Sep 14, 2022  9:15 AM  (Arrive by 9:00 AM)  SHORT with Bisi Stuart MD  [facility] ([facility] ) [address] [place] [id number]  [phone]   Nov 17, 2022 10:00 AM  (Arrive by 9:45 AM)  Return Visit with Deidre Mishra DPM  [facility] ([facility] ) [address] [place] [address] [phone]           Routing refill request to provider for review/approval because:

## 2022-10-11 DIAGNOSIS — I10 BENIGN ESSENTIAL HYPERTENSION: ICD-10-CM

## 2022-10-11 DIAGNOSIS — I25.810 CORONARY ARTERY DISEASE INVOLVING CORONARY BYPASS GRAFT OF NATIVE HEART WITHOUT ANGINA PECTORIS: ICD-10-CM

## 2022-10-11 DIAGNOSIS — E11.65 TYPE 2 DIABETES MELLITUS WITH HYPERGLYCEMIA, WITHOUT LONG-TERM CURRENT USE OF INSULIN (H): ICD-10-CM

## 2022-10-13 RX ORDER — METOPROLOL SUCCINATE 50 MG/1
TABLET, EXTENDED RELEASE ORAL
Qty: 90 TABLET | Refills: 0 | Status: SHIPPED | OUTPATIENT
Start: 2022-10-13 | End: 2023-01-18

## 2022-10-13 RX ORDER — LISINOPRIL 2.5 MG/1
TABLET ORAL
Qty: 90 TABLET | Refills: 0 | Status: SHIPPED | OUTPATIENT
Start: 2022-10-13 | End: 2022-11-30

## 2022-10-13 RX ORDER — METFORMIN HCL 500 MG
TABLET, EXTENDED RELEASE 24 HR ORAL
Qty: 90 TABLET | Refills: 0 | Status: SHIPPED | OUTPATIENT
Start: 2022-10-13 | End: 2023-01-18

## 2022-11-17 ENCOUNTER — OFFICE VISIT (OUTPATIENT)
Dept: PODIATRY | Facility: OTHER | Age: 87
End: 2022-11-17
Attending: PODIATRIST
Payer: COMMERCIAL

## 2022-11-17 VITALS
HEART RATE: 66 BPM | SYSTOLIC BLOOD PRESSURE: 154 MMHG | OXYGEN SATURATION: 98 % | DIASTOLIC BLOOD PRESSURE: 77 MMHG | TEMPERATURE: 97 F

## 2022-11-17 DIAGNOSIS — L85.3 XEROSIS OF SKIN: ICD-10-CM

## 2022-11-17 DIAGNOSIS — E11.9 DIABETES MELLITUS TYPE 2, NONINSULIN DEPENDENT (H): ICD-10-CM

## 2022-11-17 DIAGNOSIS — I50.22 CHRONIC SYSTOLIC CONGESTIVE HEART FAILURE (H): ICD-10-CM

## 2022-11-17 DIAGNOSIS — E11.42 DIABETIC POLYNEUROPATHY ASSOCIATED WITH TYPE 2 DIABETES MELLITUS (H): ICD-10-CM

## 2022-11-17 DIAGNOSIS — L60.3 ONYCHODYSTROPHY: Primary | ICD-10-CM

## 2022-11-17 PROCEDURE — 11721 DEBRIDE NAIL 6 OR MORE: CPT | Performed by: PODIATRIST

## 2022-11-17 PROCEDURE — G0463 HOSPITAL OUTPT CLINIC VISIT: HCPCS | Mod: 25

## 2022-11-17 ASSESSMENT — PAIN SCALES - GENERAL: PAINLEVEL: MODERATE PAIN (5)

## 2022-11-18 ENCOUNTER — OFFICE VISIT (OUTPATIENT)
Dept: FAMILY MEDICINE | Facility: OTHER | Age: 87
End: 2022-11-18
Attending: FAMILY MEDICINE
Payer: COMMERCIAL

## 2022-11-18 ENCOUNTER — LAB (OUTPATIENT)
Dept: LAB | Facility: OTHER | Age: 87
End: 2022-11-18
Attending: FAMILY MEDICINE
Payer: COMMERCIAL

## 2022-11-18 VITALS
WEIGHT: 139.6 LBS | TEMPERATURE: 97 F | BODY MASS INDEX: 22.43 KG/M2 | HEIGHT: 66 IN | DIASTOLIC BLOOD PRESSURE: 62 MMHG | OXYGEN SATURATION: 97 % | SYSTOLIC BLOOD PRESSURE: 122 MMHG | HEART RATE: 60 BPM

## 2022-11-18 DIAGNOSIS — I50.22 CHRONIC SYSTOLIC CONGESTIVE HEART FAILURE (H): ICD-10-CM

## 2022-11-18 DIAGNOSIS — E78.5 HYPERLIPIDEMIA LDL GOAL <70: ICD-10-CM

## 2022-11-18 DIAGNOSIS — I25.810 CORONARY ARTERY DISEASE INVOLVING CORONARY BYPASS GRAFT OF NATIVE HEART WITHOUT ANGINA PECTORIS: ICD-10-CM

## 2022-11-18 DIAGNOSIS — M17.10 PRIMARY LOCALIZED OSTEOARTHROSIS OF LOWER LEG, UNSPECIFIED LATERALITY: Primary | ICD-10-CM

## 2022-11-18 DIAGNOSIS — E11.65 TYPE 2 DIABETES MELLITUS WITH HYPERGLYCEMIA, WITHOUT LONG-TERM CURRENT USE OF INSULIN (H): ICD-10-CM

## 2022-11-18 DIAGNOSIS — I10 BENIGN ESSENTIAL HYPERTENSION: ICD-10-CM

## 2022-11-18 DIAGNOSIS — E03.8 OTHER SPECIFIED HYPOTHYROIDISM: ICD-10-CM

## 2022-11-18 DIAGNOSIS — M17.0 BILATERAL PRIMARY OSTEOARTHRITIS OF KNEE: ICD-10-CM

## 2022-11-18 DIAGNOSIS — Z71.89 ACP (ADVANCE CARE PLANNING): ICD-10-CM

## 2022-11-18 LAB
ALBUMIN SERPL BCG-MCNC: 4.2 G/DL (ref 3.5–5.2)
ALP SERPL-CCNC: 106 U/L (ref 40–129)
ALT SERPL W P-5'-P-CCNC: 16 U/L (ref 10–50)
ANION GAP SERPL CALCULATED.3IONS-SCNC: 10 MMOL/L (ref 7–15)
AST SERPL W P-5'-P-CCNC: 23 U/L (ref 10–50)
BILIRUB SERPL-MCNC: 0.6 MG/DL
BUN SERPL-MCNC: 19.9 MG/DL (ref 8–23)
CALCIUM SERPL-MCNC: 9.4 MG/DL (ref 8.8–10.2)
CHLORIDE SERPL-SCNC: 103 MMOL/L (ref 98–107)
CREAT SERPL-MCNC: 1.05 MG/DL (ref 0.67–1.17)
DEPRECATED HCO3 PLAS-SCNC: 27 MMOL/L (ref 22–29)
ERYTHROCYTE [DISTWIDTH] IN BLOOD BY AUTOMATED COUNT: 13.5 % (ref 10–15)
EST. AVERAGE GLUCOSE BLD GHB EST-MCNC: 137 MG/DL
GFR SERPL CREATININE-BSD FRML MDRD: 68 ML/MIN/1.73M2
GLUCOSE SERPL-MCNC: 133 MG/DL (ref 70–99)
HBA1C MFR BLD: 6.4 %
HCT VFR BLD AUTO: 43.6 % (ref 40–53)
HGB BLD-MCNC: 14.3 G/DL (ref 13.3–17.7)
MCH RBC QN AUTO: 28.1 PG (ref 26.5–33)
MCHC RBC AUTO-ENTMCNC: 32.8 G/DL (ref 31.5–36.5)
MCV RBC AUTO: 86 FL (ref 78–100)
PLATELET # BLD AUTO: 158 10E3/UL (ref 150–450)
POTASSIUM SERPL-SCNC: 4.2 MMOL/L (ref 3.4–5.3)
PROT SERPL-MCNC: 6.6 G/DL (ref 6.4–8.3)
RBC # BLD AUTO: 5.09 10E6/UL (ref 4.4–5.9)
SODIUM SERPL-SCNC: 140 MMOL/L (ref 136–145)
T4 FREE SERPL-MCNC: 1.34 NG/DL (ref 0.9–1.7)
TSH SERPL DL<=0.005 MIU/L-ACNC: 4.95 UIU/ML (ref 0.3–4.2)
WBC # BLD AUTO: 5.1 10E3/UL (ref 4–11)

## 2022-11-18 PROCEDURE — 83036 HEMOGLOBIN GLYCOSYLATED A1C: CPT | Mod: ZL

## 2022-11-18 PROCEDURE — 80061 LIPID PANEL: CPT | Mod: ZL

## 2022-11-18 PROCEDURE — G0463 HOSPITAL OUTPT CLINIC VISIT: HCPCS | Mod: 25

## 2022-11-18 PROCEDURE — 84439 ASSAY OF FREE THYROXINE: CPT | Mod: ZL

## 2022-11-18 PROCEDURE — 99214 OFFICE O/P EST MOD 30 MIN: CPT | Mod: 25 | Performed by: FAMILY MEDICINE

## 2022-11-18 PROCEDURE — G0463 HOSPITAL OUTPT CLINIC VISIT: HCPCS

## 2022-11-18 PROCEDURE — 36415 COLL VENOUS BLD VENIPUNCTURE: CPT | Mod: ZL

## 2022-11-18 PROCEDURE — 84443 ASSAY THYROID STIM HORMONE: CPT | Mod: ZL

## 2022-11-18 PROCEDURE — 20610 DRAIN/INJ JOINT/BURSA W/O US: CPT | Performed by: FAMILY MEDICINE

## 2022-11-18 PROCEDURE — 80053 COMPREHEN METABOLIC PANEL: CPT | Mod: ZL

## 2022-11-18 PROCEDURE — 85027 COMPLETE CBC AUTOMATED: CPT | Mod: ZL

## 2022-11-18 RX ORDER — TRIAMCINOLONE ACETONIDE 40 MG/ML
60 INJECTION, SUSPENSION INTRA-ARTICULAR; INTRAMUSCULAR ONCE
Status: COMPLETED | OUTPATIENT
Start: 2022-11-18 | End: 2022-11-18

## 2022-11-18 RX ORDER — TRIAMCINOLONE ACETONIDE 40 MG/ML
80 INJECTION, SUSPENSION INTRA-ARTICULAR; INTRAMUSCULAR ONCE
Status: DISCONTINUED | OUTPATIENT
Start: 2022-11-18 | End: 2023-07-21

## 2022-11-18 RX ORDER — LIDOCAINE HYDROCHLORIDE 10 MG/ML
12 INJECTION, SOLUTION INFILTRATION; PERINEURAL ONCE
Status: COMPLETED | OUTPATIENT
Start: 2022-11-18 | End: 2022-11-18

## 2022-11-18 RX ORDER — TRIAMCINOLONE ACETONIDE 40 MG/ML
40 INJECTION, SUSPENSION INTRA-ARTICULAR; INTRAMUSCULAR ONCE
Status: DISCONTINUED | OUTPATIENT
Start: 2022-11-18 | End: 2023-07-21

## 2022-11-18 RX ADMIN — LIDOCAINE HYDROCHLORIDE 12 ML: 10 INJECTION, SOLUTION INFILTRATION; PERINEURAL at 12:00

## 2022-11-18 RX ADMIN — TRIAMCINOLONE ACETONIDE 60 MG: 40 INJECTION, SUSPENSION INTRA-ARTICULAR; INTRAMUSCULAR at 12:02

## 2022-11-18 RX ADMIN — Medication 96 MG: at 12:01

## 2022-11-18 RX ADMIN — TRIAMCINOLONE ACETONIDE 60 MG: 40 INJECTION, SUSPENSION INTRA-ARTICULAR; INTRAMUSCULAR at 12:04

## 2022-11-18 ASSESSMENT — PAIN SCALES - GENERAL: PAINLEVEL: MODERATE PAIN (5)

## 2022-11-18 NOTE — LETTER
November 23, 2022      Orprashant Looney  [address]  [place] [address]        Dear ,    We are writing to inform you of your test results.    Thyroid is ok  Cholesterol is great        Resulted Orders   Comprehensive metabolic panel   Result Value Ref Range    Sodium 140 136 - 145 mmol/L    Potassium 4.2 3.4 - 5.3 mmol/L    Chloride 103 98 - 107 mmol/L    Carbon Dioxide (CO2) 27 22 - 29 mmol/L    Anion Gap 10 7 - 15 mmol/L    Urea Nitrogen 19.9 8.0 - 23.0 mg/dL    Creatinine 1.05 0.67 - 1.17 mg/dL    Calcium 9.4 8.8 - 10.2 mg/dL    Glucose 133 (H) 70 - 99 mg/dL    Alkaline Phosphatase 106 40 - 129 U/L    AST 23 10 - 50 U/L    ALT 16 10 - 50 U/L    Protein Total 6.6 6.4 - 8.3 g/dL    Albumin 4.2 3.5 - 5.2 g/dL    Bilirubin Total 0.6 <=1.2 mg/dL    GFR Estimate 68 >60 mL/min/1.73m2      Comment:      Effective December 21, 2021 eGFRcr in adults is calculated using the 2021 CKD-EPI creatinine equation which includes age and gender (Jan et al., NEJ, DOI: 10.1056/KXMRcr0817986)   HEMOGLOBIN A1C   Result Value Ref Range    Estimated Average Glucose 137 mg/dL    Hemoglobin A1C 6.4 (H) <5.7 %      Comment:      Normal <5.7%   Prediabetes 5.7-6.4%    Diabetes 6.5% or higher     Note: Adopted from ADA consensus guidelines.   TSH WITH FREE T4 REFLEX   Result Value Ref Range    TSH 4.95 (H) 0.30 - 4.20 uIU/mL   CBC with Platelets   Result Value Ref Range    WBC Count 5.1 4.0 - 11.0 10e3/uL    RBC Count 5.09 4.40 - 5.90 10e6/uL    Hemoglobin 14.3 13.3 - 17.7 g/dL    Hematocrit 43.6 40.0 - 53.0 %    MCV 86 78 - 100 fL    MCH 28.1 26.5 - 33.0 pg    MCHC 32.8 31.5 - 36.5 g/dL    RDW 13.5 10.0 - 15.0 %    Platelet Count 158 150 - 450 10e3/uL   T4 free   Result Value Ref Range    Free T4 1.34 0.90 - 1.70 ng/dL       If you have any questions or concerns, please call the clinic at the number listed above.       Sincerely,      Bisi Stuart MD

## 2022-11-18 NOTE — PROGRESS NOTES
PROCEDURE:  JOINT ASPIRATION/INJECTION.          After a discussion of risks, benefits and side effects of procedure, informed patient consent was obtained.       The bilateral knees were prepped and draped in the usual clean fashion (sterile not required for this procedure).      INJECTION:  Using 6 cc of 1% lidocaine mixed                           with 60 mg of Kenalog, the right knee was successfully injected                           without complication, syringe then switched to synvisc one syringe which was injected without complication.  Patient did experience some pain                          relief following injection.     Using 6 cc of 1% lidocaine mixed                           with 60 mg of Kenalog, the left knee was successfully injected                           without complication, syringe then switched to synvisc one syringe which was injected without complication.  Patient did experience some pain                          relief following injection.

## 2022-11-18 NOTE — PROGRESS NOTES
Assessment & Plan     Type 2 diabetes mellitus with hyperglycemia, without long-term current use of insulin (H)  Follow-up 5 mos, doing well  - Adult Eye  Referral; Future  - HEMOGLOBIN A1C; Future    Benign essential hypertension  stable    Hyperlipidemia LDL goal <70  The ASCVD Risk score (Lidia RUGGIERO, et al., 2019) failed to calculate for the following reasons:    The 2019 ASCVD risk score is only valid for ages 40 to 79  - Lipid panel reflex to direct LDL Non-fasting; Future  - Comprehensive metabolic panel; Future    Chronic systolic congestive heart failure (H)  stable  - CBC with Platelets; Future    Coronary artery disease involving coronary bypass graft of native heart without angina pectoris  Stable, asymptomatic  - Lipid panel reflex to direct LDL Non-fasting; Future    Other specified hypothyroidism  tsh up but T4 normal  - TSH WITH FREE T4 REFLEX; Future    Bilateral primary osteoarthritis of knee  See procedure note, doing well    Primary localized osteoarthrosis of lower leg, unspecified laterality    - Large Joint/Bursa injection and/or drainage (Shoulder, Knee)  - triamcinolone (KENALOG-40) injection 80 mg  - triamcinolone (KENALOG-40) injection 40 mg  - lidocaine 1 % injection 12 mL  - hylan (SYNVISC ONE) injection 96 mg    ACP (advance care planning)  Paperwork given, he thinks he has a trust or health care directive, he will look and get back to us      Provider  Link to [facility] Help Grid :[id number]}    Patient was agreeable to this plan and had no further questions.  There are no Patient Instructions on file for this visit.    No follow-ups on file.    Bisi Stuart MD  [facility]ADH[facility] Julio is a 88 year old, presenting for the following health issues:  Chronic Disease Management      HPI     Diabetes Follow-up      How often are you checking your blood sugar? Not at all    What concerns do you have today about your diabetes? None     Do you have any of  these symptoms? (Select all that apply)  No numbness or tingling in feet.  No redness, sores or blisters on feet.  No complaints of excessive thirst.  No reports of blurry vision.  No significant changes to weight.    Have you had a diabetic eye exam in the last 12 months? No- has an appointment next month                 Hyperlipidemia Follow-Up      Are you regularly taking any medication or supplement to lower your cholesterol?   Yes- Lipitor    Are you having muscle aches or other side effects that you think could be caused by your cholesterol lowering medication?  No    Hypertension Follow-up      Do you check your blood pressure regularly outside of the clinic? No     Are you following a low salt diet? Yes    Are your blood pressures ever more than 140 on the top number (systolic) OR more   than 90 on the bottom number (diastolic), for example 140/90? No    BP Readings from Last 2 Encounters:   11/18/22 122/62   11/17/22 (!) 154/77     Hemoglobin A1C (%)   Date Value   11/18/2022 6.4 (H)   05/04/2022 6.5 (H)   05/03/2021 6.3 (H)   02/01/2021 6.1 (H)     LDL Cholesterol Calculated (mg/dL)   Date Value   09/03/2021 51   10/30/2020 47   01/08/2020 73       Hypothyroidism Follow-up      Since last visit, patient describes the following symptoms: Weight stable, no hair loss, no skin changes, no constipation, no loose stools      How many servings of fruits and vegetables do you eat daily?  0-1    On average, how many sweetened beverages do you drink each day (Examples: soda, juice, sweet tea, etc.  Do NOT count diet or artificially sweetened beverages)?   1    How many days per week do you exercise enough to make your heart beat faster? 3 or less    How many minutes a day do you exercise enough to make your heart beat faster? 10 - 19    How many days per week do you miss taking your medication? 0       Pain History:  When did you first notice your pain? - Chronic Pain   Have you seen this provider for your pain in  "the past?   Yes   Where in your body do you have pain? Knees  Are you seeing anyone else for your pain? No    PHQ-9 SCORE 7/29/2020 5/3/2021 1/3/2022   PHQ-9 Total Score 1 1 0       QIAN-7 SCORE 7/29/2020 5/3/2021 1/3/2022   Total Score 0 0 0       PEG Score 11/18/2022   PEG Total Score 7.67       Chronic Pain Follow Up:    Location of pain:   Analgesia/pain control:    - Recent changes:      - Overall control:     - Current treatments:   Adherence:     - Do you ever take more pain medicine than prescribed? No    - When did you take your last dose of pain medicine?  n/a   Adverse effects: No   PDMP Review     None        Last CSA Agreement:   CSA -- Patient Level:    CSA: None found at the patient level.       Last UDS:   Review of Systems   Constitutional, HEENT, cardiovascular, pulmonary, gi and gu systems are negative, except as otherwise noted.      Objective    /62   Pulse 60   Temp 97  F (36.1  C)   Ht 1.676 m (5' 6\")   Wt 63.3 kg (139 lb 9.6 oz)   SpO2 97%   BMI 22.53 kg/m    Body mass index is 22.53 kg/m .  Physical Exam   GENERAL: healthy, alert and no distress  NECK: no adenopathy, no asymmetry, masses, or scars and thyroid normal to palpation  RESP: lungs clear to auscultation - no rales, rhonchi or wheezes  CV: regular rate and rhythm, normal S1 S2, no S3 or S4, no murmur, click or rub, no peripheral edema and peripheral pulses strong  ABDOMEN: soft, nontender, no hepatosplenomegaly, no masses and bowel sounds normal  MS: no gross musculoskeletal defects noted, no edema  PSYCH: mentation appears normal, affect normal/bright    Results for orders placed or performed in visit on 11/18/22   Comprehensive metabolic panel     Status: Abnormal   Result Value Ref Range    Sodium 140 136 - 145 mmol/L    Potassium 4.2 3.4 - 5.3 mmol/L    Chloride 103 98 - 107 mmol/L    Carbon Dioxide (CO2) 27 22 - 29 mmol/L    Anion Gap 10 7 - 15 mmol/L    Urea Nitrogen 19.9 8.0 - 23.0 mg/dL    Creatinine 1.05 0.67 - " 1.17 mg/dL    Calcium 9.4 8.8 - 10.2 mg/dL    Glucose 133 (H) 70 - 99 mg/dL    Alkaline Phosphatase 106 40 - 129 U/L    AST 23 10 - 50 U/L    ALT 16 10 - 50 U/L    Protein Total 6.6 6.4 - 8.3 g/dL    Albumin 4.2 3.5 - 5.2 g/dL    Bilirubin Total 0.6 <=1.2 mg/dL    GFR Estimate 68 >60 mL/min/1.73m2   HEMOGLOBIN A1C     Status: Abnormal   Result Value Ref Range    Estimated Average Glucose 137 mg/dL    Hemoglobin A1C 6.4 (H) <5.7 %   TSH WITH FREE T4 REFLEX     Status: Abnormal   Result Value Ref Range    TSH 4.95 (H) 0.30 - 4.20 uIU/mL   CBC with Platelets     Status: Normal   Result Value Ref Range    WBC Count 5.1 4.0 - 11.0 10e3/uL    RBC Count 5.09 4.40 - 5.90 10e6/uL    Hemoglobin 14.3 13.3 - 17.7 g/dL    Hematocrit 43.6 40.0 - 53.0 %    MCV 86 78 - 100 fL    MCH 28.1 26.5 - 33.0 pg    MCHC 32.8 31.5 - 36.5 g/dL    RDW 13.5 10.0 - 15.0 %    Platelet Count 158 150 - 450 10e3/uL   T4 free     Status: Normal   Result Value Ref Range    Free T4 1.34 0.90 - 1.70 ng/dL

## 2022-11-19 LAB
CHOLEST SERPL-MCNC: 121 MG/DL
HDLC SERPL-MCNC: 39 MG/DL
LDLC SERPL CALC-MCNC: 71 MG/DL
NONHDLC SERPL-MCNC: 82 MG/DL
TRIGL SERPL-MCNC: 53 MG/DL

## 2022-11-28 DIAGNOSIS — E11.65 TYPE 2 DIABETES MELLITUS WITH HYPERGLYCEMIA, WITHOUT LONG-TERM CURRENT USE OF INSULIN (H): ICD-10-CM

## 2022-11-28 DIAGNOSIS — I10 BENIGN ESSENTIAL HYPERTENSION: ICD-10-CM

## 2022-11-28 DIAGNOSIS — K21.9 GASTROESOPHAGEAL REFLUX DISEASE, UNSPECIFIED WHETHER ESOPHAGITIS PRESENT: ICD-10-CM

## 2022-11-30 RX ORDER — FAMOTIDINE 40 MG/1
TABLET, FILM COATED ORAL
Qty: 90 TABLET | Refills: 2 | Status: SHIPPED | OUTPATIENT
Start: 2022-11-30 | End: 2023-07-21

## 2022-11-30 RX ORDER — LISINOPRIL 2.5 MG/1
TABLET ORAL
Qty: 90 TABLET | Refills: 2 | Status: SHIPPED | OUTPATIENT
Start: 2022-11-30 | End: 2023-10-11

## 2022-11-30 NOTE — TELEPHONE ENCOUNTER
lisinopril      Last Written Prescription Date:  10/13/22  Last Fill Quantity: 90,   # refills: 0  Last Office Visit: 11/18/22  Future Office visit:    Next 5 appointments (look out 90 days)    Feb 17, 2023 10:30 AM  (Arrive by 10:15 AM)  Return Visit with Deidre Mishra DPM  [facility] ([facility] ) [address] [place] [phone]         pepcid      Last Written Prescription Date:  4/21/22  Last Fill Quantity: 90,   # refills: 2  Last Office Visit: 11/18/22  Future Office visit:

## 2022-12-21 ENCOUNTER — DOCUMENTATION ONLY (OUTPATIENT)
Dept: OTHER | Facility: CLINIC | Age: 87
End: 2022-12-21

## 2023-01-11 ENCOUNTER — TRANSFERRED RECORDS (OUTPATIENT)
Dept: HEALTH INFORMATION MANAGEMENT | Facility: CLINIC | Age: 88
End: 2023-01-11

## 2023-01-11 LAB — RETINOPATHY: NEGATIVE

## 2023-02-01 ENCOUNTER — TRANSFERRED RECORDS (OUTPATIENT)
Dept: HEALTH INFORMATION MANAGEMENT | Facility: CLINIC | Age: 88
End: 2023-02-01

## 2023-02-17 ENCOUNTER — OFFICE VISIT (OUTPATIENT)
Dept: PODIATRY | Facility: OTHER | Age: 88
End: 2023-02-17
Attending: PODIATRIST
Payer: COMMERCIAL

## 2023-02-17 VITALS
TEMPERATURE: 97 F | WEIGHT: 140 LBS | BODY MASS INDEX: 22.5 KG/M2 | HEIGHT: 66 IN | DIASTOLIC BLOOD PRESSURE: 67 MMHG | OXYGEN SATURATION: 82 % | HEART RATE: 56 BPM | SYSTOLIC BLOOD PRESSURE: 126 MMHG

## 2023-02-17 DIAGNOSIS — L85.3 XEROSIS OF SKIN: ICD-10-CM

## 2023-02-17 DIAGNOSIS — I50.22 CHRONIC SYSTOLIC CONGESTIVE HEART FAILURE (H): ICD-10-CM

## 2023-02-17 DIAGNOSIS — L60.3 ONYCHODYSTROPHY: Primary | ICD-10-CM

## 2023-02-17 DIAGNOSIS — E11.42 DIABETIC POLYNEUROPATHY ASSOCIATED WITH TYPE 2 DIABETES MELLITUS (H): ICD-10-CM

## 2023-02-17 DIAGNOSIS — E11.9 DIABETES MELLITUS TYPE 2, NONINSULIN DEPENDENT (H): ICD-10-CM

## 2023-02-17 PROCEDURE — 11721 DEBRIDE NAIL 6 OR MORE: CPT | Performed by: PODIATRIST

## 2023-02-17 PROCEDURE — G0463 HOSPITAL OUTPT CLINIC VISIT: HCPCS

## 2023-02-17 ASSESSMENT — PAIN SCALES - GENERAL: PAINLEVEL: NO PAIN (0)

## 2023-02-28 ENCOUNTER — TRANSFERRED RECORDS (OUTPATIENT)
Dept: HEALTH INFORMATION MANAGEMENT | Facility: CLINIC | Age: 88
End: 2023-02-28

## 2023-03-01 DIAGNOSIS — E03.8 OTHER SPECIFIED HYPOTHYROIDISM: ICD-10-CM

## 2023-03-03 RX ORDER — LEVOTHYROXINE SODIUM 150 UG/1
TABLET ORAL
Qty: 30 TABLET | Refills: 3 | Status: SHIPPED | OUTPATIENT
Start: 2023-03-03 | End: 2023-07-21

## 2023-03-03 NOTE — TELEPHONE ENCOUNTER
levothyroxine (SYNTHROID/LEVOTHROID) 150 MCG tablet      Last Written Prescription Date:  1/18/2023  Last Fill Quantity: 60,   # refills: 3  Last Office Visit: 11/18/2022  Future Office visit:    Next 5 appointments (look out 90 days)    Apr 21, 2023  8:45 AM  (Arrive by 8:30 AM)  SHORT with iBsi Stuart MD  [facility] ([facility] ) [address] [place] [id number]  [phone]   May 24, 2023 10:00 AM  (Arrive by 9:45 AM)  Return Visit with Deidre Mishra DPM  [facility] ([facility] ) [address] [place] [phone]

## 2023-04-21 ENCOUNTER — ANCILLARY PROCEDURE (OUTPATIENT)
Dept: GENERAL RADIOLOGY | Facility: OTHER | Age: 88
End: 2023-04-21
Attending: FAMILY MEDICINE
Payer: COMMERCIAL

## 2023-04-21 ENCOUNTER — OFFICE VISIT (OUTPATIENT)
Dept: FAMILY MEDICINE | Facility: OTHER | Age: 88
End: 2023-04-21
Attending: FAMILY MEDICINE
Payer: COMMERCIAL

## 2023-04-21 ENCOUNTER — APPOINTMENT (OUTPATIENT)
Dept: LAB | Facility: OTHER | Age: 88
End: 2023-04-21
Attending: FAMILY MEDICINE
Payer: COMMERCIAL

## 2023-04-21 VITALS
HEART RATE: 60 BPM | OXYGEN SATURATION: 95 % | SYSTOLIC BLOOD PRESSURE: 124 MMHG | BODY MASS INDEX: 21.55 KG/M2 | DIASTOLIC BLOOD PRESSURE: 68 MMHG | RESPIRATION RATE: 16 BRPM | HEIGHT: 67 IN | TEMPERATURE: 96.8 F | WEIGHT: 137.3 LBS

## 2023-04-21 DIAGNOSIS — I10 BENIGN ESSENTIAL HYPERTENSION: ICD-10-CM

## 2023-04-21 DIAGNOSIS — M54.2 CERVICAL PAIN: ICD-10-CM

## 2023-04-21 DIAGNOSIS — E11.65 TYPE 2 DIABETES MELLITUS WITH HYPERGLYCEMIA, WITHOUT LONG-TERM CURRENT USE OF INSULIN (H): Primary | ICD-10-CM

## 2023-04-21 DIAGNOSIS — M17.0 BILATERAL PRIMARY OSTEOARTHRITIS OF KNEE: ICD-10-CM

## 2023-04-21 LAB
ALBUMIN SERPL BCG-MCNC: 4.1 G/DL (ref 3.5–5.2)
ALP SERPL-CCNC: 92 U/L (ref 40–129)
ALT SERPL W P-5'-P-CCNC: 15 U/L (ref 10–50)
ANION GAP SERPL CALCULATED.3IONS-SCNC: 8 MMOL/L (ref 7–15)
AST SERPL W P-5'-P-CCNC: 22 U/L (ref 10–50)
BILIRUB SERPL-MCNC: 0.4 MG/DL
BUN SERPL-MCNC: 25.9 MG/DL (ref 8–23)
CALCIUM SERPL-MCNC: 9.6 MG/DL (ref 8.8–10.2)
CHLORIDE SERPL-SCNC: 106 MMOL/L (ref 98–107)
CHOLEST SERPL-MCNC: 98 MG/DL
CREAT SERPL-MCNC: 1.01 MG/DL (ref 0.67–1.17)
DEPRECATED HCO3 PLAS-SCNC: 28 MMOL/L (ref 22–29)
EST. AVERAGE GLUCOSE BLD GHB EST-MCNC: 140 MG/DL
GFR SERPL CREATININE-BSD FRML MDRD: 71 ML/MIN/1.73M2
GLUCOSE SERPL-MCNC: 111 MG/DL (ref 70–99)
HBA1C MFR BLD: 6.5 %
HDLC SERPL-MCNC: 33 MG/DL
LDLC SERPL CALC-MCNC: 49 MG/DL
NONHDLC SERPL-MCNC: 65 MG/DL
POTASSIUM SERPL-SCNC: 4.8 MMOL/L (ref 3.4–5.3)
PROT SERPL-MCNC: 6.5 G/DL (ref 6.4–8.3)
SODIUM SERPL-SCNC: 142 MMOL/L (ref 136–145)
TRIGL SERPL-MCNC: 81 MG/DL
TSH SERPL DL<=0.005 MIU/L-ACNC: 2.15 UIU/ML (ref 0.3–4.2)

## 2023-04-21 PROCEDURE — 80061 LIPID PANEL: CPT | Mod: ZL | Performed by: FAMILY MEDICINE

## 2023-04-21 PROCEDURE — 250N000011 HC RX IP 250 OP 636: Mod: JW | Performed by: FAMILY MEDICINE

## 2023-04-21 PROCEDURE — 83036 HEMOGLOBIN GLYCOSYLATED A1C: CPT | Mod: ZL | Performed by: FAMILY MEDICINE

## 2023-04-21 PROCEDURE — G0463 HOSPITAL OUTPT CLINIC VISIT: HCPCS | Mod: 25

## 2023-04-21 PROCEDURE — 72040 X-RAY EXAM NECK SPINE 2-3 VW: CPT | Mod: TC

## 2023-04-21 PROCEDURE — 36415 COLL VENOUS BLD VENIPUNCTURE: CPT | Mod: ZL | Performed by: FAMILY MEDICINE

## 2023-04-21 PROCEDURE — 80053 COMPREHEN METABOLIC PANEL: CPT | Mod: ZL | Performed by: FAMILY MEDICINE

## 2023-04-21 PROCEDURE — 250N000009 HC RX 250: Performed by: FAMILY MEDICINE

## 2023-04-21 PROCEDURE — 20610 DRAIN/INJ JOINT/BURSA W/O US: CPT | Mod: 50 | Performed by: FAMILY MEDICINE

## 2023-04-21 PROCEDURE — 99214 OFFICE O/P EST MOD 30 MIN: CPT | Mod: 25 | Performed by: FAMILY MEDICINE

## 2023-04-21 PROCEDURE — 84443 ASSAY THYROID STIM HORMONE: CPT | Mod: ZL | Performed by: FAMILY MEDICINE

## 2023-04-21 RX ORDER — TRIAMCINOLONE ACETONIDE 40 MG/ML
40 INJECTION, SUSPENSION INTRA-ARTICULAR; INTRAMUSCULAR ONCE
Status: DISCONTINUED | OUTPATIENT
Start: 2023-04-21 | End: 2023-07-21

## 2023-04-21 RX ORDER — TRIAMCINOLONE ACETONIDE 40 MG/ML
60 INJECTION, SUSPENSION INTRA-ARTICULAR; INTRAMUSCULAR ONCE
Status: COMPLETED | OUTPATIENT
Start: 2023-04-21 | End: 2023-04-21

## 2023-04-21 RX ORDER — FAMOTIDINE 40 MG/1
1 TABLET, FILM COATED ORAL DAILY
COMMUNITY
Start: 2022-11-30 | End: 2023-07-21

## 2023-04-21 RX ORDER — METFORMIN HCL 500 MG
1 TABLET, EXTENDED RELEASE 24 HR ORAL AT BEDTIME
COMMUNITY
Start: 2023-01-18 | End: 2023-07-21

## 2023-04-21 RX ORDER — GLIPIZIDE 2.5 MG/1
1 TABLET, EXTENDED RELEASE ORAL DAILY
COMMUNITY
Start: 2021-07-02

## 2023-04-21 RX ORDER — LEVOTHYROXINE SODIUM 137 UG/1
137 TABLET ORAL
COMMUNITY
Start: 2023-01-18 | End: 2023-07-21

## 2023-04-21 RX ORDER — LIDOCAINE HYDROCHLORIDE 10 MG/ML
12 INJECTION, SOLUTION INFILTRATION; PERINEURAL ONCE
Status: DISCONTINUED | OUTPATIENT
Start: 2023-04-21 | End: 2023-07-21

## 2023-04-21 RX ORDER — LISINOPRIL 2.5 MG/1
1 TABLET ORAL DAILY
COMMUNITY
Start: 2022-11-30 | End: 2023-07-21

## 2023-04-21 RX ORDER — TRIAMCINOLONE ACETONIDE 40 MG/ML
80 INJECTION, SUSPENSION INTRA-ARTICULAR; INTRAMUSCULAR ONCE
Status: DISCONTINUED | OUTPATIENT
Start: 2023-04-21 | End: 2023-07-21

## 2023-04-21 RX ORDER — ATORVASTATIN CALCIUM 10 MG/1
1 TABLET, FILM COATED ORAL DAILY
COMMUNITY
Start: 2023-01-18 | End: 2023-07-21

## 2023-04-21 RX ADMIN — TRIAMCINOLONE ACETONIDE 60 MG: 40 INJECTION, SUSPENSION INTRA-ARTICULAR; INTRAMUSCULAR at 14:26

## 2023-04-21 RX ADMIN — LIDOCAINE HYDROCHLORIDE 12 ML: 10 INJECTION, SOLUTION INFILTRATION; PERINEURAL at 14:26

## 2023-04-21 ASSESSMENT — PAIN SCALES - GENERAL: PAINLEVEL: MODERATE PAIN (5)

## 2023-04-21 NOTE — PROGRESS NOTES
PROCEDURE:  JOINT ASPIRATION/INJECTION.         After a discussion of risks, benefits and side effects of procedure, informed patient consent was obtained.       The bilateral knees were prepped and draped in the usual clean fashion (sterile not required for this procedure).        INJECTION:  Using 6 cc of 1% lidocaine mixed                           with 60 mg of kenalog, the right knee was successfully injected                           without complication.  Patient did experience some pain                          relief following injection.    Using 6 cc of 1% lidocaine mixed                           with 60 mg of kenalog, the left knee was successfully injected                           without complication.  Patient did experience some pain                          relief following injection.

## 2023-04-21 NOTE — PROGRESS NOTES
Assessment & Plan     Type 2 diabetes mellitus with hyperglycemia, without long-term current use of insulin (H)  Follow-up 3 months  - TSH with free T4 reflex; Future  - Hemoglobin A1c; Future  - Albumin Random Urine Quantitative with Creat Ratio; Future  - Comprehensive metabolic panel; Future  - Lipid Profile (Chol, Trig, HDL, LDL calc); Future  - TSH with free T4 reflex  - Hemoglobin A1c  - Albumin Random Urine Quantitative with Creat Ratio  - Comprehensive metabolic panel  - Lipid Profile (Chol, Trig, HDL, LDL calc)    Benign essential hypertension  Stable continue same meds    Cervical pain  Has muscular pain in his paracervical area and upper trapezius  Consider physical therapy or chiropractic  - XR CERVICAL SPINE 2/3 VWS (Clinic Performed); Future    Bilateral primary osteoarthritis of knee  Injections have worked well for him we will hold off on Synvisc at this time and repeat next time  - lidocaine 1 % 12 mL  - triamcinolone (KENALOG-40) injection 60 mg  - triamcinolone (KENALOG-40) injection 60 mg  - Large Joint/Bursa injection and/or drainage (Shoulder, Knee)  - triamcinolone (KENALOG-40) injection 80 mg  - triamcinolone (KENALOG-40) injection 40 mg  - lidocaine 1 % injection 12 mL    Provider  Link to [facility] Help Grid :[id number]}    Patient was agreeable to this plan and had no further questions.  There are no Patient Instructions on file for this visit.    No follow-ups on file.    Bisi Stuart MD  [facility] - MADHAV Julio is a 89 year old, presenting for the following health issues:  Recheck Medication (Diabetes)         View : No data to display.              HPI     Diabetes Follow-up      How often are you checking your blood sugar? Not at all    What concerns do you have today about your diabetes? None     Do you have any of these symptoms? (Select all that apply)  Weight loss    Have you had a diabetic eye exam in the last 12 months? No        BP Readings from  "Last 2 Encounters:   04/21/23 124/68   02/17/23 126/67     Hemoglobin A1C (%)   Date Value   04/21/2023 6.5 (H)   11/18/2022 6.4 (H)   05/03/2021 6.3 (H)   02/01/2021 6.1 (H)     LDL Cholesterol Calculated (mg/dL)   Date Value   04/21/2023 49   11/18/2022 71   10/30/2020 47   01/08/2020 73       Hyperlipidemia Follow-Up      Are you regularly taking any medication or supplement to lower your cholesterol?   Yes- atorvastatin    Are you having muscle aches or other side effects that you think could be caused by your cholesterol lowering medication?  No    Hypertension Follow-up      Do you check your blood pressure regularly outside of the clinic? No     Are you following a low salt diet? Yes    Are your blood pressures ever more than 140 on the top number (systolic) OR more   than 90 on the bottom number (diastolic), for example 140/90? No    Vascular Disease Follow-up      How often do you take nitroglycerin? Occasionally but that was many months ago    Do you take an aspirin every day? Yes    Chronic/Recurring Back Pain Follow Up      Where is your back pain located? (Select all that apply) low back bilateral    How would you describe your back pain?  dull ache    Where does your back pain spread? the right hip    Since your last clinic visit for back pain, how has your pain changed? unchanged    Does your back pain interfere with your job? YES slows him down    Since your last visit, have you tried any new treatment? Yes -  lidocaine patches      Review of Systems   Constitutional, HEENT, cardiovascular, pulmonary, gi and gu systems are negative, except as otherwise noted.      Objective    /68 (BP Location: Left arm, Patient Position: Chair, Cuff Size: Adult Regular)   Pulse 60   Temp 96.8  F (36  C) (Tympanic)   Resp 16   Ht 1.702 m (5' 7\")   Wt 62.3 kg (137 lb 4.8 oz)   SpO2 95%   BMI 21.50 kg/m    Body mass index is 21.5 kg/m .  Physical Exam   GENERAL: healthy, alert and no distress  NECK: no " adenopathy, no asymmetry, masses, or scars and thyroid normal to palpation  RESP: lungs clear to auscultation - no rales, rhonchi or wheezes  CV: regular rate and rhythm, normal S1 S2, no S3 or S4, no murmur, click or rub, no peripheral edema and peripheral pulses strong  ABDOMEN: soft, nontender, no hepatosplenomegaly, no masses and bowel sounds normal  MS: no gross musculoskeletal defects noted, no edema  PSYCH: mentation appears normal, affect normal/bright    Results for orders placed or performed in visit on 04/21/23   XR CERVICAL SPINE 2/3 VWS (Clinic Performed)     Status: None    Narrative    PROCEDURE: XR CERVICAL SPINE 2/3 VIEWS 4/21/2023 2:46 PM    HISTORY: Cervical pain    COMPARISONS: None.    TECHNIQUE: 3 views.    FINDINGS: There is reversal of the normal cervical lordosis with  kyphotic angulation centered at the C5 level. There is moderate  degenerative change at C5-6 and C6-7 with disc space narrowing,  subchondral sclerosis and anterior and posterior spur formation. There  is degenerative change in facet joints, more severe on the right.    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    RUSTY GARRISON MD         SYSTEM ID:  [id number]   Results for orders placed or performed in visit on 04/21/23   TSH with free T4 reflex     Status: Normal   Result Value Ref Range    TSH 2.15 0.30 - 4.20 uIU/mL   Hemoglobin A1c     Status: Abnormal   Result Value Ref Range    Estimated Average Glucose 140 mg/dL    Hemoglobin A1C 6.5 (H) <5.7 %   Comprehensive metabolic panel     Status: Abnormal   Result Value Ref Range    Sodium 142 136 - 145 mmol/L    Potassium 4.8 3.4 - 5.3 mmol/L    Chloride 106 98 - 107 mmol/L    Carbon Dioxide (CO2) 28 22 - 29 mmol/L    Anion Gap 8 7 - 15 mmol/L    Urea Nitrogen 25.9 (H) 8.0 - 23.0 mg/dL    Creatinine 1.01 0.67 -  1.17 mg/dL    Calcium 9.6 8.8 - 10.2 mg/dL    Glucose 111 (H) 70 - 99 mg/dL    Alkaline Phosphatase 92 40 - 129 U/L    AST 22 10 - 50 U/L    ALT 15 10 - 50 U/L    Protein Total 6.5 6.4 - 8.3 g/dL    Albumin 4.1 3.5 - 5.2 g/dL    Bilirubin Total 0.4 <=1.2 mg/dL    GFR Estimate 71 >60 mL/min/1.73m2   Lipid Profile (Chol, Trig, HDL, LDL calc)     Status: Abnormal   Result Value Ref Range    Cholesterol 98 <200 mg/dL    Triglycerides 81 <150 mg/dL    Direct Measure HDL 33 (L) >=40 mg/dL    LDL Cholesterol Calculated 49 <=100 mg/dL    Non HDL Cholesterol 65 <130 mg/dL    Narrative    Cholesterol  Desirable:  <200 mg/dL    Triglycerides  Normal:  Less than 150 mg/dL  Borderline High:  150-199 mg/dL  High:  200-499 mg/dL  Very High:  Greater than or equal to 500 mg/dL    Direct Measure HDL  Female:  Greater than or equal to 50 mg/dL   Male:  Greater than or equal to 40 mg/dL    LDL Cholesterol  Desirable:  <100mg/dL  Above Desirable:  100-129 mg/dL   Borderline High:  130-159 mg/dL   High:  160-189 mg/dL   Very High:  >= 190 mg/dL    Non HDL Cholesterol  Desirable:  130 mg/dL  Above Desirable:  130-159 mg/dL  Borderline High:  160-189 mg/dL  High:  190-219 mg/dL  Very High:  Greater than or equal to 220 mg/dL

## 2023-04-21 NOTE — RESULT ENCOUNTER NOTE
Please call patient with the following results:  His neck shows quite a bit of arthritis and a reverse curvature which could be helped by physical therapy or chiropractic let us know which 1 he prefers

## 2023-04-21 NOTE — LETTER
May 1, 2023      Nori Looney  [address] [place] [address]        Dear ,    We are writing to inform you of your test results.    Your test results fall within the expected range(s) or remain unchanged from previous results.  Please continue with current treatment plan.    Resulted Orders   TSH with free T4 reflex   Result Value Ref Range    TSH 2.15 0.30 - 4.20 uIU/mL   Hemoglobin A1c   Result Value Ref Range    Estimated Average Glucose 140 mg/dL    Hemoglobin A1C 6.5 (H) <5.7 %      Comment:      Normal <5.7%   Prediabetes 5.7-6.4%    Diabetes 6.5% or higher     Note: Adopted from ADA consensus guidelines.   Albumin Random Urine Quantitative with Creat Ratio   Result Value Ref Range    Creatinine Urine mg/dL 63.1 mg/dL      Comment:      The reference ranges have not been established in urine creatinine. The results should be integrated into the clinical context for interpretation.    Albumin Urine mg/L <12.0 mg/L      Comment:      The reference ranges have not been established in urine albumin. The results should be integrated into the clinical context for interpretation.    Albumin Urine mg/g Cr        Comment:      Unable to calculate, urine albumin and/or urine creatinine is outside detectable limits.  Microalbuminuria is defined as an albumin:creatinine ratio of 17 to 299 for males and 25 to 299 for females. A ratio of albumin:creatinine of 300 or higher is indicative of overt proteinuria.  Due to biologic variability, positive results should be confirmed by a second, first-morning random or 24-hour timed urine specimen. If there is discrepancy, a third specimen is recommended. When 2 out of 3 results are in the microalbuminuria range, this is evidence for incipient nephropathy and warrants increased efforts at glucose control, blood pressure control, and institution of therapy with an angiotensin-converting-enzyme (ACE) inhibitor (if the patient can tolerate it).     Comprehensive  metabolic panel   Result Value Ref Range    Sodium 142 136 - 145 mmol/L    Potassium 4.8 3.4 - 5.3 mmol/L    Chloride 106 98 - 107 mmol/L    Carbon Dioxide (CO2) 28 22 - 29 mmol/L    Anion Gap 8 7 - 15 mmol/L    Urea Nitrogen 25.9 (H) 8.0 - 23.0 mg/dL    Creatinine 1.01 0.67 - 1.17 mg/dL    Calcium 9.6 8.8 - 10.2 mg/dL    Glucose 111 (H) 70 - 99 mg/dL    Alkaline Phosphatase 92 40 - 129 U/L    AST 22 10 - 50 U/L    ALT 15 10 - 50 U/L    Protein Total 6.5 6.4 - 8.3 g/dL    Albumin 4.1 3.5 - 5.2 g/dL    Bilirubin Total 0.4 <=1.2 mg/dL    GFR Estimate 71 >60 mL/min/1.73m2      Comment:      eGFR calculated using 2021 CKD-EPI equation.   Lipid Profile (Chol, Trig, HDL, LDL calc)   Result Value Ref Range    Cholesterol 98 <200 mg/dL    Triglycerides 81 <150 mg/dL    Direct Measure HDL 33 (L) >=40 mg/dL    LDL Cholesterol Calculated 49 <=100 mg/dL    Non HDL Cholesterol 65 <130 mg/dL    Narrative    Cholesterol  Desirable:  <200 mg/dL    Triglycerides  Normal:  Less than 150 mg/dL  Borderline High:  150-199 mg/dL  High:  200-499 mg/dL  Very High:  Greater than or equal to 500 mg/dL    Direct Measure HDL  Female:  Greater than or equal to 50 mg/dL   Male:  Greater than or equal to 40 mg/dL    LDL Cholesterol  Desirable:  <100mg/dL  Above Desirable:  100-129 mg/dL   Borderline High:  130-159 mg/dL   High:  160-189 mg/dL   Very High:  >= 190 mg/dL    Non HDL Cholesterol  Desirable:  130 mg/dL  Above Desirable:  130-159 mg/dL  Borderline High:  160-189 mg/dL  High:  190-219 mg/dL  Very High:  Greater than or equal to 220 mg/dL       If you have any questions or concerns, please call the clinic at the number listed above.       Sincerely,      iBsi Stuart MD

## 2023-04-21 NOTE — RESULT ENCOUNTER NOTE
Please call patient with the following results:  A1c is great, remainder of labs are good with exception of he does need to drink more fluids.  His cholesterol looks amazing and his thyroid is now normal

## 2023-04-25 ENCOUNTER — APPOINTMENT (OUTPATIENT)
Dept: LAB | Facility: OTHER | Age: 88
End: 2023-04-25
Payer: COMMERCIAL

## 2023-04-25 LAB
CREAT UR-MCNC: 63.1 MG/DL
MICROALBUMIN UR-MCNC: <12 MG/L
MICROALBUMIN/CREAT UR: NORMAL MG/G{CREAT}

## 2023-04-25 PROCEDURE — 82570 ASSAY OF URINE CREATININE: CPT | Mod: ZL | Performed by: FAMILY MEDICINE

## 2023-05-01 ENCOUNTER — TRANSFERRED RECORDS (OUTPATIENT)
Dept: MULTI SPECIALTY CLINIC | Facility: CLINIC | Age: 88
End: 2023-05-01

## 2023-05-01 LAB — RETINOPATHY: NORMAL

## 2023-05-24 ENCOUNTER — OFFICE VISIT (OUTPATIENT)
Dept: PODIATRY | Facility: OTHER | Age: 88
End: 2023-05-24
Attending: PODIATRIST
Payer: COMMERCIAL

## 2023-05-24 VITALS
WEIGHT: 134 LBS | HEART RATE: 67 BPM | SYSTOLIC BLOOD PRESSURE: 148 MMHG | TEMPERATURE: 97.3 F | BODY MASS INDEX: 20.99 KG/M2 | OXYGEN SATURATION: 96 % | DIASTOLIC BLOOD PRESSURE: 73 MMHG

## 2023-05-24 DIAGNOSIS — L85.3 XEROSIS OF SKIN: ICD-10-CM

## 2023-05-24 DIAGNOSIS — E11.9 DIABETES MELLITUS TYPE 2, NONINSULIN DEPENDENT (H): ICD-10-CM

## 2023-05-24 DIAGNOSIS — R21 RASH OF BOTH FEET: ICD-10-CM

## 2023-05-24 DIAGNOSIS — E11.42 DIABETIC POLYNEUROPATHY ASSOCIATED WITH TYPE 2 DIABETES MELLITUS (H): ICD-10-CM

## 2023-05-24 DIAGNOSIS — L60.3 ONYCHODYSTROPHY: Primary | ICD-10-CM

## 2023-05-24 PROCEDURE — G0463 HOSPITAL OUTPT CLINIC VISIT: HCPCS

## 2023-05-24 PROCEDURE — 99213 OFFICE O/P EST LOW 20 MIN: CPT | Performed by: PODIATRIST

## 2023-05-24 PROCEDURE — G0463 HOSPITAL OUTPT CLINIC VISIT: HCPCS | Mod: 25

## 2023-05-24 PROCEDURE — 87101 SKIN FUNGI CULTURE: CPT | Mod: ZL | Performed by: PODIATRIST

## 2023-05-24 ASSESSMENT — PAIN SCALES - GENERAL: PAINLEVEL: NO PAIN (0)

## 2023-05-24 NOTE — PROGRESS NOTES
Chief complaint: Patient presents with:  Toenail: trimming      History of Present Illness: This 89 year old NIDDM male is seen for follow-up management of elongated toenails.     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 He says he developed an itchy foot again, especially on the LEFT foot. This has been bothering him and nothing has helped to reduce the itching.    He has a little burning, tingling, and numbness in his feet, but not a lot. He says he has most recently had some numbness in his feet. He says his feet get cold if he is not wearing socks. He also wears socks when sleeping.    Patient says he has been staying active. He likes to dance for exercise.     No further pedal complaints today.       Lab Results   Component Value Date    A1C 6.5 04/21/2023    A1C 6.4 11/18/2022    A1C 6.5 05/04/2022    A1C 6.5 01/03/2022    A1C 6.0 09/03/2021    A1C 6.3 05/03/2021    A1C 6.1 02/01/2021    A1C 6.4 10/30/2020    A1C 6.5 07/29/2020    A1C 6.3 01/08/2020         Wt Readings from Last 4 Encounters:   05/24/23 60.8 kg (134 lb)   04/21/23 62.3 kg (137 lb 4.8 oz)   02/17/23 63.5 kg (140 lb)   11/18/22 63.3 kg (139 lb 9.6 oz)         BP (!) 148/73 (BP Location: Left arm, Patient Position: Sitting, Cuff Size: Adult Regular)   Pulse 67   Temp 97.3  F (36.3  C) (Tympanic)   Wt 60.8 kg (134 lb)   SpO2 96%   BMI 20.99 kg/m      Patient Active Problem List   Diagnosis     Other specified hypothyroidism     Chronic systolic congestive heart failure (H)     Benign essential hypertension     Hyperlipidemia LDL goal <70     Bilateral carotid artery stenosis     Coronary artery disease involving coronary bypass graft of native heart without angina pectoris     Encounter for diabetic foot exam (H)     Trochanteric bursitis of right hip     Gastroesophageal reflux disease, esophagitis presence not specified     Primary osteoarthritis of right hip     Bilateral primary osteoarthritis of knee      Diabetic  polyneuropathy associated with type 2 diabetes mellitus (H)     Chronic bilateral low back pain without sciatica     DDD (degenerative disc disease), lumbar     Allergic arthritis of right hip     Keratoacanthoma of cheek     Atypical squamoproliferative skin lesion     Generalized osteoarthrosis, unspecified site     Primary localized osteoarthrosis of lower leg, unspecified laterality     Type 2 diabetes mellitus with hyperglycemia, without long-term current use of insulin (H)     Atypical chest pain     Pulmonary nodules       Past Surgical History:   Procedure Laterality Date     AS CABG, ARTERY-VEIN, FIVE  11/02/2011    off pump       Current Outpatient Medications   Medication     acetaminophen (ACETAMINOPHEN EXTRA STRENGTH) 500 MG tablet     ASPIRIN PO     atorvastatin (LIPITOR) 10 MG tablet     atorvastatin (LIPITOR) 10 MG tablet     blood glucose (NO BRAND SPECIFIED) lancets standard     blood glucose (NO BRAND SPECIFIED) test strip     blood glucose (NO BRAND SPECIFIED) test strip     blood glucose monitoring (NO BRAND SPECIFIED) meter device kit     diclofenac (VOLTAREN) 1 % topical gel     famotidine (PEPCID) 40 MG tablet     famotidine (PEPCID) 40 MG tablet     glipiZIDE (GLUCOTROL XL) 2.5 MG 24 hr tablet     levothyroxine (SYNTHROID/LEVOTHROID) 137 MCG tablet     levothyroxine (SYNTHROID/LEVOTHROID) 137 MCG tablet     levothyroxine (SYNTHROID/LEVOTHROID) 150 MCG tablet     lisinopril (ZESTRIL) 2.5 MG tablet     lisinopril (ZESTRIL) 2.5 MG tablet     metFORMIN (GLUCOPHAGE XR) 500 MG 24 hr tablet     metFORMIN (GLUCOPHAGE XR) 500 MG 24 hr tablet     metoprolol succinate ER (TOPROL XL) 50 MG 24 hr tablet     nitroGLYcerin (NITROSTAT) 0.4 MG sublingual tablet     vitamin D3 (CHOLECALCIFEROL) 50 mcg (2000 units) tablet     Current Facility-Administered Medications   Medication     lidocaine 1 % injection 12 mL     triamcinolone (KENALOG-40) injection 40 mg     triamcinolone (KENALOG-40) injection 40 mg      triamcinolone (KENALOG-40) injection 80 mg     triamcinolone (KENALOG-40) injection 80 mg        No Known Allergies    Family History   Problem Relation Age of Onset     Cerebrovascular Disease Father      Coronary Artery Disease Father      Dementia Mother      Coronary Artery Disease Sister      Lung Cancer Sister      Thyroid Disease Daughter        Social History     Socioeconomic History     Marital status:      Spouse name: Luis     Number of children: 5     Years of education: 12     Highest education level: Not on file   Occupational History     Occupation:      Employer: RETIRED   Social Needs     Financial resource strain: Not on file     Food insecurity:     Worry: Not on file     Inability: Not on file     Transportation needs:     Medical: Not on file     Non-medical: Not on file   Tobacco Use     Smoking status: Never Smoker     Smokeless tobacco: Never Used     Tobacco comment: second hand smoke in the house 40 yrs   Substance and Sexual Activity     Alcohol use: No     Alcohol/week: 0.0 oz     Drug use: No     Sexual activity: Never     Partners: Female   Lifestyle     Physical activity:     Days per week: Not on file     Minutes per session: Not on file     Stress: Not on file   Relationships     Social connections:     Talks on phone: Not on file     Gets together: Not on file     Attends Muslim service: Not on file     Active member of club or organization: Not on file     Attends meetings of clubs or organizations: Not on file     Relationship status: Not on file     Intimate partner violence:     Fear of current or ex partner: Not on file     Emotionally abused: Not on file     Physically abused: Not on file     Forced sexual activity: Not on file   Other Topics Concern      Service No     Blood Transfusions Yes     Comment: Ok to recieve      Caffeine Concern Yes     Comment: 3 cups daily      Occupational Exposure Not Asked     Hobby Hazards Not Asked     Sleep  Concern Not Asked     Stress Concern Not Asked     Weight Concern Not Asked     Special Diet Not Asked     Back Care Not Asked     Exercise Yes     Comment: walking,       Bike Helmet Not Asked     Seat Belt Yes     Self-Exams Not Asked     Parent/sibling w/ CABG, MI or angioplasty before 65F 55M? Not Asked   Social History Narrative     Not on file       ROS: 10 point ROS neg other than the symptoms noted above in the HPI.  EXAM  Constitutional: healthy, alert and no distress    Psychiatric: mentation appears normal and affect normal/bright    VASCULAR:  -Dorsalis pedis pulse +1/4 b/l  -Posterior tibial pulse +1/4 b/l  -Capillary refill time < 3 seconds to b/l hallux  -Hair growth Absent to b/l anterior legs and ankles  NEURO:  -Positive for paresthesias to the bilateral foot  -Epicritic and protective sensation diminished to the bilateral foot  DERM:  -Skin moderately dry, flaking to bilateral digits only (improved to the remainder of the foot)   -Skin moderately erythematous with flaking of skin to bilateral plantar feet in a moccasin distribution and increased raised, red rash on the bilateral medial midfoot    -Skin temperature mildly cool to bilateral foot  -Toenails elongated, thickened, dystrophic and discolored with subungual debris x 10  MSK:  -Muscle strength of ankles +5/5 for dorsiflexion, plantarflexion, ABDUction and ADDuction b/l    ============================================================    ASSESSMENT:    (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 Treatment options discussed with no educational barriers noted.    -High risk toenail debridement x 10 toenails without incident    -Diabetic Foot Education provided. This included checking the  feet daily looking for new new blisters or wounds, wearing shoes at all times when walking including around the house, and avoiding lotion application between the toes. Any sign of infection in the foot warrant's the patient presenting to the ED as soon as possible.  ---Patient has moderate xerosis of the skin. This can cause increased cracking of the skin. He has CHF which contributes to lower extremity edema, but this has been controlled recently.    -Discussed Athlete's Foot vs. Dermatitis and treatment regimens.  Athlete's foot is a common condition in the foot. Fungi thrive in dark, warm, moist environments such as in shoe gear. Symptoms can include flaking skin, a red rash and/or itching, burning or flaking of skin. Moisture and/or raw skin can also develop between the toes.     -Obtained a skin fungal culture of the LEFT plantar foot from the flaking skin  If the rash is positive for fungi:  ---Apply an anti-fungal foot cream to the feet every morning and evening  ---Apply an anti-fungal foot powder to the feet  Between the toes  ---Apply white, cotton socks to the feet daily  ---Switch out socks with a fresh powder retirement through the day if socks are moist  ---Spray an anti-fungal spray or Lysol spray to shoes daily   ---Spray tub/shower with a bleach based shower  daily after use    If the rash is negative for fungi:   ---Will start a two week course of triamcinolone cream.    -Patient to start with OTC Eucerin Cream      -Patient in agreement with the above treatment plan and all of patient's questions were answered.      RTC 3 1/2 months for diabetic foot exam and high risk nail debridement        Deidre Mishra DPM

## 2023-05-24 NOTE — PATIENT INSTRUCTIONS
Flakes of skin were sent for culture from your foot toe check for Athlete's Foot. You will be called with the results and the treatment options based on the culture. For the rash on the foot, consider trying an over-the-counter Eucerin Cream on the feet at night in the meantime (not between the toes), but keep the feet dry during the day.      Thank you for allowing  and our Podiatry team to participate in your care. Please call our office at [phone] with scheduling questions or with any other questions or concerns.

## 2023-05-30 DIAGNOSIS — B35.3 TINEA PEDIS OF BOTH FEET: Primary | ICD-10-CM

## 2023-05-30 RX ORDER — PRENATAL VIT 91/IRON/FOLIC/DHA 28-975-200
COMBINATION PACKAGE (EA) ORAL 2 TIMES DAILY
Qty: 42 G | Refills: 1 | Status: SHIPPED | OUTPATIENT
Start: 2023-05-30 | End: 2024-02-21

## 2023-05-30 RX ORDER — TOLNAFTATE 1 G/100G
POWDER TOPICAL 2 TIMES DAILY
Qty: 90 G | Refills: 1 | Status: SHIPPED | OUTPATIENT
Start: 2023-05-30 | End: 2024-02-21

## 2023-06-13 LAB
BACTERIA SKIN AEROBE CULT: ABNORMAL
BACTERIA SKIN AEROBE CULT: ABNORMAL

## 2023-07-20 NOTE — PROGRESS NOTES
Assessment & Plan     Type 2 diabetes mellitus with hyperglycemia, without long-term current use of insulin (H)  Follow-up 4 mos  - Hemoglobin A1c; Future  - metFORMIN (GLUCOPHAGE XR) 500 MG 24 hr tablet; Take 2 tablets (1,000 mg) by mouth At Bedtime    Other specified hypothyroidism  stable  - levothyroxine (SYNTHROID/LEVOTHROID) 150 MCG tablet; Take 1 Tablet by mouth once daily on Saturday and Sunday  - levothyroxine (SYNTHROID/LEVOTHROID) 137 MCG tablet; Take 1 Tablet by mouth daily Monday-Friday.    Benign essential hypertension  stable    Hyperlipidemia LDL goal <70  stable    Chronic systolic congestive heart failure (H)  stable    Gastroesophageal reflux disease, unspecified whether esophagitis present  Refilled medication  - famotidine (PEPCID) 40 MG tablet; Take 1 tablet (40 mg) by mouth daily    Bilateral primary osteoarthritis of knee   See procedure note for medication error that happened  - lidocaine 1 % 6 mL  - triamcinolone (KENALOG-40) injection 40 mg  - triamcinolone (KENALOG-40) injection 40 mg  - lidocaine 1 % 6 mL  - hylan G-F 20 (SYNVISC) injection 48 mg  - hylan G-F 20 (SYNVISC) injection 48 mg    Primary localized osteoarthrosis of lower leg, unspecified laterality    - Large Joint/Bursa injection and/or drainage (Shoulder, Knee)  - triamcinolone (KENALOG-40) injection 80 mg  - lidocaine 1% with EPINEPHrine 1:100,000 injection 12 mL  - hylan (SYNVISC ONE) injection 96 mg    Provider  Link to [facility] Help Grid :[id number]}     Patient was agreeable to this plan and had no further questions.  There are no Patient Instructions on file for this visit.    No follow-ups on file.    Bisi Stuart MD  [facility] - MADHAV Julio is a 89 year old, presenting for the following health issues:  Diabetes        4/21/2023     1:05 PM   Additional Questions   Roomed by Kimberly Boecker, RN   Accompanied by self     HPI     Diabetes Follow-up    How often are you checking your blood  "sugar? A few times a month  What time of day are you checking your blood sugars (select all that apply)?  Before meals  Have you had any blood sugars above 200?  No  Have you had any blood sugars below 70?  No    What symptoms do you notice when your blood sugar is low?  None    What concerns do you have today about your diabetes? None     Do you have any of these symptoms? (Select all that apply)  Weight loss    Have you had a diabetic eye exam in the last 12 months? Yes- Date of last eye exam: about 2 months ago ,  Location: unknown         BP Readings from Last 2 Encounters:   07/21/23 112/64   05/24/23 (!) 148/73     Hemoglobin A1C (%)   Date Value   04/21/2023 6.5 (H)   11/18/2022 6.4 (H)   05/03/2021 6.3 (H)   02/01/2021 6.1 (H)     LDL Cholesterol Calculated (mg/dL)   Date Value   04/21/2023 49   11/18/2022 71   10/30/2020 47   01/08/2020 73     Hyperlipidemia Follow-Up      Are you regularly taking any medication or supplement to lower your cholesterol?   Yes- lipitor     Are you having muscle aches or other side effects that you think could be caused by your cholesterol lowering medication?  No    Hypertension Follow-up      Do you check your blood pressure regularly outside of the clinic? No     Are you following a low salt diet? Yes    Are your blood pressures ever more than 140 on the top number (systolic) OR more   than 90 on the bottom number (diastolic), for example 140/90? No    Vascular Disease Follow-up      How often do you take nitroglycerin? Occasionally    Do you take an aspirin every day? Yes      Review of Systems   Constitutional, HEENT, cardiovascular, pulmonary, gi and gu systems are negative, except as otherwise noted.      Objective    /64 (BP Location: Right arm, Patient Position: Sitting, Cuff Size: Adult Regular)   Pulse 61   Temp 97.5  F (36.4  C) (Tympanic)   Ht 1.702 m (5' 7\")   Wt 59.3 kg (130 lb 12.8 oz)   SpO2 96%   BMI 20.49 kg/m    Body mass index is 20.49 " kg/m .  Physical Exam   GENERAL: healthy, alert and no distress  NECK: no adenopathy, no asymmetry, masses, or scars and thyroid normal to palpation  RESP: lungs clear to auscultation - no rales, rhonchi or wheezes  CV: regular rate and rhythm, normal S1 S2, no S3 or S4, no murmur, click or rub, no peripheral edema and peripheral pulses strong  ABDOMEN: soft, nontender, no hepatosplenomegaly, no masses and bowel sounds normal  MS: decreased range of motion arthritic changes of the knees noted bilaterally  PSYCH: mentation appears normal, affect normal/bright    Results for orders placed or performed in visit on 07/21/23   Hemoglobin A1c     Status: Abnormal   Result Value Ref Range    Estimated Average Glucose 148 mg/dL    Hemoglobin A1C 6.8 (H) <5.7 %

## 2023-07-21 ENCOUNTER — OFFICE VISIT (OUTPATIENT)
Dept: FAMILY MEDICINE | Facility: OTHER | Age: 88
End: 2023-07-21
Attending: FAMILY MEDICINE
Payer: COMMERCIAL

## 2023-07-21 ENCOUNTER — LAB (OUTPATIENT)
Dept: LAB | Facility: OTHER | Age: 88
End: 2023-07-21
Payer: COMMERCIAL

## 2023-07-21 VITALS
SYSTOLIC BLOOD PRESSURE: 112 MMHG | WEIGHT: 130.8 LBS | HEART RATE: 61 BPM | OXYGEN SATURATION: 96 % | DIASTOLIC BLOOD PRESSURE: 64 MMHG | HEIGHT: 67 IN | TEMPERATURE: 97.5 F | BODY MASS INDEX: 20.53 KG/M2

## 2023-07-21 DIAGNOSIS — E03.8 OTHER SPECIFIED HYPOTHYROIDISM: ICD-10-CM

## 2023-07-21 DIAGNOSIS — I50.22 CHRONIC SYSTOLIC CONGESTIVE HEART FAILURE (H): ICD-10-CM

## 2023-07-21 DIAGNOSIS — M17.0 BILATERAL PRIMARY OSTEOARTHRITIS OF KNEE: ICD-10-CM

## 2023-07-21 DIAGNOSIS — M17.10 PRIMARY LOCALIZED OSTEOARTHROSIS OF LOWER LEG, UNSPECIFIED LATERALITY: ICD-10-CM

## 2023-07-21 DIAGNOSIS — E11.65 TYPE 2 DIABETES MELLITUS WITH HYPERGLYCEMIA, WITHOUT LONG-TERM CURRENT USE OF INSULIN (H): Primary | ICD-10-CM

## 2023-07-21 DIAGNOSIS — E78.5 HYPERLIPIDEMIA LDL GOAL <70: ICD-10-CM

## 2023-07-21 DIAGNOSIS — I10 BENIGN ESSENTIAL HYPERTENSION: ICD-10-CM

## 2023-07-21 DIAGNOSIS — K21.9 GASTROESOPHAGEAL REFLUX DISEASE, UNSPECIFIED WHETHER ESOPHAGITIS PRESENT: ICD-10-CM

## 2023-07-21 DIAGNOSIS — E11.65 TYPE 2 DIABETES MELLITUS WITH HYPERGLYCEMIA, WITHOUT LONG-TERM CURRENT USE OF INSULIN (H): ICD-10-CM

## 2023-07-21 LAB
EST. AVERAGE GLUCOSE BLD GHB EST-MCNC: 148 MG/DL
HBA1C MFR BLD: 6.8 %

## 2023-07-21 PROCEDURE — 20610 DRAIN/INJ JOINT/BURSA W/O US: CPT | Mod: 50 | Performed by: FAMILY MEDICINE

## 2023-07-21 PROCEDURE — 83036 HEMOGLOBIN GLYCOSYLATED A1C: CPT | Mod: ZL

## 2023-07-21 PROCEDURE — 99214 OFFICE O/P EST MOD 30 MIN: CPT | Mod: 25 | Performed by: FAMILY MEDICINE

## 2023-07-21 PROCEDURE — 250N000011 HC RX IP 250 OP 636: Mod: JZ | Performed by: FAMILY MEDICINE

## 2023-07-21 PROCEDURE — G0463 HOSPITAL OUTPT CLINIC VISIT: HCPCS | Mod: 25 | Performed by: FAMILY MEDICINE

## 2023-07-21 PROCEDURE — 36415 COLL VENOUS BLD VENIPUNCTURE: CPT | Mod: ZL

## 2023-07-21 RX ORDER — LIDOCAINE HYDROCHLORIDE AND EPINEPHRINE 10; 10 MG/ML; UG/ML
12 INJECTION, SOLUTION INFILTRATION; PERINEURAL ONCE
Status: DISCONTINUED | OUTPATIENT
Start: 2023-07-21 | End: 2024-07-15

## 2023-07-21 RX ORDER — LEVOTHYROXINE SODIUM 150 UG/1
TABLET ORAL
Qty: 30 TABLET | Refills: 3 | Status: SHIPPED | OUTPATIENT
Start: 2023-07-21 | End: 2024-04-22

## 2023-07-21 RX ORDER — METFORMIN HCL 500 MG
500 TABLET, EXTENDED RELEASE 24 HR ORAL AT BEDTIME
Qty: 90 TABLET | Refills: 3 | Status: SHIPPED | OUTPATIENT
Start: 2023-07-21 | End: 2023-07-21

## 2023-07-21 RX ORDER — TRIAMCINOLONE ACETONIDE 40 MG/ML
40 INJECTION, SUSPENSION INTRA-ARTICULAR; INTRAMUSCULAR ONCE
Status: COMPLETED | OUTPATIENT
Start: 2023-07-21 | End: 2023-07-21

## 2023-07-21 RX ORDER — METFORMIN HCL 500 MG
1000 TABLET, EXTENDED RELEASE 24 HR ORAL AT BEDTIME
Qty: 180 TABLET | Refills: 3 | Status: SHIPPED | OUTPATIENT
Start: 2023-07-21

## 2023-07-21 RX ORDER — FAMOTIDINE 40 MG/1
40 TABLET, FILM COATED ORAL DAILY
Qty: 90 TABLET | Refills: 3 | Status: SHIPPED | OUTPATIENT
Start: 2023-07-21

## 2023-07-21 RX ORDER — HYLAN G-F 20 16MG/2ML
48 SYRINGE (ML) INTRAARTICULAR ONCE
Qty: 6 ML | Refills: 0 | Status: SHIPPED | OUTPATIENT
Start: 2023-07-21 | End: 2023-07-21

## 2023-07-21 RX ORDER — LEVOTHYROXINE SODIUM 137 UG/1
TABLET ORAL
Qty: 60 TABLET | Refills: 3 | Status: SHIPPED | OUTPATIENT
Start: 2023-07-21 | End: 2023-12-28

## 2023-07-21 RX ORDER — TRIAMCINOLONE ACETONIDE 40 MG/ML
80 INJECTION, SUSPENSION INTRA-ARTICULAR; INTRAMUSCULAR ONCE
Status: COMPLETED | OUTPATIENT
Start: 2023-07-21 | End: 2023-11-20

## 2023-07-21 RX ADMIN — Medication 48 MG: at 12:45

## 2023-07-21 RX ADMIN — TRIAMCINOLONE ACETONIDE 40 MG: 40 INJECTION, SUSPENSION INTRA-ARTICULAR; INTRAMUSCULAR at 11:46

## 2023-07-21 RX ADMIN — Medication 48 MG: at 12:44

## 2023-07-21 ASSESSMENT — PAIN SCALES - GENERAL: PAINLEVEL: MODERATE PAIN (5)

## 2023-07-21 NOTE — PROGRESS NOTES
PROCEDURE:  JOINT ASPIRATION/INJECTION.         After a discussion of risks, benefits and side effects of procedure, informed patient consent was obtained.       The bilateral knees were prepped and draped in the usual clean fashion (sterile not required for this procedure).      NOTE:  ORDER WAS FOR 1% LIDOCAINE BUT NURSE INADVERTENTLY SANTIAGO UP 1% LIDOCAINE WITH EPINEPHRINE  INFORMATION WAS DISCUSSED WITH PATIENT AND DISCUSSED WITH PHARMACY, NO OVERT HARM SHOULD COME as RESULT OF THIS MEDICATION ERROR    INJECTION:  Using 48 mg Synvisc One was injected, syringe was changed and then 6 cc of 1% lidocaine with epi mixed                           with 40 mg of kenalog, the right knee was successfully injected                           without complication.  Patient did experience some pain                          relief following injection.    Using 48 mg Synvisc One was injected, syringe was changed and then 6 cc of 1% lidocaine with epi mixed                           with 40 mg of kenalog, the left knee was successfully injected                           without complication.  Patient did experience some pain                          relief following injection.

## 2023-08-11 DIAGNOSIS — M17.10 PRIMARY LOCALIZED OSTEOARTHROSIS OF LOWER LEG, UNSPECIFIED LATERALITY: ICD-10-CM

## 2023-08-11 DIAGNOSIS — M16.11 PRIMARY OSTEOARTHRITIS OF RIGHT HIP: ICD-10-CM

## 2023-08-11 RX ORDER — PSEUDOEPHED/ACETAMINOPH/DIPHEN 30MG-500MG
TABLET ORAL
Qty: 120 TABLET | Refills: 6 | Status: SHIPPED | OUTPATIENT
Start: 2023-08-11 | End: 2024-03-29

## 2023-08-11 NOTE — TELEPHONE ENCOUNTER
Acetaminophen ES (Tylenol Extra Strength) 500 MG tablet    Last Written Prescription Date:  01/03/2022  Last Fill Quantity: 120,   # refills: 6  Last Office Visit: 07/21/2023

## 2023-09-07 ENCOUNTER — OFFICE VISIT (OUTPATIENT)
Dept: PODIATRY | Facility: OTHER | Age: 88
End: 2023-09-07
Attending: PODIATRIST
Payer: COMMERCIAL

## 2023-09-07 VITALS
TEMPERATURE: 97.6 F | SYSTOLIC BLOOD PRESSURE: 117 MMHG | HEART RATE: 53 BPM | DIASTOLIC BLOOD PRESSURE: 60 MMHG | OXYGEN SATURATION: 99 %

## 2023-09-07 DIAGNOSIS — E11.9 DIABETES MELLITUS TYPE 2, NONINSULIN DEPENDENT (H): ICD-10-CM

## 2023-09-07 DIAGNOSIS — L60.3 ONYCHODYSTROPHY: Primary | ICD-10-CM

## 2023-09-07 DIAGNOSIS — E11.42 DIABETIC POLYNEUROPATHY ASSOCIATED WITH TYPE 2 DIABETES MELLITUS (H): ICD-10-CM

## 2023-09-07 DIAGNOSIS — Z13.89 SCREENING FOR DIABETIC PERIPHERAL NEUROPATHY: ICD-10-CM

## 2023-09-07 PROCEDURE — 11721 DEBRIDE NAIL 6 OR MORE: CPT | Performed by: PODIATRIST

## 2023-09-07 PROCEDURE — G0463 HOSPITAL OUTPT CLINIC VISIT: HCPCS

## 2023-09-07 ASSESSMENT — PAIN SCALES - GENERAL: PAINLEVEL: MODERATE PAIN (4)

## 2023-09-07 NOTE — PROGRESS NOTES
Chief complaint: Patient presents with:  Toenail: DFE      History of Present Illness: This 89 year old NIDDM male is seen for follow-up management of elongated toenails.     He says he no longer has Athlete's Foot or an itchy rash. This resolved since his last appointment.    He has a little burning, tingling, and numbness in his feet.    Patient says he has continued to be active with dancing for exercise.    No further pedal complaints today.     Lab Results   Component Value Date    A1C 6.8 07/21/2023    A1C 6.5 04/21/2023    A1C 6.4 11/18/2022    A1C 6.5 05/04/2022    A1C 6.5 01/03/2022    A1C 6.3 05/03/2021    A1C 6.1 02/01/2021    A1C 6.4 10/30/2020    A1C 6.5 07/29/2020    A1C 6.3 01/08/2020           Wt Readings from Last 4 Encounters:   07/21/23 59.3 kg (130 lb 12.8 oz)   05/24/23 60.8 kg (134 lb)   04/21/23 62.3 kg (137 lb 4.8 oz)   02/17/23 63.5 kg (140 lb)         /60 (BP Location: Left arm, Patient Position: Sitting, Cuff Size: Adult Regular)   Pulse 53   Temp 97.6  F (36.4  C) (Tympanic)   SpO2 99%     Patient Active Problem List   Diagnosis    Other specified hypothyroidism    Chronic systolic congestive heart failure (H)    Benign essential hypertension    Hyperlipidemia LDL goal <70    Bilateral carotid artery stenosis    Coronary artery disease involving coronary bypass graft of native heart without angina pectoris    Encounter for diabetic foot exam (H)    Trochanteric bursitis of right hip    Gastroesophageal reflux disease, esophagitis presence not specified    Primary osteoarthritis of right hip    Bilateral primary osteoarthritis of knee     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)    Chronic bilateral low back pain without sciatica    DDD (degenerative disc disease), lumbar    Allergic arthritis of right hip    Keratoacanthoma of cheek    Atypical squamoproliferative skin lesion    Generalized osteoarthrosis, unspecified site    Primary localized  osteoarthrosis of lower leg, unspecified laterality    Type 2 diabetes mellitus with hyperglycemia, without long-term current use of insulin (H)    Atypical chest pain    Pulmonary nodules       Past Surgical History:   Procedure Laterality Date    AS CABG, ARTERY-VEIN, FIVE  11/02/2011    off pump       Current Outpatient Medications   Medication    acetaminophen (TYLENOL) 500 MG tablet    ASPIRIN PO    atorvastatin (LIPITOR) 10 MG tablet    blood glucose (NO BRAND SPECIFIED) lancets standard    blood glucose (NO BRAND SPECIFIED) test strip    blood glucose monitoring (NO BRAND SPECIFIED) meter device kit    diclofenac (VOLTAREN) 1 % topical gel    famotidine (PEPCID) 40 MG tablet    glipiZIDE (GLUCOTROL XL) 2.5 MG 24 hr tablet    levothyroxine (SYNTHROID/LEVOTHROID) 137 MCG tablet    levothyroxine (SYNTHROID/LEVOTHROID) 150 MCG tablet    lisinopril (ZESTRIL) 2.5 MG tablet    metFORMIN (GLUCOPHAGE XR) 500 MG 24 hr tablet    metoprolol succinate ER (TOPROL XL) 50 MG 24 hr tablet    nitroGLYcerin (NITROSTAT) 0.4 MG sublingual tablet    terbinafine (LAMISIL) 1 % external cream    tolnaftate (TINACTIN) 1 % external powder    vitamin D3 (CHOLECALCIFEROL) 50 mcg (2000 units) tablet     Current Facility-Administered Medications   Medication    hylan (SYNVISC ONE) injection 96 mg    lidocaine 1 % 6 mL    lidocaine 1 % 6 mL    lidocaine 1% with EPINEPHrine 1:100,000 injection 12 mL    triamcinolone (KENALOG-40) injection 80 mg        No Known Allergies    Family History   Problem Relation Age of Onset    Cerebrovascular Disease Father     Coronary Artery Disease Father     Dementia Mother     Coronary Artery Disease Sister     Lung Cancer Sister     Thyroid Disease Daughter        Social History     Socioeconomic History    Marital status:      Spouse name: Luis    Number of children: 5    Years of education: 12    Highest education level: Not on file   Occupational History    Occupation:      Employer:  RETIRED   Social Needs    Financial resource strain: Not on file    Food insecurity:     Worry: Not on file     Inability: Not on file    Transportation needs:     Medical: Not on file     Non-medical: Not on file   Tobacco Use    Smoking status: Never Smoker    Smokeless tobacco: Never Used    Tobacco comment: second hand smoke in the house 40 yrs   Substance and Sexual Activity    Alcohol use: No     Alcohol/week: 0.0 oz    Drug use: No    Sexual activity: Never     Partners: Female   Lifestyle    Physical activity:     Days per week: Not on file     Minutes per session: Not on file    Stress: Not on file   Relationships    Social connections:     Talks on phone: Not on file     Gets together: Not on file     Attends [employer] service: Not on file     Active member of club or organization: Not on file     Attends meetings of clubs or organizations: Not on file     Relationship status: Not on file    Intimate partner violence:     Fear of current or ex partner: Not on file     Emotionally abused: Not on file     Physically abused: Not on file     Forced sexual activity: Not on file   Other Topics Concern     Service No    Blood Transfusions Yes     Comment: Ok to recieve     Caffeine Concern Yes     Comment: 3 cups daily     Occupational Exposure Not Asked    Hobby Hazards Not Asked    Sleep Concern Not Asked    Stress Concern Not Asked    Weight Concern Not Asked    Special Diet Not Asked    Back Care Not Asked    Exercise Yes     Comment: walking,      Bike Helmet Not Asked    Seat Belt Yes    Self-Exams Not Asked    Parent/sibling w/ CABG, MI or angioplasty before 65F 55M? Not Asked   Social History Narrative    Not on file       ROS: 10 point ROS neg other than the symptoms noted above in the HPI.  EXAM  Constitutional: healthy, alert and no distress    Psychiatric: mentation appears normal and affect normal/bright    VASCULAR:  -Dorsalis pedis pulse +1/4 b/l  -Posterior tibial pulse +1/4  b/l  -Capillary refill time < 3 seconds to b/l hallux  -Hair growth Absent to b/l anterior legs and ankles  NEURO:  -Positive for paresthesias to the bilateral foot  -Epicritic and protective sensation diminished to the bilateral foot  DERM:  -Skin mildly dry, flaking to bilateral digits only (improved to the remainder of the foot)   -Skin has no further erythematous, flaking of skin to bilateral plantar foot    -Skin temperature mildly cool to bilateral foot  -Toenails elongated, thickened, dystrophic and discolored with subungual debris x 10  MSK:  -Muscle strength of ankles +5/5 for dorsiflexion, plantarflexion, ABDUction and ADDuction b/l    ============================================================    ASSESSMENT:    (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 Treatment options discussed with no educational barriers noted.    -High risk toenail debridement x 10 toenails without incident    Diabetes Mellitus: Patient's DM is managed by their PCP. The DM appears to be stable. Patient's last HbA1C was 6.8% on 07/21/2023.    -Diabetic Foot Education provided. This included checking the feet daily looking for new new blisters or wounds, wearing shoes at all times when walking including around the house, and avoiding lotion application between the toes. Any sign of infection in the foot warrant's the patient presenting to the ED as soon as possible.  ---Patient has improved xerosis of the skin.     -Patient in agreement with the above treatment plan and all of patient's questions were answered.      RTC 3 1/2 months for diabetic foot exam and high risk nail debridement        Deidre Mishra DPM

## 2023-10-10 DIAGNOSIS — E11.65 TYPE 2 DIABETES MELLITUS WITH HYPERGLYCEMIA, WITHOUT LONG-TERM CURRENT USE OF INSULIN (H): ICD-10-CM

## 2023-10-10 DIAGNOSIS — I10 BENIGN ESSENTIAL HYPERTENSION: ICD-10-CM

## 2023-10-10 NOTE — TELEPHONE ENCOUNTER
Lisinopril (Zestril) 2.5 MG tablet   Last Written Prescription Date:  11/30/2022  Last Fill Quantity: 90,   # refills: 2  Last Office Visit: 07/21/2023

## 2023-10-11 RX ORDER — LISINOPRIL 2.5 MG/1
TABLET ORAL
Qty: 90 TABLET | Refills: 1 | Status: SHIPPED | OUTPATIENT
Start: 2023-10-11 | End: 2024-03-29

## 2023-11-17 NOTE — PATIENT INSTRUCTIONS
Patient Education   Personalized Prevention Plan  You are due for the preventive services outlined below.  Your care team is available to assist you in scheduling these services.  If you have already completed any of these items, please share that information with your care team to update in your medical record.  Health Maintenance Due   Topic Date Due     Zoster (Shingles) Vaccine (1 of 2) Never done     RSV VACCINE (Pregnancy & 60+) (1 - 1-dose 60+ series) Never done     Annual Wellness Visit  Never done     Flu Vaccine (1) 09/01/2023     COVID-19 Vaccine (4 - 2023-24 season) 09/01/2023     Basic Metabolic Panel  10/21/2023     Complete Blood Count  11/18/2023

## 2023-11-20 ENCOUNTER — LAB (OUTPATIENT)
Dept: LAB | Facility: OTHER | Age: 88
End: 2023-11-20
Payer: COMMERCIAL

## 2023-11-20 ENCOUNTER — OFFICE VISIT (OUTPATIENT)
Dept: FAMILY MEDICINE | Facility: OTHER | Age: 88
End: 2023-11-20
Attending: FAMILY MEDICINE
Payer: COMMERCIAL

## 2023-11-20 VITALS
OXYGEN SATURATION: 98 % | WEIGHT: 128 LBS | DIASTOLIC BLOOD PRESSURE: 57 MMHG | HEIGHT: 67 IN | SYSTOLIC BLOOD PRESSURE: 120 MMHG | BODY MASS INDEX: 20.09 KG/M2 | TEMPERATURE: 97.5 F | HEART RATE: 58 BPM

## 2023-11-20 DIAGNOSIS — E03.8 OTHER SPECIFIED HYPOTHYROIDISM: ICD-10-CM

## 2023-11-20 DIAGNOSIS — I50.22 CHRONIC SYSTOLIC CONGESTIVE HEART FAILURE (H): ICD-10-CM

## 2023-11-20 DIAGNOSIS — E78.5 HYPERLIPIDEMIA LDL GOAL <70: ICD-10-CM

## 2023-11-20 DIAGNOSIS — M17.12 PRIMARY LOCALIZED OSTEOARTHROSIS OF LEFT LOWER LEG: ICD-10-CM

## 2023-11-20 DIAGNOSIS — K21.9 GASTROESOPHAGEAL REFLUX DISEASE, UNSPECIFIED WHETHER ESOPHAGITIS PRESENT: ICD-10-CM

## 2023-11-20 DIAGNOSIS — I10 BENIGN ESSENTIAL HYPERTENSION: ICD-10-CM

## 2023-11-20 DIAGNOSIS — M24.9 DERANGEMENT OF RIGHT SACROILIAC JOINT: ICD-10-CM

## 2023-11-20 DIAGNOSIS — E11.65 TYPE 2 DIABETES MELLITUS WITH HYPERGLYCEMIA, WITHOUT LONG-TERM CURRENT USE OF INSULIN (H): ICD-10-CM

## 2023-11-20 DIAGNOSIS — M54.2 CERVICALGIA: ICD-10-CM

## 2023-11-20 DIAGNOSIS — Z00.00 ENCOUNTER FOR MEDICARE ANNUAL WELLNESS EXAM: Primary | ICD-10-CM

## 2023-11-20 DIAGNOSIS — E11.42 DIABETIC POLYNEUROPATHY ASSOCIATED WITH TYPE 2 DIABETES MELLITUS (H): ICD-10-CM

## 2023-11-20 LAB
ALBUMIN SERPL BCG-MCNC: 4 G/DL (ref 3.5–5.2)
ALP SERPL-CCNC: 100 U/L (ref 40–150)
ALT SERPL W P-5'-P-CCNC: 12 U/L (ref 0–70)
ANION GAP SERPL CALCULATED.3IONS-SCNC: 9 MMOL/L (ref 7–15)
AST SERPL W P-5'-P-CCNC: 17 U/L (ref 0–45)
BILIRUB SERPL-MCNC: 0.6 MG/DL
BUN SERPL-MCNC: 16.5 MG/DL (ref 8–23)
CALCIUM SERPL-MCNC: 9.2 MG/DL (ref 8.8–10.2)
CHLORIDE SERPL-SCNC: 104 MMOL/L (ref 98–107)
CREAT SERPL-MCNC: 0.89 MG/DL (ref 0.67–1.17)
DEPRECATED HCO3 PLAS-SCNC: 26 MMOL/L (ref 22–29)
EGFRCR SERPLBLD CKD-EPI 2021: 82 ML/MIN/1.73M2
ERYTHROCYTE [DISTWIDTH] IN BLOOD BY AUTOMATED COUNT: 12.7 % (ref 10–15)
EST. AVERAGE GLUCOSE BLD GHB EST-MCNC: 143 MG/DL
GLUCOSE SERPL-MCNC: 155 MG/DL (ref 70–99)
HBA1C MFR BLD: 6.6 %
HCT VFR BLD AUTO: 41.4 % (ref 40–53)
HGB BLD-MCNC: 13.7 G/DL (ref 13.3–17.7)
MCH RBC QN AUTO: 28.5 PG (ref 26.5–33)
MCHC RBC AUTO-ENTMCNC: 33.1 G/DL (ref 31.5–36.5)
MCV RBC AUTO: 86 FL (ref 78–100)
PLATELET # BLD AUTO: 164 10E3/UL (ref 150–450)
POTASSIUM SERPL-SCNC: 4.3 MMOL/L (ref 3.4–5.3)
PROT SERPL-MCNC: 6.3 G/DL (ref 6.4–8.3)
RBC # BLD AUTO: 4.81 10E6/UL (ref 4.4–5.9)
SODIUM SERPL-SCNC: 139 MMOL/L (ref 135–145)
WBC # BLD AUTO: 6.4 10E3/UL (ref 4–11)

## 2023-11-20 PROCEDURE — 250N000011 HC RX IP 250 OP 636: Mod: JW | Performed by: FAMILY MEDICINE

## 2023-11-20 PROCEDURE — 20610 DRAIN/INJ JOINT/BURSA W/O US: CPT | Performed by: FAMILY MEDICINE

## 2023-11-20 PROCEDURE — 36415 COLL VENOUS BLD VENIPUNCTURE: CPT | Mod: ZL

## 2023-11-20 PROCEDURE — G0463 HOSPITAL OUTPT CLINIC VISIT: HCPCS | Mod: 25

## 2023-11-20 PROCEDURE — 85014 HEMATOCRIT: CPT | Mod: ZL

## 2023-11-20 PROCEDURE — 80053 COMPREHEN METABOLIC PANEL: CPT | Mod: ZL

## 2023-11-20 PROCEDURE — 83036 HEMOGLOBIN GLYCOSYLATED A1C: CPT | Mod: ZL

## 2023-11-20 PROCEDURE — G0439 PPPS, SUBSEQ VISIT: HCPCS | Performed by: FAMILY MEDICINE

## 2023-11-20 PROCEDURE — 99213 OFFICE O/P EST LOW 20 MIN: CPT | Mod: 25 | Performed by: FAMILY MEDICINE

## 2023-11-20 RX ORDER — LIDOCAINE HYDROCHLORIDE 10 MG/ML
6 INJECTION, SOLUTION INFILTRATION; PERINEURAL ONCE
Status: DISCONTINUED | OUTPATIENT
Start: 2023-11-20 | End: 2024-07-15

## 2023-11-20 RX ORDER — TRIAMCINOLONE ACETONIDE 40 MG/ML
60 INJECTION, SUSPENSION INTRA-ARTICULAR; INTRAMUSCULAR ONCE
Status: DISCONTINUED | OUTPATIENT
Start: 2023-11-20 | End: 2024-07-15

## 2023-11-20 RX ORDER — TRIAMCINOLONE ACETONIDE 40 MG/ML
60 INJECTION, SUSPENSION INTRA-ARTICULAR; INTRAMUSCULAR ONCE
Status: COMPLETED | OUTPATIENT
Start: 2023-11-20 | End: 2023-11-20

## 2023-11-20 RX ADMIN — TRIAMCINOLONE ACETONIDE 60 MG: 40 INJECTION, SUSPENSION INTRA-ARTICULAR; INTRAMUSCULAR at 17:22

## 2023-11-20 RX ADMIN — TRIAMCINOLONE ACETONIDE 60 MG: 40 INJECTION, SUSPENSION INTRA-ARTICULAR; INTRAMUSCULAR at 17:23

## 2023-11-20 ASSESSMENT — ANXIETY QUESTIONNAIRES
6. BECOMING EASILY ANNOYED OR IRRITABLE: NOT AT ALL
4. TROUBLE RELAXING: NOT AT ALL
GAD7 TOTAL SCORE: 0
7. FEELING AFRAID AS IF SOMETHING AWFUL MIGHT HAPPEN: NOT AT ALL
3. WORRYING TOO MUCH ABOUT DIFFERENT THINGS: NOT AT ALL
1. FEELING NERVOUS, ANXIOUS, OR ON EDGE: NOT AT ALL
5. BEING SO RESTLESS THAT IT IS HARD TO SIT STILL: NOT AT ALL
GAD7 TOTAL SCORE: 0
IF YOU CHECKED OFF ANY PROBLEMS ON THIS QUESTIONNAIRE, HOW DIFFICULT HAVE THESE PROBLEMS MADE IT FOR YOU TO DO YOUR WORK, TAKE CARE OF THINGS AT HOME, OR GET ALONG WITH OTHER PEOPLE: NOT DIFFICULT AT ALL
2. NOT BEING ABLE TO STOP OR CONTROL WORRYING: NOT AT ALL

## 2023-11-20 ASSESSMENT — ENCOUNTER SYMPTOMS
FEVER: 0
PARESTHESIAS: 1
DYSURIA: 0
ARTHRALGIAS: 1
COUGH: 0
HEMATOCHEZIA: 0
JOINT SWELLING: 1
ABDOMINAL PAIN: 0
PALPITATIONS: 0
CHILLS: 0
HEMATURIA: 0
SHORTNESS OF BREATH: 0
EYE PAIN: 0
WEAKNESS: 1
MYALGIAS: 1
NERVOUS/ANXIOUS: 0
CONSTIPATION: 0
FREQUENCY: 1
SORE THROAT: 0
NAUSEA: 0
DIZZINESS: 0
DIARRHEA: 1
HEADACHES: 0

## 2023-11-20 ASSESSMENT — PAIN SCALES - GENERAL: PAINLEVEL: NO PAIN (0)

## 2023-11-20 ASSESSMENT — PATIENT HEALTH QUESTIONNAIRE - PHQ9
SUM OF ALL RESPONSES TO PHQ QUESTIONS 1-9: 1
SUM OF ALL RESPONSES TO PHQ QUESTIONS 1-9: 1
10. IF YOU CHECKED OFF ANY PROBLEMS, HOW DIFFICULT HAVE THESE PROBLEMS MADE IT FOR YOU TO DO YOUR WORK, TAKE CARE OF THINGS AT HOME, OR GET ALONG WITH OTHER PEOPLE: NOT DIFFICULT AT ALL

## 2023-11-20 ASSESSMENT — ACTIVITIES OF DAILY LIVING (ADL): CURRENT_FUNCTION: NO ASSISTANCE NEEDED

## 2023-11-20 NOTE — PROGRESS NOTES
"SUBJECTIVE:   Nori is a 89 year old, presenting for the following:  Physical, Lipids, Diabetes, Hypertension, and Thyroid Problem        11/20/2023     9:07 AM   Additional Questions   Roomed by Briana CESPEDES   Accompanied by self       Are you in the first 12 months of your Medicare coverage?  No    Healthy Habits:     In general, how would you rate your overall health?  Good    Frequency of exercise:  None    Do you usually eat at least 4 servings of fruit and vegetables a day, include whole grains    & fiber and avoid regularly eating high fat or \"junk\" foods?  Yes    Taking medications regularly:  Yes    Medication side effects:  None    Ability to successfully perform activities of daily living:  No assistance needed    Home Safety:  No safety concerns identified    Hearing Impairment:  Need to ask people to speak up or repeat themselves    In the past 6 months, have you been bothered by leaking of urine?  No    In general, how would you rate your overall mental or emotional health?  Good    Additional concerns today:  Yes    Knee injections      Have you ever done Advance Care Planning? (For example, a Health Directive, POLST, or a discussion with a medical provider or your loved ones about your wishes): Yes, advance care planning is on file.       Fall risk  Fallen 2 or more times in the past year?: No  Any fall with injury in the past year?: No    Cognitive Screening   1) Repeat 3 items (Leader, Season, Table)    2) Clock draw: ABNORMAL Small clock, time wrong wrong numbers written in.  3) 3 item recall: Recalls NO objects   Results: 0 items recalled: PROBABLE COGNITIVE IMPAIRMENT, **INFORM PROVIDER**    Mini-CogTM Copyright ANGIE Ellington. Licensed by the author for use in [facility]; reprinted with permission (alma@.[email or web address]). All rights reserved.      Do you have sleep apnea, excessive snoring or daytime drowsiness? : no    Reviewed and updated as needed this visit by clinical staff   Tobacco  Allergies "  Meds              Reviewed and updated as needed this visit by Provider                 Social History     Tobacco Use    Smoking status: Never     Passive exposure: Never    Smokeless tobacco: Never    Tobacco comments:     second hand smoke in the house 40 yrs   Substance Use Topics    Alcohol use: No     Alcohol/week: 0.0 standard drinks of alcohol             11/20/2023     9:32 AM   Alcohol Use   Prescreen: >3 drinks/day or >7 drinks/week? Not Applicable   Do you have a current opioid prescription? No  Do you use any other controlled substances or medications that are not prescribed by a provider? None  Diabetes Follow-up    How often are you checking your blood sugar? Not at all  What concerns do you have today about your diabetes? None   Do you have any of these symptoms? (Select all that apply)  No numbness or tingling in feet.  No redness, sores or blisters on feet.  No complaints of excessive thirst.  No reports of blurry vision.  No significant changes to weight.          Hyperlipidemia Follow-Up    Are you regularly taking any medication or supplement to lower your cholesterol?   Yes- Atorvastatin  Are you having muscle aches or other side effects that you think could be caused by your cholesterol lowering medication?  No    Hypertension Follow-up    Do you check your blood pressure regularly outside of the clinic? No  Are you following a low salt diet? Yes  Are your blood pressures ever more than 140 on the top number (systolic) OR more   than 90 on the bottom number (diastolic), for example 140/90? No    BP Readings from Last 2 Encounters:   11/20/23 120/57   09/07/23 117/60     Hemoglobin A1C (%)   Date Value   11/20/2023 6.6 (H)   07/21/2023 6.8 (H)   05/03/2021 6.3 (H)   02/01/2021 6.1 (H)     LDL Cholesterol Calculated (mg/dL)   Date Value   04/21/2023 49   11/18/2022 71   10/30/2020 47   01/08/2020 73         Heart Failure Follow-up   Are you experiencing any shortness of breath? No  Are you  experiencing any swelling in your legs or feet?  No  Are you using more pillows than usual? Yes  Do you cough at night?  No  Do you check your weight daily?  No  Have you had a weight change recently?  Weight decrease  Are you having any of the following side effects from your medications? (Select all that apply)  The patient does not report symptoms of dizziness, fatigue, cough, swelling, or slow heart beat.  Since your last visit, how many times have you gone to the cardiologist, urgent care, emergency room, or hospital because of your heart failure?   None  Last Echo: Echo result w/o MOPS: Interpretation SummaryNo pericardial effusion is present.Global and regional left ventricular function is normal with an EF of 55-60%.Grade I or early diastolic dysfunction.The right ventricle is normal size.Global right ventricular function is normal.Both atria appear normal.Mild mitral insufficiency is present.The aortic valve is tricuspid.Mild aortic insufficiency is present.Trace tricuspid insufficiency is present.Mild pulmonic insufficiency is present.      Hypothyroidism Follow-up    Since last visit, patient describes the following symptoms: constipation and hair loss    Current providers sharing in care for this patient include:   Patient Care Team:  Bisi Stuart MD as PCP - General (Family Practice)  Bisi Stuart MD as Assigned PCP  Nancy Miranda NP as Assigned Surgical Provider  Deidre Mishra DPM as Assigned Musculoskeletal Provider    The following health maintenance items are reviewed in Epic and correct as of today:  Health Maintenance   Topic Date Due    ZOSTER IMMUNIZATION (1 of 2) Never done    RSV VACCINE (Pregnancy & 60+) (1 - 1-dose 60+ series) Never done    MEDICARE ANNUAL WELLNESS VISIT  Never done    LIPID  04/21/2024    TSH W/FREE T4 REFLEX  04/21/2024    MICROALBUMIN  04/25/2024    EYE EXAM  05/01/2024    A1C  05/20/2024    BMP  05/20/2024    DIABETIC FOOT EXAM  09/07/2024     ALT  11/20/2024    FALL RISK ASSESSMENT  11/20/2024    CBC  11/20/2024    HF ACTION PLAN  05/04/2025    DTAP/TDAP/TD IMMUNIZATION (3 - Td or Tdap) 11/08/2028    ADVANCE CARE PLANNING  11/20/2028    PHQ-2 (once per calendar year)  Completed    INFLUENZA VACCINE  Completed    Pneumococcal Vaccine: 65+ Years  Completed    COVID-19 Vaccine  Completed    IPV IMMUNIZATION  Aged Out    HPV IMMUNIZATION  Aged Out    MENINGITIS IMMUNIZATION  Aged Out    RSV MONOCLONAL ANTIBODY  Aged Out     Lab work is in process  Labs reviewed in EPIC  BP Readings from Last 3 Encounters:   11/20/23 120/57   09/07/23 117/60   07/21/23 112/64    Wt Readings from Last 3 Encounters:   11/20/23 58.1 kg (128 lb)   07/21/23 59.3 kg (130 lb 12.8 oz)   05/24/23 60.8 kg (134 lb)                  Patient Active Problem List   Diagnosis    Other specified hypothyroidism    Chronic systolic congestive heart failure (H)    Benign essential hypertension    Hyperlipidemia LDL goal <70    Bilateral carotid artery stenosis    Coronary artery disease involving coronary bypass graft of native heart without angina pectoris    Encounter for diabetic foot exam (H)    Trochanteric bursitis of right hip    Gastroesophageal reflux disease, esophagitis presence not specified    Primary osteoarthritis of right hip    Bilateral primary osteoarthritis of knee     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)    Chronic bilateral low back pain without sciatica    DDD (degenerative disc disease), lumbar    Allergic arthritis of right hip    Keratoacanthoma of cheek    Atypical squamoproliferative skin lesion    Generalized osteoarthrosis, unspecified site    Primary localized osteoarthrosis of lower leg, unspecified laterality    Type 2 diabetes mellitus with hyperglycemia, without long-term current use of insulin (H)    Atypical chest pain    Pulmonary nodules    Primary localized osteoarthrosis of left lower leg    Cervicalgia    Derangement of  right sacroiliac joint     Past Surgical History:   Procedure Laterality Date    AS CABG, ARTERY-VEIN, FIVE  11/02/2011    off pump       Social History     Tobacco Use    Smoking status: Never     Passive exposure: Never    Smokeless tobacco: Never    Tobacco comments:     second hand smoke in the house 40 yrs   Substance Use Topics    Alcohol use: No     Alcohol/week: 0.0 standard drinks of alcohol     Family History   Problem Relation Age of Onset    Cerebrovascular Disease Father     Coronary Artery Disease Father     Dementia Mother     Coronary Artery Disease Sister     Lung Cancer Sister     Thyroid Disease Daughter          Current Outpatient Medications   Medication Sig Dispense Refill    acetaminophen (TYLENOL) 500 MG tablet Take 2 Tablets by mouth two times a day as needed for mild pain 120 tablet 6    ASPIRIN PO Take 325 mg by mouth daily      atorvastatin (LIPITOR) 10 MG tablet Take 1 Tablet by mouth one time a day. 90 tablet 3    blood glucose (NO BRAND SPECIFIED) lancets standard Use to test blood sugar 1 times daily 100 each 3    blood glucose (NO BRAND SPECIFIED) test strip Use to test blood sugar 1 times daily 100 each 3    blood glucose monitoring (NO BRAND SPECIFIED) meter device kit Use to test blood sugar 1 times daily 1 kit 0    diclofenac (VOLTAREN) 1 % topical gel Apply 4 g topically 4 times daily To the knees 150 g 3    famotidine (PEPCID) 40 MG tablet Take 1 tablet (40 mg) by mouth daily 90 tablet 3    glipiZIDE (GLUCOTROL XL) 2.5 MG 24 hr tablet Take 1 tablet by mouth daily      levothyroxine (SYNTHROID/LEVOTHROID) 137 MCG tablet Take 1 Tablet by mouth daily Monday-Friday. 60 tablet 3    levothyroxine (SYNTHROID/LEVOTHROID) 150 MCG tablet Take 1 Tablet by mouth once daily on Saturday and Sunday 30 tablet 3    lisinopril (ZESTRIL) 2.5 MG tablet Take 1 Tab by mouth one time a day. 90 tablet 1    metFORMIN (GLUCOPHAGE XR) 500 MG 24 hr tablet Take 2 tablets (1,000 mg) by mouth At Bedtime 180  "tablet 3    metoprolol succinate ER (TOPROL XL) 50 MG 24 hr tablet Take 1 Tablet by mouth one time a day. 90 tablet 3    nitroGLYcerin (NITROSTAT) 0.4 MG sublingual tablet For chest pain place 1 tablet under the tongue every 5 minutes for 3 doses. If symptoms persist 5 minutes after 1st dose call 911. 25 tablet 0    terbinafine (LAMISIL) 1 % external cream Apply topically 2 times daily 42 g 1    tolnaftate (TINACTIN) 1 % external powder Apply topically 2 times daily 90 g 1    vitamin D3 (CHOLECALCIFEROL) 50 mcg (2000 units) tablet Take by mouth daily       No Known Allergies    Review of Systems   Constitutional:  Negative for chills and fever.   HENT:  Positive for hearing loss. Negative for congestion, ear pain and sore throat.    Eyes:  Negative for pain and visual disturbance.   Respiratory:  Negative for cough and shortness of breath.    Cardiovascular:  Negative for chest pain, palpitations and peripheral edema.   Gastrointestinal:  Positive for diarrhea. Negative for abdominal pain, constipation, hematochezia and nausea.   Genitourinary:  Positive for frequency, impotence and urgency. Negative for dysuria, genital sores, hematuria and penile discharge.   Musculoskeletal:  Positive for arthralgias, joint swelling and myalgias.   Skin:  Negative for rash.   Neurological:  Positive for weakness and paresthesias. Negative for dizziness and headaches.   Psychiatric/Behavioral:  Negative for mood changes. The patient is not nervous/anxious.      OBJECTIVE:   /57 (BP Location: Right arm, Patient Position: Sitting, Cuff Size: Adult Regular)   Pulse 58   Temp 97.5  F (36.4  C) (Tympanic)   Ht 1.702 m (5' 7\")   Wt 58.1 kg (128 lb)   SpO2 98%   BMI 20.05 kg/m   Estimated body mass index is 20.05 kg/m  as calculated from the following:    Height as of this encounter: 1.702 m (5' 7\").    Weight as of this encounter: 58.1 kg (128 lb).  Physical Exam  GENERAL: healthy, alert and no distress  EYES: Eyes grossly " normal to inspection, PERRL and conjunctivae and sclerae normal  HENT: ear canals and TM's normal, nose and mouth without ulcers or lesions  NECK: no adenopathy, no asymmetry, masses, or scars and thyroid normal to palpation  RESP: lungs clear to auscultation - no rales, rhonchi or wheezes  CV: regular rate and rhythm, normal S1 S2, no S3 or S4, no murmur, click or rub, no peripheral edema and peripheral pulses strong  ABDOMEN: soft, nontender, no hepatosplenomegaly, no masses and bowel sounds normal  MS: no gross musculoskeletal defects noted, no edema  MS: decreased range of motion bilateral knees, cervical ROM decrease as well as muscle spasm in neck and right sacrum  SKIN: no suspicious lesions or rashes  NEURO: Normal strength and tone, mentation intact and speech normal  PSYCH: mentation appears normal, affect normal/bright    Diagnostic Test Results:  Labs reviewed in Epic  Results for orders placed or performed in visit on 11/20/23   Hemoglobin A1c     Status: Abnormal   Result Value Ref Range    Estimated Average Glucose 143 mg/dL    Hemoglobin A1C 6.6 (H) <5.7 %   Comprehensive metabolic panel     Status: Abnormal   Result Value Ref Range    Sodium 139 135 - 145 mmol/L    Potassium 4.3 3.4 - 5.3 mmol/L    Carbon Dioxide (CO2) 26 22 - 29 mmol/L    Anion Gap 9 7 - 15 mmol/L    Urea Nitrogen 16.5 8.0 - 23.0 mg/dL    Creatinine 0.89 0.67 - 1.17 mg/dL    GFR Estimate 82 >60 mL/min/1.73m2    Calcium 9.2 8.8 - 10.2 mg/dL    Chloride 104 98 - 107 mmol/L    Glucose 155 (H) 70 - 99 mg/dL    Alkaline Phosphatase 100 40 - 150 U/L    AST 17 0 - 45 U/L    ALT 12 0 - 70 U/L    Protein Total 6.3 (L) 6.4 - 8.3 g/dL    Albumin 4.0 3.5 - 5.2 g/dL    Bilirubin Total 0.6 <=1.2 mg/dL   CBC with platelets     Status: Normal   Result Value Ref Range    WBC Count 6.4 4.0 - 11.0 10e3/uL    RBC Count 4.81 4.40 - 5.90 10e6/uL    Hemoglobin 13.7 13.3 - 17.7 g/dL    Hematocrit 41.4 40.0 - 53.0 %    MCV 86 78 - 100 fL    MCH 28.5 26.5 -  33.0 pg    MCHC 33.1 31.5 - 36.5 g/dL    RDW 12.7 10.0 - 15.0 %    Platelet Count 164 150 - 450 10e3/uL       ASSESSMENT / PLAN:   (Z00.00) Encounter for Medicare annual wellness exam  (primary encounter diagnosis)  Comment:   Plan: follow-up 1 yr    (E11.42) 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)  Comment:   Plan: improved numbers    (E11.65) Type 2 diabetes mellitus with hyperglycemia, without long-term current use of insulin (H)  Comment:   Plan: Hemoglobin A1c        Follow-up 3-4 mos    (E03.8) Other specified hypothyroidism  Comment:   Plan: stable    (E78.5) Hyperlipidemia LDL goal <70  Comment:   Plan: Comprehensive metabolic panel        stable    (K21.9) Gastroesophageal reflux disease, unspecified whether esophagitis present  Comment:   Plan: doing well    (I10) Benign essential hypertension  Comment:   Plan: stable    (I50.22) Chronic systolic congestive heart failure (H)  Comment:   Plan: CBC with platelets        Doing ok    (M17.12) Primary localized osteoarthrosis of left lower leg  Comment:   Plan: lidocaine 1 % injection 6 mL, triamcinolone         (KENALOG-40) injection 60 mg, triamcinolone         (KENALOG-40) injection 60 mg, lidocaine 1 %         injection 6 mL        Injections today, see note    (M54.2) Cervicalgia  Comment:   Plan: Chiropractic Referral          (M24.9) Derangement of right sacroiliac joint  Comment:   Plan: Chiropractic Referral          COUNSELING:  Reviewed preventive health counseling, as reflected in patient instructions  Special attention given to:       Regular exercise       Healthy diet/nutrition       Vision screening       Hearing screening       Dental care        He reports that he has never smoked. He has never been exposed to tobacco smoke. He has never used smokeless tobacco.      Appropriate preventive services were discussed with this patient, including applicable screening as appropriate for fall prevention, nutrition,  physical activity, Tobacco-use cessation, weight loss and cognition.  Checklist reviewing preventive services available has been given to the patient.    Reviewed patients plan of care and provided an AVS. The Intermediate Care Plan ( asthma action plan, low back pain action plan, and migraine action plan) for Nori meets the Care Plan requirement. This Care Plan has been established and reviewed with the Patient.          Bisi Stuart MD  [facility] [place]    Identified Health Risks:  I have reviewed Opioid Use Disorder and Substance Use Disorder risk factors and made any needed referrals. PROCEDURE:  JOINT ASPIRATION/INJECTION.         After a discussion of risks, benefits and side effects of procedure, informed patient consent was obtained.       The bilateral knees were prepped and draped in the usual clean fashion (sterile not required for this procedure).     INJECTION:  Using 6 cc of 1% lidocaine mixed                           with 60 mg of kenalog, the right knee was successfully injected                           without complication.  Patient did experience some pain                          relief following injection.      :  Using 6 cc of 1% lidocaine mixed                           with 60 mg of kenalog, the left knee was successfully injected                           without complication.  Patient did experience some pain                          relief following injection.

## 2023-11-30 ENCOUNTER — OFFICE VISIT (OUTPATIENT)
Dept: CHIROPRACTIC MEDICINE | Facility: OTHER | Age: 88
End: 2023-11-30
Attending: CHIROPRACTOR
Payer: COMMERCIAL

## 2023-11-30 DIAGNOSIS — M54.41 ACUTE RIGHT-SIDED LOW BACK PAIN WITH RIGHT-SIDED SCIATICA: ICD-10-CM

## 2023-11-30 DIAGNOSIS — M99.03 SEGMENTAL AND SOMATIC DYSFUNCTION OF LUMBAR REGION: Primary | ICD-10-CM

## 2023-11-30 DIAGNOSIS — M99.02 SEGMENTAL AND SOMATIC DYSFUNCTION OF THORACIC REGION: ICD-10-CM

## 2023-11-30 DIAGNOSIS — M99.01 SEGMENTAL AND SOMATIC DYSFUNCTION OF CERVICAL REGION: ICD-10-CM

## 2023-11-30 PROCEDURE — 98941 CHIROPRACT MANJ 3-4 REGIONS: CPT | Mod: AT | Performed by: CHIROPRACTOR

## 2023-11-30 NOTE — PROGRESS NOTES
Subjective Finding:    Chief compalint: Patient presents with:  Back Pain: Right low back and left neck   , Pain Scale: 5/10, Intensity: sharp, Duration: 1 months, Radiating: right buttock.    Date of injury:     Activities that the pain restricts:   Home/household/hobbies/social activities: Yes.  Work duties: Yes.  Sleep: Yes.  Makes symptoms better: rest.  Makes symptoms worse: activity and lumbar flexion.  Have you seen anyone else for the symptoms? No.  Work related: No.  Automobile related injury: No.    Objective and Assessment:    Posture Analysis:   High shoulder: .  Head tilt: .  High iliac crest: right.  Head carriage: neutral.  Thoracic Kyphosis: neutral.  Lumbar Lordosis: forward.    Lumbar Range of Motion: extension decreased and right lateral flexion decreased.  Cervical Range of Motion: .  Thoracic Range of Motion: .  Extremity Range of Motion: .    Palpation:   Quad lumb: right, referred pain: yes    Segmental dysfunction pre-treatment and treatment area: C5, C6, T4, L4, and L5.    Assessment post-treatment:  Cervical: ROM increased.  Thoracic: ROM increased.  Lumbar: ROM increased.    Comments: .      Complicating Factors: .    Procedure(s):  CMT:    Cervical: Diversified, See above for level, Supine, Thoracic: Diversified, See above for level, Prone, and Lumbar: Diversified, See above for level, Side posture    Modalities:  None performed this visit    Therapeutic procedures:  None    Plan:  Treatment plan:  2 times per week for 2 weeks.  Instructed patient: stretch as instructed at visit.  Short term goals: reduce pain.  Long term goals: restore normal function.  Prognosis: very good.

## 2023-12-04 ENCOUNTER — OFFICE VISIT (OUTPATIENT)
Dept: CHIROPRACTIC MEDICINE | Facility: OTHER | Age: 88
End: 2023-12-04
Attending: CHIROPRACTOR
Payer: COMMERCIAL

## 2023-12-04 DIAGNOSIS — M99.02 SEGMENTAL AND SOMATIC DYSFUNCTION OF THORACIC REGION: ICD-10-CM

## 2023-12-04 DIAGNOSIS — M99.01 SEGMENTAL AND SOMATIC DYSFUNCTION OF CERVICAL REGION: ICD-10-CM

## 2023-12-04 DIAGNOSIS — M54.50 ACUTE BILATERAL LOW BACK PAIN WITHOUT SCIATICA: ICD-10-CM

## 2023-12-04 DIAGNOSIS — M99.03 SEGMENTAL AND SOMATIC DYSFUNCTION OF LUMBAR REGION: Primary | ICD-10-CM

## 2023-12-04 PROCEDURE — 98941 CHIROPRACT MANJ 3-4 REGIONS: CPT | Mod: AT | Performed by: CHIROPRACTOR

## 2023-12-04 NOTE — PROGRESS NOTES
Subjective Finding:    Chief compalint: Patient presents with:  Back Pain: Low back and neck are doing better with last tx  , Pain Scale: 4/10, Intensity: dull ache, Duration: 1 months, Radiating: right buttock.    Date of injury:     Activities that the pain restricts:   Home/household/hobbies/social activities: Yes.  Work duties: Yes.  Sleep: Yes.  Makes symptoms better: rest.  Makes symptoms worse: activity and lumbar flexion.  Have you seen anyone else for the symptoms? No.  Work related: No.  Automobile related injury: No.    Objective and Assessment:    Posture Analysis:   High shoulder: .  Head tilt: .  High iliac crest: right.  Head carriage: neutral.  Thoracic Kyphosis: neutral.  Lumbar Lordosis: forward.    Lumbar Range of Motion: right rotation dc.  Cervical Range of Motion: .  Thoracic Range of Motion: .  Extremity Range of Motion: .    Palpation:   Quad lumb: right, referred pain: yes    Segmental dysfunction pre-treatment and treatment area: C6  T8  L45.    Assessment post-treatment:  Cervical: ROM increased.  Thoracic: ROM increased.  Lumbar: ROM increased.    Comments: .      Complicating Factors: .    Procedure(s):  CMT:    Cervical: Diversified, See above for level, Supine, Thoracic: Diversified, See above for level, Prone, and Lumbar: Diversified, See above for level, Side posture    Modalities:  None performed this visit    Therapeutic procedures:  None    Plan:  Treatment plan:  2 times per week for 2 weeks.  Instructed patient: stretch as instructed at visit.  Short term goals: reduce pain.  Long term goals: restore normal function.  Prognosis: very good.

## 2023-12-07 ENCOUNTER — OFFICE VISIT (OUTPATIENT)
Dept: CHIROPRACTIC MEDICINE | Facility: OTHER | Age: 88
End: 2023-12-07
Attending: STUDENT IN AN ORGANIZED HEALTH CARE EDUCATION/TRAINING PROGRAM
Payer: COMMERCIAL

## 2023-12-07 DIAGNOSIS — M99.03 SEGMENTAL AND SOMATIC DYSFUNCTION OF LUMBAR REGION: Primary | ICD-10-CM

## 2023-12-07 DIAGNOSIS — M54.50 ACUTE RIGHT-SIDED LOW BACK PAIN WITHOUT SCIATICA: ICD-10-CM

## 2023-12-07 DIAGNOSIS — M99.02 SEGMENTAL AND SOMATIC DYSFUNCTION OF THORACIC REGION: ICD-10-CM

## 2023-12-07 PROCEDURE — 98940 CHIROPRACT MANJ 1-2 REGIONS: CPT | Mod: AT | Performed by: CHIROPRACTOR

## 2023-12-11 NOTE — PROGRESS NOTES
Subjective Finding:    Chief compalint: Patient presents with:  Back Pain: Right lower back is getting better.  Less pain in lower back  , Pain Scale: 3/10, Intensity: dull ache, Duration: 1 months, Radiating: no.    Date of injury:     Activities that the pain restricts:   Home/household/hobbies/social activities: Yes.  Work duties: Yes.  Sleep: Yes.  Makes symptoms better: rest.  Makes symptoms worse: activity and lumbar flexion.  Have you seen anyone else for the symptoms? No.  Work related: No.  Automobile related injury: No.    Objective and Assessment:    Posture Analysis:   High shoulder: .  Head tilt: .  High iliac crest: right.  Head carriage: neutral.  Thoracic Kyphosis: neutral.  Lumbar Lordosis: forward.    Lumbar Range of Motion: right rotation dc.  Cervical Range of Motion: .  Thoracic Range of Motion: .  Extremity Range of Motion: .    Palpation:   Quad lumb: right, referred pain: yes    Segmental dysfunction pre-treatment and treatment area: T56  L45.    Assessment post-treatment:  Cervical: ROM increased.  Thoracic: ROM increased.  Lumbar: ROM increased.    Comments: .      Complicating Factors: .    Procedure(s):  CMT:  31927      T spine   L spine    Modalities:  None performed this visit    Therapeutic procedures:  None    Plan:  Treatment plan:  2 times per week for 2 weeks.  Instructed patient: stretch as instructed at visit.  Short term goals: reduce pain.  Long term goals: restore normal function.  Prognosis: very good.

## 2023-12-28 DIAGNOSIS — E03.8 OTHER SPECIFIED HYPOTHYROIDISM: ICD-10-CM

## 2023-12-28 RX ORDER — LEVOTHYROXINE SODIUM 137 UG/1
TABLET ORAL
Qty: 60 TABLET | Refills: 3 | Status: SHIPPED | OUTPATIENT
Start: 2023-12-28

## 2023-12-28 NOTE — TELEPHONE ENCOUNTER
Levothyroxine      Last Written Prescription Date:  7/21/23  Last Fill Quantity: 60,   # refills: 3  Last Office Visit: 7/21/23  Future Office visit:    Next 5 appointments (look out 90 days)      Jan 25, 2024 10:45 AM  (Arrive by 10:30 AM)  Return Visit with Deidre Mishra DPM  [facility] ([facility] ) [address] [place] [id number] [phone]     Feb 21, 2024 10:30 AM  (Arrive by 10:15 AM)  SHORT with Bisi Stuart MD  [facility] ([facility] ) [address] [place] [id number]  [phone]

## 2023-12-28 NOTE — TELEPHONE ENCOUNTER
Disp Refills Start End KATH   levothyroxine (SYNTHROID/LEVOTHROID) 137 MCG tablet 60 tablet 3 7/21/2023 -- No     Last Office Visit: 11/20/2023  Future Office visit:    Next 5 appointments (look out 90 days)      Jan 25, 2024 10:45 AM  (Arrive by 10:30 AM)  Return Visit with Deidre Mishra DPM  [facility] ([facility] ) [address] [place] [id number] [phone]     Feb 21, 2024 10:30 AM  (Arrive by 10:15 AM)  SHORT with Bisi Stuart MD  [facility] ([facility] ) [address] [place] [id number]  [phone]             Routing refill request to provider for review/approval because:

## 2023-12-30 DIAGNOSIS — E78.5 HYPERLIPIDEMIA LDL GOAL <70: ICD-10-CM

## 2023-12-30 DIAGNOSIS — I25.810 CORONARY ARTERY DISEASE INVOLVING CORONARY BYPASS GRAFT OF NATIVE HEART WITHOUT ANGINA PECTORIS: ICD-10-CM

## 2024-01-02 RX ORDER — ATORVASTATIN CALCIUM 10 MG/1
TABLET, FILM COATED ORAL
Qty: 90 TABLET | Refills: 0 | Status: SHIPPED | OUTPATIENT
Start: 2024-01-02 | End: 2024-03-29

## 2024-01-02 RX ORDER — METOPROLOL SUCCINATE 50 MG/1
TABLET, EXTENDED RELEASE ORAL
Qty: 90 TABLET | Refills: 2 | Status: SHIPPED | OUTPATIENT
Start: 2024-01-02

## 2024-02-21 ENCOUNTER — LAB (OUTPATIENT)
Dept: LAB | Facility: OTHER | Age: 89
End: 2024-02-21
Attending: FAMILY MEDICINE
Payer: COMMERCIAL

## 2024-02-21 ENCOUNTER — OFFICE VISIT (OUTPATIENT)
Dept: FAMILY MEDICINE | Facility: OTHER | Age: 89
End: 2024-02-21
Attending: FAMILY MEDICINE
Payer: COMMERCIAL

## 2024-02-21 VITALS
TEMPERATURE: 97.9 F | OXYGEN SATURATION: 97 % | WEIGHT: 129.19 LBS | HEART RATE: 62 BPM | BODY MASS INDEX: 20.23 KG/M2 | DIASTOLIC BLOOD PRESSURE: 66 MMHG | SYSTOLIC BLOOD PRESSURE: 114 MMHG

## 2024-02-21 DIAGNOSIS — M54.50 CHRONIC BILATERAL LOW BACK PAIN WITHOUT SCIATICA: ICD-10-CM

## 2024-02-21 DIAGNOSIS — M17.0 BILATERAL PRIMARY OSTEOARTHRITIS OF KNEE: ICD-10-CM

## 2024-02-21 DIAGNOSIS — E11.65 TYPE 2 DIABETES MELLITUS WITH HYPERGLYCEMIA, WITHOUT LONG-TERM CURRENT USE OF INSULIN (H): Primary | ICD-10-CM

## 2024-02-21 DIAGNOSIS — E11.42 DIABETIC POLYNEUROPATHY ASSOCIATED WITH TYPE 2 DIABETES MELLITUS (H): ICD-10-CM

## 2024-02-21 DIAGNOSIS — E03.8 OTHER SPECIFIED HYPOTHYROIDISM: ICD-10-CM

## 2024-02-21 DIAGNOSIS — I50.22 CHRONIC SYSTOLIC CONGESTIVE HEART FAILURE (H): ICD-10-CM

## 2024-02-21 DIAGNOSIS — E78.5 HYPERLIPIDEMIA LDL GOAL <70: ICD-10-CM

## 2024-02-21 DIAGNOSIS — M17.10 PRIMARY LOCALIZED OSTEOARTHROSIS OF LOWER LEG, UNSPECIFIED LATERALITY: ICD-10-CM

## 2024-02-21 DIAGNOSIS — E11.65 TYPE 2 DIABETES MELLITUS WITH HYPERGLYCEMIA, WITHOUT LONG-TERM CURRENT USE OF INSULIN (H): ICD-10-CM

## 2024-02-21 DIAGNOSIS — G89.29 CHRONIC BILATERAL LOW BACK PAIN WITHOUT SCIATICA: ICD-10-CM

## 2024-02-21 LAB
ALBUMIN SERPL BCG-MCNC: 4.1 G/DL (ref 3.5–5.2)
ALP SERPL-CCNC: 86 U/L (ref 40–150)
ALT SERPL W P-5'-P-CCNC: 15 U/L (ref 0–70)
ANION GAP SERPL CALCULATED.3IONS-SCNC: 11 MMOL/L (ref 7–15)
AST SERPL W P-5'-P-CCNC: 24 U/L (ref 0–45)
BILIRUB SERPL-MCNC: 0.6 MG/DL
BUN SERPL-MCNC: 22.4 MG/DL (ref 8–23)
CALCIUM SERPL-MCNC: 9.1 MG/DL (ref 8.2–9.6)
CHLORIDE SERPL-SCNC: 105 MMOL/L (ref 98–107)
CHOLEST SERPL-MCNC: 131 MG/DL
CREAT SERPL-MCNC: 0.88 MG/DL (ref 0.67–1.17)
DEPRECATED HCO3 PLAS-SCNC: 25 MMOL/L (ref 22–29)
EGFRCR SERPLBLD CKD-EPI 2021: 82 ML/MIN/1.73M2
EST. AVERAGE GLUCOSE BLD GHB EST-MCNC: 157 MG/DL
FASTING STATUS PATIENT QL REPORTED: NO
GLUCOSE SERPL-MCNC: 160 MG/DL (ref 70–99)
HBA1C MFR BLD: 7.1 %
HDLC SERPL-MCNC: 43 MG/DL
LDLC SERPL CALC-MCNC: 73 MG/DL
NONHDLC SERPL-MCNC: 88 MG/DL
POTASSIUM SERPL-SCNC: 4.2 MMOL/L (ref 3.4–5.3)
PROT SERPL-MCNC: 6.4 G/DL (ref 6.4–8.3)
SODIUM SERPL-SCNC: 141 MMOL/L (ref 135–145)
TRIGL SERPL-MCNC: 77 MG/DL
TSH SERPL DL<=0.005 MIU/L-ACNC: 1.83 UIU/ML (ref 0.3–4.2)

## 2024-02-21 PROCEDURE — 99214 OFFICE O/P EST MOD 30 MIN: CPT | Mod: 25 | Performed by: FAMILY MEDICINE

## 2024-02-21 PROCEDURE — 84443 ASSAY THYROID STIM HORMONE: CPT | Mod: ZL

## 2024-02-21 PROCEDURE — 36415 COLL VENOUS BLD VENIPUNCTURE: CPT | Mod: ZL

## 2024-02-21 PROCEDURE — 82040 ASSAY OF SERUM ALBUMIN: CPT | Mod: ZL

## 2024-02-21 PROCEDURE — 20610 DRAIN/INJ JOINT/BURSA W/O US: CPT | Performed by: FAMILY MEDICINE

## 2024-02-21 PROCEDURE — 83036 HEMOGLOBIN GLYCOSYLATED A1C: CPT | Mod: ZL

## 2024-02-21 PROCEDURE — 250N000011 HC RX IP 250 OP 636: Performed by: FAMILY MEDICINE

## 2024-02-21 PROCEDURE — G0463 HOSPITAL OUTPT CLINIC VISIT: HCPCS | Mod: 25

## 2024-02-21 PROCEDURE — 80061 LIPID PANEL: CPT | Mod: ZL

## 2024-02-21 RX ORDER — TRIAMCINOLONE ACETONIDE 40 MG/ML
80 INJECTION, SUSPENSION INTRA-ARTICULAR; INTRAMUSCULAR ONCE
Status: CANCELLED | OUTPATIENT
Start: 2024-02-21 | End: 2024-02-21

## 2024-02-21 RX ORDER — LIDOCAINE HYDROCHLORIDE 10 MG/ML
12 INJECTION, SOLUTION INFILTRATION; PERINEURAL ONCE
Status: CANCELLED | OUTPATIENT
Start: 2024-02-21 | End: 2024-02-21

## 2024-02-21 RX ORDER — TRIAMCINOLONE ACETONIDE 40 MG/ML
80 INJECTION, SUSPENSION INTRA-ARTICULAR; INTRAMUSCULAR ONCE
Status: COMPLETED | OUTPATIENT
Start: 2024-02-21 | End: 2024-02-21

## 2024-02-21 RX ORDER — LIDOCAINE HYDROCHLORIDE 10 MG/ML
10 INJECTION, SOLUTION INFILTRATION; PERINEURAL ONCE
Status: COMPLETED | OUTPATIENT
Start: 2024-02-21 | End: 2024-02-21

## 2024-02-21 RX ADMIN — Medication 96 MG: at 11:40

## 2024-02-21 RX ADMIN — TRIAMCINOLONE ACETONIDE 80 MG: 40 INJECTION, SUSPENSION INTRA-ARTICULAR; INTRAMUSCULAR at 11:29

## 2024-02-21 RX ADMIN — LIDOCAINE HYDROCHLORIDE 10 ML: 10 INJECTION, SOLUTION INFILTRATION; PERINEURAL at 11:29

## 2024-02-21 ASSESSMENT — PAIN SCALES - GENERAL: PAINLEVEL: MODERATE PAIN (5)

## 2024-02-21 NOTE — PROGRESS NOTES
PROCEDURE:  JOINT ASPIRATION/INJECTION.         After a discussion of risks, benefits and side effects of procedure, informed patient consent was obtained.       The bilateral knees were prepped and draped in the usual clean fashion (sterile not required for this procedure).     INJECTION:  Using 5 cc of 1% lidocaine mixed                           with 40 mg of kenalog, the right knee was successfully injected                           without complication and then syringe changed to synvisc one which was injected without complication.  Patient did experience some pain                          relief following injection.    Using 5 cc of 1% lidocaine mixed                           with 40 mg of kenalog, the left knee was successfully injected                           without complication and then syringe changed to synvisc one which was injected without complication.  Patient did experience some pain                          relief following injection.

## 2024-02-21 NOTE — PROGRESS NOTES
Assessment & Plan     Type 2 diabetes mellitus with hyperglycemia, without long-term current use of insulin (H)  Follow-up 3 mo  - Hemoglobin A1c; Future  - Lipid Profile (Chol, Trig, HDL, LDL calc); Future  - Comprehensive metabolic panel; Future    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)  stable    Chronic bilateral low back pain without sciatica  Doing ok, still doing a lot of dancing!    Other specified hypothyroidism  - TSH with free T4 reflex; Future    Hyperlipidemia LDL goal <70  - Lipid Profile (Chol, Trig, HDL, LDL calc); Future    Chronic systolic congestive heart failure (H)  stable    Bilateral primary osteoarthritis of knee   Did well with injections  - Large Joint/Bursa injection and/or drainage (Shoulder, Knee)  - hylan (SYNVISC ONE) injection 96 mg  - lidocaine 1 % injection 10 mL  - triamcinolone (KENALOG-40) injection 80 mg    Primary localized osteoarthrosis of lower leg, unspecified laterality    - Large Joint/Bursa injection and/or drainage (Shoulder, Knee)  - hylan (SYNVISC ONE) injection 96 mg  - lidocaine 1 % injection 10 mL  - triamcinolone (KENALOG-40) injection 80 mg    Patient was agreeable to this plan and had no further questions.  There are no Patient Instructions on file for this visit.    No follow-ups on file.    Abel Julio is a 89 year old, presenting for the following health issues:  Diabetes, Knee Pain, Thyroid Problem, and Lipids        2/21/2024    10:00 AM   Additional Questions   Roomed by Ashley Painter   Accompanied by None         2/21/2024    10:00 AM   Patient Reported Additional Medications   Patient reports taking the following new medications None     HPI     Diabetes Follow-up    How often are you checking your blood sugar? A few times a month  What time of day are you checking your blood sugars (select all that apply)?  After meals  Have you had any blood sugars above 200?  No  Have you had any blood sugars below 70?   No  What symptoms do you notice when your blood sugar is low?  None  What concerns do you have today about your diabetes? None   Do you have any of these symptoms? (Select all that apply)  No numbness or tingling in feet.  No redness, sores or blisters on feet.  No complaints of excessive thirst.  No reports of blurry vision.  No significant changes to weight.      BP Readings from Last 2 Encounters:   02/21/24 114/66   11/20/23 120/57     Hemoglobin A1C (%)   Date Value   11/20/2023 6.6 (H)   07/21/2023 6.8 (H)   05/03/2021 6.3 (H)   02/01/2021 6.1 (H)     LDL Cholesterol Calculated (mg/dL)   Date Value   04/21/2023 49   11/18/2022 71   10/30/2020 47   01/08/2020 73           Hyperlipidemia Follow-Up    Are you regularly taking any medication or supplement to lower your cholesterol?   Yes- Lipitor   Are you having muscle aches or other side effects that you think could be caused by your cholesterol lowering medication?  No    Hypothyroidism Follow-up    Since last visit, patient describes the following symptoms: Weight stable, no hair loss, no skin changes, no constipation, no loose stools    How many servings of fruits and vegetables do you eat daily?  2-3  On average, how many sweetened beverages do you drink each day (Examples: soda, juice, sweet tea, etc.  Do NOT count diet or artificially sweetened beverages)?   0  How many days per week do you exercise enough to make your heart beat faster? None  How many minutes a day do you exercise enough to make your heart beat faster? None   How many days per week do you miss taking your medication? 0      Concern - bilateral knee pain  Onset: ongoing   Description: bilateral knee pain   Progression of Symptoms:  worsening  Accompanying Signs & Symptoms: pain, stiffness   Previous history of similar problem: ongoing   Precipitating factors:        Worsened by: walking, activity   Alleviating factors:        Improved by: Biofreeze helps a little bit   Therapies tried and  outcome: Biofreeze helps a little bit       Review of Systems  Constitutional, neuro, ENT, endocrine, pulmonary, cardiac, gastrointestinal, genitourinary, musculoskeletal, integument and psychiatric systems are negative, except as otherwise noted.      Objective    /66 (BP Location: Right arm, Patient Position: Sitting, Cuff Size: Adult Regular)   Pulse 62   Temp 97.9  F (36.6  C) (Tympanic)   Wt 58.6 kg (129 lb 3 oz)   SpO2 97%   BMI 20.23 kg/m    Body mass index is 20.23 kg/m .  Physical Exam   GENERAL: alert and no distress  NECK: no adenopathy, no asymmetry, masses, or scars  RESP: lungs clear to auscultation - no rales, rhonchi or wheezes  CV: regular rate and rhythm, normal S1 S2, no S3 or S4, no murmur, click or rub, no peripheral edema  ABDOMEN: soft, nontender, no hepatosplenomegaly, no masses and bowel sounds normal  MS: decreased range of motion knees bilaterally  PSYCH: mentation appears normal, affect normal/bright    Results for orders placed or performed in visit on 02/21/24   Hemoglobin A1c     Status: Abnormal   Result Value Ref Range    Estimated Average Glucose 157 mg/dL    Hemoglobin A1C 7.1 (H) <5.7 %   Lipid Profile (Chol, Trig, HDL, LDL calc)     Status: None   Result Value Ref Range    Cholesterol 131 <200 mg/dL    Triglycerides 77 <150 mg/dL    Direct Measure HDL 43 >=40 mg/dL    LDL Cholesterol Calculated 73 <=100 mg/dL    Non HDL Cholesterol 88 <130 mg/dL    Patient Fasting > 8hrs? No     Narrative    Cholesterol  Desirable:  <200 mg/dL    Triglycerides  Normal:  Less than 150 mg/dL  Borderline High:  150-199 mg/dL  High:  200-499 mg/dL  Very High:  Greater than or equal to 500 mg/dL    Direct Measure HDL  Female:  Greater than or equal to 50 mg/dL   Male:  Greater than or equal to 40 mg/dL    LDL Cholesterol  Desirable:  <100mg/dL  Above Desirable:  100-129 mg/dL   Borderline High:  130-159 mg/dL   High:  160-189 mg/dL   Very High:  >= 190 mg/dL    Non HDL  Cholesterol  Desirable:  130 mg/dL  Above Desirable:  130-159 mg/dL  Borderline High:  160-189 mg/dL  High:  190-219 mg/dL  Very High:  Greater than or equal to 220 mg/dL   Comprehensive metabolic panel     Status: Abnormal   Result Value Ref Range    Sodium 141 135 - 145 mmol/L    Potassium 4.2 3.4 - 5.3 mmol/L    Carbon Dioxide (CO2) 25 22 - 29 mmol/L    Anion Gap 11 7 - 15 mmol/L    Urea Nitrogen 22.4 8.0 - 23.0 mg/dL    Creatinine 0.88 0.67 - 1.17 mg/dL    GFR Estimate 82 >60 mL/min/1.73m2    Calcium 9.1 8.2 - 9.6 mg/dL    Chloride 105 98 - 107 mmol/L    Glucose 160 (H) 70 - 99 mg/dL    Alkaline Phosphatase 86 40 - 150 U/L    AST 24 0 - 45 U/L    ALT 15 0 - 70 U/L    Protein Total 6.4 6.4 - 8.3 g/dL    Albumin 4.1 3.5 - 5.2 g/dL    Bilirubin Total 0.6 <=1.2 mg/dL   TSH with free T4 reflex     Status: Normal   Result Value Ref Range    TSH 1.83 0.30 - 4.20 uIU/mL           Signed Electronically by: Bisi Stuart MD

## 2024-03-28 DIAGNOSIS — E11.65 TYPE 2 DIABETES MELLITUS WITH HYPERGLYCEMIA, WITHOUT LONG-TERM CURRENT USE OF INSULIN (H): ICD-10-CM

## 2024-03-28 DIAGNOSIS — I10 BENIGN ESSENTIAL HYPERTENSION: ICD-10-CM

## 2024-03-28 DIAGNOSIS — E78.5 HYPERLIPIDEMIA LDL GOAL <70: ICD-10-CM

## 2024-03-28 DIAGNOSIS — M17.10 PRIMARY LOCALIZED OSTEOARTHROSIS OF LOWER LEG, UNSPECIFIED LATERALITY: ICD-10-CM

## 2024-03-28 DIAGNOSIS — M16.11 PRIMARY OSTEOARTHRITIS OF RIGHT HIP: ICD-10-CM

## 2024-03-28 NOTE — TELEPHONE ENCOUNTER
Tylenol      Last Written Prescription Date:  8/11/23  Last Fill Quantity: 120,   # refills: 6  Last Office Visit: 2/21/24  Future Office visit:    Next 5 appointments (look out 90 days)      May 23, 2024 10:30 AM  (Arrive by 10:15 AM)  SHORT with Bisi Stuart MD  [facility] ([facility] ) [address]  [place] [address]  [phone]             Routing refill request to provider for review/approval because:      Lisinopril      Last Written Prescription Date:  10/11/23  Last Fill Quantity: 90,   # refills: 1  Last Office Visit: 2/21/24  Future Office visit:    Next 5 appointments (look out 90 days)      May 23, 2024 10:30 AM  (Arrive by 10:15 AM)  SHORT with Bisi Stuart MD  [facility] ([facility] ) [address]  [place] [address]  [phone]             Routing refill request to provider for review/approval because:      Lipitor      Last Written Prescription Date:  1/2/24  Last Fill Quantity: 90,   # refills: 0  Last Office Visit: 2/21/24  Future Office visit:    Next 5 appointments (look out 90 days)      May 23, 2024 10:30 AM  (Arrive by 10:15 AM)  SHORT with Bisi Stuart MD  [facility] ([facility] ) [address]  [place] [address]  [phone]             Routing refill request to provider for review/approval because:

## 2024-03-29 RX ORDER — ATORVASTATIN CALCIUM 10 MG/1
TABLET, FILM COATED ORAL
Qty: 90 TABLET | Refills: 1 | Status: SHIPPED | OUTPATIENT
Start: 2024-03-29

## 2024-03-29 RX ORDER — PSEUDOEPHED/ACETAMINOPH/DIPHEN 30MG-500MG
TABLET ORAL
Qty: 120 TABLET | Refills: 6 | Status: SHIPPED | OUTPATIENT
Start: 2024-03-29

## 2024-03-29 RX ORDER — LISINOPRIL 2.5 MG/1
TABLET ORAL
Qty: 90 TABLET | Refills: 1 | Status: SHIPPED | OUTPATIENT
Start: 2024-03-29

## 2024-03-29 NOTE — TELEPHONE ENCOUNTER
Analgesics (Non-Narcotic Tylenol and ASA Only) [id number]/2024 06:08 AM   Protocol Details Medication matches indication

## 2024-04-20 DIAGNOSIS — E03.8 OTHER SPECIFIED HYPOTHYROIDISM: ICD-10-CM

## 2024-04-22 RX ORDER — LEVOTHYROXINE SODIUM 150 UG/1
TABLET ORAL
Qty: 30 TABLET | Refills: 11 | Status: SHIPPED | OUTPATIENT
Start: 2024-04-22

## 2024-04-22 NOTE — TELEPHONE ENCOUNTER
Synthroid      Last Written Prescription Date:  12/28/23  Last Fill Quantity: 60,   # refills: 3  Last Office Visit: 2/21/24  Future Office visit:    Next 5 appointments (look out 90 days)      May 23, 2024 10:30 AM  (Arrive by 10:15 AM)  SHORT with Bisi Stuart MD  [facility] [place] ([facility] [place] ) [address]  [place] [address]  [phone]             Routing refill request to provider for review/approval because:

## 2024-07-15 ENCOUNTER — APPOINTMENT (OUTPATIENT)
Dept: LAB | Facility: OTHER | Age: 89
End: 2024-07-15
Attending: FAMILY MEDICINE
Payer: COMMERCIAL

## 2024-07-15 ENCOUNTER — OFFICE VISIT (OUTPATIENT)
Dept: FAMILY MEDICINE | Facility: OTHER | Age: 89
End: 2024-07-15
Attending: FAMILY MEDICINE
Payer: COMMERCIAL

## 2024-07-15 VITALS
SYSTOLIC BLOOD PRESSURE: 138 MMHG | WEIGHT: 129.1 LBS | DIASTOLIC BLOOD PRESSURE: 86 MMHG | OXYGEN SATURATION: 97 % | RESPIRATION RATE: 16 BRPM | BODY MASS INDEX: 20.22 KG/M2 | HEART RATE: 59 BPM | TEMPERATURE: 97.1 F

## 2024-07-15 DIAGNOSIS — R41.3 MEMORY LOSS: ICD-10-CM

## 2024-07-15 DIAGNOSIS — M17.10 PRIMARY LOCALIZED OSTEOARTHROSIS OF LOWER LEG, UNSPECIFIED LATERALITY: ICD-10-CM

## 2024-07-15 DIAGNOSIS — E11.65 TYPE 2 DIABETES MELLITUS WITH HYPERGLYCEMIA, WITHOUT LONG-TERM CURRENT USE OF INSULIN (H): Primary | ICD-10-CM

## 2024-07-15 DIAGNOSIS — L60.8 TOENAIL DEFORMITY: ICD-10-CM

## 2024-07-15 DIAGNOSIS — E03.8 OTHER SPECIFIED HYPOTHYROIDISM: ICD-10-CM

## 2024-07-15 DIAGNOSIS — I51.89 DIASTOLIC DYSFUNCTION: ICD-10-CM

## 2024-07-15 DIAGNOSIS — I10 BENIGN ESSENTIAL HYPERTENSION: ICD-10-CM

## 2024-07-15 DIAGNOSIS — E78.5 HYPERLIPIDEMIA LDL GOAL <70: ICD-10-CM

## 2024-07-15 DIAGNOSIS — M17.0 BILATERAL PRIMARY OSTEOARTHRITIS OF KNEE: ICD-10-CM

## 2024-07-15 LAB
CREAT UR-MCNC: 82.9 MG/DL
EST. AVERAGE GLUCOSE BLD GHB EST-MCNC: 151 MG/DL
HBA1C MFR BLD: 6.9 %
MICROALBUMIN UR-MCNC: <12 MG/L
MICROALBUMIN/CREAT UR: NORMAL MG/G{CREAT}

## 2024-07-15 PROCEDURE — 83036 HEMOGLOBIN GLYCOSYLATED A1C: CPT | Mod: ZL | Performed by: FAMILY MEDICINE

## 2024-07-15 PROCEDURE — 20610 DRAIN/INJ JOINT/BURSA W/O US: CPT | Performed by: FAMILY MEDICINE

## 2024-07-15 PROCEDURE — 36415 COLL VENOUS BLD VENIPUNCTURE: CPT | Mod: ZL | Performed by: FAMILY MEDICINE

## 2024-07-15 PROCEDURE — G0463 HOSPITAL OUTPT CLINIC VISIT: HCPCS | Mod: 25

## 2024-07-15 PROCEDURE — 99214 OFFICE O/P EST MOD 30 MIN: CPT | Mod: 25 | Performed by: FAMILY MEDICINE

## 2024-07-15 PROCEDURE — 82043 UR ALBUMIN QUANTITATIVE: CPT | Mod: ZL | Performed by: FAMILY MEDICINE

## 2024-07-15 PROCEDURE — 250N000011 HC RX IP 250 OP 636: Performed by: FAMILY MEDICINE

## 2024-07-15 RX ORDER — TRIAMCINOLONE ACETONIDE 40 MG/ML
120 INJECTION, SUSPENSION INTRA-ARTICULAR; INTRAMUSCULAR ONCE
Status: COMPLETED | OUTPATIENT
Start: 2024-07-15 | End: 2024-07-15

## 2024-07-15 RX ORDER — LIDOCAINE HYDROCHLORIDE 10 MG/ML
12 INJECTION, SOLUTION INFILTRATION; PERINEURAL ONCE
Status: DISCONTINUED | OUTPATIENT
Start: 2024-07-15 | End: 2024-07-15

## 2024-07-15 RX ORDER — TRIAMCINOLONE ACETONIDE 40 MG/ML
80 INJECTION, SUSPENSION INTRA-ARTICULAR; INTRAMUSCULAR ONCE
Status: DISCONTINUED | OUTPATIENT
Start: 2024-07-15 | End: 2024-07-15

## 2024-07-15 RX ORDER — LIDOCAINE HYDROCHLORIDE 10 MG/ML
12 INJECTION, SOLUTION INFILTRATION; PERINEURAL ONCE
Status: COMPLETED | OUTPATIENT
Start: 2024-07-15 | End: 2024-07-15

## 2024-07-15 RX ADMIN — LIDOCAINE HYDROCHLORIDE 6 ML: 10 INJECTION, SOLUTION INFILTRATION; PERINEURAL at 16:03

## 2024-07-15 RX ADMIN — TRIAMCINOLONE ACETONIDE 60 MG: 40 INJECTION, SUSPENSION INTRA-ARTICULAR; INTRAMUSCULAR at 16:02

## 2024-07-15 RX ADMIN — TRIAMCINOLONE ACETONIDE 60 MG: 40 INJECTION, SUSPENSION INTRA-ARTICULAR; INTRAMUSCULAR at 16:00

## 2024-07-15 RX ADMIN — LIDOCAINE HYDROCHLORIDE 6 ML: 10 INJECTION, SOLUTION INFILTRATION; PERINEURAL at 16:05

## 2024-07-15 ASSESSMENT — PAIN SCALES - GENERAL: PAINLEVEL: MODERATE PAIN (5)

## 2024-07-15 NOTE — PROGRESS NOTES
The longitudinal plan of care for the diagnosis(es)/condition(s) as documented were addressed during this visit. Due to the added complexity in care, I will continue to support Orlen in the subsequent management and with ongoing continuity of care.    Assessment & Plan     Type 2 diabetes mellitus with hyperglycemia, without long-term current use of insulin (H)  Doing well, follow-up 3-4 mos  - Adult Eye  Referral; Future    Other specified hypothyroidism  Stable labs    Hyperlipidemia LDL goal <70  stable    Benign essential hypertension  Borderline but has pain with knees right now and lower extremity edema, obtain echo to see if changes    Bilateral primary osteoarthritis of knee   Last injection didn't help long  Discussed seeing ortho for options other than cortisone injections  - Large Joint/Bursa injection and/or drainage (Shoulder, Knee)  - Orthopedic  Referral; Future  - Large Joint/Bursa injection and/or drainage (Shoulder, Knee)  - lidocaine 1 % injection 12 mL  - triamcinolone (KENALOG-40) injection 120 mg    Primary localized osteoarthrosis of lower leg, unspecified laterality    - Large Joint/Bursa injection and/or drainage (Shoulder, Knee)  - Orthopedic  Referral; Future  - Large Joint/Bursa injection and/or drainage (Shoulder, Knee)  - lidocaine 1 % injection 12 mL  - triamcinolone (KENALOG-40) injection 120 mg    Diastolic dysfunction  Worsening function?  Has left lower extremity edema but   - Echocardiogram Complete; Future    Memory loss  Family concerned his memory may be worsening, will set him up for further eval  - Occupational Therapy  Referral; Future    Toenail deformity  Needs update with podiatry  - Orthopedic  Referral; Future        46 minutes spent by me on the date of the encounter doing chart review, history and exam, documentation and further activities per the note, over 40 minutes spent in acute and chronic conditions and remaining  time in HCM and exam.    Patient was agreeable to this plan and had no further questions.  There are no Patient Instructions on file for this visit.    No follow-ups on file.    Abel Julio is a 89 year old, presenting for the following health issues:  Diabetes, Knee Pain (Bilateral ), Lipids, Hypertension, and Thyroid Problem        7/15/2024     2:26 PM   Additional Questions   Roomed by Konrad Bueno   Accompanied by Daughter and grand daughter         7/15/2024     2:26 PM   Patient Reported Additional Medications   Patient reports taking the following new medications None       Diabetes Follow-up    How often are you checking your blood sugar? Not at all  What concerns do you have today about your diabetes? None   Do you have any of these symptoms? (Select all that apply)  No numbness or tingling in feet.  No redness, sores or blisters on feet.  No complaints of excessive thirst.  No reports of blurry vision.  No significant changes to weight.  Have you had a diabetic eye exam in the last 12 months? No            Hyperlipidemia Follow-Up    Are you regularly taking any medication or supplement to lower your cholesterol?   Yes- Lipitor   Are you having muscle aches or other side effects that you think could be caused by your cholesterol lowering medication?  Yes- Muscle cramps     Hypertension Follow-up    Do you check your blood pressure regularly outside of the clinic? No   Are you following a low salt diet? Yes  Are your blood pressures ever more than 140 on the top number (systolic) OR more   than 90 on the bottom number (diastolic), for example 140/90? Patient does not check BP outside of the clinic     BP Readings from Last 2 Encounters:   07/15/24 138/86   02/21/24 114/66     Hemoglobin A1C (%)   Date Value   02/21/2024 7.1 (H)   11/20/2023 6.6 (H)   05/03/2021 6.3 (H)   02/01/2021 6.1 (H)     LDL Cholesterol Calculated (mg/dL)   Date Value   02/21/2024 73   04/21/2023 49   10/30/2020 47    01/08/2020 73         Hypothyroidism Follow-up    Since last visit, patient describes the following symptoms: Weight stable, no hair loss, no skin changes, no constipation, no loose stools  Pain History:  When did you first notice your pain? Chronic    Have you seen this provider for your pain in the past? Yes   Where in your body do you have pain? Bilateral knees and right hip   Are you seeing anyone else for your pain? No        5/3/2021     9:00 AM 1/3/2022     8:00 AM 11/20/2023     9:24 AM   PHQ-9 SCORE   PHQ-9 Total Score MyChart   1 (Minimal depression)   PHQ-9 Total Score 1 0 1           5/3/2021     9:00 AM 1/3/2022     8:00 AM 11/20/2023     9:26 AM   QIAN-7 SCORE   Total Score   0 (minimal anxiety)   Total Score 0 0 0     Chronic Pain Follow Up:    Location of pain: Bilateral knees and right hip  Analgesia/pain control:    - Recent changes:  Gradually getting worse with time     - Overall control: Inadequate pain control    - Current treatments: Lidocaine patches, tylenol, asper cream, bio freeze, Asprin    Adherence:     - Do you ever take more pain medicine than prescribed? No    - When did you take your last dose of pain medicine?  N/A   Adverse effects: No   PDMP Review       None          Last CSA Agreement:   CSA -- Patient Level:    CSA: None found at the patient level.       Last UDS:   How many servings of fruits and vegetables do you eat daily?  2-3  On average, how many sweetened beverages do you drink each day (Examples: soda, juice, sweet tea, etc.  Do NOT count diet or artificially sweetened beverages)?   2  How many days per week do you exercise enough to make your heart beat faster? 4  How many minutes a day do you exercise enough to make your heart beat faster? 20 - 29  How many days per week do you miss taking your medication? 2  What makes it hard for you to take your medications?  remembering to take    Answers submitted by the patient for this visit:  Lipid Visit (Submitted on  7/15/2024)  Chief Complaint: Chronic problems general questions HPI Form  Are you regularly taking any medication or supplement to lower your cholesterol?: Yes  Are you having muscle aches or other side effects that you think could be caused by your cholesterol lowering medication?: Yes  Diabetes Visit (Submitted on 7/15/2024)  Chief Complaint: Chronic problems general questions HPI Form  Frequency of checking blood sugars:: not at all  Diabetic concerns:: none  Paraesthesia present:: none of these symptoms  Have you had a diabetic eye exam within the last year?: No  General Questionnaire (Submitted on 7/15/2024)  Chief Complaint: Chronic problems general questions HPI Form  What is the reason for your visit today? : pain  How many servings of fruits and vegetables do you eat daily?: 2-3  On average, how many sweetened beverages do you drink each day (Examples: soda, juice, sweet tea, etc.  Do NOT count diet or artificially sweetened beverages)?: 2  How many minutes a day do you exercise enough to make your heart beat faster?: 20 to 29  How many days a week do you exercise enough to make your heart beat faster?: 4  How many days per week do you miss taking your medication?: 2  What makes it hard for you to take your medication every day?: remembering to take    Review of Systems  Constitutional, neuro, ENT, endocrine, pulmonary, cardiac, gastrointestinal, genitourinary, musculoskeletal, integument and psychiatric systems are negative, except as otherwise noted.      Objective    /86 (BP Location: Right arm, Patient Position: Sitting, Cuff Size: Adult Regular)   Pulse 59   Temp 97.1  F (36.2  C) (Tympanic)   Resp 16   Wt 58.6 kg (129 lb 1.6 oz)   SpO2 97%   BMI 20.22 kg/m    Body mass index is 20.22 kg/m .  Physical Exam   GENERAL: alert and no distress  NECK: no adenopathy, no asymmetry, masses, or scars  RESP: lungs clear to auscultation - no rales, rhonchi or wheezes  CV: regular rates and rhythm,  normal S1 S2, no S3 or S4, grade 2-3/6 systolic murmur heard best over the LUSB, peripheral pulses strong, and no peripheral edema  ABDOMEN: soft, nontender, no hepatosplenomegaly, no masses and bowel sounds normal  MS: decreased range of motion knees bilaterally  PSYCH: mentation appears normal, affect normal/bright    Results for orders placed or performed in visit on 07/15/24   Hemoglobin A1c     Status: Abnormal   Result Value Ref Range    Estimated Average Glucose 151 mg/dL    Hemoglobin A1C 6.9 (H) <5.7 %   Albumin Random Urine Quantitative with Creat Ratio     Status: None   Result Value Ref Range    Creatinine Urine mg/dL 82.9 mg/dL    Albumin Urine mg/L <12.0 mg/L    Albumin Urine mg/g Cr           Signed Electronically by: Bisi Stuart MD      PROCEDURE:  JOINT ASPIRATION/INJECTION.         After a discussion of risks, benefits and side effects of procedure, informed patient consent was obtained.       The bilateral knees was prepped and draped in the usual clean fashion (sterile not required for this procedure).       INJECTION:  Using 6 cc of 1% lidocaine mixed                           with 60 mg of kenalog, the right knee was successfully injected                           without complication.  Patient did experience some pain                          relief following injection.    Using 6 cc of 1% lidocaine mixed                           with 60 mg of kenalog, the left knee was successfully injected                           without complication.  Patient did experience some pain                          relief following injection.

## 2024-07-19 ENCOUNTER — TELEPHONE (OUTPATIENT)
Dept: FAMILY MEDICINE | Facility: OTHER | Age: 89
End: 2024-07-19

## 2024-07-19 ENCOUNTER — HOSPITAL ENCOUNTER (OUTPATIENT)
Dept: CARDIOLOGY | Facility: HOSPITAL | Age: 89
Discharge: HOME | End: 2024-07-19
Attending: FAMILY MEDICINE | Admitting: INTERNAL MEDICINE
Payer: COMMERCIAL

## 2024-07-19 DIAGNOSIS — I51.89 DIASTOLIC DYSFUNCTION: ICD-10-CM

## 2024-07-19 LAB — LVEF ECHO: NORMAL

## 2024-07-19 PROCEDURE — 93306 TTE W/DOPPLER COMPLETE: CPT | Mod: 26 | Performed by: INTERNAL MEDICINE

## 2024-07-19 PROCEDURE — 93306 TTE W/DOPPLER COMPLETE: CPT

## 2024-07-19 NOTE — TELEPHONE ENCOUNTER
12:22 PM    Reason for Call: Phone Call    Description: Pt granddaughter called requesting to speak to care team about the patient's disability parking ticket application. Patient received a copy of this form. Is requesting to know if they need to bring the paperwork in or if Nathalie sent forms. If they need to send forms in, they are requesting to know where to send these forms.     Was an appointment offered for this call? No  If yes : Appointment type              Date    Preferred method for responding to this message: Telephone Call  What is your phone number ?[phone]     If we cannot reach you directly, may we leave a detailed response at the number you provided? Yes    Can this message wait until your PCP/provider returns, if available today? No    Chloe Purdy

## 2024-07-19 NOTE — TELEPHONE ENCOUNTER
Perlita Dodson, granddaughter calling - Consent to communicate is on file  States Pt had handicapped parking paperwork signed at [facility] Monday  Pt has taken the copy given to him after visit to the [facility]  Granddaughter asking if this is correct or does PCP (team) need to submit?  Writer stated that Pt's submit to [employer] should be sufficient  Instructed to call back to CT2 if more is needed from PCP  Caller relayed understanding.  No further concerns at calls end.

## 2024-07-23 NOTE — RESULT ENCOUNTER NOTE
Please call and notify patient that heart function looks good.  Little bit of diastolic dysfunction that is stable from before.  Overall no concerning findings.

## 2024-08-01 ENCOUNTER — OFFICE VISIT (OUTPATIENT)
Dept: PODIATRY | Facility: OTHER | Age: 89
End: 2024-08-01
Attending: PODIATRIST
Payer: COMMERCIAL

## 2024-08-01 VITALS
HEART RATE: 77 BPM | DIASTOLIC BLOOD PRESSURE: 83 MMHG | TEMPERATURE: 97.7 F | SYSTOLIC BLOOD PRESSURE: 135 MMHG | OXYGEN SATURATION: 98 %

## 2024-08-01 DIAGNOSIS — L60.3 ONYCHODYSTROPHY: Primary | ICD-10-CM

## 2024-08-01 DIAGNOSIS — Z13.89 SCREENING FOR DIABETIC PERIPHERAL NEUROPATHY: ICD-10-CM

## 2024-08-01 DIAGNOSIS — E11.42 DIABETIC POLYNEUROPATHY ASSOCIATED WITH TYPE 2 DIABETES MELLITUS (H): ICD-10-CM

## 2024-08-01 DIAGNOSIS — E11.9 DIABETES MELLITUS TYPE 2, NONINSULIN DEPENDENT (H): ICD-10-CM

## 2024-08-01 PROCEDURE — 99207 PR FOOT EXAM NO CHARGE: CPT | Performed by: PODIATRIST

## 2024-08-01 PROCEDURE — 11721 DEBRIDE NAIL 6 OR MORE: CPT | Performed by: PODIATRIST

## 2024-08-01 PROCEDURE — G0463 HOSPITAL OUTPT CLINIC VISIT: HCPCS

## 2024-08-01 ASSESSMENT — PAIN SCALES - GENERAL: PAINLEVEL: NO PAIN (0)

## 2024-08-01 NOTE — PROGRESS NOTES
Chief complaint: Patient presents with:  Toenail: Trimming      History of Present Illness: This 89-year-old NIDDM male is seen for follow-up management of elongated toenails.     He has a little burning, tingling, and numbness in his feet.    Patient says he has continued to be active with dancing for exercise. His daughter is helping him to file his nails between his appointments.    No further pedal complaints today.       Lab Results   Component Value Date    A1C 6.9 07/15/2024    A1C 7.1 02/21/2024    A1C 6.6 11/20/2023    A1C 6.8 07/21/2023    A1C 6.5 04/21/2023    A1C 6.3 05/03/2021    A1C 6.1 02/01/2021    A1C 6.4 10/30/2020    A1C 6.5 07/29/2020    A1C 6.3 01/08/2020         Wt Readings from Last 4 Encounters:   07/15/24 58.6 kg (129 lb 1.6 oz)   02/21/24 58.6 kg (129 lb 3 oz)   11/20/23 58.1 kg (128 lb)   07/21/23 59.3 kg (130 lb 12.8 oz)         /83 (BP Location: Left arm, Patient Position: Sitting, Cuff Size: Adult Regular)   Pulse 77   Temp 97.7  F (36.5  C) (Tympanic)   SpO2 98%     Patient Active Problem List   Diagnosis    Other specified hypothyroidism    Chronic systolic congestive heart failure (H)    Benign essential hypertension    Hyperlipidemia LDL goal <70    Bilateral carotid artery stenosis    Coronary artery disease involving coronary bypass graft of native heart without angina pectoris    Encounter for diabetic foot exam (H)    Trochanteric bursitis of right hip    Gastroesophageal reflux disease, esophagitis presence not specified    Primary osteoarthritis of right hip    Bilateral primary osteoarthritis of knee     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)    Chronic bilateral low back pain without sciatica    DDD (degenerative disc disease), lumbar    Allergic arthritis of right hip    Keratoacanthoma of cheek    Atypical squamoproliferative skin lesion    Primary localized osteoarthrosis of lower leg, unspecified laterality    Type 2 diabetes  mellitus with hyperglycemia, without long-term current use of insulin (H)    Atypical chest pain    Pulmonary nodules    Primary localized osteoarthrosis of left lower leg    Cervicalgia    Derangement of right sacroiliac joint    Diastolic dysfunction    Memory loss    Toenail deformity       Past Surgical History:   Procedure Laterality Date    AS CABG, ARTERY-VEIN, FIVE  11/02/2011    off pump       Current Outpatient Medications   Medication Sig Dispense Refill    acetaminophen (TYLENOL) 500 MG tablet Take 2 Tablets by mouth two times a day as needed for mild pain 120 tablet 6    ASPIRIN PO Take 325 mg by mouth daily      atorvastatin (LIPITOR) 10 MG tablet Take 1 Tablet by mouth one time a day. 90 tablet 1    blood glucose (NO BRAND SPECIFIED) lancets standard Use to test blood sugar 1 times daily 100 each 3    blood glucose (NO BRAND SPECIFIED) test strip Use to test blood sugar 1 times daily 100 each 3    blood glucose monitoring (NO BRAND SPECIFIED) meter device kit Use to test blood sugar 1 times daily 1 kit 0    famotidine (PEPCID) 40 MG tablet Take 1 tablet (40 mg) by mouth daily 90 tablet 3    glipiZIDE (GLUCOTROL XL) 2.5 MG 24 hr tablet Take 1 tablet by mouth daily      levothyroxine (SYNTHROID/LEVOTHROID) 137 MCG tablet Take 1 Tablet by mouth one time a day monday-friday. 60 tablet 3    levothyroxine (SYNTHROID/LEVOTHROID) 150 MCG tablet Take 1 Tablet by mouth once daily on Saturday and Sunday 30 tablet 11    lisinopril (ZESTRIL) 2.5 MG tablet Take 1 Tablet by mouth one time a day. 90 tablet 1    metFORMIN (GLUCOPHAGE XR) 500 MG 24 hr tablet Take 2 tablets (1,000 mg) by mouth At Bedtime 180 tablet 3    metoprolol succinate ER (TOPROL XL) 50 MG 24 hr tablet Take 1 Tablet by mouth one time a day. 90 tablet 2    vitamin D3 (CHOLECALCIFEROL) 50 mcg (2000 units) tablet Take by mouth daily      nitroGLYcerin (NITROSTAT) 0.4 MG sublingual tablet For chest pain place 1 tablet under the tongue every 5 minutes  for 3 doses. If symptoms persist 5 minutes after 1st dose call 911. (Patient not taking: Reported on 2/21/2024) 25 tablet 0     No current facility-administered medications for this visit.        No Known Allergies    Family History   Problem Relation Age of Onset    Cerebrovascular Disease Father     Coronary Artery Disease Father     Dementia Mother     Coronary Artery Disease Sister     Lung Cancer Sister     Thyroid Disease Daughter        Social History     Socioeconomic History    Marital status:      Spouse name: Luis    Number of children: 5    Years of education: 12    Highest education level: Not on file   Occupational History    Occupation:      Employer: RETIRED   Social Needs    Financial resource strain: Not on file    Food insecurity:     Worry: Not on file     Inability: Not on file    Transportation needs:     Medical: Not on file     Non-medical: Not on file   Tobacco Use    Smoking status: Never Smoker    Smokeless tobacco: Never Used    Tobacco comment: second hand smoke in the house 40 yrs   Substance and Sexual Activity    Alcohol use: No     Alcohol/week: 0.0 oz    Drug use: No    Sexual activity: Never     Partners: Female   Lifestyle    Physical activity:     Days per week: Not on file     Minutes per session: Not on file    Stress: Not on file   Relationships    Social connections:     Talks on phone: Not on file     Gets together: Not on file     Attends Religion service: Not on file     Active member of club or organization: Not on file     Attends meetings of clubs or organizations: Not on file     Relationship status: Not on file    Intimate partner violence:     Fear of current or ex partner: Not on file     Emotionally abused: Not on file     Physically abused: Not on file     Forced sexual activity: Not on file   Other Topics Concern     Service No    Blood Transfusions Yes     Comment: Ok to recieve     Caffeine Concern Yes     Comment: 3 cups daily      Occupational Exposure Not Asked    Hobby Hazards Not Asked    Sleep Concern Not Asked    Stress Concern Not Asked    Weight Concern Not Asked    Special Diet Not Asked    Back Care Not Asked    Exercise Yes     Comment: walking,      Bike Helmet Not Asked    Seat Belt Yes    Self-Exams Not Asked    Parent/sibling w/ CABG, MI or angioplasty before 65F 55M? Not Asked   Social History Narrative    Not on file       ROS: 10 point ROS neg other than the symptoms noted above in the HPI.  EXAM  Constitutional: healthy, alert and no distress    Psychiatric: mentation appears normal and affect normal/bright    VASCULAR:  -Dorsalis pedis pulse +1/4 b/l  -Posterior tibial pulse +1/4 b/l  -Capillary refill time < 3 seconds to b/l hallux  -Hair growth Absent to b/l anterior legs and ankles  NEURO:  -Positive for paresthesias to the bilateral foot  -Epicritic and protective sensation diminished to the bilateral foot  DERM:  -Skin mildly dry, flaking to bilateral digits only (improved to the remainder of the foot)   -Skin temperature within normal limits to the bilateral foot  -Toenails elongated, thickened, dystrophic and discolored with subungual debris x 10  MSK:  -Muscle strength of ankles +5/5 for dorsiflexion, plantarflexion, ABDUction and ADDuction b/l    ============================================================    ASSESSMENT:    (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 Treatment options discussed with no educational barriers noted.    -High risk toenail debridement x 10 toenails without incident    Diabetes Mellitus: Patient's DM is managed by their PCP. The DM appears to be stable. Patient's last HbA1C was 6.9% on 07/15/2024.    -Diabetic Foot Education provided. This included checking the feet daily looking  for new new blisters or wounds, wearing shoes at all times when walking including around the house, and avoiding lotion application between the toes. Any sign of infection in the foot warrant's the patient presenting to the ED as soon as possible.  ---Patient has improved xerosis of the skin.     -Patient in agreement with the above treatment plan and all of patient's questions were answered.      RTC 3 1/2 months for diabetic foot exam and high risk nail debridement        Deidre Mishra DPM

## 2024-08-06 ENCOUNTER — THERAPY VISIT (OUTPATIENT)
Dept: OCCUPATIONAL THERAPY | Facility: HOSPITAL | Age: 89
End: 2024-08-06
Attending: FAMILY MEDICINE
Payer: COMMERCIAL

## 2024-08-06 ENCOUNTER — TRANSFERRED RECORDS (OUTPATIENT)
Dept: HEALTH INFORMATION MANAGEMENT | Facility: CLINIC | Age: 89
End: 2024-08-06

## 2024-08-06 DIAGNOSIS — R41.3 MEMORY LOSS: ICD-10-CM

## 2024-08-06 PROCEDURE — 97165 OT EVAL LOW COMPLEX 30 MIN: CPT | Mod: GO

## 2024-08-06 NOTE — PROGRESS NOTES
OCCUPATIONAL THERAPY EVALUATION  Type of Visit: Evaluation       Fall Risk Screen:  Fall screen completed by: OT  Have you fallen 2 or more times in the past year?: No  Have you fallen and had an injury in the past year?: No  Is patient a fall risk?: No    Subjective      Presenting condition or subjective complaint: memory eval  Date of onset: 07/15/24    Relevant medical history:     Dates & types of surgery: heart bypass surgery 2006    Prior diagnostic imaging/testing results: MRI; CT scan; EMG     Prior therapy history for the same diagnosis, illness or injury:        Prior Level of Function  Transfers: Assistive equipment  Ambulation: Assistive equipment  ADL: Independent  IADL: Driving, Finances    Living Environment  Social support: With family members   Type of home: House; Basement   Stairs to enter the home: Yes       Ramp: Yes   Stairs inside the home: Yes 1 Is there a railing: Yes     Help at home: Self Cares (home health aide/personal care attendant, family, etc); Medication and/or finances; Meals on wheels/meal service  Equipment owned: Straight Cane; Raised toilet seat     Employment: No    Hobbies/Interests:      Patient goals for therapy: testing memory and ability to continue driving    Pain assessment:  Pain in knees and hips- arthritis     Objective     Cognitive Status Examination  Orientation: Person, Place   Level of Consciousness: Alert, Confused  Follows Commands and Answers Questions: Due to memory deficits, pt was unable to recall information in order to answer questions. Daughter assisted.  Personal Safety and Judgement: Intact  Memory: Impaired  Attention: No deficits identified  Organization/Problem Solving:  Reports difficulties with problem solving related to numbers, such as balancing checkbook. He stated he has his granddaughter check his math.  Executive Function: Working memory impaired, decreased storage of information for performing tasks    VISUAL SKILLS  Visual Acuity: Wears  glasses  Visual Field: Appears normal  Visual Attention: Appears normal  Oculomotor:   Pt reported he is going to schedule an eye appointment soon.    SENSATION:  No problems noted.    POSTURE: WNL  RANGE OF MOTION: UE AROM WFL  STRENGTH: UE Strength WFL  MUSCLE TONE: WFL  COORDINATION: WFL  BALANCE:  Pt reported he does not complete any task that would challenge his balance.    FUNCTIONAL MOBILITY  Assistive Device(s): Cane (quad)  Ambulation: WFL  Wheelchair: N/A    BED MOBILITY: WFL    TRANSFERS: WFL    BATHING: WFL  Equipment: Grab bar    UPPER BODY DRESSING: WFL  Equipment:     LOWER BODY DRESSING: WFL  Equipment:     TOILETING: WNL  Equipment:     GROOMING: WNL  Equipment:     EATING/SELF FEEDING: WNL   Equipment:     ACTIVITY TOLERANCE: Pt reported no difficulties.    INSTRUMENTAL ACTIVITIES OF DAILY LIVING (IADL):   Meal Planning/Prep: Pt receives Meals on Wheels and is able to complete simple meal prep and cooking tasks  Home/Financial Management: Pt reported difficulty balancing checkbook, but gets assistance.  Communication/Computer Use: N/A  Community Mobility: Pt only drives during the day on familiar roads.  Care of Others:- N/A  Pt reported he takes care of medication management, but his granddaughter double checks it for him when finished.     Assessment & Plan   CLINICAL IMPRESSIONS  Medical Diagnosis: Cognitive changes    Treatment Diagnosis: Memory difficulties    Impression/Assessment: Pt is a 89 year old male presenting to Occupational Therapy due to memory concerns.  The following significant findings have been identified: Impaired cognition.  These identified deficits interfere with their ability to perform recreational activities, medication management, and financial management as compared to previous level of function.     Clinical Decision Making (Complexity):  Assessment of Occupational Performance: 1-3 Performance Deficits  Occupational Performance Limitations: health management and  maintenance, home establishment and management, and social participation  Clinical Decision Making (Complexity): Low complexity    PLAN OF CARE  Treatment Interventions:  Interventions: Cognitive Skills    Long Term Goals   OT Goal 1  Goal Identifier: STG 1  Goal Description: Pt will be educated on memory aids.  Rationale: In order to maximize safety and independence with cognitive function within the home or community  Goal Progress: Initiated  Target Date: 10/15/24  OT Goal 2  Goal Identifier: LTG 1  Goal Description: Pt will use memory aids and compensatory strategies to increase independence with ADLs/IADLs.  Rationale: In order to maximize safety and independence with cognitive function within the home or community  Goal Progress: Initiated  Target Date: 10/15/24      Frequency of Treatment: once per week  Duration of Treatment: 10/15/24     Recommended Referrals to Other Professionals:   Education Assessment: Learner/Method: Patient;Family;Listening;Demonstration  Education Comments: Educated pt and his daughter on memory aids: utilizing calculator when working with numbers and having a small notebook and writing utensil with him for writing down things to remember or do later.     Risks and benefits of evaluation/treatment have been explained.   Patient/Family/caregiver agrees with Plan of Care.     Evaluation Time:    OT Eval, Low Complexity Minutes (90160): 50     Signing Clinician: MIGUE Cabezas [facility]                                                                                   OUTPATIENT OCCUPATIONAL THERAPY      PLAN OF TREATMENT FOR OUTPATIENT REHABILITATION   Patient's Last Name, First Name, Nori Almeida YOB: 1934   Provider's Name   [facility]   Medical Record No.  [id number]     Onset Date: 07/15/24 Start of Care Date: 08/06/24     Medical Diagnosis:  Cognitive changes      OT Treatment  Diagnosis:  Memory difficulties Plan of Treatment  Frequency/Duration:once per week/10/15/24    Certification date from 08/06/24   To 10/15/24        See note for plan of treatment details and functional goals     Kristin Hickey, OTR                         I CERTIFY THE NEED FOR THESE SERVICES FURNISHED UNDER        THIS PLAN OF TREATMENT AND WHILE UNDER MY CARE     (Physician attestation of this document indicates review and certification of the therapy plan).              Referring Provider:  Bisi Stuart    Initial Assessment  See Epic Evaluation- 08/06/24

## 2024-08-28 ENCOUNTER — TRANSFERRED RECORDS (OUTPATIENT)
Dept: HEALTH INFORMATION MANAGEMENT | Facility: CLINIC | Age: 89
End: 2024-08-28

## 2024-08-28 LAB — RETINOPATHY: NEGATIVE

## 2024-10-10 DIAGNOSIS — M17.10 PRIMARY LOCALIZED OSTEOARTHROSIS OF LOWER LEG, UNSPECIFIED LATERALITY: ICD-10-CM

## 2024-10-10 DIAGNOSIS — E11.65 TYPE 2 DIABETES MELLITUS WITH HYPERGLYCEMIA, WITHOUT LONG-TERM CURRENT USE OF INSULIN (H): ICD-10-CM

## 2024-10-10 DIAGNOSIS — K21.9 GASTROESOPHAGEAL REFLUX DISEASE, UNSPECIFIED WHETHER ESOPHAGITIS PRESENT: ICD-10-CM

## 2024-10-10 DIAGNOSIS — I10 BENIGN ESSENTIAL HYPERTENSION: ICD-10-CM

## 2024-10-10 DIAGNOSIS — M16.11 PRIMARY OSTEOARTHRITIS OF RIGHT HIP: ICD-10-CM

## 2024-10-10 DIAGNOSIS — E78.5 HYPERLIPIDEMIA LDL GOAL <70: ICD-10-CM

## 2024-10-10 DIAGNOSIS — I25.810 CORONARY ARTERY DISEASE INVOLVING CORONARY BYPASS GRAFT OF NATIVE HEART WITHOUT ANGINA PECTORIS: ICD-10-CM

## 2024-10-10 RX ORDER — METOPROLOL SUCCINATE 50 MG/1
TABLET, EXTENDED RELEASE ORAL
Qty: 90 TABLET | Refills: 2 | Status: SHIPPED | OUTPATIENT
Start: 2024-10-10

## 2024-10-10 RX ORDER — METFORMIN HYDROCHLORIDE 500 MG/1
1000 TABLET, EXTENDED RELEASE ORAL AT BEDTIME
Qty: 180 TABLET | Refills: 1 | Status: SHIPPED | OUTPATIENT
Start: 2024-10-10

## 2024-10-10 RX ORDER — PSEUDOEPHED/ACETAMINOPH/DIPHEN 30MG-500MG
TABLET ORAL
Qty: 120 TABLET | Refills: 3 | Status: SHIPPED | OUTPATIENT
Start: 2024-10-10

## 2024-10-10 RX ORDER — LISINOPRIL 2.5 MG/1
TABLET ORAL
Qty: 90 TABLET | Refills: 2 | Status: SHIPPED | OUTPATIENT
Start: 2024-10-10

## 2024-10-10 RX ORDER — ATORVASTATIN CALCIUM 10 MG/1
TABLET, FILM COATED ORAL
Qty: 90 TABLET | Refills: 2 | Status: SHIPPED | OUTPATIENT
Start: 2024-10-10

## 2024-10-10 RX ORDER — FAMOTIDINE 40 MG/1
40 TABLET, FILM COATED ORAL DAILY
Qty: 90 TABLET | Refills: 2 | Status: SHIPPED | OUTPATIENT
Start: 2024-10-10

## 2024-10-10 NOTE — TELEPHONE ENCOUNTER
Tylenol      Last Written Prescription Date:  3/29/2024  Last Fill Quantity: 120,   # refills: 6  Last Office Visit: 7/15/2024    Metformin       Last Written Prescription Date:  7/21/2023  Last Fill Quantity: 180,   # refills: 3  Last Office Visit: 7/15/2024  Future Office visit:      Next 5 appointments (look out 90 days)      Nov 14, 2024 10:30 AM  (Arrive by 10:15 AM)  Return Visit with Deidre Mishra DPM  [facility] ([facility] ) [address] [place] [id number] [phone]     Nov 25, 2024 9:00 AM  (Arrive by 8:45 AM)  Adult Preventative Visit with Bisi Stuart MD  [facility] ([facility] ) [address] [place] [address] [id number] [phone]

## 2024-11-04 ENCOUNTER — APPOINTMENT (OUTPATIENT)
Dept: CT IMAGING | Facility: HOSPITAL | Age: 89
End: 2024-11-04
Attending: NURSE PRACTITIONER
Payer: COMMERCIAL

## 2024-11-04 ENCOUNTER — APPOINTMENT (OUTPATIENT)
Dept: GENERAL RADIOLOGY | Facility: HOSPITAL | Age: 89
End: 2024-11-04
Attending: NURSE PRACTITIONER
Payer: COMMERCIAL

## 2024-11-04 ENCOUNTER — HOSPITAL ENCOUNTER (EMERGENCY)
Facility: HOSPITAL | Age: 89
Discharge: HOME | End: 2024-11-04
Attending: NURSE PRACTITIONER | Admitting: NURSE PRACTITIONER
Payer: COMMERCIAL

## 2024-11-04 VITALS
TEMPERATURE: 97.9 F | RESPIRATION RATE: 18 BRPM | SYSTOLIC BLOOD PRESSURE: 118 MMHG | OXYGEN SATURATION: 96 % | HEART RATE: 54 BPM | DIASTOLIC BLOOD PRESSURE: 58 MMHG

## 2024-11-04 DIAGNOSIS — R44.1 VISUAL HALLUCINATIONS: ICD-10-CM

## 2024-11-04 LAB
ALBUMIN SERPL BCG-MCNC: 4.1 G/DL (ref 3.5–5.2)
ALBUMIN UR-MCNC: NEGATIVE MG/DL
ALP SERPL-CCNC: 93 U/L (ref 40–150)
ALT SERPL W P-5'-P-CCNC: 16 U/L (ref 0–70)
AMMONIA PLAS-SCNC: 11 UMOL/L (ref 16–60)
ANION GAP SERPL CALCULATED.3IONS-SCNC: 12 MMOL/L (ref 7–15)
APPEARANCE UR: CLEAR
AST SERPL W P-5'-P-CCNC: 22 U/L (ref 0–45)
BACTERIA #/AREA URNS HPF: ABNORMAL /HPF
BASE EXCESS BLDV CALC-SCNC: 0.5 MMOL/L (ref -3–3)
BILIRUB SERPL-MCNC: 0.4 MG/DL
BILIRUB UR QL STRIP: NEGATIVE
BUN SERPL-MCNC: 20.9 MG/DL (ref 8–23)
CALCIUM SERPL-MCNC: 9.1 MG/DL (ref 8.8–10.4)
CHLORIDE SERPL-SCNC: 103 MMOL/L (ref 98–107)
COLOR UR AUTO: ABNORMAL
CREAT SERPL-MCNC: 0.92 MG/DL (ref 0.67–1.17)
EGFRCR SERPLBLD CKD-EPI 2021: 79 ML/MIN/1.73M2
ERYTHROCYTE [DISTWIDTH] IN BLOOD BY AUTOMATED COUNT: 13.5 % (ref 10–15)
ETHANOL SERPL-MCNC: <0.01 G/DL
GLUCOSE SERPL-MCNC: 156 MG/DL (ref 70–99)
GLUCOSE UR STRIP-MCNC: NEGATIVE MG/DL
HCO3 BLDV-SCNC: 27 MMOL/L (ref 21–28)
HCO3 SERPL-SCNC: 22 MMOL/L (ref 22–29)
HCT VFR BLD AUTO: 40.8 % (ref 40–53)
HGB BLD-MCNC: 13.3 G/DL (ref 13.3–17.7)
HGB UR QL STRIP: NEGATIVE
HYALINE CASTS: 1 /LPF
KETONES UR STRIP-MCNC: NEGATIVE MG/DL
LEUKOCYTE ESTERASE UR QL STRIP: NEGATIVE
MAGNESIUM SERPL-MCNC: 1.6 MG/DL (ref 1.7–2.3)
MCH RBC QN AUTO: 28.2 PG (ref 26.5–33)
MCHC RBC AUTO-ENTMCNC: 32.6 G/DL (ref 31.5–36.5)
MCV RBC AUTO: 86 FL (ref 78–100)
MUCOUS THREADS #/AREA URNS LPF: PRESENT /LPF
NITRATE UR QL: NEGATIVE
O2/TOTAL GAS SETTING VFR VENT: 21 %
OXYHGB MFR BLDV: 31 % (ref 70–75)
PCO2 BLDV: 48 MM HG (ref 40–50)
PH BLDV: 7.36 [PH] (ref 7.32–7.43)
PH UR STRIP: 5 [PH] (ref 4.7–8)
PLATELET # BLD AUTO: 173 10E3/UL (ref 150–450)
PO2 BLDV: 20 MM HG (ref 25–47)
POTASSIUM SERPL-SCNC: 4.4 MMOL/L (ref 3.4–5.3)
PROT SERPL-MCNC: 6.1 G/DL (ref 6.4–8.3)
RBC # BLD AUTO: 4.72 10E6/UL (ref 4.4–5.9)
RBC URINE: 0 /HPF
SAO2 % BLDV: 31.6 % (ref 70–75)
SODIUM SERPL-SCNC: 137 MMOL/L (ref 135–145)
SP GR UR STRIP: 1.01 (ref 1–1.03)
SQUAMOUS EPITHELIAL: 0 /HPF
T4 FREE SERPL-MCNC: 1.6 NG/DL (ref 0.9–1.7)
TROPONIN T SERPL HS-MCNC: 34 NG/L
TROPONIN T SERPL HS-MCNC: 36 NG/L
TSH SERPL DL<=0.005 MIU/L-ACNC: 5.13 UIU/ML (ref 0.3–4.2)
UROBILINOGEN UR STRIP-MCNC: NORMAL MG/DL
WBC # BLD AUTO: 6.8 10E3/UL (ref 4–11)
WBC URINE: 2 /HPF

## 2024-11-04 PROCEDURE — 82077 ASSAY SPEC XCP UR&BREATH IA: CPT | Performed by: NURSE PRACTITIONER

## 2024-11-04 PROCEDURE — 70450 CT HEAD/BRAIN W/O DYE: CPT

## 2024-11-04 PROCEDURE — 84132 ASSAY OF SERUM POTASSIUM: CPT | Performed by: NURSE PRACTITIONER

## 2024-11-04 PROCEDURE — 93010 ELECTROCARDIOGRAM REPORT: CPT | Performed by: INTERNAL MEDICINE

## 2024-11-04 PROCEDURE — 82805 BLOOD GASES W/O2 SATURATION: CPT | Performed by: NURSE PRACTITIONER

## 2024-11-04 PROCEDURE — 99284 EMERGENCY DEPT VISIT MOD MDM: CPT | Performed by: NURSE PRACTITIONER

## 2024-11-04 PROCEDURE — 99285 EMERGENCY DEPT VISIT HI MDM: CPT | Mod: 25

## 2024-11-04 PROCEDURE — 84484 ASSAY OF TROPONIN QUANT: CPT | Performed by: NURSE PRACTITIONER

## 2024-11-04 PROCEDURE — 84439 ASSAY OF FREE THYROXINE: CPT | Performed by: NURSE PRACTITIONER

## 2024-11-04 PROCEDURE — 85014 HEMATOCRIT: CPT | Performed by: NURSE PRACTITIONER

## 2024-11-04 PROCEDURE — 36415 COLL VENOUS BLD VENIPUNCTURE: CPT | Performed by: NURSE PRACTITIONER

## 2024-11-04 PROCEDURE — 83735 ASSAY OF MAGNESIUM: CPT | Performed by: NURSE PRACTITIONER

## 2024-11-04 PROCEDURE — 82140 ASSAY OF AMMONIA: CPT | Performed by: NURSE PRACTITIONER

## 2024-11-04 PROCEDURE — 84443 ASSAY THYROID STIM HORMONE: CPT | Performed by: NURSE PRACTITIONER

## 2024-11-04 PROCEDURE — 81001 URINALYSIS AUTO W/SCOPE: CPT | Performed by: STUDENT IN AN ORGANIZED HEALTH CARE EDUCATION/TRAINING PROGRAM

## 2024-11-04 PROCEDURE — 71045 X-RAY EXAM CHEST 1 VIEW: CPT

## 2024-11-04 PROCEDURE — 93005 ELECTROCARDIOGRAM TRACING: CPT

## 2024-11-04 PROCEDURE — 84155 ASSAY OF PROTEIN SERUM: CPT | Performed by: NURSE PRACTITIONER

## 2024-11-04 ASSESSMENT — ENCOUNTER SYMPTOMS
NEUROLOGICAL NEGATIVE: 1
ENDOCRINE NEGATIVE: 1
CONSTITUTIONAL NEGATIVE: 1
EYES NEGATIVE: 1
MUSCULOSKELETAL NEGATIVE: 1
ALLERGIC/IMMUNOLOGIC NEGATIVE: 1
HEMATOLOGIC/LYMPHATIC NEGATIVE: 1
GASTROINTESTINAL NEGATIVE: 1
RESPIRATORY NEGATIVE: 1
CARDIOVASCULAR NEGATIVE: 1
HALLUCINATIONS: 1

## 2024-11-04 ASSESSMENT — ACTIVITIES OF DAILY LIVING (ADL)
ADLS_ACUITY_SCORE: 0

## 2024-11-04 ASSESSMENT — COLUMBIA-SUICIDE SEVERITY RATING SCALE - C-SSRS
2. HAVE YOU ACTUALLY HAD ANY THOUGHTS OF KILLING YOURSELF IN THE PAST MONTH?: NO
1. IN THE PAST MONTH, HAVE YOU WISHED YOU WERE DEAD OR WISHED YOU COULD GO TO SLEEP AND NOT WAKE UP?: NO
6. HAVE YOU EVER DONE ANYTHING, STARTED TO DO ANYTHING, OR PREPARED TO DO ANYTHING TO END YOUR LIFE?: NO

## 2024-11-05 LAB
ATRIAL RATE - MUSE: 55 BPM
DIASTOLIC BLOOD PRESSURE - MUSE: NORMAL MMHG
INTERPRETATION ECG - MUSE: NORMAL
P AXIS - MUSE: 59 DEGREES
PR INTERVAL - MUSE: 156 MS
QRS DURATION - MUSE: 78 MS
QT - MUSE: 428 MS
QTC - MUSE: 409 MS
R AXIS - MUSE: -16 DEGREES
SYSTOLIC BLOOD PRESSURE - MUSE: NORMAL MMHG
T AXIS - MUSE: 48 DEGREES
VENTRICULAR RATE- MUSE: 55 BPM

## 2024-11-05 NOTE — ED NOTES
First patient contact.  Resting on cart, visitor at bedside.  No voiced need at this time, declined a blanket.  Siderails up x2, bed in low/locked position.

## 2024-11-05 NOTE — ED NOTES
Face to face report given with opportunity to observe patient.    Report given to Rojelio Sosa RN   11/4/2024  7:16 PM

## 2024-11-05 NOTE — ED PROVIDER NOTES
History     Chief Complaint   Patient presents with    Altered Mental Status     HPI  Nori Looney is a 89 year old individual with history of hypothyroidism, chronic systolic congestive heart failure, hypertension, carotid artery stenosis, coronary artery disease and subsequent bypass, lumbar degenerative disc disease, DMT2, comes in via EMS for hallucinations.  Patient apparently was seeing people at his house so he called law enforcement.  Law enforcement did come check out the place and did not find anybody there.  Apparently patient was seeing people right and [employer] enforcement who were actually not there.  For this reason EMS was called.  Patient denies hearing any speaking but seeing the people there.  Currently denies seeing anybody.  Denies any chest pain, dizziness, lightheadedness, headaches, paresthesias, nausea, vomiting.  States he did fall 3 months ago but no injury.    Allergies:  No Known Allergies    Problem List:    Patient Active Problem List    Diagnosis Date Noted    Diastolic dysfunction 07/15/2024     Priority: Medium    Memory loss 07/15/2024     Priority: Medium    Toenail deformity 07/15/2024     Priority: Medium    Primary localized osteoarthrosis of left lower leg 11/20/2023     Priority: Medium    Cervicalgia 11/20/2023     Priority: Medium    Derangement of right sacroiliac joint 11/20/2023     Priority: Medium    Atypical chest pain 05/04/2022     Priority: Medium    Pulmonary nodules 05/04/2022     Priority: Medium    Primary localized osteoarthrosis of lower leg, unspecified laterality 01/03/2022     Priority: Medium    Type 2 diabetes mellitus with hyperglycemia, without long-term current use of insulin (H) 01/03/2022     Priority: Medium    Keratoacanthoma of cheek 09/03/2021     Priority: Medium    Atypical squamoproliferative skin lesion 09/03/2021     Priority: Medium    DDD (degenerative disc disease), lumbar 02/01/2021     Priority: Medium    Allergic arthritis of right  hip 02/01/2021     Priority: Medium    Chronic bilateral low back pain without sciatica 10/30/2020     Priority: Medium    Primary osteoarthritis of right hip 01/08/2020     Priority: Medium    Bilateral primary osteoarthritis of knee  01/08/2020     Priority: Medium    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) 01/08/2020     Priority: Medium    Gastroesophageal reflux disease, esophagitis presence not specified 08/26/2019     Priority: Medium     IMO Regulatory Load OCT 2020      Trochanteric bursitis of right hip 01/31/2018     Priority: Medium    Coronary artery disease involving coronary bypass graft of native heart without angina pectoris 01/05/2017     Priority: Medium     2011 in [place] With Dr. Negro at .       Encounter for diabetic foot exam (H) 01/05/2017     Priority: Medium    Other specified hypothyroidism 06/29/2016     Priority: Medium    Chronic systolic congestive heart failure (H) 06/29/2016     Priority: Medium    Benign essential hypertension 06/29/2016     Priority: Medium    Hyperlipidemia LDL goal <70 06/29/2016     Priority: Medium    Bilateral carotid artery stenosis 06/29/2016     Priority: Medium        Past Medical History:    Past Medical History:   Diagnosis Date    Chronic diastolic congestive heart failure (H)     Coronary artery disease involving coronary bypass graft of native heart without angina pectoris 1/5/2017    Coronary atherosclerosis of native coronary artery     Encounter for diabetic foot exam (H) 1/5/2017    Erectile dysfunction due to arterial insufficiency     Generalized osteoarthrosis     HTN (hypertension)     Hyperlipidemia associated with type 2 diabetes mellitus (H)     Hypothyroidism     Neuropathy 01/2018    Other specified hypothyroidism 6/29/2016    Primary osteoarthritis of left knee 1/5/2017    Stable angina (H)     Type 2 diabetes mellitus without complication, without long-term current use of insulin (H) 1/5/2017        Past Surgical History:    Past Surgical History:   Procedure Laterality Date    AS CABG, ARTERY-VEIN, FIVE  11/02/2011    off pump       Family History:    Family History   Problem Relation Age of Onset    Cerebrovascular Disease Father     Coronary Artery Disease Father     Dementia Mother     Coronary Artery Disease Sister     Lung Cancer Sister     Thyroid Disease Daughter        Social History:  Marital Status:   [5]  Social History     Tobacco Use    Smoking status: Never     Passive exposure: Never    Smokeless tobacco: Never    Tobacco comments:     second hand smoke in the house 40 yrs   Vaping Use    Vaping status: Never Used   Substance Use Topics    Alcohol use: No     Alcohol/week: 0.0 standard drinks of alcohol    Drug use: No        Medications:    acetaminophen (TYLENOL) 500 MG tablet  ASPIRIN PO  atorvastatin (LIPITOR) 10 MG tablet  blood glucose (NO BRAND SPECIFIED) lancets standard  blood glucose (NO BRAND SPECIFIED) test strip  blood glucose monitoring (NO BRAND SPECIFIED) meter device kit  famotidine (PEPCID) 40 MG tablet  glipiZIDE (GLUCOTROL XL) 2.5 MG 24 hr tablet  levothyroxine (SYNTHROID/LEVOTHROID) 137 MCG tablet  levothyroxine (SYNTHROID/LEVOTHROID) 150 MCG tablet  lisinopril (ZESTRIL) 2.5 MG tablet  metFORMIN (GLUCOPHAGE XR) 500 MG 24 hr tablet  metoprolol succinate ER (TOPROL XL) 50 MG 24 hr tablet  nitroGLYcerin (NITROSTAT) 0.4 MG sublingual tablet  vitamin D3 (CHOLECALCIFEROL) 50 mcg (2000 units) tablet          Review of Systems   Constitutional: Negative.    HENT: Negative.     Eyes: Negative.    Respiratory: Negative.     Cardiovascular: Negative.    Gastrointestinal: Negative.    Endocrine: Negative.    Genitourinary: Negative.    Musculoskeletal: Negative.    Skin: Negative.    Allergic/Immunologic: Negative.    Neurological: Negative.    Hematological: Negative.    Psychiatric/Behavioral:  Positive for hallucinations.        Physical Exam   BP: 131/71  Pulse:  56  Temp: 97.9  F (36.6  C)  Resp: 18  SpO2: 95 %      GENERAL APPEARANCE:  The patient is a 89 year old well-developed, well-nourished individual in no acute distress that appears as stated age.  NECK:  Supple.  Trachea is midline.   LUNGS:  Breathing is easy.  Breath sounds are equal and clear bilaterally.  No wheezes, rhonchi, or rales.  HEART:  Regular rate and rhythm with normal S1 and S2.  No murmurs, gallops, or rubs.  ABDOMEN:  Soft.  No mass, tenderness, guarding, or rebound.  No organomegaly or hernia.  Bowel sounds are present.  No CVA tenderness or flank mass.  No abdominal bruits or thrills present upon auscultation/palpation.  GENITOURINARY: No obvious anterior pelvic tenderness, hernia, mass noted to palpation.  EXTREMITIES:  No cyanosis, clubbing, or edema.    NEUROLOGIC:  No focal sensory or motor deficits are noted.   PSYCHIATRIC:  The patient is awake, alert, and oriented x4.  Recent and remote memory is intact.  Appropriate mood and affect.  Calm and cooperative with history and physical exam.  SKIN:  Warm, dry, and well perfused.  Good turgor.  No lesions, nodules, or rashes are noted.  No bruising noted.      ED Course     ED Course as of 11/04/24 2136   Mon Nov 04, 2024 1842 Labs and CT of head ordered.  Chest x-ray ordered.   1842 In to see patient and history/physical completed.    1855 No acute findings on ECG.   1944 Troponin T, High Sensitivity(!): 36  High-sensitivity troponin 36.  2-hour repeat ordered.   2131 Troponin T, High Sensitivity(!): 34  Second high-sensitivity troponin 34.  As no acute findings noted today we will discharge home.  Close follow-up with PCP.  Return precautions given.          ECG:    ECG competed at 1850 and personally reviewed at 1855 showing sinus bradycardia with ventricular rate of 55 and QTc of 409.  Normal axis.  Normal ECG.  When compared to ECG from 5//22, no significant changes noted.         Results for orders placed or performed during the hospital  encounter of 11/04/24 (from the past 24 hours)   UA with Microscopic reflex to Culture    Specimen: Urine, Clean Catch   Result Value Ref Range    Color Urine Light Yellow Colorless, Straw, Light Yellow, Yellow    Appearance Urine Clear Clear    Glucose Urine Negative Negative mg/dL    Bilirubin Urine Negative Negative    Ketones Urine Negative Negative mg/dL    Specific Gravity Urine 1.015 1.003 - 1.035    Blood Urine Negative Negative    pH Urine 5.0 4.7 - 8.0    Protein Albumin Urine Negative Negative mg/dL    Urobilinogen Urine Normal Normal, 2.0 mg/dL    Nitrite Urine Negative Negative    Leukocyte Esterase Urine Negative Negative    Bacteria Urine Few (A) None Seen /HPF    Mucus Urine Present (A) None Seen /LPF    RBC Urine 0 <=2 /HPF    WBC Urine 2 <=5 /HPF    Squamous Epithelials Urine 0 <=1 /HPF    Hyaline Casts Urine 1 <=2 /LPF    Narrative    Urine Culture not indicated   EKG 12-lead, tracing only   Result Value Ref Range    Systolic Blood Pressure  mmHg    Diastolic Blood Pressure  mmHg    Ventricular Rate 55 BPM    Atrial Rate 55 BPM    OK Interval 156 ms    QRS Duration 78 ms     ms    QTc 409 ms    P Axis 59 degrees    R AXIS -16 degrees    T Axis 48 degrees    Interpretation ECG       Sinus bradycardia  Otherwise normal ECG  No previous ECGs available     XR Chest Port 1 View    Narrative    Exam:  XR CHEST PORT 1 VIEW    HISTORY: Confusion.    COMPARISON:  5/9/2022, 5/4/2022    FINDINGS:     The cardiomediastinal contours are normal.      No focal consolidation, effusion, or pneumothorax.      No acute osseous abnormality.       Impression    IMPRESSION:      No acute cardiopulmonary process.      NAN ASENCIO MD         SYSTEM ID:  [id number]   CBC with platelets   Result Value Ref Range    WBC Count 6.8 4.0 - 11.0 10e3/uL    RBC Count 4.72 4.40 - 5.90 10e6/uL    Hemoglobin 13.3 13.3 - 17.7 g/dL    Hematocrit 40.8 40.0 - 53.0 %    MCV 86 78 - 100 fL    MCH 28.2 26.5 - 33.0 pg    MCHC 32.6  31.5 - 36.5 g/dL    RDW 13.5 10.0 - 15.0 %    Platelet Count 173 150 - 450 10e3/uL   Comprehensive metabolic panel   Result Value Ref Range    Sodium 137 135 - 145 mmol/L    Potassium 4.4 3.4 - 5.3 mmol/L    Carbon Dioxide (CO2) 22 22 - 29 mmol/L    Anion Gap 12 7 - 15 mmol/L    Urea Nitrogen 20.9 8.0 - 23.0 mg/dL    Creatinine 0.92 0.67 - 1.17 mg/dL    GFR Estimate 79 >60 mL/min/1.73m2    Calcium 9.1 8.8 - 10.4 mg/dL    Chloride 103 98 - 107 mmol/L    Glucose 156 (H) 70 - 99 mg/dL    Alkaline Phosphatase 93 40 - 150 U/L    AST 22 0 - 45 U/L    ALT 16 0 - 70 U/L    Protein Total 6.1 (L) 6.4 - 8.3 g/dL    Albumin 4.1 3.5 - 5.2 g/dL    Bilirubin Total 0.4 <=1.2 mg/dL   Troponin T, High Sensitivity   Result Value Ref Range    Troponin T, High Sensitivity 36 (H) <=22 ng/L   Magnesium   Result Value Ref Range    Magnesium 1.6 (L) 1.7 - 2.3 mg/dL   Blood gas venous   Result Value Ref Range    pH Venous 7.36 7.32 - 7.43    pCO2 Venous 48 40 - 50 mm Hg    pO2 Venous 20 (L) 25 - 47 mm Hg    Bicarbonate Venous 27 21 - 28 mmol/L    Base Excess/Deficit Venous 0.5 -3.0 - 3.0 mmol/L    FIO2 21     Oxyhemoglobin Venous 31 (L) 70 - 75 %    O2 Sat, Venous 31.6 (L) 70.0 - 75.0 %    Narrative    In healthy individuals, oxyhemoglobin (O2Hb) and oxygen saturation (SO2) are approximately equal. In the presence of dyshemoglobins, oxyhemoglobin can be considerably lower than oxygen saturation.   Ammonia   Result Value Ref Range    Ammonia 11 (L) 16 - 60 umol/L   Ethyl Alcohol Level   Result Value Ref Range    Alcohol ethyl <0.01 <=0.01 g/dL   TSH with free T4 reflex   Result Value Ref Range    TSH 5.13 (H) 0.30 - 4.20 uIU/mL   T4 free   Result Value Ref Range    Free T4 1.60 0.90 - 1.70 ng/dL   Head CT w/o contrast    Narrative    Exam: CT HEAD W/O CONTRAST      Exam reason: Confusion    Technique:   Axial images of the head obtained without contrast. Coronal and  sagittal reformations were generated. This CT was performed using one  or  more of the following dose reduction techniques: automated exposure  control, adjustment of the mA and/or kV according to patient size,  and/or use of iterative reconstruction technique.    Comparison: None.        Findings:      Parenchyma: No evidence of intraparenchymal hemorrhage, mass, acute  cortical infarct or prior infarct in a major vascular territory.      There are a few foci of hypoattenuation in the periventricular and  deep white matter, nonspecific but likely due to chronic microvascular  ischemic change.    No midline shift. The basilar cisterns are patent.    Extra-axial spaces: No extra-axial fluid collection or hemorrhage.     Ventricles: Normal.  Paranasal sinuses: Clear.   Mastoid air cells: Clear.    Osseous: No acute findings.  Orbits: Normal.    Soft tissues: Unremarkable.       Impression    Impression:  No acute intracranial abnormality.      NAN ASENCIO MD         SYSTEM ID:  [id number]   Troponin T, High Sensitivity   Result Value Ref Range    Troponin T, High Sensitivity 34 (H) <=22 ng/L       Medications - No data to display    Assessments & Plan (with Medical Decision Making)     I have reviewed the nursing notes.    I have reviewed the findings, diagnosis, plan and need for follow up with the patient.    Summary:  Patient presents to the ER today for hallucinations.  Potential diagnosis which have been considered and evaluated include UTI, electrolyte abnormality, intracerebral abnormality, medication reaction, metabolic encephalopathy, as well as others. Many of these have been excluded using the various modalities and assessment as noted on the chart. At the present time, the diagnosis given seems to be the most likely visual hallucinations.  Upon arrival, vitals signs are normal.  The patient is alert and oriented.  Neurological examination normal.  Cardiac and respiratory examination normal.  ECG obtained on arrival showing no acute abnormalities.  Lab work obtained showing  WBC of 6.5 with hemoglobin of 13.3.  Electrolytes, renal, hepatic functions benign.  Glucose 156.  Venous pH 7.36 with a CO2 of 48.  Ammonia negative.  Alcohol negative.  TSH mildly elevated at 5.13.  UA benign.  High-sensitivity troponin 36.  2-hour repeat high-sensitivity troponin 34 making ACS unlikely.  Chest x-ray personally reviewed showing no cardiopulmonary abnormalities.  CT of head showed no acute intracerebral abnormality.  At this time no acute findings noted on labs, imaging, or vital signs.  Discussed that this could be a possibility of medication reaction.  Other etiology still cannot be ruled out.  Will discharge home for follow-up with PCP.  Patient's daughter is here and patient will be closely monitored.  Return to ER if new or worse symptoms or any concerns otherwise follow-up as described above.  Patient and family verbalized understand agree with plan of care.  Patient discharged home.      Critical Care Time: None    Impression and plan discussed with patient. Questions answered, concerns addressed, indications for urgent re-evaluation reviewed, and  given. Patient/Parent/Caregiver agree with treatment plan and have no further questions at this time.  AVS provided at discharge.    This note was created by the Dragon Voice Dictation System. Inadvertent typographical errors, due to software recognition problems, may still exist.             New Prescriptions    No medications on file       Final diagnoses:   Visual hallucinations       11/4/2024   [facility]       David Nuñez APRN CNP  11/04/24 [id number]

## 2024-11-05 NOTE — ED NOTES
Pt is here with c/o calling the police d/t having hallucinations. Pt reports was seeing people in the yard. Police arrived and stated there was no one there. Pt daughter at bedside stated this has happened before but its starting to happen more frequently. Pt understands that what he's seeing is not real but in the moment its very real for pt. Pt is [employer].

## 2024-11-05 NOTE — ED NOTES
Bed: ED05  Expected date:   Expected time:   Means of arrival:   Comments:  [facility]

## 2024-11-05 NOTE — ED TRIAGE NOTES
"Patient presents with c/o hallucinations and confusion that started several weeks ago. Reports both auditory and visual hallucinations. Patient lives at home alone. Patient aware that he is \"seeing things that aren't there.\" Patient denies any complaints at this time but does report that he has urinary frequency when asked, denies any other symptoms.         "

## 2024-11-05 NOTE — DISCHARGE INSTRUCTIONS
Follow-up with your primary care provider for reevaluation.  Contact your primary care provider if you have any questions or concerns.  Do not hesitate to return to the ER if any new or worsening symptoms.     Please read the attached instructions (if any).  They highlight more specific treatments and interventions for you at home.              Thank you for letting me participate in your care and wish you a fast and uneventful recovery,    David ROOT CNP    Do not hesitate to contact me with questions or concerns.  [email or web address]

## 2024-11-14 ENCOUNTER — OFFICE VISIT (OUTPATIENT)
Dept: PODIATRY | Facility: OTHER | Age: 89
End: 2024-11-14
Attending: PODIATRIST
Payer: COMMERCIAL

## 2024-11-14 VITALS
OXYGEN SATURATION: 98 % | HEART RATE: 63 BPM | SYSTOLIC BLOOD PRESSURE: 117 MMHG | TEMPERATURE: 96.9 F | DIASTOLIC BLOOD PRESSURE: 69 MMHG

## 2024-11-14 DIAGNOSIS — E11.9 DIABETES MELLITUS TYPE 2, NONINSULIN DEPENDENT (H): ICD-10-CM

## 2024-11-14 DIAGNOSIS — E11.42 DIABETIC POLYNEUROPATHY ASSOCIATED WITH TYPE 2 DIABETES MELLITUS (H): ICD-10-CM

## 2024-11-14 DIAGNOSIS — Z13.89 SCREENING FOR DIABETIC PERIPHERAL NEUROPATHY: ICD-10-CM

## 2024-11-14 DIAGNOSIS — L60.3 ONYCHODYSTROPHY: Primary | ICD-10-CM

## 2024-11-14 PROCEDURE — 11721 DEBRIDE NAIL 6 OR MORE: CPT | Performed by: PODIATRIST

## 2024-11-14 PROCEDURE — G0463 HOSPITAL OUTPT CLINIC VISIT: HCPCS

## 2024-11-14 ASSESSMENT — PAIN SCALES - GENERAL: PAINLEVEL_OUTOF10: NO PAIN (0)

## 2024-11-14 NOTE — PROGRESS NOTES
Chief complaint: Patient presents with:  Toenail: Trimming      History of Present Illness: This 89-year-old NIDDM male is seen for follow-up management of elongated toenails.     He has a little burning, tingling, and numbness in his feet.    Patient says he has continued to be active with dancing for exercise. His daughter is helping him to file his nails between his appointments. He likes to come in every 3 1/2 months.    No further pedal complaints today.       Lab Results   Component Value Date    A1C 6.9 07/15/2024    A1C 7.1 02/21/2024    A1C 6.6 11/20/2023    A1C 6.8 07/21/2023    A1C 6.5 04/21/2023    A1C 6.3 05/03/2021    A1C 6.1 02/01/2021    A1C 6.4 10/30/2020    A1C 6.5 07/29/2020    A1C 6.3 01/08/2020         Wt Readings from Last 4 Encounters:   07/15/24 58.6 kg (129 lb 1.6 oz)   02/21/24 58.6 kg (129 lb 3 oz)   11/20/23 58.1 kg (128 lb)   07/21/23 59.3 kg (130 lb 12.8 oz)         /69 (BP Location: Left arm, Patient Position: Sitting, Cuff Size: Adult Regular)   Pulse 63   Temp 96.9  F (36.1  C) (Tympanic)   SpO2 98%     Patient Active Problem List   Diagnosis    Other specified hypothyroidism    Chronic systolic congestive heart failure (H)    Benign essential hypertension    Hyperlipidemia LDL goal <70    Bilateral carotid artery stenosis    Coronary artery disease involving coronary bypass graft of native heart without angina pectoris    Encounter for diabetic foot exam (H)    Trochanteric bursitis of right hip    Gastroesophageal reflux disease, esophagitis presence not specified    Primary osteoarthritis of right hip    Bilateral primary osteoarthritis of knee     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)    Chronic bilateral low back pain without sciatica    DDD (degenerative disc disease), lumbar    Allergic arthritis of right hip    Keratoacanthoma of cheek    Atypical squamoproliferative skin lesion    Primary localized osteoarthrosis of lower leg,  unspecified laterality    Type 2 diabetes mellitus with hyperglycemia, without long-term current use of insulin (H)    Atypical chest pain    Pulmonary nodules    Primary localized osteoarthrosis of left lower leg    Cervicalgia    Derangement of right sacroiliac joint    Diastolic dysfunction    Memory loss    Toenail deformity       Past Surgical History:   Procedure Laterality Date    AS CABG, ARTERY-VEIN, FIVE  11/02/2011    off pump       Current Outpatient Medications   Medication Sig Dispense Refill    acetaminophen (TYLENOL) 500 MG tablet Take 2 Tablets by mouth two times a day as needed for mild pain 120 tablet 3    ASPIRIN PO Take 325 mg by mouth daily      atorvastatin (LIPITOR) 10 MG tablet Take 1 Tablet by mouth one time a day. 90 tablet 2    blood glucose (NO BRAND SPECIFIED) lancets standard Use to test blood sugar 1 times daily 100 each 3    blood glucose (NO BRAND SPECIFIED) test strip Use to test blood sugar 1 times daily 100 each 3    blood glucose monitoring (NO BRAND SPECIFIED) meter device kit Use to test blood sugar 1 times daily 1 kit 0    famotidine (PEPCID) 40 MG tablet Take 1 Tablet by mouth one time a day. 90 tablet 2    glipiZIDE (GLUCOTROL XL) 2.5 MG 24 hr tablet Take 1 tablet by mouth daily      levothyroxine (SYNTHROID/LEVOTHROID) 137 MCG tablet Take 1 Tablet by mouth one time a day monday-friday. 60 tablet 3    levothyroxine (SYNTHROID/LEVOTHROID) 150 MCG tablet Take 1 Tablet by mouth once daily on Saturday and Sunday 30 tablet 11    lisinopril (ZESTRIL) 2.5 MG tablet Take 1 Tablet by mouth one time a day. 90 tablet 2    metFORMIN (GLUCOPHAGE XR) 500 MG 24 hr tablet Take 2 tablets (1,000 mg) by mouth At Bedtime 180 tablet 1    metoprolol succinate ER (TOPROL XL) 50 MG 24 hr tablet Take 1 Tablet by mouth one time a day. 90 tablet 2    vitamin D3 (CHOLECALCIFEROL) 50 mcg (2000 units) tablet Take by mouth daily      nitroGLYcerin (NITROSTAT) 0.4 MG sublingual tablet For chest pain place  1 tablet under the tongue every 5 minutes for 3 doses. If symptoms persist 5 minutes after 1st dose call 911. (Patient not taking: Reported on 11/14/2024) 25 tablet 0     No current facility-administered medications for this visit.        No Known Allergies    Family History   Problem Relation Age of Onset    Cerebrovascular Disease Father     Coronary Artery Disease Father     Dementia Mother     Coronary Artery Disease Sister     Lung Cancer Sister     Thyroid Disease Daughter        Social History     Socioeconomic History    Marital status:      Spouse name: Luis    Number of children: 5    Years of education: 12    Highest education level: Not on file   Occupational History    Occupation:      Employer: RETIRED   Social Needs    Financial resource strain: Not on file    Food insecurity:     Worry: Not on file     Inability: Not on file    Transportation needs:     Medical: Not on file     Non-medical: Not on file   Tobacco Use    Smoking status: Never Smoker    Smokeless tobacco: Never Used    Tobacco comment: second hand smoke in the house 40 yrs   Substance and Sexual Activity    Alcohol use: No     Alcohol/week: 0.0 oz    Drug use: No    Sexual activity: Never     Partners: Female   Lifestyle    Physical activity:     Days per week: Not on file     Minutes per session: Not on file    Stress: Not on file   Relationships    Social connections:     Talks on phone: Not on file     Gets together: Not on file     Attends [employer] service: Not on file     Active member of club or organization: Not on file     Attends meetings of clubs or organizations: Not on file     Relationship status: Not on file    Intimate partner violence:     Fear of current or ex partner: Not on file     Emotionally abused: Not on file     Physically abused: Not on file     Forced sexual activity: Not on file   Other Topics Concern     Service No    Blood Transfusions Yes     Comment: Ok to recieve      Caffeine Concern Yes     Comment: 3 cups daily     Occupational Exposure Not Asked    Hobby Hazards Not Asked    Sleep Concern Not Asked    Stress Concern Not Asked    Weight Concern Not Asked    Special Diet Not Asked    Back Care Not Asked    Exercise Yes     Comment: walking,      Bike Helmet Not Asked    Seat Belt Yes    Self-Exams Not Asked    Parent/sibling w/ CABG, MI or angioplasty before 65F 55M? Not Asked   Social History Narrative    Not on file       ROS: 10 point ROS neg other than the symptoms noted above in the HPI.  EXAM  Constitutional: healthy, alert and no distress    Psychiatric: mentation appears normal and affect normal/bright    VASCULAR:  -Dorsalis pedis pulse +1/4 b/l  -Posterior tibial pulse +1/4 b/l  -Capillary refill time < 3 seconds to b/l hallux  -Hair growth Absent to b/l anterior legs and ankles  NEURO:  -Positive for paresthesias to the bilateral foot  -Epicritic and protective sensation diminished to the bilateral foot  DERM:  -Skin mildly dry, flaking to bilateral digits only (improved to the remainder of the foot)   -Skin temperature within normal limits to the bilateral foot  -Toenails elongated, thickened, dystrophic and discolored with subungual debris x 10  MSK:  -Muscle strength of ankles +5/5 for dorsiflexion, plantarflexion, ABDUction and ADDuction b/l    ============================================================    ASSESSMENT:    (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 Treatment options discussed with no educational barriers noted.    -High risk toenail debridement x 10 toenails without incident    Diabetes Mellitus: Patient's DM is managed by their PCP. The DM appears to be stable. Patient's last  HbA1C was 6.9% on 07/15/2024.    -Diabetic Foot Education provided. This included checking the feet daily looking for new new blisters or wounds, wearing shoes at all times when walking including around the house, and avoiding lotion application between the toes. Any sign of infection in the foot warrant's the patient presenting to the ED as soon as possible.  ---Patient has improved xerosis of the skin.     -Patient in agreement with the above treatment plan and all of patient's questions were answered.      RTC 3 1/2 months for diabetic foot exam and high risk nail debridement        Deidre Mishra DPM

## 2024-11-25 ENCOUNTER — ANCILLARY PROCEDURE (OUTPATIENT)
Dept: GENERAL RADIOLOGY | Facility: OTHER | Age: 89
End: 2024-11-25
Attending: FAMILY MEDICINE
Payer: COMMERCIAL

## 2024-11-25 ENCOUNTER — OFFICE VISIT (OUTPATIENT)
Dept: FAMILY MEDICINE | Facility: OTHER | Age: 89
End: 2024-11-25
Attending: FAMILY MEDICINE
Payer: COMMERCIAL

## 2024-11-25 ENCOUNTER — APPOINTMENT (OUTPATIENT)
Dept: LAB | Facility: OTHER | Age: 89
End: 2024-11-25
Attending: FAMILY MEDICINE
Payer: COMMERCIAL

## 2024-11-25 VITALS
SYSTOLIC BLOOD PRESSURE: 110 MMHG | DIASTOLIC BLOOD PRESSURE: 69 MMHG | RESPIRATION RATE: 18 BRPM | WEIGHT: 126.8 LBS | TEMPERATURE: 96.5 F | OXYGEN SATURATION: 96 % | HEART RATE: 62 BPM | HEIGHT: 67 IN | BODY MASS INDEX: 19.9 KG/M2

## 2024-11-25 DIAGNOSIS — Z79.4 DIABETES MELLITUS DUE TO UNDERLYING CONDITION WITH CHRONIC KIDNEY DISEASE ON CHRONIC DIALYSIS, WITH LONG-TERM CURRENT USE OF INSULIN (H): ICD-10-CM

## 2024-11-25 DIAGNOSIS — M51.361 DEGENERATION OF INTERVERTEBRAL DISC OF LUMBAR REGION WITH LOWER EXTREMITY PAIN: ICD-10-CM

## 2024-11-25 DIAGNOSIS — N18.6 DIABETES MELLITUS DUE TO UNDERLYING CONDITION WITH CHRONIC KIDNEY DISEASE ON CHRONIC DIALYSIS, WITH LONG-TERM CURRENT USE OF INSULIN (H): ICD-10-CM

## 2024-11-25 DIAGNOSIS — M16.11 PRIMARY OSTEOARTHRITIS OF RIGHT HIP: ICD-10-CM

## 2024-11-25 DIAGNOSIS — E03.8 OTHER SPECIFIED HYPOTHYROIDISM: ICD-10-CM

## 2024-11-25 DIAGNOSIS — R79.0 LOW MAGNESIUM LEVEL: ICD-10-CM

## 2024-11-25 DIAGNOSIS — M87.9 OSTEONECROSIS (H): ICD-10-CM

## 2024-11-25 DIAGNOSIS — E11.65 TYPE 2 DIABETES MELLITUS WITH HYPERGLYCEMIA, WITHOUT LONG-TERM CURRENT USE OF INSULIN (H): ICD-10-CM

## 2024-11-25 DIAGNOSIS — M54.50 CHRONIC BILATERAL LOW BACK PAIN WITHOUT SCIATICA: ICD-10-CM

## 2024-11-25 DIAGNOSIS — Z00.00 MEDICARE ANNUAL WELLNESS VISIT, SUBSEQUENT: ICD-10-CM

## 2024-11-25 DIAGNOSIS — I10 BENIGN ESSENTIAL HYPERTENSION: ICD-10-CM

## 2024-11-25 DIAGNOSIS — M25.551 HIP PAIN, RIGHT: ICD-10-CM

## 2024-11-25 DIAGNOSIS — I50.22 CHRONIC SYSTOLIC CONGESTIVE HEART FAILURE (H): ICD-10-CM

## 2024-11-25 DIAGNOSIS — G89.29 CHRONIC BILATERAL LOW BACK PAIN WITHOUT SCIATICA: ICD-10-CM

## 2024-11-25 DIAGNOSIS — E78.5 HYPERLIPIDEMIA LDL GOAL <70: ICD-10-CM

## 2024-11-25 DIAGNOSIS — Z99.2 DIABETES MELLITUS DUE TO UNDERLYING CONDITION WITH CHRONIC KIDNEY DISEASE ON CHRONIC DIALYSIS, WITH LONG-TERM CURRENT USE OF INSULIN (H): ICD-10-CM

## 2024-11-25 DIAGNOSIS — R41.3 MEMORY LOSS: ICD-10-CM

## 2024-11-25 DIAGNOSIS — M54.50 CHRONIC BILATERAL LOW BACK PAIN WITHOUT SCIATICA: Primary | ICD-10-CM

## 2024-11-25 DIAGNOSIS — M17.0 BILATERAL PRIMARY OSTEOARTHRITIS OF KNEE: ICD-10-CM

## 2024-11-25 DIAGNOSIS — G89.29 CHRONIC BILATERAL LOW BACK PAIN WITHOUT SCIATICA: Primary | ICD-10-CM

## 2024-11-25 DIAGNOSIS — R44.1 VISUAL HALLUCINATION: ICD-10-CM

## 2024-11-25 DIAGNOSIS — E08.22 DIABETES MELLITUS DUE TO UNDERLYING CONDITION WITH CHRONIC KIDNEY DISEASE ON CHRONIC DIALYSIS, WITH LONG-TERM CURRENT USE OF INSULIN (H): ICD-10-CM

## 2024-11-25 DIAGNOSIS — M17.10 PRIMARY LOCALIZED OSTEOARTHROSIS OF LOWER LEG, UNSPECIFIED LATERALITY: ICD-10-CM

## 2024-11-25 DIAGNOSIS — M16.7 OTHER SECONDARY OSTEOARTHRITIS OF RIGHT HIP: ICD-10-CM

## 2024-11-25 LAB
ALBUMIN SERPL BCG-MCNC: 4 G/DL (ref 3.5–5.2)
ALP SERPL-CCNC: 90 U/L (ref 40–150)
ALT SERPL W P-5'-P-CCNC: 17 U/L (ref 0–70)
ANION GAP SERPL CALCULATED.3IONS-SCNC: 10 MMOL/L (ref 7–15)
AST SERPL W P-5'-P-CCNC: 20 U/L (ref 0–45)
BILIRUB SERPL-MCNC: 0.5 MG/DL
BUN SERPL-MCNC: 23.4 MG/DL (ref 8–23)
CALCIUM SERPL-MCNC: 9.3 MG/DL (ref 8.8–10.4)
CHLORIDE SERPL-SCNC: 104 MMOL/L (ref 98–107)
CHOLEST SERPL-MCNC: 95 MG/DL
CREAT SERPL-MCNC: 1.02 MG/DL (ref 0.67–1.17)
EGFRCR SERPLBLD CKD-EPI 2021: 70 ML/MIN/1.73M2
EST. AVERAGE GLUCOSE BLD GHB EST-MCNC: 137 MG/DL
FASTING STATUS PATIENT QL REPORTED: ABNORMAL
FASTING STATUS PATIENT QL REPORTED: ABNORMAL
GLUCOSE SERPL-MCNC: 180 MG/DL (ref 70–99)
HBA1C MFR BLD: 6.4 %
HCO3 SERPL-SCNC: 24 MMOL/L (ref 22–29)
HDLC SERPL-MCNC: 34 MG/DL
LDLC SERPL CALC-MCNC: 47 MG/DL
MAGNESIUM SERPL-MCNC: 1.6 MG/DL (ref 1.7–2.3)
NONHDLC SERPL-MCNC: 61 MG/DL
POTASSIUM SERPL-SCNC: 4.5 MMOL/L (ref 3.4–5.3)
PROT SERPL-MCNC: 5.8 G/DL (ref 6.4–8.3)
SODIUM SERPL-SCNC: 138 MMOL/L (ref 135–145)
T4 FREE SERPL-MCNC: 1.27 NG/DL (ref 0.9–1.7)
TRIGL SERPL-MCNC: 72 MG/DL
TSH SERPL DL<=0.005 MIU/L-ACNC: 8.19 UIU/ML (ref 0.3–4.2)

## 2024-11-25 PROCEDURE — 20610 DRAIN/INJ JOINT/BURSA W/O US: CPT | Performed by: FAMILY MEDICINE

## 2024-11-25 PROCEDURE — 82247 BILIRUBIN TOTAL: CPT | Mod: ZL | Performed by: FAMILY MEDICINE

## 2024-11-25 PROCEDURE — 84443 ASSAY THYROID STIM HORMONE: CPT | Mod: ZL | Performed by: FAMILY MEDICINE

## 2024-11-25 PROCEDURE — 36415 COLL VENOUS BLD VENIPUNCTURE: CPT | Mod: ZL | Performed by: FAMILY MEDICINE

## 2024-11-25 PROCEDURE — G0463 HOSPITAL OUTPT CLINIC VISIT: HCPCS | Mod: 25

## 2024-11-25 PROCEDURE — 84439 ASSAY OF FREE THYROXINE: CPT | Mod: ZL | Performed by: FAMILY MEDICINE

## 2024-11-25 PROCEDURE — 250N000011 HC RX IP 250 OP 636: Mod: JZ | Performed by: FAMILY MEDICINE

## 2024-11-25 PROCEDURE — 84155 ASSAY OF PROTEIN SERUM: CPT | Mod: ZL | Performed by: FAMILY MEDICINE

## 2024-11-25 PROCEDURE — 83735 ASSAY OF MAGNESIUM: CPT | Mod: ZL | Performed by: FAMILY MEDICINE

## 2024-11-25 PROCEDURE — 80061 LIPID PANEL: CPT | Mod: ZL | Performed by: FAMILY MEDICINE

## 2024-11-25 PROCEDURE — 83036 HEMOGLOBIN GLYCOSYLATED A1C: CPT | Mod: ZL | Performed by: FAMILY MEDICINE

## 2024-11-25 PROCEDURE — 73502 X-RAY EXAM HIP UNI 2-3 VIEWS: CPT | Mod: TC

## 2024-11-25 PROCEDURE — 72100 X-RAY EXAM L-S SPINE 2/3 VWS: CPT | Mod: TC

## 2024-11-25 RX ORDER — LIDOCAINE HYDROCHLORIDE 10 MG/ML
10 INJECTION, SOLUTION EPIDURAL; INFILTRATION; INTRACAUDAL; PERINEURAL ONCE
Status: COMPLETED | OUTPATIENT
Start: 2024-11-25 | End: 2024-11-25

## 2024-11-25 RX ORDER — LEVOTHYROXINE SODIUM 150 UG/1
TABLET ORAL
Qty: 30 TABLET | Refills: 2 | Status: SHIPPED | OUTPATIENT
Start: 2024-11-25

## 2024-11-25 RX ORDER — TRIAMCINOLONE ACETONIDE 40 MG/ML
40 INJECTION, SUSPENSION INTRA-ARTICULAR; INTRAMUSCULAR ONCE
Status: COMPLETED | OUTPATIENT
Start: 2024-11-25 | End: 2024-11-25

## 2024-11-25 RX ADMIN — TRIAMCINOLONE ACETONIDE 40 MG: 40 INJECTION, SUSPENSION INTRA-ARTICULAR; INTRAMUSCULAR at 10:38

## 2024-11-25 RX ADMIN — LIDOCAINE HYDROCHLORIDE 10 ML: 10 INJECTION, SOLUTION EPIDURAL; INFILTRATION; INTRACAUDAL; PERINEURAL at 10:37

## 2024-11-25 RX ADMIN — TRIAMCINOLONE ACETONIDE 40 MG: 40 INJECTION, SUSPENSION INTRA-ARTICULAR; INTRAMUSCULAR at 10:39

## 2024-11-25 RX ADMIN — Medication 96 MG: at 10:31

## 2024-11-25 SDOH — HEALTH STABILITY: PHYSICAL HEALTH: ON AVERAGE, HOW MANY DAYS PER WEEK DO YOU ENGAGE IN MODERATE TO STRENUOUS EXERCISE (LIKE A BRISK WALK)?: 5 DAYS

## 2024-11-25 SDOH — HEALTH STABILITY: PHYSICAL HEALTH: ON AVERAGE, HOW MANY MINUTES DO YOU ENGAGE IN EXERCISE AT THIS LEVEL?: 20 MIN

## 2024-11-25 ASSESSMENT — LIFESTYLE VARIABLES
SKIP TO QUESTIONS 9-10: 1
HOW MANY STANDARD DRINKS CONTAINING ALCOHOL DO YOU HAVE ON A TYPICAL DAY: PATIENT DOES NOT DRINK
HOW OFTEN DO YOU HAVE A DRINK CONTAINING ALCOHOL: MONTHLY OR LESS
AUDIT-C TOTAL SCORE: 1
HOW OFTEN DO YOU HAVE SIX OR MORE DRINKS ON ONE OCCASION: NEVER

## 2024-11-25 ASSESSMENT — SOCIAL DETERMINANTS OF HEALTH (SDOH): HOW OFTEN DO YOU GET TOGETHER WITH FRIENDS OR RELATIVES?: MORE THAN THREE TIMES A WEEK

## 2024-11-25 NOTE — ADDENDUM NOTE
Addended by: LISSY ARRIAGA on: 11/25/2024 02:26 PM     Modules accepted: Orders, Level of Service

## 2024-11-25 NOTE — PROGRESS NOTES
Preventive Care Visit  [facility]  Bisi Stuart MD, Family Medicine  Nov 25, 2024      Assessment & Plan     Medicare annual wellness visit, subsequent  Follow-up 1 year    Hyperlipidemia LDL goal <70  Improved lipid panel   - Comprehensive metabolic panel; Future  - Lipid Profile (Chol, Trig, HDL, LDL calc); Future  - Comprehensive metabolic panel  - Lipid Profile (Chol, Trig, HDL, LDL calc)    Type 2 diabetes mellitus with hyperglycemia, without long-term current use of insulin (H)  Looks good   - Hemoglobin A1c; Future  - Hemoglobin A1c    Other specified hypothyroidism  stable    Chronic systolic congestive heart failure (H)  stable    Benign essential hypertension  stable    Hip pain, right  Worsening, keeping him from movement  Will plan for injection if needed  - XR Hip Right 2-3 Views (Clinic Performed); Future    Chronic bilateral low back pain without sciatica  Xray today and consider injections  - XR LUMBAR SPINE 2/3 VIEWS (Clinic Performed); Future  - triamcinolone (KENALOG-40) injection 40 mg    Bilateral primary osteoarthritis of knee   - Occupational Therapy  Referral; Future  - Large Joint/Bursa injection and/or drainage (Shoulder, Knee)  - hylan (SYNVISC ONE) injection 96 mg  - triamcinolone (KENALOG-40) injection 40 mg  - lidocaine (PF) (XYLOCAINE) 1 % injection 10 mL  - triamcinolone (KENALOG-40) injection 40 mg    Primary osteoarthritis of right hip  Family does not want him driving anymore and he reports he is driving less but will get OT evaluation through [facility] in [place]  - Occupational Therapy  Referral; Future    Degeneration of intervertebral disc of lumbar region with lower extremity pain    - Occupational Therapy  Referral; Future    Memory loss  Other reason his family doesn't think he should be driving  - Occupational Therapy  Referral; Future    Primary localized osteoarthrosis of lower leg, unspecified laterality    Low magnesium  level  Was low in ER, will recheck  - Magnesium    Visual hallucination  Nothing since, recheck tsh and magnesium  - TSH with free T4 reflex  - T4 free    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  Checklist reviewing preventive services available has been given to the patient.  Reviewed patient's diet, addressing concerns and/or questions.   The patient was instructed to see the dentist every 6 months.   Updated plan of care.  Patient reported difficulty with activities of daily living were addressed today.Addressed any concerns about safety while driving.  The patient was provided with written information regarding signs of hearing loss.     Patient was agreeable to this plan and had no further questions.  There are no Patient Instructions on file for this visit.    47 minutes spent by me on the date of the encounter doing chart review, history and exam, documentation and further activities per the note, over 40 minutes spent in acute and chronic conditions and remaining time in HCM and exam.  This time statement does not include time for injections at all.      No follow-ups on file.    Abel Julio is a 89 year old, presenting for the following:  Physical        11/25/2024     8:44 AM   Additional Questions   Roomed by warren solano   Accompanied by granddaughter         11/25/2024     8:44 AM   Patient Reported Additional Medications   Patient reports taking the following new medications none          HPI  Diabetes Follow-up    How  often are you checking your blood sugar? Not at all  What concerns do you have today about your diabetes? None   Do you have any of these symptoms? (Select all that apply)  No numbness or tingling in feet.  No redness, sores or blisters on feet.  No complaints of excessive thirst.  No reports of blurry vision.  No significant changes to weight.          Hyperlipidemia Follow-Up    Are you regularly taking any medication or supplement to lower your cholesterol?   Yes- see list  Are you having muscle aches or other side effects that you think could be caused by your cholesterol lowering medication?  No    Hypertension Follow-up    Do you check your blood pressure regularly outside of the clinic? No   Are you following a low salt diet? No  Are your blood pressures ever more than 140 on the top number (systolic) OR more   than 90 on the bottom number (diastolic), for example 140/90? No    BP Readings from Last 2 Encounters:   11/25/24 110/69   11/14/24 117/69     Hemoglobin A1C (%)   Date Value   11/25/2024 6.4 (H)   07/15/2024 6.9 (H)   05/03/2021 6.3 (H)   02/01/2021 6.1 (H)     LDL Cholesterol Calculated (mg/dL)   Date Value   11/25/2024 47   02/21/2024 73   10/30/2020 47   01/08/2020 73     Health Care Directive  Patient has a Health Care Directive on file  Advance care planning document is on file and is current.      11/25/2024   General Health   How would you rate your overall physical health? (!) FAIR   Feel stress (tense, anxious, or unable to sleep) Not at all            11/25/2024   Nutrition   Diet: Regular (no restrictions)            11/25/2024   Exercise   Days per week of moderate/strenous exercise 5 days            11/25/2024   Social Factors   Frequency of gathering with friends or relatives More than three times a week   Worry food won't last until get money to buy more No   Food not last or not have enough money for food? No   Do you have housing? (Housing is defined as stable permanent housing and  does not include staying ouside in a car, in a tent, in an abandoned building, in an overnight shelter, or couch-surfing.) Yes   Are you worried about losing your housing? No   Lack of transportation? No   Unable to get utilities (heat,electricity)? No            11/25/2024   Fall Risk   Fallen 2 or more times in the past year? No    Trouble with walking or balance? Yes    Reason Gait Speed Test Not Completed Patient declines   Reason for decline unable to complete due to markers not being up       Patient-reported          11/25/2024   Activities of Daily Living- Home Safety   Needs help with the following daily activites Telephone use    Housework    Medication administration    Money management   Safety concerns in the home None of the above       Multiple values from one day are sorted in reverse-chronological order         11/25/2024   Dental   Dentist two times every year? (!) NO            11/25/2024   Hearing Screening   Hearing concerns? (!) IT'S HARD TO FOLLOW A CONVERSATION IN A NOISY RESTAURANT OR CROWDED ROOM.    (!) TROUBLE UNDERSTANDING SOFT OR WHISPERED SPEECH.    (!) TROUBLE UNDERSTANDING SPEECH ON THE TELEPHONE       Multiple values from one day are sorted in reverse-chronological order         11/25/2024   Driving Risk Screening   Patient/family members have concerns about driving (!) YES discussed            11/25/2024   General Alertness/Fatigue Screening   Have you been more tired than usual lately? No            11/25/2024   Urinary Incontinence Screening   Bothered by leaking urine in past 6 months No            11/25/2024   TB Screening   Were you born outside of the [place]? No        Today's PHQ-2 Score:       11/25/2024     8:49 AM   PHQ-2 ( 1999 [employer])   Q1: Little interest or pleasure in doing things 0   Q2: Feeling down, depressed or hopeless 0   PHQ-2 Score 0         11/25/2024   Substance Use   Alcohol more than 3/day or more than 7/wk Not Applicable   Do you have a current opioid  prescription? No   How severe/bad is pain from 1 to 10? 5/10   Do you use any other substances recreationally? No        Social History     Tobacco Use    Smoking status: Never     Passive exposure: Never    Smokeless tobacco: Never    Tobacco comments:     second hand smoke in the house 40 yrs   Vaping Use    Vaping status: Never Used   Substance Use Topics    Alcohol use: No     Alcohol/week: 0.0 standard drinks of alcohol    Drug use: No             Reviewed and updated as needed this visit by Provider                  Past Medical History:   Diagnosis Date    Chronic diastolic congestive heart failure (H)     EF 85%    Coronary artery disease involving coronary bypass graft of native heart without angina pectoris 1/5/2017    2011 in [place] With Dr. Negro at [facility].      Coronary atherosclerosis of native coronary artery     CABG x 5    Encounter for diabetic foot exam (H) 1/5/2017    Erectile dysfunction due to arterial insufficiency     Generalized osteoarthrosis     HTN (hypertension)     Hyperlipidemia associated with type 2 diabetes mellitus (H)     Hypothyroidism     hashimoto's thyroiditis    Neuropathy 01/2018    Other specified hypothyroidism 6/29/2016    Primary osteoarthritis of left knee 1/5/2017    Stable angina (H)     Type 2 diabetes mellitus without complication, without long-term current use of insulin (H) 1/5/2017     Past Surgical History:   Procedure Laterality Date    AS CABG, ARTERY-VEIN, FIVE  11/02/2011    off pump     Lab work is in process  Labs reviewed in EPIC  BP Readings from Last 3 Encounters:   11/25/24 110/69   11/14/24 117/69   11/04/24 118/58    Wt Readings from Last 3 Encounters:   11/25/24 57.5 kg (126 lb 12.8 oz)   07/15/24 58.6 kg (129 lb 1.6 oz)   02/21/24 58.6 kg (129 lb 3 oz)                  Patient Active Problem List   Diagnosis    Other specified hypothyroidism    Chronic systolic congestive heart failure (H)    Benign essential hypertension    Hyperlipidemia  LDL goal <70    Bilateral carotid artery stenosis    Coronary artery disease involving coronary bypass graft of native heart without angina pectoris    Encounter for diabetic foot exam (H)    Trochanteric bursitis of right hip    Gastroesophageal reflux disease, esophagitis presence not specified    Primary osteoarthritis of right hip    Bilateral primary osteoarthritis of knee     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)    Chronic bilateral low back pain without sciatica    DDD (degenerative disc disease), lumbar    Allergic arthritis of right hip    Keratoacanthoma of cheek    Atypical squamoproliferative skin lesion    Primary localized osteoarthrosis of lower leg, unspecified laterality    Type 2 diabetes mellitus with hyperglycemia, without long-term current use of insulin (H)    Atypical chest pain    Pulmonary nodules    Primary localized osteoarthrosis of left lower leg    Cervicalgia    Derangement of right sacroiliac joint    Diastolic dysfunction    Memory loss    Toenail deformity     Past Surgical History:   Procedure Laterality Date    AS CABG, ARTERY-VEIN, FIVE  11/02/2011    off pump       Social History     Tobacco Use    Smoking status: Never     Passive exposure: Never    Smokeless tobacco: Never    Tobacco comments:     second hand smoke in the house 40 yrs   Substance Use Topics    Alcohol use: No     Alcohol/week: 0.0 standard drinks of alcohol     Family History   Problem Relation Age of Onset    Dementia Mother     Cerebrovascular Disease Father     Coronary Artery Disease Father     Coronary Artery Disease Sister     Lung Cancer Sister     Thyroid Disease Daughter          Current Outpatient Medications   Medication Sig Dispense Refill    acetaminophen (TYLENOL) 500 MG tablet Take 2 Tablets by mouth two times a day as needed for mild pain 120 tablet 3    ASPIRIN PO Take 325 mg by mouth daily      atorvastatin (LIPITOR) 10 MG tablet Take 1 Tablet by mouth one time  a day. 90 tablet 2    blood glucose (NO BRAND SPECIFIED) lancets standard Use to test blood sugar 1 times daily 100 each 3    blood glucose (NO BRAND SPECIFIED) test strip Use to test blood sugar 1 times daily 100 each 3    blood glucose monitoring (NO BRAND SPECIFIED) meter device kit Use to test blood sugar 1 times daily 1 kit 0    famotidine (PEPCID) 40 MG tablet Take 1 Tablet by mouth one time a day. 90 tablet 2    glipiZIDE (GLUCOTROL XL) 2.5 MG 24 hr tablet Take 1 tablet by mouth daily      levothyroxine (SYNTHROID/LEVOTHROID) 137 MCG tablet Take 1 Tablet by mouth one time a day monday-friday. 60 tablet 3    levothyroxine (SYNTHROID/LEVOTHROID) 150 MCG tablet Take 1 Tablet by mouth once daily on Saturday and Sunday 30 tablet 11    lisinopril (ZESTRIL) 2.5 MG tablet Take 1 Tablet by mouth one time a day. 90 tablet 2    metFORMIN (GLUCOPHAGE XR) 500 MG 24 hr tablet Take 2 tablets (1,000 mg) by mouth At Bedtime 180 tablet 1    metoprolol succinate ER (TOPROL XL) 50 MG 24 hr tablet Take 1 Tablet by mouth one time a day. 90 tablet 2    nitroGLYcerin (NITROSTAT) 0.4 MG sublingual tablet For chest pain place 1 tablet under the tongue every 5 minutes for 3 doses. If symptoms persist 5 minutes after 1st dose call 911. 25 tablet 0    vitamin D3 (CHOLECALCIFEROL) 50 mcg (2000 units) tablet Take by mouth daily       No Known Allergies  Recent Labs   Lab Test 11/25/24  0900 11/04/24  1903 07/15/24  1414 02/21/24  0954 07/21/23  1024 04/21/23  1411 02/01/21  1127 10/30/20  0908 07/29/20  0923 01/08/20  0857   A1C 6.4*  --  6.9* 7.1*   < > 6.5*   < > 6.4*   < > 6.3*   LDL 47  --   --  73  --  49   < > 47  --  73   HDL 34*  --   --  43  --  33*   < > 42  --  37*   TRIG 72  --   --  77  --  81   < > 119  --  90   ALT 17 16  --  15   < > 15   < > 21  --  19   CR 1.02 0.92  --  0.88   < > 1.01   < > 1.06  --  1.02   GFRESTIMATED 70 79  --  82   < > 71   < > 63  --  66   GFRESTBLACK  --   --   --   --   --   --   --  73  --  77  "  POTASSIUM 4.5 4.4  --  4.2   < > 4.8   < > 4.5  --  4.2   TSH  --  5.13*  --  1.83  --  2.15   < > 0.63   < > 4.42*    < > = values in this interval not displayed.      Current providers sharing in care for this patient include:  Patient Care Team:  Bisi Stuart MD as PCP - General (Family Practice)  Bisi Stuart MD as Assigned PCP  Deidre Mishra DPM as Assigned Musculoskeletal Provider    The following health maintenance items are reviewed in Epic and correct as of today:  Health Maintenance   Topic Date Due    ZOSTER IMMUNIZATION (1 of 2) Never done    RSV VACCINE (1 - 1-dose 75+ series) Never done    A1C  01/15/2025    LIPID  02/21/2025    BMP  05/04/2025    HF ACTION PLAN  05/04/2025    MICROALBUMIN  07/15/2025    EYE EXAM  08/28/2025    ALT  11/04/2025    TSH W/FREE T4 REFLEX  11/04/2025    CBC  11/04/2025    DIABETIC FOOT EXAM  11/14/2025    MEDICARE ANNUAL WELLNESS VISIT  11/25/2025    FALL RISK ASSESSMENT  11/25/2025    DTAP/TDAP/TD IMMUNIZATION (3 - Td or Tdap) 11/08/2028    ADVANCE CARE PLANNING  11/20/2028    PHQ-2 (once per calendar year)  Completed    INFLUENZA VACCINE  Completed    Pneumococcal Vaccine: 65+ Years  Completed    COVID-19 Vaccine  Completed    HPV IMMUNIZATION  Aged Out    MENINGITIS IMMUNIZATION  Aged Out    RSV MONOCLONAL ANTIBODY  Aged Out         Review of Systems  Constitutional, HEENT, cardiovascular, pulmonary, GI, , musculoskeletal, neuro, skin, endocrine and psych systems are negative, except as otherwise noted.     Objective    Exam  /69 (BP Location: Right arm, Patient Position: Sitting, Cuff Size: Adult Regular)   Pulse 62   Temp (!) 96.5  F (35.8  C) (Tympanic)   Resp 18   Ht 1.702 m (5' 7\")   Wt 57.5 kg (126 lb 12.8 oz)   SpO2 96%   BMI 19.86 kg/m     Estimated body mass index is 19.86 kg/m  as calculated from the following:    Height as of this encounter: 1.702 m (5' 7\").    Weight as of this encounter: 57.5 kg (126 lb 12.8 " oz).    Physical Exam  GENERAL: alert and no distress  EYES: Eyes grossly normal to inspection, PERRL and conjunctivae and sclerae normal  HENT: ear canals and TM's normal, nose and mouth without ulcers or lesions  NECK: no adenopathy, no asymmetry, masses, or scars  RESP: lungs clear to auscultation - no rales, rhonchi or wheezes  CV: regular rate and rhythm, normal S1 S2, no S3 or S4, no murmur, click or rub, no peripheral edema  ABDOMEN: soft, nontender, no hepatosplenomegaly, no masses and bowel sounds normal  MS: no gross musculoskeletal defects noted, no edema  MS: decreased range of motion right hip, bilateral knees  SKIN: no suspicious lesions or rashes  NEURO: Normal strength and tone, mentation intact and speech normal  PSYCH: mentation appears normal, affect normal/bright         11/25/2024   Mini Cog   Clock Draw Score 0 Abnormal   3 Item Recall 0 objects recalled   Mini Cog Total Score 0            Vision Screen     Signed Electronically by: Bisi Stuart MD  PROCEDURE:  JOINT ASPIRATION/INJECTION.         After a discussion of risks, benefits and side effects of procedure, informed patient consent was obtained.       The bilateral knees were prepped and draped in the usual clean fashion (sterile not required for this procedure).       INJECTION:  Using 5 cc of 1% lidocaine mixed                           with 40 mg of kenalong, the left knee was successfully injected                           without complication.  Patient did experience some pain                          relief following injection.    INJECTION:  Using 5 cc of 1% lidocaine mixed                           with 40 mg of kenalong, the right knee was successfully injected                           without complication.  Patient did experience some pain                          relief following injection.

## 2024-11-27 NOTE — PROGRESS NOTES
"Chief complaint: Patient presents with:  Diabetes: Foot exam      History of Present Illness: This 89 year old NIDDM male is seen for follow-up management of elongated toenails.     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 He still uses it every once in awhile.     He has a little burning, tingling, and numbness in his feet, but not a lot. He says he has most recently had some numbness in his feet. He says his feet get cold if he is not wearing socks. He also wears socks when sleeping.    Patient says he has been staying active. He likes to dance for exercise.     No further pedal complaints today.       Last HbA1C was 6.4% on 11/18/2022.    Previously 6.5% on 05/04/2022.    Previously 6.5% on 01/03/2022.    Previously  6.3% on 05/03/2021  Previously 6.3% from 01/08/2020  Previously 7.1% on 08/26/2019        Wt Readings from Last 4 Encounters:   02/17/23 63.5 kg (140 lb)   11/18/22 63.3 kg (139 lb 9.6 oz)   06/17/22 64 kg (141 lb)   05/04/22 63.1 kg (139 lb 3.2 oz)         /67 (BP Location: Left arm, Patient Position: Sitting, Cuff Size: Adult Regular)   Pulse 56   Temp 97  F (36.1  C)   Ht 1.676 m (5' 6\")   Wt 63.5 kg (140 lb)   SpO2 (!) 82%   BMI 22.60 kg/m      Patient Active Problem List   Diagnosis     Other specified hypothyroidism     Chronic systolic congestive heart failure (H)     Benign essential hypertension     Hyperlipidemia LDL goal <70     Bilateral carotid artery stenosis     Coronary artery disease involving coronary bypass graft of native heart without angina pectoris     Encounter for diabetic foot exam (H)     Trochanteric bursitis of right hip     Gastroesophageal reflux disease, esophagitis presence not specified     Primary osteoarthritis of right hip     Bilateral primary osteoarthritis of knee      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)     Chronic bilateral low back pain without sciatica     DDD (degenerative disc disease), " Healthy young adult!!  - Vaccines today: FLU  - SCREENING LABS: CHOLESTEROL, ANEMIA, VITAMIN D, IRON, LIVE/KIDNEY FUNCTIONS.   - See you next year.    lumbar     Allergic arthritis of right hip     Keratoacanthoma of cheek     Atypical squamoproliferative skin lesion     Generalized osteoarthrosis, unspecified site     Primary localized osteoarthrosis of lower leg, unspecified laterality     Type 2 diabetes mellitus with hyperglycemia, without long-term current use of insulin (H)     Atypical chest pain     Pulmonary nodules       Past Surgical History:   Procedure Laterality Date     AS CABG, ARTERY-VEIN, FIVE  11/02/2011    off pump       Current Outpatient Medications   Medication     acetaminophen (ACETAMINOPHEN EXTRA STRENGTH) 500 MG tablet     ASPIRIN PO     atorvastatin (LIPITOR) 10 MG tablet     blood glucose (NO BRAND SPECIFIED) lancets standard     blood glucose (NO BRAND SPECIFIED) test strip     blood glucose (NO BRAND SPECIFIED) test strip     blood glucose monitoring (NO BRAND SPECIFIED) meter device kit     diclofenac (VOLTAREN) 1 % topical gel     famotidine (PEPCID) 40 MG tablet     levothyroxine (SYNTHROID/LEVOTHROID) 137 MCG tablet     levothyroxine (SYNTHROID/LEVOTHROID) 150 MCG tablet     lisinopril (ZESTRIL) 2.5 MG tablet     metFORMIN (GLUCOPHAGE XR) 500 MG 24 hr tablet     metoprolol succinate ER (TOPROL XL) 50 MG 24 hr tablet     nitroGLYcerin (NITROSTAT) 0.4 MG sublingual tablet     vitamin D3 (CHOLECALCIFEROL) 50 mcg (2000 units) tablet     Current Facility-Administered Medications   Medication     triamcinolone (KENALOG-40) injection 40 mg     triamcinolone (KENALOG-40) injection 80 mg        No Known Allergies    Family History   Problem Relation Age of Onset     Cerebrovascular Disease Father      Coronary Artery Disease Father      Dementia Mother      Coronary Artery Disease Sister      Lung Cancer Sister      Thyroid Disease Daughter        Social History     Socioeconomic History     Marital status:      Spouse name: Luis     Number of children: 5     Years of education: 12     Highest education level: Not on file    Occupational History     Occupation:      Employer: RETIRED   Social Needs     Financial resource strain: Not on file     Food insecurity:     Worry: Not on file     Inability: Not on file     Transportation needs:     Medical: Not on file     Non-medical: Not on file   Tobacco Use     Smoking status: Never Smoker     Smokeless tobacco: Never Used     Tobacco comment: second hand smoke in the house 40 yrs   Substance and Sexual Activity     Alcohol use: No     Alcohol/week: 0.0 oz     Drug use: No     Sexual activity: Never     Partners: Female   Lifestyle     Physical activity:     Days per week: Not on file     Minutes per session: Not on file     Stress: Not on file   Relationships     Social connections:     Talks on phone: Not on file     Gets together: Not on file     Attends Mandaen service: Not on file     Active member of club or organization: Not on file     Attends meetings of clubs or organizations: Not on file     Relationship status: Not on file     Intimate partner violence:     Fear of current or ex partner: Not on file     Emotionally abused: Not on file     Physically abused: Not on file     Forced sexual activity: Not on file   Other Topics Concern      Service No     Blood Transfusions Yes     Comment: Ok to recieve      Caffeine Concern Yes     Comment: 3 cups daily      Occupational Exposure Not Asked     Hobby Hazards Not Asked     Sleep Concern Not Asked     Stress Concern Not Asked     Weight Concern Not Asked     Special Diet Not Asked     Back Care Not Asked     Exercise Yes     Comment: walking,       Bike Helmet Not Asked     Seat Belt Yes     Self-Exams Not Asked     Parent/sibling w/ CABG, MI or angioplasty before 65F 55M? Not Asked   Social History Narrative     Not on file       ROS: 10 point ROS neg other than the symptoms noted above in the HPI.  EXAM  Constitutional: healthy, alert and no distress    Psychiatric: mentation appears normal and  affect normal/bright    VASCULAR:  -Dorsalis pedis pulse +1/4 b/l  -Posterior tibial pulse +1/4 b/l  -Capillary refill time < 3 seconds to b/l hallux  -Hair growth Absent to b/l anterior legs and ankles  NEURO:  -Positive for paresthesias to the bilateral foot  -Epicritic and protective sensation diminished to the bilateral foot  DERM:  -Skin mildly dry, flaking to bilateral digits only (improved to the remainder of the foot)   -Skin mild-to-moderately erythematous with flaking of skin to bilateral plantar feet in a moccasin distribution  -Skin temperature mildly cool to bilateral foot  -Toenails elongated, thickened, dystrophic and discolored with subungual debris x 10  MSK:  -Muscle strength of ankles +5/5 for dorsiflexion, plantarflexion, ABDUction and ADDuction b/l    ============================================================    ASSESSMENT:    (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 Treatment options discussed with no educational barriers noted.    -High risk toenail debridement x 10 toenails without incident    -Diabetic Foot Education provided. This included checking the feet daily looking for new new blisters or wounds, wearing shoes at all times when walking including around the house, and avoiding lotion application between the toes. Any sign of infection in the foot warrant's the patient presenting to the ED as soon as possible.  ---Patient has moderate xerosis of the skin. This can cause increased cracking of the skin. He has CHF which contributes to lower extremity edema, but this has been controlled recently.    -Patient in agreement with the above treatment plan and all of  patient's questions were answered.      RTC 3 1/2 months for diabetic foot exam and high risk nail debridement        Deidre Mishra DPM

## 2024-12-03 ENCOUNTER — HOSPITAL ENCOUNTER (OUTPATIENT)
Dept: MRI IMAGING | Facility: HOSPITAL | Age: 89
Discharge: HOME | End: 2024-12-03
Attending: FAMILY MEDICINE
Payer: COMMERCIAL

## 2024-12-03 ENCOUNTER — TELEPHONE (OUTPATIENT)
Dept: FAMILY MEDICINE | Facility: OTHER | Age: 89
End: 2024-12-03

## 2024-12-03 DIAGNOSIS — M87.9 OSTEONECROSIS (H): ICD-10-CM

## 2024-12-03 DIAGNOSIS — M16.7 OTHER SECONDARY OSTEOARTHRITIS OF RIGHT HIP: ICD-10-CM

## 2024-12-03 PROCEDURE — 73721 MRI JNT OF LWR EXTRE W/O DYE: CPT | Mod: RT

## 2024-12-03 NOTE — TELEPHONE ENCOUNTER
Results requested: Patient had MRI please call granddaughter, Stephanie Dodson      Best number to reach patient at: [phone] (consent to communicate)     Best time to call patient: anytime    Send to care team in basket.

## 2024-12-04 NOTE — TELEPHONE ENCOUNTER
Writer kathleen stating that the MRI has not yet been reviewed by provider and we will contact them once it has been.

## 2024-12-05 ENCOUNTER — OFFICE VISIT (OUTPATIENT)
Dept: ORTHOPEDICS | Facility: OTHER | Age: 89
End: 2024-12-05
Attending: ORTHOPAEDIC SURGERY
Payer: COMMERCIAL

## 2024-12-05 VITALS
HEART RATE: 66 BPM | WEIGHT: 126 LBS | DIASTOLIC BLOOD PRESSURE: 78 MMHG | BODY MASS INDEX: 19.78 KG/M2 | HEIGHT: 67 IN | TEMPERATURE: 97.3 F | SYSTOLIC BLOOD PRESSURE: 120 MMHG | OXYGEN SATURATION: 98 %

## 2024-12-05 DIAGNOSIS — M16.7 OTHER SECONDARY OSTEOARTHRITIS OF RIGHT HIP: ICD-10-CM

## 2024-12-05 DIAGNOSIS — M54.50 CHRONIC BILATERAL LOW BACK PAIN WITHOUT SCIATICA: Primary | ICD-10-CM

## 2024-12-05 DIAGNOSIS — G89.29 CHRONIC BILATERAL LOW BACK PAIN WITHOUT SCIATICA: Primary | ICD-10-CM

## 2024-12-05 PROCEDURE — G0463 HOSPITAL OUTPT CLINIC VISIT: HCPCS

## 2024-12-05 ASSESSMENT — PAIN SCALES - GENERAL: PAINLEVEL_OUTOF10: MODERATE PAIN (5)

## 2024-12-05 NOTE — PROGRESS NOTES
ORTHOPEDIC CLINIC CONSULT    Referred by:     Chief Complaint: Nori Looney is a 89 year old male who is being seen for No chief complaint on file.      History of Present Illness:   Patient is a 89-year-old male who presents for evaluation of right hip pain.  Patient and family member present with a noted right hip pain for the last couple years but the last year has been the most advanced discomfort.  Notes that he still try to go out and do some dancing but the longer periods of ambulation sometimes he is using a wheelchair for that aspect.  He otherwise will ambulate with a cane.  States that he has had an injection in his hip about 3 to 4 years ago but stated did not change anything.  However when asked where his hip pain is he points more to the buttock and SI joint area and not into the groin area.  He presents now for evaluation and recommendations    Patient's past medical, surgical, social and family histories reviewed.     Past Medical History:   Diagnosis Date    Chronic diastolic congestive heart failure (H)     EF 85%    Coronary artery disease involving coronary bypass graft of native heart without angina pectoris 1/5/2017    2011 in [place] With Dr. Negro at [facility].      Coronary atherosclerosis of native coronary artery     CABG x 5    Encounter for diabetic foot exam (H) 1/5/2017    Erectile dysfunction due to arterial insufficiency     Generalized osteoarthrosis     HTN (hypertension)     Hyperlipidemia associated with type 2 diabetes mellitus (H)     Hypothyroidism     hashimoto's thyroiditis    Neuropathy 01/2018    Other specified hypothyroidism 6/29/2016    Primary osteoarthritis of left knee 1/5/2017    Stable angina (H)     Type 2 diabetes mellitus without complication, without long-term current use of insulin (H) 1/5/2017       Past Surgical History:   Procedure Laterality Date    AS CABG, ARTERY-VEIN, FIVE  11/02/2011    off pump       Home Medications:  Prior to Admission  medications    Medication Sig Start Date End Date Taking? Authorizing Provider   acetaminophen (TYLENOL) 500 MG tablet Take 2 Tablets by mouth two times a day as needed for mild pain 10/10/24  Yes Bisi Stuart MD   ASPIRIN PO Take 325 mg by mouth daily   Yes Reported, Patient   atorvastatin (LIPITOR) 10 MG tablet Take 1 Tablet by mouth one time a day. 10/10/24  Yes Bisi Stuart MD   blood glucose (NO BRAND SPECIFIED) lancets standard Use to test blood sugar 1 times daily 11/17/20  Yes Taryn Barry MD   blood glucose (NO BRAND SPECIFIED) test strip Use to test blood sugar 1 times daily 11/17/20  Yes Taryn Barry MD   blood glucose monitoring (NO BRAND SPECIFIED) meter device kit Use to test blood sugar 1 times daily 11/17/20  Yes Taryn Barry MD   famotidine (PEPCID) 40 MG tablet Take 1 Tablet by mouth one time a day. 10/10/24  Yes Bisi Stuart MD   glipiZIDE (GLUCOTROL XL) 2.5 MG 24 hr tablet Take 1 tablet by mouth daily 7/2/21  Yes Reported, Patient   levothyroxine (SYNTHROID/LEVOTHROID) 150 MCG tablet Take 1 Tablet by mouth once daily 11/25/24  Yes Bisi Stuart MD   lisinopril (ZESTRIL) 2.5 MG tablet Take 1 Tablet by mouth one time a day. 10/10/24  Yes Bisi Stuart MD   metFORMIN (GLUCOPHAGE XR) 500 MG 24 hr tablet Take 2 tablets (1,000 mg) by mouth At Bedtime 10/10/24  Yes Bisi Stuart MD   metoprolol succinate ER (TOPROL XL) 50 MG 24 hr tablet Take 1 Tablet by mouth one time a day. 10/10/24  Yes Bisi Stuart MD   nitroGLYcerin (NITROSTAT) 0.4 MG sublingual tablet For chest pain place 1 tablet under the tongue every 5 minutes for 3 doses. If symptoms persist 5 minutes after 1st dose call 911. 1/13/22  Yes Bisi Stuart MD   vitamin D3 (CHOLECALCIFEROL) 50 mcg (2000 units) tablet Take by mouth daily   Yes Reported, Patient       No Known Allergies    Social History     Occupational History    Occupation:      Employer:  "RETIRED   Tobacco Use    Smoking status: Never     Passive exposure: Never    Smokeless tobacco: Never    Tobacco comments:     second hand smoke in the house 40 yrs   Vaping Use    Vaping status: Never Used   Substance and Sexual Activity    Alcohol use: No     Alcohol/week: 0.0 standard drinks of alcohol    Drug use: Never    Sexual activity: Not Currently     Partners: Female       Family History   Problem Relation Age of Onset    Dementia Mother     Cerebrovascular Disease Father     Coronary Artery Disease Father     Coronary Artery Disease Sister     Lung Cancer Sister     Thyroid Disease Daughter        REVIEW OF SYSTEMS            Physical Exam:    Vitals: /78 (BP Location: Left arm, Patient Position: Sitting, Cuff Size: Adult Regular)   Pulse 66   Temp 97.3  F (36.3  C) (Tympanic)   Ht 1.702 m (5' 7\")   Wt 57.2 kg (126 lb)   SpO2 98%   BMI 19.73 kg/m    BMI= Body mass index is 19.73 kg/m .  On examination he is awake alert and oriented cooperative no apparent distress.  Afebrile vital signs stable.  He has about 10-15 degrees of internal rotation and about 20 degrees of external rotation has a little bit of discomfort at the extremes of motion that he gets into the groin but not to the same level pain that he is presenting with complaint of today.  He does not have much discomfort when he do passive hip flexion extension.  When asked he points more towards his low back and SI joint and when palpate he does have the tenderness along the SI joint and low back on the right side giving him more of his discomfort and points to that as the location of his discomfort.  Radiographs: On radiographs he has had both an MRI as well as plain films of his right hip which notes advanced bone-on-bone osteoarthritis no AVN on MRI with advanced osteoarthritis bone-on-bone regarding his lumbar area he also has had lumbar spine films also show some SI joint osteoarthritis as well as lower lumbar loss of disc space " height as well as facet osteoarthritic changes and mild spinal listhesis areas.  Mostly in the L4-5 area.  As well as L5-S1     Independent visualization of the films was made.         Impression:      ICD-10-CM    1. Chronic bilateral low back pain without sciatica  M54.50     G89.29       2. Other secondary osteoarthritis of right hip  M16.7 Orthopedic  Referral          Plan:  Impression plan he has got the hip pain in the right hip he has advanced osteoarthritis as well as changes in his hip advanced as well as in his lumbar spine.  However most of his discomfort that he points to is more typically would be out of the back not as much the hip joint area.  Although he has advanced changes in both.  Prior to proceeding with more aggressive options regarding the hip I would like to also then have further workup with the spine and do an MRI and then possibly get him set up for fluoroscopy guided injections in the lumbar and SI joint to see if his symptoms get adequately improved to separate out the issue coming from either lumbar and SI versus hip joint.  You can have some little atypical presentation where more of his hip pain advanced is feeling more into the buttock area with this to be able that were atypical.  All of the above pertinent physical exam and imaging modalities findings was reviewed with Nori.    Return to clinic in post MRI weeks.    Further imaging required MRI lumbar    Time spent with evaluation: 30 minutes    Frankie Hendrickson MD  12/5/2024  2:25 PM

## 2024-12-06 ENCOUNTER — TRANSFERRED RECORDS (OUTPATIENT)
Dept: HEALTH INFORMATION MANAGEMENT | Facility: CLINIC | Age: 89
End: 2024-12-06

## 2024-12-18 ENCOUNTER — HOSPITAL ENCOUNTER (OUTPATIENT)
Dept: MRI IMAGING | Facility: HOSPITAL | Age: 89
Discharge: HOME | End: 2024-12-18
Attending: ORTHOPAEDIC SURGERY
Payer: COMMERCIAL

## 2024-12-18 DIAGNOSIS — M54.50 CHRONIC BILATERAL LOW BACK PAIN WITHOUT SCIATICA: ICD-10-CM

## 2024-12-18 DIAGNOSIS — G89.29 CHRONIC BILATERAL LOW BACK PAIN WITHOUT SCIATICA: ICD-10-CM

## 2024-12-18 PROCEDURE — 72148 MRI LUMBAR SPINE W/O DYE: CPT

## 2025-01-09 ENCOUNTER — TELEPHONE (OUTPATIENT)
Dept: ORTHOPEDICS | Facility: OTHER | Age: OVER 89
End: 2025-01-09

## 2025-01-09 NOTE — TELEPHONE ENCOUNTER
Attempted to call to inform that we will need to schedule a visit with Dr. Hendrickson to go over MRI results in clinic. Left VM to call back to the clinic.     Rojelio Puckett LPN

## 2025-01-09 NOTE — TELEPHONE ENCOUNTER
Granddaughter called back. Scheduled appointment for 01/29/2025 for MRI results.     Rojelio Puckett LPN

## 2025-01-13 DIAGNOSIS — E03.8 OTHER SPECIFIED HYPOTHYROIDISM: ICD-10-CM

## 2025-01-13 RX ORDER — LEVOTHYROXINE SODIUM 137 UG/1
TABLET ORAL
Qty: 60 TABLET | Refills: 3 | OUTPATIENT
Start: 2025-01-13

## 2025-01-13 NOTE — TELEPHONE ENCOUNTER
Pt called and no answer.     Was discontinue on 11/25/2024 and increased to 150 mcg with labs to be rechecked in 2 months due to low thyroid.

## 2025-01-18 DIAGNOSIS — M17.10 PRIMARY LOCALIZED OSTEOARTHROSIS OF LOWER LEG, UNSPECIFIED LATERALITY: ICD-10-CM

## 2025-01-18 DIAGNOSIS — M16.11 PRIMARY OSTEOARTHRITIS OF RIGHT HIP: ICD-10-CM

## 2025-01-20 RX ORDER — PSEUDOEPHED/ACETAMINOPH/DIPHEN 30MG-500MG
TABLET ORAL
Qty: 120 TABLET | Refills: 3 | Status: SHIPPED | OUTPATIENT
Start: 2025-01-20

## 2025-01-20 NOTE — TELEPHONE ENCOUNTER
Routing refill request to provider for review/approval because:  Analgesics (Non-Narcotic Tylenol and ASA Only) Failed    Rerun Protocol (1/18/2025 7:07 AM)    Medication matches indication    Acetaminophen is associated with one or more of the following diagnoses:  Pain  Fever

## 2025-01-20 NOTE — TELEPHONE ENCOUNTER
Tylenol      Last Written Prescription Date:  10/10/24  Last Fill Quantity: 120,   # refills: 3  Last Office Visit: 11/25/24  Future Office visit:    Next 5 appointments (look out 90 days)      Jan 29, 2025 1:00 PM  (Arrive by 12:45 PM)  Return Visit with Frankie Hendrickson MD  [facility] ([facility] ) [address] [facility] [address]  [phone]     Feb 26, 2025 10:30 AM  (Arrive by 10:15 AM)  Return Visit with Deidre Mishra DPM  [facility] ([facility] ) [address] [place] [id number] [phone]     Mar 26, 2025 9:00 AM  (Arrive by 8:45 AM)  Provider Visit with Bisi Stuart MD  [facility] ([facility] ) [address] [place] [id number]  [phone]             Routing refill request to provider for review/approval because:

## 2025-01-21 DIAGNOSIS — E03.8 OTHER SPECIFIED HYPOTHYROIDISM: ICD-10-CM

## 2025-01-21 RX ORDER — LEVOTHYROXINE SODIUM 137 UG/1
TABLET ORAL
Qty: 60 TABLET | Refills: 3 | OUTPATIENT
Start: 2025-01-21

## 2025-01-28 ENCOUNTER — LAB (OUTPATIENT)
Dept: LAB | Facility: OTHER | Age: OVER 89
End: 2025-01-28
Payer: COMMERCIAL

## 2025-01-28 DIAGNOSIS — E03.8 OTHER SPECIFIED HYPOTHYROIDISM: ICD-10-CM

## 2025-01-28 LAB
T4 FREE SERPL-MCNC: 1.46 NG/DL (ref 0.9–1.7)
TSH SERPL DL<=0.005 MIU/L-ACNC: 8.86 UIU/ML (ref 0.3–4.2)

## 2025-01-28 PROCEDURE — 84439 ASSAY OF FREE THYROXINE: CPT | Mod: ZL

## 2025-01-28 PROCEDURE — 36415 COLL VENOUS BLD VENIPUNCTURE: CPT | Mod: ZL

## 2025-01-28 PROCEDURE — 84443 ASSAY THYROID STIM HORMONE: CPT | Mod: ZL

## 2025-01-29 ENCOUNTER — TELEPHONE (OUTPATIENT)
Dept: FAMILY MEDICINE | Facility: OTHER | Age: OVER 89
End: 2025-01-29

## 2025-01-29 ENCOUNTER — OFFICE VISIT (OUTPATIENT)
Dept: ORTHOPEDICS | Facility: OTHER | Age: OVER 89
End: 2025-01-29
Attending: ORTHOPAEDIC SURGERY
Payer: COMMERCIAL

## 2025-01-29 VITALS — DIASTOLIC BLOOD PRESSURE: 66 MMHG | SYSTOLIC BLOOD PRESSURE: 98 MMHG | HEART RATE: 64 BPM | OXYGEN SATURATION: 88 %

## 2025-01-29 DIAGNOSIS — M48.061 LUMBAR FORAMINAL STENOSIS: Primary | ICD-10-CM

## 2025-01-29 DIAGNOSIS — E03.8 OTHER SPECIFIED HYPOTHYROIDISM: Primary | ICD-10-CM

## 2025-01-29 RX ORDER — LEVOTHYROXINE SODIUM 175 UG/1
175 TABLET ORAL
Qty: 20 TABLET | Refills: 1 | Status: SHIPPED | OUTPATIENT
Start: 2025-01-29

## 2025-01-29 NOTE — TELEPHONE ENCOUNTER
Bisi Stuart MD  P  Family Care Team 2  Please call patient with the following results:  Thyroid is slight low, will increase synthroid to 175mcg 5 days a week and 150 2 days per week, please joselito up the 175 for me thx    Called patient with the message. Medication pended for signature  Quyen Robins MA on 1/29/2025 at 9:29 AM

## 2025-01-29 NOTE — PROGRESS NOTES
ORTHOPEDIC CLINIC CONSULT    Referred by:     Chief Complaint: Nori Looney is a 89 year old male who is being seen for   Chief Complaint   Patient presents with    Results     Review MRI        History of Present Illness:   Patient is a 89-year-old male who presents back for evaluation discussed MRI findings.  An MRI back in December and now presents back to discuss findings and recommendations he was having significant radicular symptoms and low back has a lot of degenerative changes on plain x-rays.  And is not having any other changes in his symptoms or condition    Patient's past medical, surgical, social and family histories reviewed.     Past Medical History:   Diagnosis Date    Chronic diastolic congestive heart failure (H)     EF 85%    Coronary artery disease involving coronary bypass graft of native heart without angina pectoris 1/5/2017    2011 in [place] With Dr. Negro at [facility].      Coronary atherosclerosis of native coronary artery     CABG x 5    Encounter for diabetic foot exam (H) 1/5/2017    Erectile dysfunction due to arterial insufficiency     Generalized osteoarthrosis     HTN (hypertension)     Hyperlipidemia associated with type 2 diabetes mellitus (H)     Hypothyroidism     hashimoto's thyroiditis    Neuropathy 01/2018    Other specified hypothyroidism 6/29/2016    Primary osteoarthritis of left knee 1/5/2017    Stable angina     Type 2 diabetes mellitus without complication, without long-term current use of insulin (H) 1/5/2017       Past Surgical History:   Procedure Laterality Date    AS CABG, ARTERY-VEIN, FIVE  11/02/2011    off pump       Home Medications:  Prior to Admission medications    Medication Sig Start Date End Date Taking? Authorizing Provider   acetaminophen (TYLENOL) 500 MG tablet Take 2 Tablets by mouth two times a day as needed for mild pain 1/20/25  Yes Bisi Stuart MD   ASPIRIN PO Take 325 mg by mouth daily   Yes Reported, Patient   atorvastatin (LIPITOR)  10 MG tablet Take 1 Tablet by mouth one time a day. 10/10/24  Yes Bisi Stuart MD   famotidine (PEPCID) 40 MG tablet Take 1 Tablet by mouth one time a day. 10/10/24  Yes Bisi Stuart MD   glipiZIDE (GLUCOTROL XL) 2.5 MG 24 hr tablet Take 1 tablet by mouth daily 7/2/21  Yes Reported, Patient   levothyroxine (SYNTHROID/LEVOTHROID) 150 MCG tablet Take 1 Tablet by mouth once daily 11/25/24  Yes Bisi Stuart MD   levothyroxine (SYNTHROID/LEVOTHROID) 175 MCG tablet Take 1 tablet (175 mcg) by mouth five times a week. 1/29/25  Yes Bisi Stuart MD   lisinopril (ZESTRIL) 2.5 MG tablet Take 1 Tablet by mouth one time a day. 10/10/24  Yes Bisi Stuart MD   metFORMIN (GLUCOPHAGE XR) 500 MG 24 hr tablet Take 2 tablets (1,000 mg) by mouth At Bedtime 10/10/24  Yes Bisi Stuart MD   metoprolol succinate ER (TOPROL XL) 50 MG 24 hr tablet Take 1 Tablet by mouth one time a day. 10/10/24  Yes Bisi Stuart MD   nitroGLYcerin (NITROSTAT) 0.4 MG sublingual tablet For chest pain place 1 tablet under the tongue every 5 minutes for 3 doses. If symptoms persist 5 minutes after 1st dose call 911. 1/13/22  Yes Bisi Stuart MD   vitamin D3 (CHOLECALCIFEROL) 50 mcg (2000 units) tablet Take by mouth daily   Yes Reported, Patient   blood glucose (NO BRAND SPECIFIED) lancets standard Use to test blood sugar 1 times daily  Patient not taking: Reported on 1/29/2025 11/17/20   Taryn Barry MD   blood glucose (NO BRAND SPECIFIED) test strip Use to test blood sugar 1 times daily  Patient not taking: Reported on 1/29/2025 11/17/20   Taryn Barry MD   blood glucose monitoring (NO BRAND SPECIFIED) meter device kit Use to test blood sugar 1 times daily  Patient not taking: Reported on 1/29/2025 11/17/20   Taryn Barry MD       No Known Allergies    Social History     Occupational History    Occupation:      Employer: RETIRED   Tobacco Use    Smoking status: Never      Passive exposure: Never    Smokeless tobacco: Never    Tobacco comments:     second hand smoke in the house 40 yrs   Vaping Use    Vaping status: Never Used   Substance and Sexual Activity    Alcohol use: No     Alcohol/week: 0.0 standard drinks of alcohol    Drug use: Never    Sexual activity: Not Currently     Partners: Female       Family History   Problem Relation Age of Onset    Dementia Mother     Cerebrovascular Disease Father     Coronary Artery Disease Father     Coronary Artery Disease Sister     Lung Cancer Sister     Thyroid Disease Daughter        REVIEW OF SYSTEMS            Physical Exam:    Vitals: BP 98/66   Pulse 64   SpO2 (!) 88%   PF (!) 7 L/min   BMI= There is no height or weight on file to calculate BMI.  On examination still awake alert oriented cooperative no apparent distress.  Mobilizing here in wheelchair for the distance but otherwise does ambulate around the house with some assistance.  Still having pain in the back rating down no changes on his examination from previous examination  Radiographs: MRI from 12/18/2024.  Notes multilevel degenerative changes in foraminal narrowing from mild to moderate from L1 to as well as L2- 3,3-4,4-5 ,5-S1.     Independent visualization of the films was made.         Impression:      ICD-10-CM    1. Lumbar foraminal stenosis  M48.061           Plan: Status post MRI with lumbar pain and stenosis and foraminal stenosis.  Discussed findings in detail with the patient and daughter.  At noted the multilevel changes as well as mild to moderate stenosis foraminal stenosis and impingement.  After detailed discussion like to refer him down to see Dr. Evaristo Bravo pain management anesthesiologist and see if we can get him better with a bit more conservative measures.  Will see him for the long-term treatment of the chronic low back issues.    All of the above pertinent physical exam and imaging modalities findings was reviewed with Nori.    Return to clinic  in  weeks.    Further imaging required     Time spent with evaluation:   minutes    Frankie Hendrickson MD  1/29/2025  1:25 PM

## 2025-03-03 ENCOUNTER — OFFICE VISIT (OUTPATIENT)
Dept: PODIATRY | Facility: OTHER | Age: OVER 89
End: 2025-03-03
Attending: PODIATRIST
Payer: COMMERCIAL

## 2025-03-03 VITALS
DIASTOLIC BLOOD PRESSURE: 65 MMHG | SYSTOLIC BLOOD PRESSURE: 113 MMHG | OXYGEN SATURATION: 98 % | RESPIRATION RATE: 16 BRPM | TEMPERATURE: 97.7 F | HEART RATE: 56 BPM

## 2025-03-03 DIAGNOSIS — E11.9 DIABETES MELLITUS TYPE 2, NONINSULIN DEPENDENT (H): ICD-10-CM

## 2025-03-03 DIAGNOSIS — E11.42 DIABETIC POLYNEUROPATHY ASSOCIATED WITH TYPE 2 DIABETES MELLITUS (H): ICD-10-CM

## 2025-03-03 DIAGNOSIS — Z13.89 SCREENING FOR DIABETIC PERIPHERAL NEUROPATHY: ICD-10-CM

## 2025-03-03 DIAGNOSIS — L60.3 ONYCHODYSTROPHY: Primary | ICD-10-CM

## 2025-03-03 PROCEDURE — 11721 DEBRIDE NAIL 6 OR MORE: CPT | Performed by: PODIATRIST

## 2025-03-03 PROCEDURE — G0463 HOSPITAL OUTPT CLINIC VISIT: HCPCS

## 2025-03-03 ASSESSMENT — PAIN SCALES - GENERAL: PAINLEVEL_OUTOF10: MODERATE PAIN (5)

## 2025-03-03 NOTE — PROGRESS NOTES
Chief complaint: Patient presents with:  DFE      History of Present Illness: This 89-year-old NIDDM male is seen for follow-up management of elongated toenails.     He has a little burning, tingling, and numbness in his feet.    Patient says he has continued to be active with dancing for exercise. His daughter is helping him to file his nails between his appointments. He says he missed an appointment so his toenails are longer than usual.    No further pedal complaints today.     Lab Results   Component Value Date    A1C 6.4 11/25/2024    A1C 6.9 07/15/2024    A1C 7.1 02/21/2024    A1C 6.6 11/20/2023    A1C 6.8 07/21/2023    A1C 6.3 05/03/2021    A1C 6.1 02/01/2021    A1C 6.4 10/30/2020    A1C 6.5 07/29/2020    A1C 6.3 01/08/2020             Wt Readings from Last 4 Encounters:   12/05/24 57.2 kg (126 lb)   11/25/24 57.5 kg (126 lb 12.8 oz)   07/15/24 58.6 kg (129 lb 1.6 oz)   02/21/24 58.6 kg (129 lb 3 oz)         /65   Pulse 56   Temp 97.7  F (36.5  C)   Resp 16   SpO2 98%     Patient Active Problem List   Diagnosis    Other specified hypothyroidism    Chronic systolic congestive heart failure (H)    Benign essential hypertension    Hyperlipidemia LDL goal <70    Bilateral carotid artery stenosis    Coronary artery disease involving coronary bypass graft of native heart without angina pectoris    Encounter for diabetic foot exam (H)    Trochanteric bursitis of right hip    Gastroesophageal reflux disease, esophagitis presence not specified    Primary osteoarthritis of right hip    Bilateral primary osteoarthritis of knee     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)    Chronic bilateral low back pain without sciatica    DDD (degenerative disc disease), lumbar    Allergic arthritis of right hip    Keratoacanthoma of cheek    Atypical squamoproliferative skin lesion    Primary localized osteoarthrosis of lower leg, unspecified laterality    Type 2 diabetes mellitus with  hyperglycemia, without long-term current use of insulin (H)    Atypical chest pain    Pulmonary nodules    Primary localized osteoarthrosis of left lower leg    Cervicalgia    Derangement of right sacroiliac joint    Diastolic dysfunction    Memory loss    Toenail deformity    Lumbar foraminal stenosis       Past Surgical History:   Procedure Laterality Date    AS CABG, ARTERY-VEIN, FIVE  11/02/2011    off pump       Current Outpatient Medications   Medication Sig Dispense Refill    acetaminophen (TYLENOL) 500 MG tablet Take 2 Tablets by mouth two times a day as needed for mild pain 120 tablet 3    ASPIRIN PO Take 325 mg by mouth daily      atorvastatin (LIPITOR) 10 MG tablet Take 1 Tablet by mouth one time a day. 90 tablet 2    famotidine (PEPCID) 40 MG tablet Take 1 Tablet by mouth one time a day. 90 tablet 2    glipiZIDE (GLUCOTROL XL) 2.5 MG 24 hr tablet Take 1 tablet by mouth daily      levothyroxine (SYNTHROID/LEVOTHROID) 150 MCG tablet Take 1 Tablet by mouth once daily 30 tablet 2    levothyroxine (SYNTHROID/LEVOTHROID) 175 MCG tablet Take 1 tablet (175 mcg) by mouth five times a week. 20 tablet 1    lisinopril (ZESTRIL) 2.5 MG tablet Take 1 Tablet by mouth one time a day. 90 tablet 2    metFORMIN (GLUCOPHAGE XR) 500 MG 24 hr tablet Take 2 tablets (1,000 mg) by mouth At Bedtime 180 tablet 1    metoprolol succinate ER (TOPROL XL) 50 MG 24 hr tablet Take 1 Tablet by mouth one time a day. 90 tablet 2    nitroGLYcerin (NITROSTAT) 0.4 MG sublingual tablet For chest pain place 1 tablet under the tongue every 5 minutes for 3 doses. If symptoms persist 5 minutes after 1st dose call 911. 25 tablet 0    vitamin D3 (CHOLECALCIFEROL) 50 mcg (2000 units) tablet Take by mouth daily      blood glucose (NO BRAND SPECIFIED) lancets standard Use to test blood sugar 1 times daily (Patient not taking: Reported on 3/3/2025) 100 each 3    blood glucose (NO BRAND SPECIFIED) test strip Use to test blood sugar 1 times daily (Patient  not taking: Reported on 3/3/2025) 100 each 3    blood glucose monitoring (NO BRAND SPECIFIED) meter device kit Use to test blood sugar 1 times daily (Patient not taking: Reported on 3/3/2025) 1 kit 0     No current facility-administered medications for this visit.        No Known Allergies    Family History   Problem Relation Age of Onset    Dementia Mother     Cerebrovascular Disease Father     Coronary Artery Disease Father     Coronary Artery Disease Sister     Lung Cancer Sister     Thyroid Disease Daughter        Social History     Socioeconomic History    Marital status:      Spouse name: Luis    Number of children: 5    Years of education: 12    Highest education level: Not on file   Occupational History    Occupation:      Employer: RETIRED   Social Needs    Financial resource strain: Not on file    Food insecurity:     Worry: Not on file     Inability: Not on file    Transportation needs:     Medical: Not on file     Non-medical: Not on file   Tobacco Use    Smoking status: Never Smoker    Smokeless tobacco: Never Used    Tobacco comment: second hand smoke in the house 40 yrs   Substance and Sexual Activity    Alcohol use: No     Alcohol/week: 0.0 oz    Drug use: No    Sexual activity: Never     Partners: Female   Lifestyle    Physical activity:     Days per week: Not on file     Minutes per session: Not on file    Stress: Not on file   Relationships    Social connections:     Talks on phone: Not on file     Gets together: Not on file     Attends Alevism service: Not on file     Active member of club or organization: Not on file     Attends meetings of clubs or organizations: Not on file     Relationship status: Not on file    Intimate partner violence:     Fear of current or ex partner: Not on file     Emotionally abused: Not on file     Physically abused: Not on file     Forced sexual activity: Not on file   Other Topics Concern     Service No    Blood Transfusions Yes      Comment: Ok to recieve     Caffeine Concern Yes     Comment: 3 cups daily     Occupational Exposure Not Asked    Hobby Hazards Not Asked    Sleep Concern Not Asked    Stress Concern Not Asked    Weight Concern Not Asked    Special Diet Not Asked    Back Care Not Asked    Exercise Yes     Comment: walking,      Bike Helmet Not Asked    Seat Belt Yes    Self-Exams Not Asked    Parent/sibling w/ CABG, MI or angioplasty before 65F 55M? Not Asked   Social History Narrative    Not on file       ROS: 10 point ROS neg other than the symptoms noted above in the HPI.  EXAM  Constitutional: healthy, alert and no distress    Psychiatric: mentation appears normal and affect normal/bright    VASCULAR:  -Dorsalis pedis pulse +1/4 b/l  -Posterior tibial pulse +1/4 b/l  -Capillary refill time < 3 seconds to b/l hallux  -Hair growth Absent to b/l anterior legs and ankles  NEURO:  -Positive for paresthesias to the bilateral foot  -Epicritic and protective sensation diminished to the bilateral foot  DERM:  -Skin mildly dry, flaking to bilateral digits only (improved to the remainder of the foot)   -Skin temperature within normal limits to the bilateral foot  -Toenails elongated, thickened, dystrophic and discolored with subungual debris x 10  MSK:  -Muscle strength of ankles +5/5 for dorsiflexion, plantarflexion, ABDUction and ADDuction b/l    ============================================================    ASSESSMENT:    (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 Treatment options discussed with no educational barriers noted.    -High risk toenail debridement x 10 toenails without incident    Diabetes Mellitus: Patient's DM is managed by their PCP. The DM appears to be  stable. Patient's last HbA1C was 6.4% on 11/25/2024.    -Diabetic Foot Education provided. This included checking the feet daily looking for new new blisters or wounds, wearing shoes at all times when walking including around the house, and avoiding lotion application between the toes. Any sign of infection in the foot warrant's the patient presenting to the ED as soon as possible.  ---Patient has improved xerosis of the skin.     -Patient in agreement with the above treatment plan and all of patient's questions were answered.      Return to clinic 63+ days for diabetic foot exam and high risk nail debridement        Deidre Mishra DPM

## 2025-03-26 ENCOUNTER — LAB (OUTPATIENT)
Dept: LAB | Facility: OTHER | Age: OVER 89
End: 2025-03-26
Payer: COMMERCIAL

## 2025-03-26 ENCOUNTER — OFFICE VISIT (OUTPATIENT)
Dept: FAMILY MEDICINE | Facility: OTHER | Age: OVER 89
End: 2025-03-26
Attending: FAMILY MEDICINE
Payer: COMMERCIAL

## 2025-03-26 VITALS
BODY MASS INDEX: 19.4 KG/M2 | TEMPERATURE: 97.7 F | WEIGHT: 123.6 LBS | HEIGHT: 67 IN | OXYGEN SATURATION: 98 % | SYSTOLIC BLOOD PRESSURE: 114 MMHG | DIASTOLIC BLOOD PRESSURE: 64 MMHG | HEART RATE: 62 BPM

## 2025-03-26 DIAGNOSIS — E11.65 TYPE 2 DIABETES MELLITUS WITH HYPERGLYCEMIA, WITHOUT LONG-TERM CURRENT USE OF INSULIN (H): ICD-10-CM

## 2025-03-26 DIAGNOSIS — L60.8 TOENAIL DEFORMITY: ICD-10-CM

## 2025-03-26 DIAGNOSIS — Z12.5 SCREENING FOR PROSTATE CANCER: ICD-10-CM

## 2025-03-26 DIAGNOSIS — M17.0 BILATERAL PRIMARY OSTEOARTHRITIS OF KNEE: ICD-10-CM

## 2025-03-26 DIAGNOSIS — R44.1 VISUAL HALLUCINATION: ICD-10-CM

## 2025-03-26 DIAGNOSIS — E11.65 TYPE 2 DIABETES MELLITUS WITH HYPERGLYCEMIA, WITHOUT LONG-TERM CURRENT USE OF INSULIN (H): Primary | ICD-10-CM

## 2025-03-26 DIAGNOSIS — R60.0 BILATERAL LOWER EXTREMITY EDEMA: ICD-10-CM

## 2025-03-26 DIAGNOSIS — E78.5 HYPERLIPIDEMIA LDL GOAL <70: ICD-10-CM

## 2025-03-26 DIAGNOSIS — E03.8 OTHER SPECIFIED HYPOTHYROIDISM: ICD-10-CM

## 2025-03-26 DIAGNOSIS — M17.10 PRIMARY LOCALIZED OSTEOARTHROSIS OF LOWER LEG, UNSPECIFIED LATERALITY: ICD-10-CM

## 2025-03-26 DIAGNOSIS — M16.11 PRIMARY OSTEOARTHRITIS OF RIGHT HIP: ICD-10-CM

## 2025-03-26 DIAGNOSIS — R41.3 MEMORY LOSS: ICD-10-CM

## 2025-03-26 DIAGNOSIS — I10 BENIGN ESSENTIAL HYPERTENSION: ICD-10-CM

## 2025-03-26 LAB
ALBUMIN SERPL BCG-MCNC: 4.1 G/DL (ref 3.5–5.2)
ALP SERPL-CCNC: 92 U/L (ref 40–150)
ALT SERPL W P-5'-P-CCNC: 22 U/L (ref 0–70)
ANION GAP SERPL CALCULATED.3IONS-SCNC: 12 MMOL/L (ref 7–15)
AST SERPL W P-5'-P-CCNC: 16 U/L (ref 0–45)
BILIRUB SERPL-MCNC: 0.6 MG/DL
BUN SERPL-MCNC: 25.7 MG/DL (ref 8–23)
CALCIUM SERPL-MCNC: 9.5 MG/DL (ref 8.8–10.4)
CHLORIDE SERPL-SCNC: 103 MMOL/L (ref 98–107)
CHOLEST SERPL-MCNC: 111 MG/DL
CREAT SERPL-MCNC: 0.96 MG/DL (ref 0.67–1.17)
EGFRCR SERPLBLD CKD-EPI 2021: 75 ML/MIN/1.73M2
EST. AVERAGE GLUCOSE BLD GHB EST-MCNC: 148 MG/DL
FASTING STATUS PATIENT QL REPORTED: NO
FASTING STATUS PATIENT QL REPORTED: NO
GLUCOSE SERPL-MCNC: 226 MG/DL (ref 70–99)
HBA1C MFR BLD: 6.8 %
HCO3 SERPL-SCNC: 24 MMOL/L (ref 22–29)
HDLC SERPL-MCNC: 40 MG/DL
LDLC SERPL CALC-MCNC: 58 MG/DL
NONHDLC SERPL-MCNC: 71 MG/DL
POTASSIUM SERPL-SCNC: 4.8 MMOL/L (ref 3.4–5.3)
PROT SERPL-MCNC: 6.4 G/DL (ref 6.4–8.3)
PSA SERPL DL<=0.01 NG/ML-MCNC: 8.53 NG/ML
SODIUM SERPL-SCNC: 139 MMOL/L (ref 135–145)
TRIGL SERPL-MCNC: 65 MG/DL
TSH SERPL DL<=0.005 MIU/L-ACNC: 3.72 UIU/ML (ref 0.3–4.2)

## 2025-03-26 PROCEDURE — 82040 ASSAY OF SERUM ALBUMIN: CPT | Mod: ZL

## 2025-03-26 PROCEDURE — 20610 DRAIN/INJ JOINT/BURSA W/O US: CPT | Performed by: FAMILY MEDICINE

## 2025-03-26 PROCEDURE — 83036 HEMOGLOBIN GLYCOSYLATED A1C: CPT | Mod: ZL

## 2025-03-26 PROCEDURE — G0103 PSA SCREENING: HCPCS | Mod: ZL | Performed by: FAMILY MEDICINE

## 2025-03-26 PROCEDURE — 82310 ASSAY OF CALCIUM: CPT | Mod: ZL

## 2025-03-26 PROCEDURE — 80061 LIPID PANEL: CPT | Mod: ZL

## 2025-03-26 PROCEDURE — 250N000011 HC RX IP 250 OP 636: Mod: JZ | Performed by: FAMILY MEDICINE

## 2025-03-26 PROCEDURE — G0463 HOSPITAL OUTPT CLINIC VISIT: HCPCS | Mod: 25

## 2025-03-26 PROCEDURE — 36415 COLL VENOUS BLD VENIPUNCTURE: CPT | Mod: ZL

## 2025-03-26 PROCEDURE — 84443 ASSAY THYROID STIM HORMONE: CPT | Mod: ZL

## 2025-03-26 PROCEDURE — 80048 BASIC METABOLIC PNL TOTAL CA: CPT | Mod: ZL

## 2025-03-26 RX ORDER — LEVOTHYROXINE SODIUM 175 UG/1
175 TABLET ORAL
Qty: 90 TABLET | Refills: 1 | Status: SHIPPED | OUTPATIENT
Start: 2025-03-26

## 2025-03-26 RX ORDER — TRIAMCINOLONE ACETONIDE 40 MG/ML
80 INJECTION, SUSPENSION INTRA-ARTICULAR; INTRAMUSCULAR ONCE
Status: COMPLETED | OUTPATIENT
Start: 2025-03-26 | End: 2025-03-26

## 2025-03-26 RX ORDER — MIRTAZAPINE 7.5 MG/1
7.5 TABLET, FILM COATED ORAL AT BEDTIME
Qty: 90 TABLET | Refills: 0 | Status: SHIPPED | OUTPATIENT
Start: 2025-03-26

## 2025-03-26 RX ADMIN — TRIAMCINOLONE ACETONIDE 80 MG: 40 INJECTION, SUSPENSION INTRA-ARTICULAR; INTRAMUSCULAR at 10:26

## 2025-03-26 RX ADMIN — Medication 96 MG: at 10:26

## 2025-03-26 ASSESSMENT — PAIN SCALES - GENERAL: PAINLEVEL_OUTOF10: MODERATE PAIN (5)

## 2025-03-26 NOTE — PROGRESS NOTES
Assessment & Plan     Type 2 diabetes mellitus with hyperglycemia, without long-term current use of insulin (H)  He has had trouble with the metformin, daughter and granddaughter would like to get him off as he has needed the pepcid since starting it  I think this is completely reasonable second to only needed A1c less than 8 at this stage of life  Stop glipizide as well  Follow-up 4 mos  - Hemoglobin A1c; Future    Benign essential hypertension  Stable, stop lisinopril as they would just like to get him off some meds    Hyperlipidemia LDL goal <70  Family would like to stop lipitor, which is reasonable given the lessening effectiveness in this decade of life  - Comprehensive metabolic panel (BMP + Alb, Alk Phos, ALT, AST, Total. Bili, TP); Future  - Lipid Profile (Chol, Trig, HDL, LDL calc); Future    Other specified hypothyroidism  Increase to 175 mg daily, follow-up labs in 2 mos  - TSH with free T4 reflex; Future  - levothyroxine (SYNTHROID/LEVOTHROID) 175 MCG tablet; Take 1 tablet (175 mcg) by mouth every morning (before breakfast).    Visual hallucination  More at night, will try and get him sleeping better and hopefully will gain a few pounds  - mirtazapine (REMERON) 7.5 MG tablet; Take 1 tablet (7.5 mg) by mouth at bedtime.    Screening for prostate cancer  Ok for age  - PSA, screen    Bilateral primary osteoarthritis of knee  See procedure note  - hylan (SYNVISC ONE) injection 96 mg    Primary localized osteoarthrosis of lower leg, unspecified laterality  See procedure note  - Large Joint/Bursa injection and/or drainage (Shoulder, Knee)  - hylan (SYNVISC ONE) injection 96 mg  - triamcinolone (KENALOG-40) injection 80 mg  - lidocaine 1 % 10 mL    Primary osteoarthritis of right hip  Back injections didn't help, will set up for hip injection  - XR Joint Injection Major Right; Future    Bilateral lower extremity edema  Discussed, could repeat echo, they decline for now  Recommend TORRES stockings as he is  already on the dry side  Follow-up 2 mos    Patient was agreeable to this plan and had no further questions.  There are no Patient Instructions on file for this visit.  48 minutes spent by me on the date of the encounter doing chart review, history and exam, documentation and further activities per the note, over 41 minutes spent in acute and chronic conditions and remaining time in HCM and exam, approximately 7 min for procedure    No follow-ups on file.    Abel Julio is a 89 year old, presenting for the following health issues:  Diabetes        3/26/2025     8:42 AM   Additional Questions   Roomed by Yuni CUEVA   Accompanied by daughter and grandelmerghter         3/26/2025   Forms   Any forms needing to be completed Yes     HPI      Diabetes Follow-up    How often are you checking your blood sugar? Not at all  What concerns do you have today about your diabetes? None   Do you have any of these symptoms? (Select all that apply)  No numbness or tingling in feet.  No redness, sores or blisters on feet.  No complaints of excessive thirst.  No reports of blurry vision.  No significant changes to weight.          Hyperlipidemia Follow-Up    Are you regularly taking any medication or supplement to lower your cholesterol?   Yes- Lipitor 10 mg   Are you having muscle aches or other side effects that you think could be caused by your cholesterol lowering medication?  Yes- possibly- would like to discuss this today     Hypertension Follow-up    Do you check your blood pressure regularly outside of the clinic? No   Are you following a low salt diet? No  Are your blood pressures ever more than 140 on the top number (systolic) OR more   than 90 on the bottom number (diastolic), for example 140/90? No    BP Readings from Last 2 Encounters:   03/26/25 114/64   03/03/25 113/65     Hemoglobin A1C (%)   Date Value   11/25/2024 6.4 (H)   07/15/2024 6.9 (H)   05/03/2021 6.3 (H)   02/01/2021 6.1 (H)     LDL Cholesterol Calculated  "(mg/dL)   Date Value   11/25/2024 47   02/21/2024 73   10/30/2020 47   01/08/2020 73     Hypothyroidism Follow-up    Since last visit, patient describes the following symptoms: dry skin and depression, weight loss of a few pounds.         Review of Systems  Constitutional, neuro, ENT, endocrine, pulmonary, cardiac, gastrointestinal, genitourinary, musculoskeletal, integument and psychiatric systems are negative, except as otherwise noted.      Objective    /64 (BP Location: Right arm, Patient Position: Sitting, Cuff Size: Adult Regular)   Pulse 62   Temp 97.7  F (36.5  C) (Tympanic)   Ht 1.702 m (5' 7\")   Wt 56.1 kg (123 lb 9.6 oz)   SpO2 98%   BMI 19.36 kg/m    Body mass index is 19.36 kg/m .  Physical Exam   GENERAL: alert and no distress  NECK: no adenopathy, no asymmetry, masses, or scars  RESP: lungs clear to auscultation - no rales, rhonchi or wheezes  CV: regular rate and rhythm, normal S1 S2, no S3 or S4, no murmur, click or rub, no peripheral edema  ABDOMEN: soft, nontender, no hepatosplenomegaly, no masses and bowel sounds normal  MS: no gross musculoskeletal defects noted, no edema  MS: 2+ edema to mid lower legs on right, tr-1+ on left  PSYCH: mentation appears normal, affect normal/bright    Results for orders placed or performed in visit on 03/26/25   PSA, screen     Status: None   Result Value Ref Range    Prostate Specific Antigen Screen 8.53 ng/mL    Narrative    This result is obtained using the Roche Elecsys total PSA method on the mayra e601 immunoassay analyzer, which is an ultrasensitive method. Results obtained with different assay methods or kits cannot be used interchangeably.  This test is intended for initial prostate cancer screening. PSA values exceeding the age-specific limits are suspicious for prostate disease, but additional testing, such as prostate biopsy, is needed to diagnose prostate pathology. The American Cancer Society recommends annual examination with digital " rectal examination and serum PSA beginning at age 50 and for men with a life expectancy of at least 10 years after detection of prostate cancer. For men in high-risk groups, such as  Americans or men with a first-degree relative diagnosed at a younger age, testing should begin at a younger age. It is generally recommended that information be provided to patients about the benefits and limitations of testing and treatment so they can make informed decisions.   Results for orders placed or performed in visit on 03/26/25   Hemoglobin A1c     Status: Abnormal   Result Value Ref Range    Estimated Average Glucose 148 (H) <117 mg/dL    Hemoglobin A1C 6.8 (H) <5.7 %   Comprehensive metabolic panel (BMP + Alb, Alk Phos, ALT, AST, Total. Bili, TP)     Status: Abnormal   Result Value Ref Range    Sodium 139 135 - 145 mmol/L    Potassium 4.8 3.4 - 5.3 mmol/L    Carbon Dioxide (CO2) 24 22 - 29 mmol/L    Anion Gap 12 7 - 15 mmol/L    Urea Nitrogen 25.7 (H) 8.0 - 23.0 mg/dL    Creatinine 0.96 0.67 - 1.17 mg/dL    GFR Estimate 75 >60 mL/min/1.73m2    Calcium 9.5 8.8 - 10.4 mg/dL    Chloride 103 98 - 107 mmol/L    Glucose 226 (H) 70 - 99 mg/dL    Alkaline Phosphatase 92 40 - 150 U/L    AST 16 0 - 45 U/L    ALT 22 0 - 70 U/L    Protein Total 6.4 6.4 - 8.3 g/dL    Albumin 4.1 3.5 - 5.2 g/dL    Bilirubin Total 0.6 <=1.2 mg/dL    Patient Fasting > 8hrs? No    Lipid Profile (Chol, Trig, HDL, LDL calc)     Status: None   Result Value Ref Range    Cholesterol 111 <200 mg/dL    Triglycerides 65 <150 mg/dL    Direct Measure HDL 40 >=40 mg/dL    LDL Cholesterol Calculated 58 <100 mg/dL    Non HDL Cholesterol 71 <130 mg/dL    Patient Fasting > 8hrs? No     Narrative    Cholesterol  Desirable: < 200 mg/dL  Borderline High: 200 - 239 mg/dL  High: >= 240 mg/dL    Triglycerides  Normal: < 150 mg/dL  Borderline High: 150 - 199 mg/dL  High: 200-499 mg/dL  Very High: >= 500 mg/dL    Direct Measure HDL  Female: >= 50 mg/dL   Male: >= 40  mg/dL    LDL Cholesterol  Desirable: < 100 mg/dL  Above Desirable: 100 - 129 mg/dL   Borderline High: 130 - 159 mg/dL   High:  160 - 189 mg/dL   Very High: >= 190 mg/dL    Non HDL Cholesterol  Desirable: < 130 mg/dL  Above Desirable: 130 - 159 mg/dL  Borderline High: 160 - 189 mg/dL  High: 190 - 219 mg/dL  Very High: >= 220 mg/dL   TSH with free T4 reflex     Status: Normal   Result Value Ref Range    TSH 3.72 0.30 - 4.20 uIU/mL           Signed Electronically by: Bisi Stuart MD    The longitudinal plan of care for the diagnosis(es)/condition(s) as documented were addressed during this visit. Due to the added complexity in care, I will continue to support Orprashant in the subsequent management and with ongoing continuity of care.    PROCEDURE:  JOINT ASPIRATION/INJECTION.         After a discussion of risks, benefits and side effects of procedure, informed patient consent was obtained.       The bilateral knees were prepped and draped in the usual clean fashion (sterile not required for this procedure).      INJECTION:  Using 5 cc of 1% lidocaine mixed                           with 40 mg of kenalog, the right knee was successfully injected                           without complication, then syringe switched to synvisc one .  Patient did experience some pain                          relief following injection.    Using 5 cc of 1% lidocaine mixed                           with 40 mg of kenalog, the left knee was successfully injected                           without complication, then syringe switched to synvisc one .  Patient did experience some pain                          relief following injection.

## 2025-03-27 ENCOUNTER — DOCUMENTATION ONLY (OUTPATIENT)
Dept: OTHER | Facility: CLINIC | Age: OVER 89
End: 2025-03-27

## 2025-04-10 ENCOUNTER — HOSPITAL ENCOUNTER (OUTPATIENT)
Facility: HOSPITAL | Age: OVER 89
Discharge: HOME | End: 2025-04-10
Attending: STUDENT IN AN ORGANIZED HEALTH CARE EDUCATION/TRAINING PROGRAM | Admitting: RADIOLOGY
Payer: COMMERCIAL

## 2025-04-10 ENCOUNTER — HOSPITAL ENCOUNTER (OUTPATIENT)
Dept: INTERVENTIONAL RADIOLOGY/VASCULAR | Facility: HOSPITAL | Age: OVER 89
Discharge: HOME | End: 2025-04-10
Attending: FAMILY MEDICINE | Admitting: RADIOLOGY
Payer: COMMERCIAL

## 2025-04-10 DIAGNOSIS — M16.11 PRIMARY OSTEOARTHRITIS OF RIGHT HIP: ICD-10-CM

## 2025-04-10 PROCEDURE — 77002 NEEDLE LOCALIZATION BY XRAY: CPT

## 2025-04-10 PROCEDURE — 255N000002 HC RX 255 OP 636: Performed by: STUDENT IN AN ORGANIZED HEALTH CARE EDUCATION/TRAINING PROGRAM

## 2025-04-10 PROCEDURE — 250N000009 HC RX 250: Performed by: STUDENT IN AN ORGANIZED HEALTH CARE EDUCATION/TRAINING PROGRAM

## 2025-04-10 PROCEDURE — 250N000011 HC RX IP 250 OP 636: Performed by: STUDENT IN AN ORGANIZED HEALTH CARE EDUCATION/TRAINING PROGRAM

## 2025-04-10 PROCEDURE — 20610 DRAIN/INJ JOINT/BURSA W/O US: CPT | Mod: RT

## 2025-04-10 RX ORDER — IOPAMIDOL 612 MG/ML
30 INJECTION, SOLUTION INTRAVASCULAR ONCE
Status: COMPLETED | OUTPATIENT
Start: 2025-04-10 | End: 2025-04-10

## 2025-04-10 RX ORDER — TRIAMCINOLONE ACETONIDE 40 MG/ML
40 INJECTION, SUSPENSION INTRA-ARTICULAR; INTRAMUSCULAR ONCE
Status: COMPLETED | OUTPATIENT
Start: 2025-04-10 | End: 2025-04-10

## 2025-04-10 RX ORDER — LIDOCAINE HYDROCHLORIDE 10 MG/ML
5 INJECTION, SOLUTION EPIDURAL; INFILTRATION; INTRACAUDAL; PERINEURAL ONCE
Status: COMPLETED | OUTPATIENT
Start: 2025-04-10 | End: 2025-04-10

## 2025-04-10 RX ADMIN — IOPAMIDOL 3 ML: 612 INJECTION, SOLUTION INTRAVENOUS at 14:52

## 2025-04-10 RX ADMIN — LIDOCAINE HYDROCHLORIDE 5 ML: 10 INJECTION, SOLUTION EPIDURAL; INFILTRATION; INTRACAUDAL; PERINEURAL at 14:52

## 2025-04-10 RX ADMIN — TRIAMCINOLONE ACETONIDE 40 MG: 40 INJECTION, SUSPENSION INTRA-ARTICULAR; INTRAMUSCULAR at 14:52

## 2025-04-10 NOTE — DISCHARGE INSTRUCTIONS
Cell number on file:    Telephone Information:   Mobile [phone]   Mobile [phone]     Is it ok to leave a message at this number(s)? Yes    Dr. Olivas completed your procedure on 4/10/2025.    Current Pain Level (0-10 Scale): 9/10  Post Pain Level (0-10):  0/10    Radiology Discharge instructions for Steroid Injection    Activity Level:     Do not do any heavy activity or exercise for 24 hours.   Do not drive for 4 hours after your injection.  Diet:   Return to your normal diet.  Medications:   If you have stopped taking your Aspirin, Coumadin/Warfarin, Ibuprofen, or any   other blood thinner for this procedure you may resume in the morning unless   your primary care provider has given you other instructions.    Diabetics may see an increase in blood sugar after steroid injections. If you are concerned about your blood sugar, please contact your family doctor.    Site Care:  Remove the bandage and bathe or shower the morning after the procedure.      Please allow two weeks to experience improvement in your pain.  If you have any further issues, please contact your provider.    Call your Primary Care Provider if you have the following (if your primary care provider is not available please seek emergency care):   Nausea with vomiting   Severe headache   Drowsiness or confusion   Redness or drainage at the injection or puncture site   Temperature over 101 degrees F   Other concerns   Worsening back pain   Stiff neck

## 2025-04-16 DIAGNOSIS — E11.65 TYPE 2 DIABETES MELLITUS WITH HYPERGLYCEMIA, WITHOUT LONG-TERM CURRENT USE OF INSULIN (H): ICD-10-CM

## 2025-04-16 RX ORDER — METFORMIN HYDROCHLORIDE 500 MG/1
1000 TABLET, EXTENDED RELEASE ORAL AT BEDTIME
Qty: 180 TABLET | Refills: 1 | OUTPATIENT
Start: 2025-04-16

## 2025-04-16 NOTE — TELEPHONE ENCOUNTER
metFORMIN (GLUCOPHAGE XR) 500 MG 24 hr tablet (Discontinued)       Last Written Prescription Date:  10/10/24  Last Fill Quantity: 180,   # refills: 1  Last Office Visit: 03/26/25  Future Office visit:    Next 5 appointments (look out 90 days)      Apr 28, 2025 10:00 AM  (Arrive by 9:45 AM)  Provider Visit with Bisi Stuart MD  [facility] ([facility] ) [address] [place] [id number]  [phone]     May 08, 2025 4:00 PM  (Arrive by 3:45 PM)  Return Visit with Deidre Mishra DPM  [facility] ([facility] ) [address] [place] [address] [phone]

## 2025-04-22 DIAGNOSIS — E11.65 TYPE 2 DIABETES MELLITUS WITH HYPERGLYCEMIA, WITHOUT LONG-TERM CURRENT USE OF INSULIN (H): ICD-10-CM

## 2025-04-22 DIAGNOSIS — M17.10 PRIMARY LOCALIZED OSTEOARTHROSIS OF LOWER LEG, UNSPECIFIED LATERALITY: ICD-10-CM

## 2025-04-22 DIAGNOSIS — M16.11 PRIMARY OSTEOARTHRITIS OF RIGHT HIP: ICD-10-CM

## 2025-04-22 RX ORDER — PSEUDOEPHED/ACETAMINOPH/DIPHEN 30MG-500MG
TABLET ORAL
Qty: 120 TABLET | Refills: 3 | Status: SHIPPED | OUTPATIENT
Start: 2025-04-22

## 2025-04-22 RX ORDER — METFORMIN HYDROCHLORIDE 500 MG/1
1000 TABLET, EXTENDED RELEASE ORAL AT BEDTIME
Qty: 180 TABLET | Refills: 1 | OUTPATIENT
Start: 2025-04-22

## 2025-04-22 NOTE — TELEPHONE ENCOUNTER
RN CC talked with patient's granddaughter, Stephanie.  Stephanie reports patient is no longer taking metformin and medication was discontinued on 3/26/25.    RN ADARSH called pharmacy and talked with George to report that medication is discontinued. George reported he documented this in patient's record.

## 2025-04-22 NOTE — TELEPHONE ENCOUNTER
Pt called and message left to call back.    Metformin was discontinue on 03/26/2025.     Type 2 diabetes mellitus with hyperglycemia, without long-term current use of insulin (H)  He has had trouble with the metformin, daughter and granddaughter would like to get him off as he has needed the pepcid since starting it  I think this is completely reasonable second to only needed A1c less than 8 at this stage of life  Stop glipizide as well  Follow-up 4 mos  - Hemoglobin A1c; Future

## 2025-04-22 NOTE — TELEPHONE ENCOUNTER
acetaminophen (TYLENOL) 500 MG tablet       Last Written Prescription Date:  1/20/25  Last Fill Quantity: 120,   # refills: 3  Last Office Visit: 3/26/25  Future Office visit:    Next 5 appointments (look out 90 days)      Apr 28, 2025 10:00 AM  (Arrive by 9:45 AM)  Provider Visit with Bisi Stuart MD  [facility] ([facility] ) [address] [place] [id number]  [phone]     May 08, 2025 4:00 PM  (Arrive by 3:45 PM)  Return Visit with Deidre Mishra DPM  [facility] ([facility] ) [address] [place] [phone]             Routing refill request to provider for review/approval because:  Analgesics (Non-Narcotic Tylenol and ASA Only) [id number]/2025 03:39 PM   Protocol Details Medication matches indication    Recent (12 mo) or future (90 days) visit within the authorizing provider's specialty

## 2025-04-28 ENCOUNTER — OFFICE VISIT (OUTPATIENT)
Dept: FAMILY MEDICINE | Facility: OTHER | Age: OVER 89
End: 2025-04-28
Attending: FAMILY MEDICINE
Payer: COMMERCIAL

## 2025-04-28 VITALS
BODY MASS INDEX: 20.61 KG/M2 | HEIGHT: 65 IN | HEART RATE: 79 BPM | TEMPERATURE: 96.9 F | RESPIRATION RATE: 16 BRPM | DIASTOLIC BLOOD PRESSURE: 60 MMHG | SYSTOLIC BLOOD PRESSURE: 134 MMHG | WEIGHT: 123.7 LBS | OXYGEN SATURATION: 97 %

## 2025-04-28 DIAGNOSIS — Z91.89 DRIVING SAFETY ISSUE: ICD-10-CM

## 2025-04-28 DIAGNOSIS — R41.3 MEMORY LOSS: ICD-10-CM

## 2025-04-28 DIAGNOSIS — H61.23 CERUMINOSIS, BILATERAL: ICD-10-CM

## 2025-04-28 DIAGNOSIS — M17.0 BILATERAL PRIMARY OSTEOARTHRITIS OF KNEE: ICD-10-CM

## 2025-04-28 DIAGNOSIS — G47.00 INSOMNIA, UNSPECIFIED TYPE: Primary | ICD-10-CM

## 2025-04-28 DIAGNOSIS — R44.3 HALLUCINATIONS: ICD-10-CM

## 2025-04-28 PROCEDURE — G0463 HOSPITAL OUTPT CLINIC VISIT: HCPCS

## 2025-04-28 RX ORDER — QUETIAPINE FUMARATE 25 MG/1
12.5-25 TABLET, FILM COATED ORAL AT BEDTIME
Qty: 30 TABLET | Refills: 1 | Status: SHIPPED | OUTPATIENT
Start: 2025-04-28

## 2025-04-28 ASSESSMENT — PAIN SCALES - GENERAL: PAINLEVEL_OUTOF10: NO PAIN (0)

## 2025-04-28 NOTE — PATIENT INSTRUCTIONS
No more shots!  Tylenol 1000mg 3x/day and do ibuprofen 200mg in between the tylenol doses for starters and if that doesn't work, increase to 400mg 2x/day in between tylenol doses  Continue mirtazapine for now and add seroquel 1/2 tablet as well at bedtime (for a week and if not helpful, increase to a full tablet)  No driving car or tractor  Keep dancing!!

## 2025-04-28 NOTE — PROGRESS NOTES
The longitudinal plan of care for the diagnosis(es)/condition(s) as documented were addressed during this visit. Due to the added complexity in care, I will continue to support Orlen in the subsequent management and with ongoing continuity of care.    Answers submitted by the patient for this visit:  General Questionnaire (Submitted on 4/28/2025)  Chief Complaint: Chronic problems general questions HPI Form  What is the reason for your visit today? : med follow up  How many servings of fruits and vegetables do you eat daily?: 0-1  On average, how many sweetened beverages do you drink each day (Examples: soda, juice, sweet tea, etc.  Do NOT count diet or artificially sweetened beverages)?: 1  How many minutes a day do you exercise enough to make your heart beat faster?: 20 to 29  How many days a week do you exercise enough to make your heart beat faster?: 4  How many days per week do you miss taking your medication?: 1  What makes it hard for you to take your medication every day?: remembering to take  Questionnaire about: Chronic problems general questions HPI Form (Submitted on 4/28/2025)  Chief Complaint: Chronic problems general questions HPI Form

## 2025-04-28 NOTE — PROGRESS NOTES
Assessment & Plan     Insomnia, unspecified type  Ultimately he and family thinks the remeron was helpful getting him to sleep better, will continue that and start low dose of seroquel  Follow-up 1 mo  - QUEtiapine (SEROQUEL) 25 MG tablet; Take 0.5-1 tablets (12.5-25 mg) by mouth at bedtime.    Memory loss  Starting some dementia process    Hallucinations  Start 1/2 tablet seroquel and increase to full tablet if needed  Also reviewed that if things don't improve, he may need a more long term option  Follow-up 1 mo  - QUEtiapine (SEROQUEL) 25 MG tablet; Take 0.5-1 tablets (12.5-25 mg) by mouth at bedtime.    Ceruminosis, bilateral  Ear wash done by nursing    Driving safety issue  Reviewed that he can't drive car or tractor any longer, he is disappointed but agrees and verbalizes understanding    Bilateral primary osteoarthritis of knee   Last shot wasn't helpful, discussed plan to keep tylenol tid and add ibuprofen bid between tylenol dosing      Patient was agreeable to this plan and had no further questions.  Follow-up   40 minutes spent by me on the date of the encounter doing chart review, history and exam, documentation and further activities per the note and talking with granddaughter in the hallway before appt    Abel Julio is a 89 year old, presenting for the following health issues:  Insomnia and Hallucinations        4/28/2025     9:48 AM   Additional Questions   Roomed by Quyen ball   Accompanied by daughters and granddaughter         4/28/2025     9:48 AM   Patient Reported Additional Medications   Patient reports taking the following new medications None     History of Present Illness       Reason for visit:  Med follow up    He eats 0-1 servings of fruits and vegetables daily.He consumes 1 sweetened beverage(s) daily.He exercises with enough effort to increase his heart rate 20 to 29 minutes per day.  He exercises with enough effort to increase his heart rate 4 days per week. He is missing 1  "dose(s) of medications per week.  He is not taking prescribed medications regularly due to remembering to take.       Insomnia  Onset/Duration: ongoing for months  Description:   Frequency of insomnia:  nightly  Time to fall asleep (sleep latency): 30 minutes  Middle of night awakening:  YES  Early morning awakening:  YES  Progression of Symptoms:  worsening  Accompanying Signs & Symptoms:  Daytime sleepiness/napping: YES  Excessive snoring/apnea: YES snoring  Restless legs: No  Waking to urinate: YES  Chronic pain:  YES  Depression symptoms (if yes, do PHQ9): YES  Anxiety symptoms (if yes, do QIAN-7): YES  History:  Prior Insomnia: YES  New stressful situation: YES, pt had a fall recently  Precipitating factors:   Caffeine intake: YES , some coffee, soda, tea  OTC decongestants: No  Any new medications: YES Remeron  Alleviating factors:  Self medicating (alcohol, etc.):  No  Stress-reduction (exercise, yoga, meditation etc): No  Therapies tried and outcome: none    Concern - Hallucinations  Onset: 4 to 6 months  Description: hears and sees things that aren't there  Intensity: moderate  Progression of Symptoms:  worsening  Accompanying Signs & Symptoms: None  Previous history of similar problem: no  Precipitating factors:        Worsened by: n/a  Alleviating factors:        Improved by: communication with family  Therapies tried and outcome: None      Review of Systems  Constitutional, HEENT, cardiovascular, pulmonary, GI, , musculoskeletal, neuro, skin, endocrine and psych systems are negative, except as otherwise noted.      Objective    /60 (BP Location: Right arm, Patient Position: Sitting, Cuff Size: Adult Regular)   Pulse 79   Temp 96.9  F (36.1  C) (Tympanic)   Resp 16   Ht 1.651 m (5' 5\")   Wt 56.1 kg (123 lb 11.2 oz)   SpO2 97%   BMI 20.58 kg/m    Body mass index is 20.58 kg/m .  Physical Exam   GENERAL: alert and no distress  HENT: normal cephalic/atraumatic, both ears: occluded with wax, " nose and mouth without ulcers or lesions, oropharynx clear, and oral mucous membranes moist  RESP: lungs clear to auscultation - no rales, rhonchi or wheezes  CV: regular rate and rhythm, normal S1 S2, no S3 or S4, no murmur, click or rub, no peripheral edema  ABDOMEN: soft, nontender, no hepatosplenomegaly, no masses and bowel sounds normal  MS: tr-1+ edema to low ankle  PSYCH: mentation appears normal, affect normal/bright    No results found for any visits on 04/28/25.        Signed Electronically by: Bisi Stuart MD

## 2025-05-08 ENCOUNTER — OFFICE VISIT (OUTPATIENT)
Dept: PODIATRY | Facility: OTHER | Age: OVER 89
End: 2025-05-08
Attending: PODIATRIST
Payer: COMMERCIAL

## 2025-05-08 VITALS
TEMPERATURE: 98.9 F | SYSTOLIC BLOOD PRESSURE: 113 MMHG | DIASTOLIC BLOOD PRESSURE: 64 MMHG | OXYGEN SATURATION: 96 % | HEART RATE: 54 BPM

## 2025-05-08 DIAGNOSIS — E11.42 DIABETIC POLYNEUROPATHY ASSOCIATED WITH TYPE 2 DIABETES MELLITUS (H): Primary | ICD-10-CM

## 2025-05-08 DIAGNOSIS — L60.3 ONYCHODYSTROPHY: ICD-10-CM

## 2025-05-08 DIAGNOSIS — E11.9 DIABETES MELLITUS TYPE 2, NONINSULIN DEPENDENT (H): ICD-10-CM

## 2025-05-08 DIAGNOSIS — Z13.89 SCREENING FOR DIABETIC PERIPHERAL NEUROPATHY: ICD-10-CM

## 2025-05-08 PROCEDURE — G0463 HOSPITAL OUTPT CLINIC VISIT: HCPCS

## 2025-05-08 PROCEDURE — 11721 DEBRIDE NAIL 6 OR MORE: CPT | Performed by: PODIATRIST

## 2025-05-08 ASSESSMENT — PAIN SCALES - GENERAL: PAINLEVEL_OUTOF10: SEVERE PAIN (8)

## 2025-05-08 NOTE — PROGRESS NOTES
Chief complaint: Patient presents with:  Toenail: DFE      History of Present Illness: This 89-year-old NIDDM male is seen for follow-up management of elongated toenails.     He has a little burning, tingling, and numbness in his feet.    Patient says he has continued to be active with dancing for exercise. His daughter is helping him to file his nails between his appointments. He is having a lot more knee and hip pain so he has not been as active, but he continues to try to attend dances.     No further pedal complaints today.     Lab Results   Component Value Date    A1C 6.8 03/26/2025    A1C 6.4 11/25/2024    A1C 6.9 07/15/2024    A1C 7.1 02/21/2024    A1C 6.6 11/20/2023    A1C 6.3 05/03/2021    A1C 6.1 02/01/2021    A1C 6.4 10/30/2020    A1C 6.5 07/29/2020    A1C 6.3 01/08/2020         Wt Readings from Last 4 Encounters:   04/28/25 56.1 kg (123 lb 11.2 oz)   03/26/25 56.1 kg (123 lb 9.6 oz)   12/05/24 57.2 kg (126 lb)   11/25/24 57.5 kg (126 lb 12.8 oz)         /64 (BP Location: Left arm, Patient Position: Sitting, Cuff Size: Adult Regular)   Pulse 54   Temp 98.9  F (37.2  C) (Tympanic)   SpO2 96%     Patient Active Problem List   Diagnosis    Other specified hypothyroidism    Benign essential hypertension    Hyperlipidemia LDL goal <70    Bilateral carotid artery stenosis    Coronary artery disease involving coronary bypass graft of native heart without angina pectoris    Encounter for diabetic foot exam (H)    Trochanteric bursitis of right hip    Gastroesophageal reflux disease, esophagitis presence not specified    Primary osteoarthritis of right hip    Bilateral primary osteoarthritis of knee     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)    Chronic bilateral low back pain without sciatica    DDD (degenerative disc disease), lumbar    Keratoacanthoma of cheek    Atypical squamoproliferative skin lesion    Primary localized osteoarthrosis of lower leg, unspecified  laterality    Type 2 diabetes mellitus with hyperglycemia, without long-term current use of insulin (H)    Atypical chest pain    Pulmonary nodules    Primary localized osteoarthrosis of left lower leg    Cervicalgia    Derangement of right sacroiliac joint    Diastolic dysfunction    Memory loss    Toenail deformity    Lumbar foraminal stenosis    Insomnia, unspecified type    Hallucinations    Driving safety issue    Ceruminosis, bilateral       Past Surgical History:   Procedure Laterality Date    AS CABG, ARTERY-VEIN, FIVE  11/02/2011    off pump       Current Outpatient Medications   Medication Sig Dispense Refill    acetaminophen (TYLENOL) 500 MG tablet Take 2 Tablets by mouth two times a day as needed for mild pain 120 tablet 3    ASPIRIN PO Take 325 mg by mouth daily      levothyroxine (SYNTHROID/LEVOTHROID) 175 MCG tablet Take 1 tablet (175 mcg) by mouth every morning (before breakfast). 90 tablet 1    metoprolol succinate ER (TOPROL XL) 50 MG 24 hr tablet Take 1 Tablet by mouth one time a day. 90 tablet 2    mirtazapine (REMERON) 7.5 MG tablet Take 1 tablet (7.5 mg) by mouth at bedtime. 90 tablet 0    nitroGLYcerin (NITROSTAT) 0.4 MG sublingual tablet For chest pain place 1 tablet under the tongue every 5 minutes for 3 doses. If symptoms persist 5 minutes after 1st dose call 911. 25 tablet 0    QUEtiapine (SEROQUEL) 25 MG tablet Take 0.5-1 tablets (12.5-25 mg) by mouth at bedtime. 30 tablet 1    vitamin D3 (CHOLECALCIFEROL) 50 mcg (2000 units) tablet Take by mouth daily       No current facility-administered medications for this visit.        No Known Allergies    Family History   Problem Relation Age of Onset    Dementia Mother     Cerebrovascular Disease Father     Coronary Artery Disease Father     Coronary Artery Disease Sister     Lung Cancer Sister     Thyroid Disease Daughter        Social History     Socioeconomic History    Marital status:      Spouse name: ReadyCart    Number of children: 5     Years of education: 12    Highest education level: Not on file   Occupational History    Occupation:      Employer: RETIRED   Social Needs    Financial resource strain: Not on file    Food insecurity:     Worry: Not on file     Inability: Not on file    Transportation needs:     Medical: Not on file     Non-medical: Not on file   Tobacco Use    Smoking status: Never Smoker    Smokeless tobacco: Never Used    Tobacco comment: second hand smoke in the house 40 yrs   Substance and Sexual Activity    Alcohol use: No     Alcohol/week: 0.0 oz    Drug use: No    Sexual activity: Never     Partners: Female   Lifestyle    Physical activity:     Days per week: Not on file     Minutes per session: Not on file    Stress: Not on file   Relationships    Social connections:     Talks on phone: Not on file     Gets together: Not on file     Attends Taoism service: Not on file     Active member of club or organization: Not on file     Attends meetings of clubs or organizations: Not on file     Relationship status: Not on file    Intimate partner violence:     Fear of current or ex partner: Not on file     Emotionally abused: Not on file     Physically abused: Not on file     Forced sexual activity: Not on file   Other Topics Concern     Service No    Blood Transfusions Yes     Comment: Ok to recieve     Caffeine Concern Yes     Comment: 3 cups daily     Occupational Exposure Not Asked    Hobby Hazards Not Asked    Sleep Concern Not Asked    Stress Concern Not Asked    Weight Concern Not Asked    Special Diet Not Asked    Back Care Not Asked    Exercise Yes     Comment: walking,      Bike Helmet Not Asked    Seat Belt Yes    Self-Exams Not Asked    Parent/sibling w/ CABG, MI or angioplasty before 65F 55M? Not Asked   Social History Narrative    Not on file       ROS: 10 point ROS neg other than the symptoms noted above in the HPI.  EXAM  Constitutional: healthy, alert and no  distress    Psychiatric: mentation appears normal and affect normal/bright    VASCULAR:  -Dorsalis pedis pulse +1/4 b/l  -Posterior tibial pulse +1/4 b/l  -Capillary refill time < 3 seconds to b/l hallux  -Hair growth Absent to b/l anterior legs and ankles  NEURO:  -Positive for paresthesias to the bilateral foot  -Epicritic and protective sensation diminished to the bilateral foot  DERM:  -Skin mildly dry, flaking to bilateral digits only (improved to the remainder of the foot)   -Skin temperature within normal limits to the bilateral foot  -Toenails elongated, thickened, dystrophic and discolored with subungual debris x 10  MSK:  -Muscle strength of ankles +5/5 for dorsiflexion, plantarflexion, ABDUction and ADDuction b/l    ============================================================    ASSESSMENT:    (E11.42) 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)  (primary encounter diagnosis)    (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 Treatment options discussed with no educational barriers noted.    -High risk toenail debridement x 10 toenails without incident    Diabetes Mellitus: Patient's DM is managed by their PCP. The DM appears to be stable. Patient's last HbA1C was 6.4% on 11/25/2024.    -Diabetic Foot Education provided. This included checking the feet daily looking for new new blisters or wounds, wearing shoes at all times when walking including around the house, and avoiding lotion application between the toes. Any sign of infection in the foot warrant's the patient presenting to the ED as soon as possible.  ---Patient has improved xerosis of the skin.     -Patient in agreement with the above treatment plan and all of patient's questions were answered.      Return to clinic 63+ days for diabetic foot exam and high risk nail  debridement        Deidre Mishra, LEONIDASM

## 2025-06-02 ENCOUNTER — LAB (OUTPATIENT)
Dept: LAB | Facility: OTHER | Age: OVER 89
End: 2025-06-02
Payer: COMMERCIAL

## 2025-06-02 ENCOUNTER — OFFICE VISIT (OUTPATIENT)
Dept: FAMILY MEDICINE | Facility: OTHER | Age: OVER 89
End: 2025-06-02
Attending: FAMILY MEDICINE
Payer: COMMERCIAL

## 2025-06-02 ENCOUNTER — RESULTS FOLLOW-UP (OUTPATIENT)
Dept: FAMILY MEDICINE | Facility: OTHER | Age: OVER 89
End: 2025-06-02

## 2025-06-02 VITALS
DIASTOLIC BLOOD PRESSURE: 70 MMHG | RESPIRATION RATE: 16 BRPM | SYSTOLIC BLOOD PRESSURE: 128 MMHG | OXYGEN SATURATION: 97 % | WEIGHT: 120.3 LBS | BODY MASS INDEX: 20.04 KG/M2 | HEIGHT: 65 IN | TEMPERATURE: 97.9 F | HEART RATE: 60 BPM

## 2025-06-02 DIAGNOSIS — M17.4 OTHER SECONDARY OSTEOARTHRITIS OF BOTH KNEES: ICD-10-CM

## 2025-06-02 DIAGNOSIS — E03.8 OTHER SPECIFIED HYPOTHYROIDISM: ICD-10-CM

## 2025-06-02 DIAGNOSIS — F51.02 ADJUSTMENT INSOMNIA: Primary | ICD-10-CM

## 2025-06-02 DIAGNOSIS — R41.3 MEMORY LOSS: ICD-10-CM

## 2025-06-02 DIAGNOSIS — R44.3 HALLUCINATIONS: ICD-10-CM

## 2025-06-02 LAB
T4 FREE SERPL-MCNC: 1.43 NG/DL (ref 0.9–1.7)
TSH SERPL DL<=0.005 MIU/L-ACNC: 5.53 UIU/ML (ref 0.3–4.2)

## 2025-06-02 PROCEDURE — 84443 ASSAY THYROID STIM HORMONE: CPT | Mod: ZL

## 2025-06-02 PROCEDURE — 84439 ASSAY OF FREE THYROXINE: CPT | Mod: ZL

## 2025-06-02 PROCEDURE — 36415 COLL VENOUS BLD VENIPUNCTURE: CPT | Mod: ZL

## 2025-06-02 PROCEDURE — G0463 HOSPITAL OUTPT CLINIC VISIT: HCPCS

## 2025-06-02 RX ORDER — MIRTAZAPINE 15 MG/1
15 TABLET, FILM COATED ORAL AT BEDTIME
Qty: 30 TABLET | Refills: 1 | Status: SHIPPED | OUTPATIENT
Start: 2025-06-02

## 2025-06-02 ASSESSMENT — PAIN SCALES - GENERAL: PAINLEVEL_OUTOF10: MODERATE PAIN (6)

## 2025-06-02 NOTE — PROGRESS NOTES
The longitudinal plan of care for the diagnosis(es)/condition(s) as documented were addressed during this visit. Due to the added complexity in care, I will continue to support Nori in the subsequent management and with ongoing continuity of care.      Assessment & Plan     Adjustment insomnia  Increase remeron to 15mg, increase mag glycinate to 800mg   Follow-up 6 weeks  - mirtazapine (REMERON) 15 MG tablet; Take 1 tablet (15 mg) by mouth at bedtime.    Hallucinations  As above  - mirtazapine (REMERON) 15 MG tablet; Take 1 tablet (15 mg) by mouth at bedtime.    Memory loss  - mirtazapine (REMERON) 15 MG tablet; Take 1 tablet (15 mg) by mouth at bedtime.    Other secondary osteoarthritis of both knees  PT at home  Discussed using voltaren gel, lidocaine patches  - Home Care Referral    Re-emphasized avoiding the tractor and ladders, verbalized need for safety    Patient was agreeable to this plan and had no further questions.  Follow-up   40 minutes spent by me on the date of the encounter doing chart review, history and exam, documentation and further activities per the note      Subjective   Nori is a 89 year old, presenting for the following health issues:  Insomnia        6/2/2025     1:18 PM   Additional Questions   Roomed by Yuni CUEVA   Accompanied by daughter     History of Present Illness       Reason for visit:  Follow uo   He is taking medications regularly.        Hypothyroidism Follow-up    Since last visit, patient describes the following symptoms: weight loss of 3 lbs, dry skin, and depression      Insomnia  Onset/Duration: 8 months  with Remeron and Seroquel- this is been better   Description:   Frequency of insomnia:  1-2 times a week  Time to fall asleep (sleep latency): 15 minutes  Middle of night awakening:  YES- hallucinating and does not realize he is up   Early morning awakening:  YES- 530 or 6 AM   Progression of Symptoms:  improving with the Remeron and Seroquel   Accompanying Signs &  "Symptoms:  Daytime sleepiness/napping: YES- very seldom   Excessive snoring/apnea: YES- snoring unknown if excessive   Restless legs: YES  Waking to urinate: YES  Chronic pain:  YES- knees and hip   Depression symptoms (if yes, do PHQ9): YES  Anxiety symptoms (if yes, do QIAN-7): YES  History:  Prior Insomnia: No  New stressful situation: YES- not being able to drive   Precipitating factors:   Caffeine intake: YES- coffee and MT[employer] dew sometimes   OTC decongestants: No  Any new medications: No  Alleviating factors:  Self medicating (alcohol, etc.):  No  Stress-reduction (exercise, yoga, meditation etc): No  Therapies tried and outcome: Remeron and Seroquel       Review of Systems  Constitutional, HEENT, cardiovascular, pulmonary, GI, , musculoskeletal, neuro, skin, endocrine and psych systems are negative, except as otherwise noted.      Objective    /70 (BP Location: Left arm, Patient Position: Sitting, Cuff Size: Adult Regular)   Pulse 60   Temp 97.9  F (36.6  C) (Tympanic)   Resp 16   Ht 1.651 m (5' 5\")   Wt 54.6 kg (120 lb 4.8 oz)   SpO2 97%   BMI 20.02 kg/m    Body mass index is 20.02 kg/m .  Physical Exam   GENERAL: alert and no distress  RESP: lungs clear to auscultation - no rales, rhonchi or wheezes  CV: regular rate and rhythm, normal S1 S2, no S3 or S4, no murmur, click or rub, no peripheral edema  ABDOMEN: soft, nontender, no hepatosplenomegaly, no masses and bowel sounds normal  MS: decreased range of motion knees   PSYCH: mentation appears normal, affect normal/bright    No results found for any visits on 06/02/25.        Signed Electronically by: Bisi Stuart MD    "

## 2025-06-02 NOTE — PATIENT INSTRUCTIONS
Voltaren gel to joints that hurt (can use in combination with lidocaine patches)  Put PJs on at night  Magnesium glycinate 600mg 1 hr before bed  Home physical therapy will contact you   Stop seroquel for now and increase remeron 15mg   Rajinder stocking knee high helpful during the day

## 2025-06-16 DIAGNOSIS — R44.1 VISUAL HALLUCINATION: ICD-10-CM

## 2025-06-17 RX ORDER — MIRTAZAPINE 7.5 MG/1
15 TABLET, FILM COATED ORAL AT BEDTIME
Qty: 60 TABLET | Refills: 0 | Status: SHIPPED | OUTPATIENT
Start: 2025-06-17 | End: 2025-07-17

## 2025-06-17 NOTE — TELEPHONE ENCOUNTER
Grand Daughter is calling for patient she is on the consent to communicate. Grand daughter states patient needs this medication today as he wont have any for tonight. Grand Daughter states it was increased to 15 mg at bedtime. Please update RX.

## 2025-06-17 NOTE — TELEPHONE ENCOUNTER
mirtazapine (REMERON) 7.5 MG tablet       Last Written Prescription Date:  6/2/2025  Last Fill Quantity: 30,   # refills: 1  Last Office Visit: 6/2/2025  Future Office visit:    Next 5 appointments (look out 90 days)      Jul 10, 2025 4:00 PM  (Arrive by 3:45 PM)  Return Visit with Deidre Mishra DPM  [facility] ([facility] ) [address] [address]  [phone]     Jul 17, 2025 1:45 PM  (Arrive by 1:30 PM)  Provider Visit with Bisi Stuart MD  [facility] ([facility] ) [address] [address]  [phone]     Jul 28, 2025 9:00 AM  (Arrive by 8:45 AM)  Provider Visit with Bisi Stuart MD  [facility] ([facility] ) [address] [address]  [phone]

## 2025-06-24 DIAGNOSIS — Z53.9 PERSONS ENCOUNTERING HEALTH SERVICES FOR SPECIFIC PROCEDURES, NOT CARRIED OUT: Primary | ICD-10-CM

## 2025-06-24 PROCEDURE — G0180 MD CERTIFICATION HHA PATIENT: HCPCS | Performed by: FAMILY MEDICINE

## 2025-07-01 ENCOUNTER — MEDICAL CORRESPONDENCE (OUTPATIENT)
Dept: HEALTH INFORMATION MANAGEMENT | Facility: HOSPITAL | Age: OVER 89
End: 2025-07-01

## 2025-07-10 ENCOUNTER — OFFICE VISIT (OUTPATIENT)
Dept: PODIATRY | Facility: OTHER | Age: OVER 89
End: 2025-07-10
Attending: PODIATRIST
Payer: COMMERCIAL

## 2025-07-10 ENCOUNTER — TELEPHONE (OUTPATIENT)
Dept: CARE COORDINATION | Facility: OTHER | Age: OVER 89
End: 2025-07-10

## 2025-07-10 VITALS
OXYGEN SATURATION: 96 % | RESPIRATION RATE: 16 BRPM | HEART RATE: 80 BPM | SYSTOLIC BLOOD PRESSURE: 125 MMHG | DIASTOLIC BLOOD PRESSURE: 75 MMHG

## 2025-07-10 DIAGNOSIS — E11.42 DIABETIC POLYNEUROPATHY ASSOCIATED WITH TYPE 2 DIABETES MELLITUS (H): Primary | ICD-10-CM

## 2025-07-10 DIAGNOSIS — L60.3 ONYCHODYSTROPHY: ICD-10-CM

## 2025-07-10 DIAGNOSIS — E11.9 DIABETES MELLITUS TYPE 2, NONINSULIN DEPENDENT (H): ICD-10-CM

## 2025-07-10 DIAGNOSIS — Z13.89 SCREENING FOR DIABETIC PERIPHERAL NEUROPATHY: ICD-10-CM

## 2025-07-10 PROCEDURE — G0463 HOSPITAL OUTPT CLINIC VISIT: HCPCS

## 2025-07-10 PROCEDURE — 11721 DEBRIDE NAIL 6 OR MORE: CPT | Performed by: PODIATRIST

## 2025-07-10 ASSESSMENT — PAIN SCALES - GENERAL: PAINLEVEL_OUTOF10: MODERATE PAIN (4)

## 2025-07-10 NOTE — TELEPHONE ENCOUNTER
Approval for VO for OT given to Jenny. Jenny verbalized understanding.     Call from TEREZA Alvarado with [facility]    Requesting verbal orders for OT    Starting next 7/14-7/18/25  1 x week X  6 weeks      Return call to Jenny  @  [phone]  Permission to leave a detailed message?   yes

## 2025-07-10 NOTE — PROGRESS NOTES
Chief complaint: Patient presents with:  diabetic foot exam      History of Present Illness: This 89-year-old NIDDM male is seen for follow-up management of elongated toenails.     He has a little burning, tingling, and numbness in his feet.    Patient says he has continued to be active with dancing for exercise. His daughter is helping him to file his nails between his appointments. He still has knee and hip pain, but he continues to try to keep moving to reduce the pain from the arthritis.    No further pedal complaints today.       Lab Results   Component Value Date    A1C 6.8 03/26/2025    A1C 6.4 11/25/2024    A1C 6.9 07/15/2024    A1C 7.1 02/21/2024    A1C 6.6 11/20/2023    A1C 6.3 05/03/2021    A1C 6.1 02/01/2021    A1C 6.4 10/30/2020    A1C 6.5 07/29/2020    A1C 6.3 01/08/2020         Wt Readings from Last 4 Encounters:   06/02/25 54.6 kg (120 lb 4.8 oz)   04/28/25 56.1 kg (123 lb 11.2 oz)   03/26/25 56.1 kg (123 lb 9.6 oz)   12/05/24 57.2 kg (126 lb)         /75 (BP Location: Left arm, Patient Position: Sitting, Cuff Size: Adult Regular)   Pulse 80   Resp 16   SpO2 96%     Patient Active Problem List   Diagnosis    Other specified hypothyroidism    Benign essential hypertension    Hyperlipidemia LDL goal <70    Bilateral carotid artery stenosis    Coronary artery disease involving coronary bypass graft of native heart without angina pectoris    Encounter for diabetic foot exam (H)    Trochanteric bursitis of right hip    Gastroesophageal reflux disease, esophagitis presence not specified    Primary osteoarthritis of right hip    Bilateral primary osteoarthritis of knee     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)    Chronic bilateral low back pain without sciatica    DDD (degenerative disc disease), lumbar    Keratoacanthoma of cheek    Atypical squamoproliferative skin lesion    Primary localized osteoarthrosis of lower leg, unspecified laterality    Type 2 diabetes  mellitus with hyperglycemia, without long-term current use of insulin (H)    Atypical chest pain    Pulmonary nodules    Primary localized osteoarthrosis of left lower leg    Cervicalgia    Derangement of right sacroiliac joint    Diastolic dysfunction    Memory loss    Toenail deformity    Lumbar foraminal stenosis    Adjustment insomnia    Hallucinations    Driving safety issue    Ceruminosis, bilateral    Other secondary osteoarthritis of both knees       Past Surgical History:   Procedure Laterality Date    AS CABG, ARTERY-VEIN, FIVE  11/02/2011    off pump       Current Outpatient Medications   Medication Sig Dispense Refill    acetaminophen (TYLENOL) 500 MG tablet Take 2 Tablets by mouth two times a day as needed for mild pain 120 tablet 3    ASPIRIN PO Take 325 mg by mouth daily      levothyroxine (SYNTHROID/LEVOTHROID) 175 MCG tablet Take 1 tablet (175 mcg) by mouth every morning (before breakfast). 90 tablet 1    metoprolol succinate ER (TOPROL XL) 50 MG 24 hr tablet Take 1 Tablet by mouth one time a day. 90 tablet 2    mirtazapine (REMERON) 15 MG tablet Take 1 tablet (15 mg) by mouth at bedtime. 30 tablet 1    mirtazapine (REMERON) 7.5 MG tablet Take 2 tablets (15 mg) by mouth at bedtime. 60 tablet 0    nitroGLYcerin (NITROSTAT) 0.4 MG sublingual tablet For chest pain place 1 tablet under the tongue every 5 minutes for 3 doses. If symptoms persist 5 minutes after 1st dose call 911. 25 tablet 0    QUEtiapine (SEROQUEL) 25 MG tablet Take 0.5-1 tablets (12.5-25 mg) by mouth at bedtime. 30 tablet 1    vitamin D3 (CHOLECALCIFEROL) 50 mcg (2000 units) tablet Take by mouth daily       No current facility-administered medications for this visit.        No Known Allergies    Family History   Problem Relation Age of Onset    Dementia Mother     Cerebrovascular Disease Father     Coronary Artery Disease Father     Coronary Artery Disease Sister     Lung Cancer Sister     Thyroid Disease Daughter        Social History      Socioeconomic History    Marital status:      Spouse name: Luis    Number of children: 5    Years of education: 12    Highest education level: Not on file   Occupational History    Occupation:      Employer: RETIRED   Social Needs    Financial resource strain: Not on file    Food insecurity:     Worry: Not on file     Inability: Not on file    Transportation needs:     Medical: Not on file     Non-medical: Not on file   Tobacco Use    Smoking status: Never Smoker    Smokeless tobacco: Never Used    Tobacco comment: second hand smoke in the house 40 yrs   Substance and Sexual Activity    Alcohol use: No     Alcohol/week: 0.0 oz    Drug use: No    Sexual activity: Never     Partners: Female   Lifestyle    Physical activity:     Days per week: Not on file     Minutes per session: Not on file    Stress: Not on file   Relationships    Social connections:     Talks on phone: Not on file     Gets together: Not on file     Attends Advent service: Not on file     Active member of club or organization: Not on file     Attends meetings of clubs or organizations: Not on file     Relationship status: Not on file    Intimate partner violence:     Fear of current or ex partner: Not on file     Emotionally abused: Not on file     Physically abused: Not on file     Forced sexual activity: Not on file   Other Topics Concern     Service No    Blood Transfusions Yes     Comment: Ok to recieve     Caffeine Concern Yes     Comment: 3 cups daily     Occupational Exposure Not Asked    Hobby Hazards Not Asked    Sleep Concern Not Asked    Stress Concern Not Asked    Weight Concern Not Asked    Special Diet Not Asked    Back Care Not Asked    Exercise Yes     Comment: walking,      Bike Helmet Not Asked    Seat Belt Yes    Self-Exams Not Asked    Parent/sibling w/ CABG, MI or angioplasty before 65F 55M? Not Asked   Social History Narrative    Not on file       ROS: 10 point ROS neg other than  the symptoms noted above in the HPI.  EXAM  Constitutional: healthy, alert and no distress    Psychiatric: mentation appears normal and affect normal/bright    VASCULAR:  -Dorsalis pedis pulse +1/4 b/l  -Posterior tibial pulse +1/4 b/l  -Capillary refill time < 3 seconds to b/l hallux  -Hair growth Absent to b/l anterior legs and ankles  NEURO:  -Positive for paresthesias to the bilateral foot  -Epicritic and protective sensation diminished to the bilateral foot  DERM:  -Skin mildly dry, flaking to bilateral digits only (improved to the remainder of the foot)   -Skin temperature within normal limits to the bilateral foot  -Toenails elongated, thickened, dystrophic and discolored with subungual debris x 10  MSK:  -Muscle strength of ankles +5/5 for dorsiflexion, plantarflexion, ABDUction and ADDuction b/l    ============================================================    ASSESSMENT:    (E11.42) 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)  (primary encounter diagnosis)    (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 Treatment options discussed with no educational barriers noted.    -High risk toenail debridement x 10 toenails without incident    Diabetes Mellitus: Patient's DM is managed by their PCP. The DM appears to be stable. Patient's last HbA1C was 6.8% on 03/26/2025.    -Diabetic Foot Education provided. This included checking the feet daily looking for new new blisters or wounds, wearing shoes at all times when walking including around the house, and avoiding lotion application between the toes. Any sign of infection in the foot warrant's the patient presenting to the ED as soon as possible.    -Patient in agreement with the above treatment plan and all of patient's questions were answered.      Return to clinic 63+ days for diabetic foot exam  and high risk nail debridement        Deidre Mishra, LEONIDASM

## 2025-07-11 ENCOUNTER — TELEPHONE (OUTPATIENT)
Dept: FAMILY MEDICINE | Facility: OTHER | Age: OVER 89
End: 2025-07-11

## 2025-07-11 NOTE — TELEPHONE ENCOUNTER
9:19 AM    Reason for Call: Phone Call    Description: Patient's daughter, Candy (CONSENT to communicate on file) has a question regarding her father's medication. Candy wants a call back from a nurse. She reports that he is doing real good at this time. Please call back to advise.    Was an appointment offered for this call? No  If yes : Appointment type              Date    Preferred method for responding to this message: Telephone Call  What is your phone number ?  [phone]     If we cannot reach you directly, may we leave a detailed response at the number you provided? Yes    Can this message wait until your PCP/provider returns, if available today? Candy Victor

## 2025-07-11 NOTE — TELEPHONE ENCOUNTER
Spoke with Daughter Candy. States the new dose on Remeron is helping patient. Requesting to change appt scheduled 7/17 to August and have medication refilled.    Please advise   NICK CANNON LPN

## 2025-07-14 DIAGNOSIS — R44.1 VISUAL HALLUCINATION: ICD-10-CM

## 2025-07-14 RX ORDER — MIRTAZAPINE 7.5 MG/1
15 TABLET, FILM COATED ORAL AT BEDTIME
Qty: 60 TABLET | Refills: 0 | Status: SHIPPED | OUTPATIENT
Start: 2025-07-14

## 2025-07-14 NOTE — TELEPHONE ENCOUNTER
Reason for call:  Medication      Have you contacted your pharmacy? Yes   If patient has contacted Pharmacy and it has been over 72hrs, continue to #2  Medication mirtazapine (REMERON) 7.5 MG tablet   What Pharmacy do you use? [facility]       (Please note that the turn-around-time for prescriptions is 72 business hours; I am sending your request at this time. SEND TO appropriate Care Team Pool )

## 2025-07-14 NOTE — TELEPHONE ENCOUNTER
Michelle returned call to clinic.     She decided to schedule next available with Dr. Stuart (October) since he is doing so well. She says she will call back if that changes.

## 2025-07-14 NOTE — TELEPHONE ENCOUNTER
Remeron 7.5 mg       Last Written Prescription Date:  06/17/2025  Last Fill Quantity: 60,   # refills: 0  Last Office Visit: 06/02/2025  Future Office visit:    Next 5 appointments (look out 90 days)      Sep 18, 2025 11:15 AM  (Arrive by 11:00 AM)  Return Visit with Deidre Mishra DPM  [facility] ([facility] ) [address] [place] [id number] [phone]     Oct 09, 2025 11:30 AM  (Arrive by 11:15 AM)  Provider Visit with Bisi Stuart MD  [facility] ([facility] ) [address] [place] [id number]  [phone]

## 2025-08-16 DIAGNOSIS — E03.8 OTHER SPECIFIED HYPOTHYROIDISM: ICD-10-CM

## 2025-08-18 RX ORDER — LEVOTHYROXINE SODIUM 175 UG/1
175 TABLET ORAL
Qty: 90 TABLET | Refills: 1 | Status: SHIPPED | OUTPATIENT
Start: 2025-08-18

## 2025-08-19 ENCOUNTER — MEDICAL CORRESPONDENCE (OUTPATIENT)
Dept: HEALTH INFORMATION MANAGEMENT | Facility: CLINIC | Age: OVER 89
End: 2025-08-19

## 2025-08-25 DIAGNOSIS — I10 BENIGN ESSENTIAL HYPERTENSION: ICD-10-CM

## 2025-08-25 DIAGNOSIS — K21.9 GASTROESOPHAGEAL REFLUX DISEASE, UNSPECIFIED WHETHER ESOPHAGITIS PRESENT: ICD-10-CM

## 2025-08-25 DIAGNOSIS — E78.5 HYPERLIPIDEMIA LDL GOAL <70: ICD-10-CM

## 2025-08-25 DIAGNOSIS — E11.65 TYPE 2 DIABETES MELLITUS WITH HYPERGLYCEMIA, WITHOUT LONG-TERM CURRENT USE OF INSULIN (H): ICD-10-CM

## 2025-08-25 RX ORDER — ATORVASTATIN CALCIUM 10 MG/1
10 TABLET, FILM COATED ORAL DAILY
Qty: 90 TABLET | Refills: 2 | OUTPATIENT
Start: 2025-08-25

## 2025-08-25 RX ORDER — FAMOTIDINE 40 MG/1
40 TABLET, FILM COATED ORAL DAILY
Qty: 90 TABLET | Refills: 2 | OUTPATIENT
Start: 2025-08-25

## 2025-08-25 RX ORDER — LISINOPRIL 2.5 MG/1
2.5 TABLET ORAL DAILY
Qty: 90 TABLET | Refills: 2 | OUTPATIENT
Start: 2025-08-25

## 2025-09-01 DIAGNOSIS — M16.11 PRIMARY OSTEOARTHRITIS OF RIGHT HIP: ICD-10-CM

## 2025-09-01 DIAGNOSIS — M17.10 PRIMARY LOCALIZED OSTEOARTHROSIS OF LOWER LEG, UNSPECIFIED LATERALITY: ICD-10-CM

## 2025-09-03 RX ORDER — ACETAMINOPHEN 500 MG
TABLET ORAL
Qty: 120 TABLET | Refills: 0 | Status: SHIPPED | OUTPATIENT
Start: 2025-09-03

## (undated) RX ORDER — TRIAMCINOLONE ACETONIDE 40 MG/ML
INJECTION, SUSPENSION INTRA-ARTICULAR; INTRAMUSCULAR
Status: DISPENSED
Start: 2023-07-21

## (undated) RX ORDER — TRIAMCINOLONE ACETONIDE 40 MG/ML
INJECTION, SUSPENSION INTRA-ARTICULAR; INTRAMUSCULAR
Status: DISPENSED
Start: 2023-11-20

## (undated) RX ORDER — LIDOCAINE HYDROCHLORIDE 10 MG/ML
INJECTION, SOLUTION EPIDURAL; INFILTRATION; INTRACAUDAL; PERINEURAL
Status: DISPENSED
Start: 2024-07-15

## (undated) RX ORDER — LIDOCAINE HYDROCHLORIDE 10 MG/ML
INJECTION, SOLUTION EPIDURAL; INFILTRATION; INTRACAUDAL; PERINEURAL
Status: DISPENSED
Start: 2023-11-20

## (undated) RX ORDER — TRIAMCINOLONE ACETONIDE 40 MG/ML
INJECTION, SUSPENSION INTRA-ARTICULAR; INTRAMUSCULAR
Status: DISPENSED
Start: 2024-07-15

## (undated) RX ORDER — LIDOCAINE HYDROCHLORIDE 10 MG/ML
INJECTION, SOLUTION EPIDURAL; INFILTRATION; INTRACAUDAL; PERINEURAL
Status: DISPENSED
Start: 2024-02-21

## (undated) RX ORDER — LIDOCAINE HYDROCHLORIDE AND EPINEPHRINE 10; 10 MG/ML; UG/ML
INJECTION, SOLUTION INFILTRATION; PERINEURAL
Status: DISPENSED
Start: 2023-07-21